# Patient Record
Sex: FEMALE | Race: ASIAN | NOT HISPANIC OR LATINO
[De-identification: names, ages, dates, MRNs, and addresses within clinical notes are randomized per-mention and may not be internally consistent; named-entity substitution may affect disease eponyms.]

---

## 2017-04-07 PROBLEM — Z00.00 ENCOUNTER FOR PREVENTIVE HEALTH EXAMINATION: Status: ACTIVE | Noted: 2017-04-07

## 2017-04-20 ENCOUNTER — APPOINTMENT (OUTPATIENT)
Dept: ORTHOPEDIC SURGERY | Facility: CLINIC | Age: 82
End: 2017-04-20

## 2017-11-01 ENCOUNTER — OUTPATIENT (OUTPATIENT)
Dept: OUTPATIENT SERVICES | Facility: HOSPITAL | Age: 82
LOS: 1 days | End: 2017-11-01
Payer: MEDICAID

## 2017-11-01 PROCEDURE — G9001: CPT

## 2017-11-28 ENCOUNTER — INPATIENT (INPATIENT)
Facility: HOSPITAL | Age: 82
LOS: 11 days | Discharge: HOME | End: 2017-12-10
Attending: INTERNAL MEDICINE | Admitting: INTERNAL MEDICINE
Payer: MEDICAID

## 2017-11-28 VITALS
HEART RATE: 95 BPM | DIASTOLIC BLOOD PRESSURE: 65 MMHG | OXYGEN SATURATION: 96 % | TEMPERATURE: 99 F | RESPIRATION RATE: 22 BRPM | SYSTOLIC BLOOD PRESSURE: 168 MMHG

## 2017-11-28 DIAGNOSIS — R06.02 SHORTNESS OF BREATH: ICD-10-CM

## 2017-11-28 LAB
ALBUMIN SERPL ELPH-MCNC: 3.5 G/DL — SIGNIFICANT CHANGE UP (ref 3.3–5)
ALP SERPL-CCNC: 48 U/L — SIGNIFICANT CHANGE UP (ref 40–120)
ALT FLD-CCNC: 15 U/L — SIGNIFICANT CHANGE UP (ref 4–33)
APTT BLD: 29.8 SEC — SIGNIFICANT CHANGE UP (ref 27.5–37.4)
AST SERPL-CCNC: 36 U/L — HIGH (ref 4–32)
BASE EXCESS BLDV CALC-SCNC: -7.7 MMOL/L — SIGNIFICANT CHANGE UP
BASE EXCESS BLDV CALC-SCNC: -8.3 MMOL/L — SIGNIFICANT CHANGE UP
BASOPHILS # BLD AUTO: 0.07 K/UL — SIGNIFICANT CHANGE UP (ref 0–0.2)
BASOPHILS NFR BLD AUTO: 0.5 % — SIGNIFICANT CHANGE UP (ref 0–2)
BILIRUB SERPL-MCNC: < 0.2 MG/DL — LOW (ref 0.2–1.2)
BLD GP AB SCN SERPL QL: NEGATIVE — SIGNIFICANT CHANGE UP
BLOOD GAS VENOUS - CREATININE: 2.51 MG/DL — HIGH (ref 0.5–1.3)
BLOOD GAS VENOUS - CREATININE: 2.6 MG/DL — HIGH (ref 0.5–1.3)
BUN SERPL-MCNC: 48 MG/DL — HIGH (ref 7–23)
CALCIUM SERPL-MCNC: 8.5 MG/DL — SIGNIFICANT CHANGE UP (ref 8.4–10.5)
CHLORIDE BLDV-SCNC: 98 MMOL/L — SIGNIFICANT CHANGE UP (ref 96–108)
CHLORIDE BLDV-SCNC: 99 MMOL/L — SIGNIFICANT CHANGE UP (ref 96–108)
CHLORIDE SERPL-SCNC: 95 MMOL/L — LOW (ref 98–107)
CK MB BLD-MCNC: 14.57 NG/ML — HIGH (ref 1–4.7)
CK SERPL-CCNC: 452 U/L — HIGH (ref 25–170)
CO2 SERPL-SCNC: 17 MMOL/L — LOW (ref 22–31)
CREAT SERPL-MCNC: 2.52 MG/DL — HIGH (ref 0.5–1.3)
D DIMER BLD IA.RAPID-MCNC: 388 NG/ML — SIGNIFICANT CHANGE UP
EOSINOPHIL # BLD AUTO: 0.18 K/UL — SIGNIFICANT CHANGE UP (ref 0–0.5)
EOSINOPHIL NFR BLD AUTO: 1.3 % — SIGNIFICANT CHANGE UP (ref 0–6)
GAS PNL BLDV: 124 MMOL/L — LOW (ref 136–146)
GAS PNL BLDV: 125 MMOL/L — LOW (ref 136–146)
GLUCOSE BLDV-MCNC: 102 — HIGH (ref 70–99)
GLUCOSE BLDV-MCNC: 118 — HIGH (ref 70–99)
GLUCOSE SERPL-MCNC: 125 MG/DL — HIGH (ref 70–99)
HCO3 BLDV-SCNC: 17 MMOL/L — LOW (ref 20–27)
HCO3 BLDV-SCNC: 17 MMOL/L — LOW (ref 20–27)
HCT VFR BLD CALC: 21.3 % — LOW (ref 34.5–45)
HCT VFR BLDV CALC: 19.8 % — CRITICAL LOW (ref 34.5–45)
HCT VFR BLDV CALC: 21.8 % — LOW (ref 34.5–45)
HGB BLD-MCNC: 6.3 G/DL — CRITICAL LOW (ref 11.5–15.5)
HGB BLDV-MCNC: 6.3 G/DL — CRITICAL LOW (ref 11.5–15.5)
HGB BLDV-MCNC: 7.1 G/DL — LOW (ref 11.5–15.5)
IMM GRANULOCYTES # BLD AUTO: 0.12 # — SIGNIFICANT CHANGE UP
IMM GRANULOCYTES NFR BLD AUTO: 0.9 % — SIGNIFICANT CHANGE UP (ref 0–1.5)
LACTATE BLDV-MCNC: 1.6 MMOL/L — SIGNIFICANT CHANGE UP (ref 0.5–2)
LACTATE BLDV-MCNC: 2 MMOL/L — SIGNIFICANT CHANGE UP (ref 0.5–2)
LIDOCAIN IGE QN: 39.5 U/L — SIGNIFICANT CHANGE UP (ref 7–60)
LYMPHOCYTES # BLD AUTO: 1.34 K/UL — SIGNIFICANT CHANGE UP (ref 1–3.3)
LYMPHOCYTES # BLD AUTO: 10 % — LOW (ref 13–44)
MCHC RBC-ENTMCNC: 22 PG — LOW (ref 27–34)
MCHC RBC-ENTMCNC: 29.6 % — LOW (ref 32–36)
MCV RBC AUTO: 74.5 FL — LOW (ref 80–100)
MONOCYTES # BLD AUTO: 1.46 K/UL — HIGH (ref 0–0.9)
MONOCYTES NFR BLD AUTO: 10.9 % — SIGNIFICANT CHANGE UP (ref 2–14)
NEUTROPHILS # BLD AUTO: 10.26 K/UL — HIGH (ref 1.8–7.4)
NEUTROPHILS NFR BLD AUTO: 76.4 % — SIGNIFICANT CHANGE UP (ref 43–77)
NRBC # FLD: 0.02 — SIGNIFICANT CHANGE UP
NT-PROBNP SERPL-SCNC: 8170 PG/ML — SIGNIFICANT CHANGE UP
PCO2 BLDV: 40 MMHG — LOW (ref 41–51)
PCO2 BLDV: 46 MMHG — SIGNIFICANT CHANGE UP (ref 41–51)
PH BLDV: 7.23 PH — LOW (ref 7.32–7.43)
PH BLDV: 7.26 PH — LOW (ref 7.32–7.43)
PLATELET # BLD AUTO: 597 K/UL — HIGH (ref 150–400)
PMV BLD: 9.5 FL — SIGNIFICANT CHANGE UP (ref 7–13)
PO2 BLDV: 28 MMHG — LOW (ref 35–40)
PO2 BLDV: 29 MMHG — LOW (ref 35–40)
POTASSIUM BLDV-SCNC: 5.1 MMOL/L — HIGH (ref 3.4–4.5)
POTASSIUM BLDV-SCNC: 6 MMOL/L — HIGH (ref 3.4–4.5)
POTASSIUM SERPL-MCNC: 5.6 MMOL/L — HIGH (ref 3.5–5.3)
POTASSIUM SERPL-SCNC: 5.6 MMOL/L — HIGH (ref 3.5–5.3)
PROT SERPL-MCNC: 6.3 G/DL — SIGNIFICANT CHANGE UP (ref 6–8.3)
RBC # BLD: 2.86 M/UL — LOW (ref 3.8–5.2)
RBC # FLD: 15.9 % — HIGH (ref 10.3–14.5)
RH IG SCN BLD-IMP: POSITIVE — SIGNIFICANT CHANGE UP
RH IG SCN BLD-IMP: POSITIVE — SIGNIFICANT CHANGE UP
SAO2 % BLDV: 38.9 % — LOW (ref 60–85)
SAO2 % BLDV: 39.4 % — LOW (ref 60–85)
SODIUM SERPL-SCNC: 128 MMOL/L — LOW (ref 135–145)
TROPONIN T SERPL-MCNC: < 0.06 NG/ML — SIGNIFICANT CHANGE UP (ref 0–0.06)
TROPONIN T SERPL-MCNC: < 0.06 NG/ML — SIGNIFICANT CHANGE UP (ref 0–0.06)
WBC # BLD: 13.43 K/UL — HIGH (ref 3.8–10.5)
WBC # FLD AUTO: 13.43 K/UL — HIGH (ref 3.8–10.5)

## 2017-11-28 PROCEDURE — 71010: CPT | Mod: 26

## 2017-11-28 RX ORDER — NITROGLYCERIN 6.5 MG
0.4 CAPSULE, EXTENDED RELEASE ORAL ONCE
Qty: 0 | Refills: 0 | Status: COMPLETED | OUTPATIENT
Start: 2017-11-28 | End: 2017-11-28

## 2017-11-28 RX ORDER — SODIUM CHLORIDE 9 MG/ML
3 INJECTION INTRAMUSCULAR; INTRAVENOUS; SUBCUTANEOUS ONCE
Qty: 0 | Refills: 0 | Status: COMPLETED | OUTPATIENT
Start: 2017-11-28 | End: 2017-11-28

## 2017-11-28 RX ORDER — ASPIRIN/CALCIUM CARB/MAGNESIUM 324 MG
162 TABLET ORAL ONCE
Qty: 0 | Refills: 0 | Status: COMPLETED | OUTPATIENT
Start: 2017-11-28 | End: 2017-11-28

## 2017-11-28 RX ORDER — FUROSEMIDE 40 MG
20 TABLET ORAL ONCE
Qty: 0 | Refills: 0 | Status: COMPLETED | OUTPATIENT
Start: 2017-11-28 | End: 2017-11-28

## 2017-11-28 RX ADMIN — SODIUM CHLORIDE 3 MILLILITER(S): 9 INJECTION INTRAMUSCULAR; INTRAVENOUS; SUBCUTANEOUS at 13:52

## 2017-11-28 RX ADMIN — Medication 20 MILLIGRAM(S): at 18:42

## 2017-11-28 RX ADMIN — Medication 162 MILLIGRAM(S): at 13:49

## 2017-11-28 RX ADMIN — Medication 20 MILLIGRAM(S): at 13:56

## 2017-11-28 RX ADMIN — Medication 0.4 MILLIGRAM(S): at 13:50

## 2017-11-28 NOTE — ED PROVIDER NOTE - ATTENDING CONTRIBUTION TO CARE
I, Jennifer Cabot, MD, have performed a history and physical exam of the patient and discussed their management with the resident. I reviewed the resident's note and agree with the documented findings and plan of care. My medical decision making and observations are found above.    Cabot: 87F with PMH of HTN who comes in with 1 month of progressive SOB, dry cough, BLE pitting edema, and today, CP.  No F/C/sputum.  No recent travel, prior PE/DVT.  No urinary sx.  On exam, in mild resp distress, retracting, wheezes throughout, heart sounds normal, abd benign, LEs with 1+ BLE edema.  DDx includes CHF exacerbation, COPD exacerbation, ACS.  Will check basic labs, trops, BNP, EKG, CXR, admit.  Will apply BIPAP and give lasix now.

## 2017-11-28 NOTE — ED ADULT NURSE NOTE - OBJECTIVE STATEMENT
Pt received to main ED room 29, pt primarily gujarati speaking, daughter in law at bedside to translate, pt denies need for  services. Per family at bedside, pt has been weak for 1 month and 15 days ago had started having nausea, vomiting and diarrhea. Pt presents to day with primarily complaints of worsening SOB and swelling and also generalzied body pain, pt stating 'I have pain all over' Pt also reporting dry cough. Pt received awake, alert, oriented x4, denies lightheadedness/dizziness at this time. Pt with respirations  tachypneic and labored, pt arrived with 2L NC, pulse oxygenation 100%. Pt reporting SOB and chest pain in the center of the chest. Pt reporting abdominal pain, denies N/V/D at this time. Pt ambulatory with walker/cane at baseline, denies recent falls, reporting decreased ability to ambulate r/t weakness. Pt received to main ED room 29, pt primarily gujarati speaking, daughter in law at bedside to translate, pt denies need for  services. Per family at bedside, pt has been weak for 1 month and 15 days ago had started having nausea, vomiting and diarrhea. Pt presents today with primarily complaints of worsening SOB and swelling and also generalized body pain, pt stating 'I have pain all over'. Pt received awake, alert, oriented x4, denies lightheadedness/dizziness at this time. Pt with respirations tachypneic and labored, pt arrived with 2L NC in place, pulse oxygenation 100%. Pt reporting SOB and chest pain in the center of the chest. Pt reporting abdominal pain, denies N/V/D at this time. Pt ambulatory with walker/cane at baseline, denies recent falls, reporting decreased ability to ambulate r/t weakness. Pt arrived with 20g PIV in R AC placed by EMS, labs drawn and sent per orders. Pt placed on BiPap by respirations therapist at bedside per Dr Cabot. Cardiac monitor in place. Family remains at bedside. Safety maintained, will continue to monitor.

## 2017-11-28 NOTE — CONSULT NOTE ADULT - SUBJECTIVE AND OBJECTIVE BOX
CHIEF COMPLAINT:    HPI:    PAST MEDICAL & SURGICAL HISTORY:      FAMILY HISTORY:      SOCIAL HISTORY:  Smoking: [x ] Never Smoked   Substance Use: [x ] Never Used [ ] Used ____  EtOH Use:  Marital Status: [ ] Single [ ]  [ ]  [ ]   Sexual History:   Occupation:  Recent Travel: Went to [facility] in 6/2017  Country of Birth: [facility]  Advance Directives:    Allergies    No Known Allergies    Intolerances        HOME MEDICATIONS:    REVIEW OF SYSTEMS:  Constitutional: [ ] negative [ ] fevers [x ] chills [ ] weight loss [ ] weight gain  HEENT: [x ] negative [ ] dry eyes [ ] eye irritation [ ] postnasal drip [ ] nasal congestion  CV: [ ] negative  [ ] chest pain [x ] orthopnea [ ] palpitations [ ] murmur  Resp: [ ] negative [x ] cough [ ] shortness of breath [ ] dyspnea [ ] wheezing [ ] sputum [ ] hemoptysis  GI: [x] negative [ ] nausea [ ] vomiting [ ] diarrhea [ ] constipation [ ] abd pain [ ] dysphagia   : [x ] negative [ ] dysuria [ ] nocturia [ ] hematuria [ ] increased urinary frequency  Musculoskeletal: [x ] negative [ ] back pain [ ] myalgias [ ] arthralgias [ ] fracture  Skin: [x ] negative [ ] rash [ ] itch  Neurological: [x ] negative [ ] headache [ ] dizziness [ ] syncope [ ] weakness [ ] numbness  Psychiatric: [x ] negative [ ] anxiety [ ] depression  Endocrine: [x ] negative [ ] diabetes [ ] thyroid problem  Hematologic/Lymphatic: [ ] negative [ ] anemia [ ] bleeding problem  Allergic/Immunologic: [ ] negative [ ] itchy eyes [ ] nasal discharge [ ] hives [ ] angioedema  [ ] All other systems negative  [ ] Unable to assess ROS because ________    OBJECTIVE:  T(C): 36.4 (28 Nov 2017 17:29), Max: 37.2 (28 Nov 2017 12:46)  T(F): 97.6 (28 Nov 2017 17:29), Max: 98.9 (28 Nov 2017 12:46)  HR: 81 (28 Nov 2017 17:29) (72 - 95)  BP: 179/79 (28 Nov 2017 17:29) (141/52 - 179/79)  RR: 20 (28 Nov 2017 17:29) (19 - 22)  SpO2: 95% (28 Nov 2017 17:29) (95% - 100%)        CAPILLARY BLOOD GLUCOSE          PHYSICAL EXAM:  General: dyspneic with exertion but no nasal flaring or excessive use of intercostal muscles. Orthopneic.  HEENT: ONEIL  Lymph Nodes:  Neck: mild JVD.  Respiratory: decreased breath sounds at bases bilaterally. No wheezes.  Cardiovascular: RRR no m/r/g  Abdomen: S/NT/ND  Extremities: 2+ pitting edema bilaterally. Left > right. Legs are symmetrical.  Skin: No rash  Neurological: NOn-focal  Psychiatry: oriented x 3    HOSPITAL MEDICATIONS:      furosemide   Injectable 20 milliGRAM(s) IV Push once                          LABS:                        6.3    13.43 )-----------( 597      ( 28 Nov 2017 15:29 )             21.3     Hgb Trend: 6.3<--  11-28    128<L>  |  95<L>  |  48<H>  ----------------------------<  125<H>  5.6<H>   |  17<L>  |  2.52<H>    Ca    8.5      28 Nov 2017 15:29    TPro  6.3  /  Alb  3.5  /  TBili  < 0.2<L>  /  DBili  x   /  AST  36<H>  /  ALT  15  /  AlkPhos  48  11-28    Creatinine Trend: 2.52<--  PTT - ( 28 Nov 2017 15:29 )  PTT:29.8 SEC      Venous Blood Gas:  11-28 @ 17:30  7.23/46/29/17/38.9  VBG Lactate: 2.0  Venous Blood Gas:  11-28 @ 15:29  7.26/40/28/17/39.4  VBG Lactate: 1.6      MICROBIOLOGY:     RADIOLOGY:  [x ] Reviewed and interpreted by me: CXR showing b/p pleural effusions with some increased vascular markings.    PULMONARY FUNCTION TESTS:    EKG: CHIEF COMPLAINT: dyspnea    HPI: 87F with HTN, HLD and h/o anemia 2/2 uterine bleeding s/p hysterectomy presents with acute dyspnea of 1 day with chest pain. Patient has had LE swelling for a year. Her medications include Lasix 40mg to use with swelling. She only has swelling to her ankles usually. She has had worsening LE swelling since after thanksgiving for the past 4 days. She denies orthopnea but had some progressive dyspnea and dry cough. No sick contacts, runny nose, fevers. She has not required a blood transfusion since 1983 after the uterine surgery. Denies h/o MI or smoking.    PAST MEDICAL & SURGICAL HISTORY:      FAMILY HISTORY:      SOCIAL HISTORY:  Smoking: [x ] Never Smoked   Substance Use: [x ] Never Used [ ] Used ____  EtOH Use:  Marital Status: [ ] Single [ ]  [ ]  [ ]   Sexual History:   Occupation:  Recent Travel: Went to [facility] in 6/2017  Country of Birth: [facility]  Advance Directives:    Allergies    No Known Allergies    Intolerances        HOME MEDICATIONS:    REVIEW OF SYSTEMS:  Constitutional: [ ] negative [ ] fevers [x ] chills [ ] weight loss [ ] weight gain  HEENT: [x ] negative [ ] dry eyes [ ] eye irritation [ ] postnasal drip [ ] nasal congestion  CV: [ ] negative  [ ] chest pain [x ] orthopnea [ ] palpitations [ ] murmur  Resp: [ ] negative [x ] cough [ ] shortness of breath [ ] dyspnea [ ] wheezing [ ] sputum [ ] hemoptysis  GI: [x] negative [ ] nausea [ ] vomiting [ ] diarrhea [ ] constipation [ ] abd pain [ ] dysphagia   : [x ] negative [ ] dysuria [ ] nocturia [ ] hematuria [ ] increased urinary frequency  Musculoskeletal: [x ] negative [ ] back pain [ ] myalgias [ ] arthralgias [ ] fracture  Skin: [x ] negative [ ] rash [ ] itch  Neurological: [x ] negative [ ] headache [ ] dizziness [ ] syncope [ ] weakness [ ] numbness  Psychiatric: [x ] negative [ ] anxiety [ ] depression  Endocrine: [x ] negative [ ] diabetes [ ] thyroid problem  Hematologic/Lymphatic: [ ] negative [ ] anemia [ ] bleeding problem  Allergic/Immunologic: [ ] negative [ ] itchy eyes [ ] nasal discharge [ ] hives [ ] angioedema  [ ] All other systems negative  [ ] Unable to assess ROS because ________    OBJECTIVE:  T(C): 36.4 (28 Nov 2017 17:29), Max: 37.2 (28 Nov 2017 12:46)  T(F): 97.6 (28 Nov 2017 17:29), Max: 98.9 (28 Nov 2017 12:46)  HR: 81 (28 Nov 2017 17:29) (72 - 95)  BP: 179/79 (28 Nov 2017 17:29) (141/52 - 179/79)  RR: 20 (28 Nov 2017 17:29) (19 - 22)  SpO2: 95% (28 Nov 2017 17:29) (95% - 100%)        CAPILLARY BLOOD GLUCOSE          PHYSICAL EXAM:  General: dyspneic with exertion but no nasal flaring or excessive use of intercostal muscles. Orthopneic.  HEENT: ONEIL  Lymph Nodes:  Neck: mild JVD.  Respiratory: decreased breath sounds at bases bilaterally. No wheezes.  Cardiovascular: RRR no m/r/g  Abdomen: S/NT/ND  Extremities: 2+ pitting edema bilaterally. Left > right. Legs are symmetrical.  Skin: No rash  Neurological: NOn-focal  Psychiatry: oriented x 3    HOSPITAL MEDICATIONS:      furosemide   Injectable 20 milliGRAM(s) IV Push once      LABS:                        6.3    13.43 )-----------( 597      ( 28 Nov 2017 15:29 )             21.3     Hgb Trend: 6.3<--  11-28    128<L>  |  95<L>  |  48<H>  ----------------------------<  125<H>  5.6<H>   |  17<L>  |  2.52<H>    Ca    8.5      28 Nov 2017 15:29    TPro  6.3  /  Alb  3.5  /  TBili  < 0.2<L>  /  DBili  x   /  AST  36<H>  /  ALT  15  /  AlkPhos  48  11-28    Creatinine Trend: 2.52<--  PTT - ( 28 Nov 2017 15:29 )  PTT:29.8 SEC      Venous Blood Gas:  11-28 @ 17:30  7.23/46/29/17/38.9  VBG Lactate: 2.0  Venous Blood Gas:  11-28 @ 15:29  7.26/40/28/17/39.4  VBG Lactate: 1.6      MICROBIOLOGY:     RADIOLOGY:  [x ] Reviewed and interpreted by me: CXR showing b/p pleural effusions with some increased vascular markings.    PULMONARY FUNCTION TESTS:    EKG:

## 2017-11-28 NOTE — ED PROVIDER NOTE - PROGRESS NOTE DETAILS
spoke with pt's primary medical doctor Dr. Villagomez who states patient has had diarrhea recently as well. Cardiac enzymes negative.  BNP ~8000.  Bedside cardiac ultrasound showed good squeeze, no LVH.  +BL simple pleural effusions at bases.  Pulm consulted, no plans for pigtail placement given pt's stable respiratory status and likely well compensated status.  Will admit to tele for further diuresis.

## 2017-11-28 NOTE — ED PROVIDER NOTE - OBJECTIVE STATEMENT
87F with past medical history Hypertension presents with 1 mo h/o progressive shortness of breath and BL LE edema and 1d h/o acute worsening shortness of breath with constant substernal chest pressure with no radiation.  C/o dry cough as well.  Pt's daughter states she thinks patient is swollen all over and complaining of diffuse pain.  Denies fever, chills, abdominal pain, calf pain, h/o vte or recent travel, h/o Coronary Artery Disease, CHF or COPD.  No smoking history. No

## 2017-11-28 NOTE — ED ADULT TRIAGE NOTE - CHIEF COMPLAINT QUOTE
Pt co sob and chest pain  x 2 days pt with facial edema and leg edema. Pt was hypoxic per EMS . Pt placed on 2LNC. 02 sat improved. Pt was given 2 nitro sprays and 2 baby asa.

## 2017-11-28 NOTE — CONSULT NOTE ADULT - ASSESSMENT
87F with HTN, HLD and presenting with Dyspnea and chest pain found to have b/l LE swelling and b/l pleural effusions, anemia concerning for heart failure. Patient now s/p 20mg IV lasix and nitroglycerine. She is now off BIPAP to room air with saturation of 94%.    - Bedside echo showing A-lines anteriorly with B lines posteriorly and b/l moderate simple pleural effusions.  - Slight pericardial effusion with normal RV/LV size and grossly normal LV function.  - Continue Lasix 20mg IV daily  - Patient improved significantly after Lasix diuresis and blood pressure control. Please keep sbp < 160.  - No thoracentesis indicated at this time.  - Follow up  BMP and proBNP and troponins.  - Check TSH, A1c, reticulocyte count and Iron studies.  - She will need a TTE to look for possible diastolic or valvular heart failure. 87F with HTN, HLD and presenting with Dyspnea and chest pain found to have b/l LE swelling and b/l pleural effusions, anemia concerning for heart failure. Patient now s/p 20mg IV lasix and nitroglycerine. She is now off BIPAP to room air with saturation of 94%.    - Bedside echo showing A-lines anteriorly with B lines posteriorly and b/l moderate simple pleural effusions.  - Slight pericardial effusion with normal RV/LV size and grossly normal LV function.  - Continue Lasix 20mg IV daily  - Patient improved significantly after Lasix diuresis and blood pressure control. Please keep sbp < 160.  - No thoracentesis indicated at this time.  - Follow up  BMP and proBNP and troponins to r/o underlying CAD or ACS.  - Check TSH, A1c, reticulocyte count and Iron studies.  - She will need a TTE to look for possible diastolic or valvular heart failure. Telemetry monitoring.  - May use supplemental oxygen to keep sats > 90% 87F with HTN, HLD and presenting with Dyspnea and chest pain found to have b/l LE swelling and b/l pleural effusions, anemia concerning for heart failure. Patient now s/p 20mg IV lasix and nitroglycerine. She is now off BIPAP to room air with saturation of 94%.    - Bedside echo showing A-lines anteriorly with B lines posteriorly and b/l moderate simple pleural effusions.  - Slight pericardial effusion with normal RV/LV size and grossly normal LV function.  - Continue Lasix 20mg IV daily  - Check RVP.  - Patient improved significantly after Lasix diuresis and blood pressure control. Please keep sbp < 160.  - No thoracentesis indicated at this time.  - Follow up  BMP and proBNP and troponins to r/o underlying CAD or ACS.  - Check TSH, A1c, reticulocyte count and Iron studies.  - She will need a TTE to look for possible diastolic or valvular heart failure. Telemetry monitoring.  - May use supplemental oxygen to keep sats > 90% 87F with HTN, HLD and presenting with Dyspnea and chest pain found to have b/l LE swelling and b/l pleural effusions, anemia concerning for heart failure. Patient now s/p 20mg IV lasix and nitroglycerine. She is now off BIPAP to room air with saturation of 94%.    - Bedside echo showing A-lines anteriorly with B lines posteriorly and b/l moderate simple pleural effusions.  - Slight pericardial effusion with normal RV/LV size and grossly normal LV function.  - Continue Lasix 20mg IV daily  - Check RVP.  - Patient improved significantly after Lasix diuresis and blood pressure control. Please keep sbp < 160.  - No thoracentesis indicated at this time.  - Follow up  BMP and proBNP and troponins to r/o underlying CAD or ACS.  - Check TSH, A1c, reticulocyte count and Iron studies. ER to give 1 unit PRBC. please give Lasix 20mg IV extra with transfusion.  - She will need a TTE to look for possible diastolic or valvular heart failure. Telemetry monitoring.  - May use supplemental oxygen to keep sats > 90%

## 2017-11-28 NOTE — ED PROVIDER NOTE - MEDICAL DECISION MAKING DETAILS
Cabot: 87F with PMH of HTN who comes in with 1 month of progressive SOB, dry cough, BLE pitting edema, and today, CP.  No F/C/sputum.  No recent travel, prior PE/DVT.  No urinary sx.  On exam, in mild resp distress, retracting, wheezes throughout, heart sounds normal, abd benign, LEs with 1+ BLE edema.  DDx includes CHF exacerbation, COPD exacerbation, ACS.  Will check basic labs, trops, BNP, EKG, CXR, admit.  Will apply BIPAP and give lasix now.

## 2017-11-29 DIAGNOSIS — N17.9 ACUTE KIDNEY FAILURE, UNSPECIFIED: ICD-10-CM

## 2017-11-29 DIAGNOSIS — J90 PLEURAL EFFUSION, NOT ELSEWHERE CLASSIFIED: ICD-10-CM

## 2017-11-29 DIAGNOSIS — I10 ESSENTIAL (PRIMARY) HYPERTENSION: ICD-10-CM

## 2017-11-29 DIAGNOSIS — D64.9 ANEMIA, UNSPECIFIED: ICD-10-CM

## 2017-11-29 DIAGNOSIS — Z29.9 ENCOUNTER FOR PROPHYLACTIC MEASURES, UNSPECIFIED: ICD-10-CM

## 2017-11-29 DIAGNOSIS — Z90.710 ACQUIRED ABSENCE OF BOTH CERVIX AND UTERUS: Chronic | ICD-10-CM

## 2017-11-29 LAB
ANISOCYTOSIS BLD QL: SLIGHT — SIGNIFICANT CHANGE UP
APPEARANCE UR: SIGNIFICANT CHANGE UP
B PERT DNA SPEC QL NAA+PROBE: SIGNIFICANT CHANGE UP
BACTERIA # UR AUTO: HIGH
BASE EXCESS BLDV CALC-SCNC: -7.2 MMOL/L — SIGNIFICANT CHANGE UP
BASOPHILS # BLD AUTO: 0.06 K/UL — SIGNIFICANT CHANGE UP (ref 0–0.2)
BASOPHILS NFR BLD AUTO: 0.6 % — SIGNIFICANT CHANGE UP (ref 0–2)
BILIRUB UR-MCNC: NEGATIVE — SIGNIFICANT CHANGE UP
BLOOD GAS VENOUS - CREATININE: 2.66 MG/DL — HIGH (ref 0.5–1.3)
BLOOD UR QL VISUAL: NEGATIVE — SIGNIFICANT CHANGE UP
BUN SERPL-MCNC: 48 MG/DL — HIGH (ref 7–23)
C PNEUM DNA SPEC QL NAA+PROBE: NOT DETECTED — SIGNIFICANT CHANGE UP
CALCIUM SERPL-MCNC: 9.1 MG/DL — SIGNIFICANT CHANGE UP (ref 8.4–10.5)
CHLORIDE BLDV-SCNC: 98 MMOL/L — SIGNIFICANT CHANGE UP (ref 96–108)
CHLORIDE SERPL-SCNC: 95 MMOL/L — LOW (ref 98–107)
CHLORIDE UR-SCNC: 99 MMOL/L — SIGNIFICANT CHANGE UP
CHOLEST SERPL-MCNC: 148 MG/DL — SIGNIFICANT CHANGE UP (ref 120–199)
CK MB BLD-MCNC: 3.4 — HIGH (ref 0–2.5)
CK MB BLD-MCNC: 9.74 NG/ML — HIGH (ref 1–4.7)
CK SERPL-CCNC: 290 U/L — HIGH (ref 25–170)
CO2 SERPL-SCNC: 18 MMOL/L — LOW (ref 22–31)
COLOR SPEC: SIGNIFICANT CHANGE UP
CREAT SERPL-MCNC: 2.55 MG/DL — HIGH (ref 0.5–1.3)
ELLIPTOCYTES BLD QL SMEAR: SLIGHT — SIGNIFICANT CHANGE UP
EOSINOPHIL # BLD AUTO: 0.37 K/UL — SIGNIFICANT CHANGE UP (ref 0–0.5)
EOSINOPHIL NFR BLD AUTO: 3.4 % — SIGNIFICANT CHANGE UP (ref 0–6)
FERRITIN SERPL-MCNC: 13.62 NG/ML — LOW (ref 15–150)
FLUAV H1 2009 PAND RNA SPEC QL NAA+PROBE: NOT DETECTED — SIGNIFICANT CHANGE UP
FLUAV H1 RNA SPEC QL NAA+PROBE: NOT DETECTED — SIGNIFICANT CHANGE UP
FLUAV H3 RNA SPEC QL NAA+PROBE: NOT DETECTED — SIGNIFICANT CHANGE UP
FLUAV SUBTYP SPEC NAA+PROBE: SIGNIFICANT CHANGE UP
FLUBV RNA SPEC QL NAA+PROBE: NOT DETECTED — SIGNIFICANT CHANGE UP
FOLATE SERPL-MCNC: 9.6 NG/ML — SIGNIFICANT CHANGE UP (ref 4.7–20)
GAS PNL BLDV: 127 MMOL/L — LOW (ref 136–146)
GLUCOSE BLDV-MCNC: 75 — SIGNIFICANT CHANGE UP (ref 70–99)
GLUCOSE SERPL-MCNC: 78 MG/DL — SIGNIFICANT CHANGE UP (ref 70–99)
GLUCOSE UR-MCNC: NEGATIVE — SIGNIFICANT CHANGE UP
HADV DNA SPEC QL NAA+PROBE: NOT DETECTED — SIGNIFICANT CHANGE UP
HBA1C BLD-MCNC: 7.3 % — HIGH (ref 4–5.6)
HCO3 BLDV-SCNC: 18 MMOL/L — LOW (ref 20–27)
HCOV 229E RNA SPEC QL NAA+PROBE: NOT DETECTED — SIGNIFICANT CHANGE UP
HCOV HKU1 RNA SPEC QL NAA+PROBE: NOT DETECTED — SIGNIFICANT CHANGE UP
HCOV NL63 RNA SPEC QL NAA+PROBE: NOT DETECTED — SIGNIFICANT CHANGE UP
HCOV OC43 RNA SPEC QL NAA+PROBE: NOT DETECTED — SIGNIFICANT CHANGE UP
HCT VFR BLD CALC: 25.5 % — LOW (ref 34.5–45)
HCT VFR BLDV CALC: 25 % — LOW (ref 34.5–45)
HDLC SERPL-MCNC: 42 MG/DL — LOW (ref 45–65)
HGB BLD-MCNC: 8.1 G/DL — LOW (ref 11.5–15.5)
HGB BLDV-MCNC: 8 G/DL — LOW (ref 11.5–15.5)
HMPV RNA SPEC QL NAA+PROBE: NOT DETECTED — SIGNIFICANT CHANGE UP
HPIV1 RNA SPEC QL NAA+PROBE: NOT DETECTED — SIGNIFICANT CHANGE UP
HPIV2 RNA SPEC QL NAA+PROBE: NOT DETECTED — SIGNIFICANT CHANGE UP
HPIV3 RNA SPEC QL NAA+PROBE: NOT DETECTED — SIGNIFICANT CHANGE UP
HPIV4 RNA SPEC QL NAA+PROBE: NOT DETECTED — SIGNIFICANT CHANGE UP
HYALINE CASTS # UR AUTO: SIGNIFICANT CHANGE UP (ref 0–?)
HYPOCHROMIA BLD QL: SLIGHT — SIGNIFICANT CHANGE UP
IMM GRANULOCYTES NFR BLD AUTO: 0.6 % — SIGNIFICANT CHANGE UP (ref 0–1.5)
IRON SATN MFR SERPL: 105 UG/DL — SIGNIFICANT CHANGE UP (ref 30–160)
IRON SATN MFR SERPL: 350 UG/DL — SIGNIFICANT CHANGE UP (ref 140–530)
KETONES UR-MCNC: NEGATIVE — SIGNIFICANT CHANGE UP
LACTATE BLDV-MCNC: 1 MMOL/L — SIGNIFICANT CHANGE UP (ref 0.5–2)
LEUKOCYTE ESTERASE UR-ACNC: HIGH
LIPID PNL WITH DIRECT LDL SERPL: 82 MG/DL — SIGNIFICANT CHANGE UP
LYMPHOCYTES # BLD AUTO: 1.75 K/UL — SIGNIFICANT CHANGE UP (ref 1–3.3)
LYMPHOCYTES # BLD AUTO: 16.3 % — SIGNIFICANT CHANGE UP (ref 13–44)
M PNEUMO DNA SPEC QL NAA+PROBE: NOT DETECTED — SIGNIFICANT CHANGE UP
MAGNESIUM SERPL-MCNC: 1.8 MG/DL — SIGNIFICANT CHANGE UP (ref 1.6–2.6)
MCHC RBC-ENTMCNC: 23.6 PG — LOW (ref 27–34)
MCHC RBC-ENTMCNC: 31.8 % — LOW (ref 32–36)
MCV RBC AUTO: 74.3 FL — LOW (ref 80–100)
MICROCYTES BLD QL: SLIGHT — SIGNIFICANT CHANGE UP
MONOCYTES # BLD AUTO: 1.39 K/UL — HIGH (ref 0–0.9)
MONOCYTES NFR BLD AUTO: 12.9 % — SIGNIFICANT CHANGE UP (ref 2–14)
MORPHOLOGY BLD-IMP: SIGNIFICANT CHANGE UP
MUCOUS THREADS # UR AUTO: SIGNIFICANT CHANGE UP
NEUTROPHILS # BLD AUTO: 7.11 K/UL — SIGNIFICANT CHANGE UP (ref 1.8–7.4)
NEUTROPHILS NFR BLD AUTO: 66.2 % — SIGNIFICANT CHANGE UP (ref 43–77)
NITRITE UR-MCNC: NEGATIVE — SIGNIFICANT CHANGE UP
NON-SQ EPI CELLS # UR AUTO: <1 — SIGNIFICANT CHANGE UP
OSMOLALITY UR: 278 MOSMO/KG — SIGNIFICANT CHANGE UP (ref 50–1200)
PCO2 BLDV: 40 MMHG — LOW (ref 41–51)
PH BLDV: 7.29 PH — LOW (ref 7.32–7.43)
PH UR: 6 — SIGNIFICANT CHANGE UP (ref 4.6–8)
PHOSPHATE SERPL-MCNC: 5.3 MG/DL — HIGH (ref 2.5–4.5)
PLATELET # BLD AUTO: 552 K/UL — HIGH (ref 150–400)
PO2 BLDV: 39 MMHG — SIGNIFICANT CHANGE UP (ref 35–40)
POIKILOCYTOSIS BLD QL AUTO: SLIGHT — SIGNIFICANT CHANGE UP
POTASSIUM BLDV-SCNC: 4.6 MMOL/L — HIGH (ref 3.4–4.5)
POTASSIUM SERPL-MCNC: 4.9 MMOL/L — SIGNIFICANT CHANGE UP (ref 3.5–5.3)
POTASSIUM SERPL-SCNC: 4.9 MMOL/L — SIGNIFICANT CHANGE UP (ref 3.5–5.3)
POTASSIUM UR-SCNC: 17 MEQ/L — SIGNIFICANT CHANGE UP
PROT UR-MCNC: 30 — HIGH
PROT UR-MCNC: 47.1 MG/DL — SIGNIFICANT CHANGE UP
RBC # BLD: 3.43 M/UL — LOW (ref 3.8–5.2)
RBC # FLD: 16.3 % — HIGH (ref 10.3–14.5)
RBC CASTS # UR COMP ASSIST: SIGNIFICANT CHANGE UP (ref 0–?)
RSV RNA SPEC QL NAA+PROBE: NOT DETECTED — SIGNIFICANT CHANGE UP
RV+EV RNA SPEC QL NAA+PROBE: NOT DETECTED — SIGNIFICANT CHANGE UP
SAO2 % BLDV: 67.6 % — SIGNIFICANT CHANGE UP (ref 60–85)
SCHISTOCYTES BLD QL AUTO: SLIGHT — SIGNIFICANT CHANGE UP
SODIUM SERPL-SCNC: 129 MMOL/L — LOW (ref 135–145)
SODIUM UR-SCNC: 97 MEQ/L — SIGNIFICANT CHANGE UP
SP GR SPEC: 1.01 — SIGNIFICANT CHANGE UP (ref 1–1.03)
SQUAMOUS # UR AUTO: SIGNIFICANT CHANGE UP
TRANSFERRIN SERPL-MCNC: 260 MG/DL — SIGNIFICANT CHANGE UP (ref 200–360)
TRIGL SERPL-MCNC: 199 MG/DL — HIGH (ref 10–149)
TROPONIN T SERPL-MCNC: < 0.06 NG/ML — SIGNIFICANT CHANGE UP (ref 0–0.06)
TSH SERPL-MCNC: 1.92 UIU/ML — SIGNIFICANT CHANGE UP (ref 0.27–4.2)
UIBC SERPL-MCNC: 245 UG/DL — SIGNIFICANT CHANGE UP (ref 110–370)
UROBILINOGEN FLD QL: NORMAL E.U. — SIGNIFICANT CHANGE UP (ref 0.1–0.2)
UUN UR-MCNC: 144.9 MG/DL — SIGNIFICANT CHANGE UP
VIT B12 SERPL-MCNC: 1740 PG/ML — HIGH (ref 200–900)
WBC # BLD: 10.74 K/UL — HIGH (ref 3.8–10.5)
WBC # FLD AUTO: 10.74 K/UL — HIGH (ref 3.8–10.5)
WBC CLUMPS #/AREA URNS HPF: PRESENT — HIGH (ref 0–?)
WBC UR QL: SIGNIFICANT CHANGE UP (ref 0–?)

## 2017-11-29 PROCEDURE — 99223 1ST HOSP IP/OBS HIGH 75: CPT | Mod: GC

## 2017-11-29 PROCEDURE — 76775 US EXAM ABDO BACK WALL LIM: CPT | Mod: 26

## 2017-11-29 RX ORDER — AMLODIPINE BESYLATE 2.5 MG/1
5 TABLET ORAL
Qty: 0 | Refills: 0 | Status: DISCONTINUED | OUTPATIENT
Start: 2017-11-29 | End: 2017-11-29

## 2017-11-29 RX ORDER — SIMVASTATIN 20 MG/1
40 TABLET, FILM COATED ORAL AT BEDTIME
Qty: 0 | Refills: 0 | Status: DISCONTINUED | OUTPATIENT
Start: 2017-11-29 | End: 2017-12-10

## 2017-11-29 RX ORDER — METOPROLOL TARTRATE 50 MG
100 TABLET ORAL DAILY
Qty: 0 | Refills: 0 | Status: DISCONTINUED | OUTPATIENT
Start: 2017-11-29 | End: 2017-11-30

## 2017-11-29 RX ORDER — FUROSEMIDE 40 MG
20 TABLET ORAL DAILY
Qty: 0 | Refills: 0 | Status: DISCONTINUED | OUTPATIENT
Start: 2017-11-29 | End: 2017-11-30

## 2017-11-29 RX ORDER — SODIUM CHLORIDE 9 MG/ML
3 INJECTION INTRAMUSCULAR; INTRAVENOUS; SUBCUTANEOUS EVERY 8 HOURS
Qty: 0 | Refills: 0 | Status: DISCONTINUED | OUTPATIENT
Start: 2017-11-29 | End: 2017-12-10

## 2017-11-29 RX ORDER — AMLODIPINE BESYLATE 2.5 MG/1
5 TABLET ORAL DAILY
Qty: 0 | Refills: 0 | Status: DISCONTINUED | OUTPATIENT
Start: 2017-11-30 | End: 2017-12-02

## 2017-11-29 RX ORDER — AMLODIPINE BESYLATE 2.5 MG/1
5 TABLET ORAL ONCE
Qty: 0 | Refills: 0 | Status: COMPLETED | OUTPATIENT
Start: 2017-11-29 | End: 2017-11-29

## 2017-11-29 RX ORDER — PANTOPRAZOLE SODIUM 20 MG/1
40 TABLET, DELAYED RELEASE ORAL EVERY 12 HOURS
Qty: 0 | Refills: 0 | Status: DISCONTINUED | OUTPATIENT
Start: 2017-11-29 | End: 2017-12-01

## 2017-11-29 RX ADMIN — AMLODIPINE BESYLATE 5 MILLIGRAM(S): 2.5 TABLET ORAL at 17:11

## 2017-11-29 RX ADMIN — Medication 20 MILLIGRAM(S): at 04:07

## 2017-11-29 RX ADMIN — Medication 100 MILLIGRAM(S): at 10:18

## 2017-11-29 RX ADMIN — PANTOPRAZOLE SODIUM 40 MILLIGRAM(S): 20 TABLET, DELAYED RELEASE ORAL at 17:11

## 2017-11-29 RX ADMIN — PANTOPRAZOLE SODIUM 40 MILLIGRAM(S): 20 TABLET, DELAYED RELEASE ORAL at 06:59

## 2017-11-29 RX ADMIN — SIMVASTATIN 40 MILLIGRAM(S): 20 TABLET, FILM COATED ORAL at 21:49

## 2017-11-29 RX ADMIN — SODIUM CHLORIDE 3 MILLILITER(S): 9 INJECTION INTRAMUSCULAR; INTRAVENOUS; SUBCUTANEOUS at 13:58

## 2017-11-29 RX ADMIN — SODIUM CHLORIDE 3 MILLILITER(S): 9 INJECTION INTRAMUSCULAR; INTRAVENOUS; SUBCUTANEOUS at 21:50

## 2017-11-29 RX ADMIN — SODIUM CHLORIDE 3 MILLILITER(S): 9 INJECTION INTRAMUSCULAR; INTRAVENOUS; SUBCUTANEOUS at 06:59

## 2017-11-29 NOTE — H&P ADULT - HISTORY OF PRESENT ILLNESS
88 y/o F with hx of HTN presents with SOB and worsening b/l LE edema x 1 day. As per daughter, patient has been having gradual onset of SOB x 1 month. SOB became more severe and constant. SOB is present when she is resting as well. Daughter states it was difficult for patient to ambulate secondary to SOB. Patient also has b/l LE edema. SOB was also associated with chest discomfort. Denies fever, chills, cough, falls, LOC, abdominal pain, nausea, vomiting, melena, hematochezia, diarrhea, constipation, melena or calf tenderness.

## 2017-11-29 NOTE — PROGRESS NOTE ADULT - SUBJECTIVE AND OBJECTIVE BOX
CHIEF COMPLAINT:    Interval Events:    REVIEW OF SYSTEMS:  Constitutional: [ ] negative [ ] fevers [ ] chills [ ] weight loss [ ] weight gain  HEENT: [ ] negative [ ] dry eyes [ ] eye irritation [ ] postnasal drip [ ] nasal congestion  CV: [ ] negative  [ ] chest pain [ ] orthopnea [ ] palpitations [ ] murmur  Resp: [ ] negative [ ] cough [ ] shortness of breath [ ] dyspnea [ ] wheezing [ ] sputum [ ] hemoptysis  GI: [ ] negative [ ] nausea [ ] vomiting [ ] diarrhea [ ] constipation [ ] abd pain [ ] dysphagia   : [ ] negative [ ] dysuria [ ] nocturia [ ] hematuria [ ] increased urinary frequency  Musculoskeletal: [ ] negative [ ] back pain [ ] myalgias [ ] arthralgias [ ] fracture  Skin: [ ] negative [ ] rash [ ] itch  Neurological: [ ] negative [ ] headache [ ] dizziness [ ] syncope [ ] weakness [ ] numbness  Psychiatric: [ ] negative [ ] anxiety [ ] depression  Endocrine: [ ] negative [ ] diabetes [ ] thyroid problem  Hematologic/Lymphatic: [ ] negative [ ] anemia [ ] bleeding problem  Allergic/Immunologic: [ ] negative [ ] itchy eyes [ ] nasal discharge [ ] hives [ ] angioedema  [ ] All other systems negative  [ ] Unable to assess ROS because ________    OBJECTIVE:  ICU Vital Signs Last 24 Hrs  T(C): 36.8 (2017 07:03), Max: 37.2 (2017 12:46)  T(F): 98.3 (2017 07:03), Max: 98.9 (2017 12:46)  HR: 85 (2017 07:03) (72 - 95)  BP: 159/65 (2017 07:03) (141/52 - 187/66)  BP(mean): --  ABP: --  ABP(mean): --  RR: 18 (2017 07:03) (17 - 24)  SpO2: 95% (2017 07:03) (95% - 100%)         @ 07:01  -   @ 07:00  --------------------------------------------------------  IN: 0 mL / OUT: 400 mL / NET: -400 mL      CAPILLARY BLOOD GLUCOSE          PHYSICAL EXAM:  General:   HEENT:   Lymph Nodes:  Neck:   Respiratory:   Cardiovascular:   Abdomen:   Extremities:   Skin:   Neurological:  Psychiatry:    HOSPITAL MEDICATIONS:      amLODIPine   Tablet 5 milliGRAM(s) Oral <User Schedule>  furosemide   Injectable 20 milliGRAM(s) IV Push daily  metoprolol succinate  milliGRAM(s) Oral daily    simvastatin 40 milliGRAM(s) Oral at bedtime        pantoprazole  Injectable 40 milliGRAM(s) IV Push every 12 hours              sodium chloride 0.9% lock flush 3 milliLiter(s) IV Push every 8 hours      LABS:                        6.3    13.43 )-----------( 597      ( 2017 15:29 )             21.3     Hgb Trend: 6.3<--      129<L>  |  95<L>  |  48<H>  ----------------------------<  78  4.9   |  18<L>  |  2.55<H>    Ca    9.1      2017 04:20  Phos  5.3       Mg     1.8         TPro  6.3  /  Alb  3.5  /  TBili  < 0.2<L>  /  DBili  x   /  AST  36<H>  /  ALT  15  /  AlkPhos  48      Creatinine Trend: 2.55<--, 2.52<--  PTT - ( 2017 15:29 )  PTT:29.8 SEC  Urinalysis Basic - ( 2017 07:45 )    Color: PLYEL / Appearance: HAZY / S.007 / pH: 6.0  Gluc: NEGATIVE / Ketone: NEGATIVE  / Bili: NEGATIVE / Urobili: NORMAL E.U.   Blood: NEGATIVE / Protein: 30 / Nitrite: NEGATIVE   Leuk Esterase: LARGE / RBC: 2-5 / WBC 25-50   Sq Epi: OCC / Non Sq Epi: x / Bacteria: MANY        Venous Blood Gas:   @ 04:20  7.29/40/39/18/67.6  VBG Lactate: 1.0  Venous Blood Gas:   @ 17:30  7.23/46/29/17/38.9  VBG Lactate: 2.0  Venous Blood Gas:   @ 15:29  7.26/40/28/17/39.4  VBG Lactate: 1.6      MICROBIOLOGY:     RADIOLOGY:  [ ] Reviewed and interpreted by me    PULMONARY FUNCTION TESTS:    EKG: CHIEF COMPLAINT: chest pain and dyspnea.    Interval Events: improved dyspnea    REVIEW OF SYSTEMS:  Constitutional: [x ] negative [ ] fevers [ ] chills [ ] weight loss [ ] weight gain  HEENT: [x ] negative [ ] dry eyes [ ] eye irritation [ ] postnasal drip [ ] nasal congestion  CV: [ ] negative  [ ] chest pain [x ] orthopnea [ ] palpitations [ ] murmur  Resp: [ ] negative [ ] cough [x ] shortness of breath [ ] dyspnea [ ] wheezing [ ] sputum [ ] hemoptysis  GI: [ x] negative [ ] nausea [ ] vomiting [ ] diarrhea [ ] constipation [ ] abd pain [ ] dysphagia   : [ ] negative [ ] dysuria [ ] nocturia [ ] hematuria [x ] increased urinary frequency  Musculoskeletal: [x ] negative [ ] back pain [ ] myalgias [ ] arthralgias [ ] fracture  Skin: [x ] negative [ ] rash [ ] itch  Neurological: [ x] negative [ ] headache [ ] dizziness [ ] syncope [ ] weakness [ ] numbness  Psychiatric: [x ] negative [ ] anxiety [ ] depression  Endocrine: [ ] negative [ ] diabetes [ ] thyroid problem  Hematologic/Lymphatic: [ ] negative [ ] anemia [ ] bleeding problem  Allergic/Immunologic: [ ] negative [ ] itchy eyes [ ] nasal discharge [ ] hives [ ] angioedema  [ ] All other systems negative  [ ] Unable to assess ROS because ________    OBJECTIVE:  T(C): 36.8 (2017 07:03), Max: 37.2 (2017 12:46)  T(F): 98.3 (2017 07:03), Max: 98.9 (2017 12:46)  HR: 85 (2017 07:03) (72 - 95)  BP: 159/65 (2017 07:03) (141/52 - 187/66)  RR: 18 (2017 07:03) (17 - 24)  SpO2: 95% (2017 07:03) (95% - 100%)         @ 07:01  -   @ 07:00  --------------------------------------------------------  IN: 0 mL / OUT: 400 mL / NET: -400 mL      CAPILLARY BLOOD GLUCOSE          PHYSICAL EXAM:  General: NAD  HEENT: ONEIL  Lymph Nodes:  Neck: No JVD  Respiratory: Diminished breath sounds at bases with few rales.  Cardiovascular: RRR no m/r/g  Abdomen: S/NT/ND  Extremities: 2+ pitting edema  Skin: No rash  Neurological: non focality  Psychiatry: oriented x 3    HOSPITAL MEDICATIONS:      amLODIPine   Tablet 5 milliGRAM(s) Oral <User Schedule>  furosemide   Injectable 20 milliGRAM(s) IV Push daily  metoprolol succinate  milliGRAM(s) Oral daily    simvastatin 40 milliGRAM(s) Oral at bedtime        pantoprazole  Injectable 40 milliGRAM(s) IV Push every 12 hours              sodium chloride 0.9% lock flush 3 milliLiter(s) IV Push every 8 hours      LABS:                        6.3    13.43 )-----------( 597      ( 2017 15:29 )             21.3     Hgb Trend: 6.3<--      129<L>  |  95<L>  |  48<H>  ----------------------------<  78  4.9   |  18<L>  |  2.55<H>    Ca    9.1      2017 04:20  Phos  5.3       Mg     1.8         TPro  6.3  /  Alb  3.5  /  TBili  < 0.2<L>  /  DBili  x   /  AST  36<H>  /  ALT  15  /  AlkPhos  48      Creatinine Trend: 2.55<--, 2.52<--  PTT - ( 2017 15:29 )  PTT:29.8 SEC  Urinalysis Basic - ( 2017 07:45 )    Color: PLYEL / Appearance: HAZY / S.007 / pH: 6.0  Gluc: NEGATIVE / Ketone: NEGATIVE  / Bili: NEGATIVE / Urobili: NORMAL E.U.   Blood: NEGATIVE / Protein: 30 / Nitrite: NEGATIVE   Leuk Esterase: LARGE / RBC: 2-5 / WBC 25-50   Sq Epi: OCC / Non Sq Epi: x / Bacteria: MANY        Venous Blood Gas:   @ 04:20  7.29/40/39/18/67.6  VBG Lactate: 1.0  Venous Blood Gas:   @ 17:30  7.23/46/29/17/38.9  VBG Lactate: 2.0  Venous Blood Gas:   @ 15:29  7.26/40/28/17/39.4  VBG Lactate: 1.6      MICROBIOLOGY:     RADIOLOGY:  [ ] Reviewed and interpreted by me    PULMONARY FUNCTION TESTS:    EKG:

## 2017-11-29 NOTE — PROGRESS NOTE ADULT - ASSESSMENT
87F with HTN, HLD and presenting with Dyspnea and chest pain found to have b/l LE swelling and b/l pleural effusions, anemia concerning for heart failure. Patient now s/p 20mg IV lasix and nitroglycerine. She is now off BIPAP to room air with saturation of 94% but now with + cardiac enzymes (down trending), SHY, possible UTI and elevated pro BNP concerning for ischemic CAD causing heart failure.    - Patient is responding very well to diuresis. She does not need a thoracentesis at this time.  - Please continue cardiac care per primary team.  - We will sign off. Call with questions. 87F with HTN, HLD and presenting with Dyspnea and chest pain found to have b/l LE swelling and b/l pleural effusions, anemia concerning for heart failure. Patient now s/p 20mg IV lasix and nitroglycerine. She is now off BIPAP to room air with saturation of 97% on RA but now with + cardiac enzymes (down trending), SHY, possible UTI and elevated pro BNP concerning for ischemic CAD causing heart failure.    - Patient is responding very well to diuresis. She does not need a thoracentesis at this time.  - Please continue cardiac care per primary team.  - We will sign off. Call with questions.

## 2017-11-29 NOTE — H&P ADULT - PROBLEM SELECTOR PLAN 3
cont amlodipine, metoprolol and lasix S/P 1 unit pRBC  No signs of acute bleeding  protonix IV BID  Iron studies  Consider GI eval  Clear diet for now  stool occult ordered   Monitor H/H

## 2017-11-29 NOTE — CONSULT NOTE ADULT - SUBJECTIVE AND OBJECTIVE BOX
Ryan Perez MD  Interventional Cardiology  [place] Office : [address] [place] [address]. [id number]  Tel:   [place] Office : [address] [facility] [place] [address]  Tel: [phone]  Cell : [phone]    HISTORY OF PRESENT ILLNESS:  88 y/o F with hx of HTN presents with SOB and worsening b/l LE edema x 1 day. As per daughter, patient has been having gradual onset of SOB x 1 month. SOB became more severe and constant. SOB is present when she is resting as well. Daughter states it was difficult for patient to ambulate secondary to SOB. Patient also has b/l LE edema. SOB was also associated with chest discomfort. Denies fever, chills, cough, falls, LOC, abdominal pain, nausea, vomiting, melena, hematochezia, diarrhea, constipation, melena or calf tenderness, no history of heart disease in the past, currently SOB is better      PAST MEDICAL & SURGICAL HISTORY:  Hypertension  History of hysterectomy    	    MEDICATIONS:  furosemide   Injectable 20 milliGRAM(s) IV Push daily  metoprolol succinate  milliGRAM(s) Oral daily          pantoprazole  Injectable 40 milliGRAM(s) IV Push every 12 hours    simvastatin 40 milliGRAM(s) Oral at bedtime        FAMILY HISTORY:        Allergies    No Known Allergies    Intolerances    	      PHYSICAL EXAM:  T(C): 36.7 (11-29-17 @ 17:36), Max: 37 (11-29-17 @ 15:29)  HR: 84 (11-29-17 @ 17:36) (74 - 90)  BP: 176/80 (11-29-17 @ 17:36) (159/65 - 210/76)  RR: 19 (11-29-17 @ 17:36) (17 - 20)  SpO2: 99% (11-29-17 @ 17:36) (95% - 100%)  Wt(kg): --  I&O's Summary    28 Nov 2017 07:01  -  29 Nov 2017 07:00  --------------------------------------------------------  IN: 0 mL / OUT: 400 mL / NET: -400 mL        Appearance: Normal	  HEENT:   Normal oral mucosa, PERRL, EOMI	  Cardiovascular: Normal S1 S2, No JVD, No murmurs, No edema  Respiratory: decreased breath sounds lung bases  Gastrointestinal:  Soft, Non-tender, + BS	  Extremities: 1+ edema b/l    LABS:	 	    CARDIAC MARKERS:      CKMB: 9.74 ng/mL (11-29 @ 04:20)    CKMB Relative Index: 3.4 (11-29 @ 04:20)                            8.1    10.74 )-----------( 552      ( 29 Nov 2017 04:20 )             25.5     11-29    129<L>  |  95<L>  |  48<H>  ----------------------------<  78  4.9   |  18<L>  |  2.55<H>    Ca    9.1      29 Nov 2017 04:20  Phos  5.3     11-29  Mg     1.8     11-29    TPro  6.3  /  Alb  3.5  /  TBili  < 0.2<L>  /  DBili  x   /  AST  36<H>  /  ALT  15  /  AlkPhos  48  11-28    proBNP:   Lipid Profile:   HgA1c: Hemoglobin A1C, Whole Blood: 7.3 % (11-29 @ 04:20)    TSH: Thyroid Stimulating Hormone, Serum: 1.92 uIU/mL (11-29 @ 04:20)      ASSESSMENT/PLAN:

## 2017-11-29 NOTE — H&P ADULT - PROBLEM SELECTOR PLAN 1
Admit to tele  check cbc, bmp, a1c, flp, tsh, trend CE  echo ordered  lasix 20 IV daily  pulm consult appreciated  consider cardio consult  strict I&Os  fluid restriction  f/u MD note Admit to tele  check cbc, bmp, a1c, flp, tsh, trend CE  echo ordered  lasix 20 IV daily  pulm consult appreciated  consider cardio consult  strict I&Os  fluid restriction  Dr. Moreno to call cardiology consult  Discussed with Dr. Moreno  f/u MD note

## 2017-11-29 NOTE — CHART NOTE - NSCHARTNOTEFT_GEN_A_CORE
Notified by RN pt had an episode of 2.2 sec pause on cardiac monitor while pt was asleep. Upon awakening the pt she denied chest pain, sob, palpitations, nausea, vomiting or abdominal pain.  STAT EKG obtained and Showed: NSR @ 82 bpm.  Will cont to monitor the pt.

## 2017-11-29 NOTE — PHYSICAL THERAPY INITIAL EVALUATION ADULT - ADDITIONAL COMMENTS
Follow progress with PT for ROM/MMT and previous level of function, which were unablet o be completed as patient had to use the bathroom.

## 2017-11-29 NOTE — H&P ADULT - NSHPLABSRESULTS_GEN_ALL_CORE
EKG: NSR 93, Flat T in V1  11-28    128<L>  |  95<L>  |  48<H>  ----------------------------<  125<H>  5.6<H>   |  17<L>  |  2.52<H>    Ca    8.5      28 Nov 2017 15:29    TPro  6.3  /  Alb  3.5  /  TBili  < 0.2<L>  /  DBili  x   /  AST  36<H>  /  ALT  15  /  AlkPhos  48  11-28                        6.3    13.43 )-----------( 597      ( 28 Nov 2017 15:29 )             21.3   Serum Pro-Brain Natriuretic Peptide: 8170: ACUTE CONGESTIVE HEART FAILURE is UNLIKELY if NT-proBNP is < 300 pg/mL.    CONSIDER ACUTE CONGESTIVE HEART FAILURE if:    AGE                NT-proBNP RESULT  ---                -------------  < 50 YEARS      >  450 pg/mL  50 - 75 YEARS      >  900 pg/mL  > 75 YEARS      > 1800 pg/mL    All results require clinical correlation.  Consider obtaining a  baseline or "dry" NT-proBNP level when the patient is stabilized,  so that subsequent levels can be compared to that value.  Patients  with recurrent CHF may have elevated NT-proBNP levels.  Acute  failure episodes generally produce levels at least 25% greater  than baseline levels.  The above values are derived from a large  multi-center international study, "KIERSTEN Parks, et al, European  Heart Journal, 2006; 27:330-337."   pg/mL (11.28.17 @ 15:29)  CARDIAC MARKERS ( 28 Nov 2017 20:00 )  x     / < 0.06 ng/mL / x     / x     / x      CARDIAC MARKERS ( 28 Nov 2017 15:29 )  x     / < 0.06 ng/mL / 452 u/L / 14.57 ng/mL / x        < from: Xray Chest 1 View AP/PA (11.28.17 @ 14:16) >    Bilateral pleural effusions and vascular lung bases concomitant   underlying airspace consolidation including pneumonia in the proper   clinical context cannot be excluded.    Clear remaining visualized upper lungs. No pneumothorax.    Cardiac silhouette partially obscured by the bilateral effusions.    Trachea midline.    Generalized osteopenia and spinal degenerative changes.    < end of copied text >

## 2017-11-29 NOTE — H&P ADULT - PROBLEM SELECTOR PLAN 2
S/P 1 unit pRBC  No signs of acute bleeding  protonix IV BID  Iron studies  Consider GI eval  Clear diet for now  stool occult ordered   Monitor H/H No previous Cr to compare  Most likely CKD or SHY on CKD  Will hold losartan/hctz now  Monitor Cr  avoid nephrotoxic meds  Patient was taking naprosyn at home --> AVOID nsaids No previous Cr to compare  Most likely CKD or SHY on CKD  Will hold losartan/hctz now   Renal us ordered UA urine lytes ordered   Monitor Cr  avoid nephrotoxic meds  Patient was taking naprosyn at home --> AVOID nsaids  Dr. Moreno to call Renal consult

## 2017-11-29 NOTE — CONSULT NOTE ADULT - ASSESSMENT
EKG - NSR no STT changes    a/p     1) Shortness of breath - likely secondary to worsening renal function, started on lasix 20mg daily, also pt has h/o uncontrolled HTN, will get 2d echo to access LV function     2) HTN - on toprol 100mg daily, norvasc 5 mg daily added    3) Anemia - s/p transfusion , check guaic MCV 74    4) Acute on chronic renal failure - nephrology on case

## 2017-11-29 NOTE — H&P ADULT - ASSESSMENT
86 y/o F with hx of HTN presents with SOB and worsening b/l LE edema x 1 day. admitted for pleural effusion and anemia

## 2017-11-30 DIAGNOSIS — E87.1 HYPO-OSMOLALITY AND HYPONATREMIA: ICD-10-CM

## 2017-11-30 DIAGNOSIS — R69 ILLNESS, UNSPECIFIED: ICD-10-CM

## 2017-11-30 DIAGNOSIS — D63.1 ANEMIA IN CHRONIC KIDNEY DISEASE: ICD-10-CM

## 2017-11-30 DIAGNOSIS — N39.0 URINARY TRACT INFECTION, SITE NOT SPECIFIED: ICD-10-CM

## 2017-11-30 DIAGNOSIS — R60.0 LOCALIZED EDEMA: ICD-10-CM

## 2017-11-30 DIAGNOSIS — N18.4 CHRONIC KIDNEY DISEASE, STAGE 4 (SEVERE): ICD-10-CM

## 2017-11-30 DIAGNOSIS — I12.9 HYPERTENSIVE CHRONIC KIDNEY DISEASE WITH STAGE 1 THROUGH STAGE 4 CHRONIC KIDNEY DISEASE, OR UNSPECIFIED CHRONIC KIDNEY DISEASE: ICD-10-CM

## 2017-11-30 LAB
BUN SERPL-MCNC: 51 MG/DL — HIGH (ref 7–23)
CALCIUM SERPL-MCNC: 8.8 MG/DL — SIGNIFICANT CHANGE UP (ref 8.4–10.5)
CHLORIDE SERPL-SCNC: 101 MMOL/L — SIGNIFICANT CHANGE UP (ref 98–107)
CO2 SERPL-SCNC: 22 MMOL/L — SIGNIFICANT CHANGE UP (ref 22–31)
CREAT ?TM UR-MCNC: 67.93 MG/DL — SIGNIFICANT CHANGE UP
CREAT SERPL-MCNC: 2.57 MG/DL — HIGH (ref 0.5–1.3)
GLUCOSE BLDC GLUCOMTR-MCNC: 113 MG/DL — HIGH (ref 70–99)
GLUCOSE BLDC GLUCOMTR-MCNC: 193 MG/DL — HIGH (ref 70–99)
GLUCOSE SERPL-MCNC: 97 MG/DL — SIGNIFICANT CHANGE UP (ref 70–99)
HCT VFR BLD CALC: 23.9 % — LOW (ref 34.5–45)
HGB BLD-MCNC: 7.6 G/DL — LOW (ref 11.5–15.5)
MCHC RBC-ENTMCNC: 23.8 PG — LOW (ref 27–34)
MCHC RBC-ENTMCNC: 31.8 % — LOW (ref 32–36)
MCV RBC AUTO: 74.7 FL — LOW (ref 80–100)
NRBC # FLD: 0.02 — SIGNIFICANT CHANGE UP
OSMOLALITY SERPL: 291 MOSMO/KG — SIGNIFICANT CHANGE UP (ref 275–295)
PLATELET # BLD AUTO: 525 K/UL — HIGH (ref 150–400)
PMV BLD: 9.1 FL — SIGNIFICANT CHANGE UP (ref 7–13)
POTASSIUM SERPL-MCNC: 5.2 MMOL/L — SIGNIFICANT CHANGE UP (ref 3.5–5.3)
POTASSIUM SERPL-SCNC: 5.2 MMOL/L — SIGNIFICANT CHANGE UP (ref 3.5–5.3)
PROT UR-MCNC: 109.4 MG/DL — SIGNIFICANT CHANGE UP
RBC # BLD: 3.2 M/UL — LOW (ref 3.8–5.2)
RBC # FLD: 16.4 % — HIGH (ref 10.3–14.5)
SODIUM SERPL-SCNC: 135 MMOL/L — SIGNIFICANT CHANGE UP (ref 135–145)
SPECIMEN SOURCE: SIGNIFICANT CHANGE UP
WBC # BLD: 11.75 K/UL — HIGH (ref 3.8–10.5)
WBC # FLD AUTO: 11.75 K/UL — HIGH (ref 3.8–10.5)

## 2017-11-30 PROCEDURE — 99254 IP/OBS CNSLTJ NEW/EST MOD 60: CPT | Mod: GC

## 2017-11-30 RX ORDER — METOPROLOL TARTRATE 50 MG
50 TABLET ORAL DAILY
Qty: 0 | Refills: 0 | Status: DISCONTINUED | OUTPATIENT
Start: 2017-11-30 | End: 2017-12-02

## 2017-11-30 RX ORDER — FUROSEMIDE 40 MG
40 TABLET ORAL DAILY
Qty: 0 | Refills: 0 | Status: DISCONTINUED | OUTPATIENT
Start: 2017-12-01 | End: 2017-12-01

## 2017-11-30 RX ADMIN — SODIUM CHLORIDE 3 MILLILITER(S): 9 INJECTION INTRAMUSCULAR; INTRAVENOUS; SUBCUTANEOUS at 21:02

## 2017-11-30 RX ADMIN — Medication 100 MILLIGRAM(S): at 06:12

## 2017-11-30 RX ADMIN — PANTOPRAZOLE SODIUM 40 MILLIGRAM(S): 20 TABLET, DELAYED RELEASE ORAL at 06:12

## 2017-11-30 RX ADMIN — PANTOPRAZOLE SODIUM 40 MILLIGRAM(S): 20 TABLET, DELAYED RELEASE ORAL at 17:31

## 2017-11-30 RX ADMIN — SODIUM CHLORIDE 3 MILLILITER(S): 9 INJECTION INTRAMUSCULAR; INTRAVENOUS; SUBCUTANEOUS at 06:11

## 2017-11-30 RX ADMIN — Medication 20 MILLIGRAM(S): at 06:11

## 2017-11-30 RX ADMIN — SIMVASTATIN 40 MILLIGRAM(S): 20 TABLET, FILM COATED ORAL at 19:29

## 2017-11-30 RX ADMIN — AMLODIPINE BESYLATE 5 MILLIGRAM(S): 2.5 TABLET ORAL at 06:11

## 2017-11-30 RX ADMIN — SODIUM CHLORIDE 3 MILLILITER(S): 9 INJECTION INTRAMUSCULAR; INTRAVENOUS; SUBCUTANEOUS at 14:03

## 2017-11-30 NOTE — PROGRESS NOTE ADULT - PROBLEM SELECTOR PLAN 3
S/P 1 unit pRBC  No signs of acute bleeding  protonix IV BID  gi eval  Iron studies  Consider GI eval  Clear diet for now  stool occult ordered   Monitor H/H

## 2017-11-30 NOTE — PROGRESS NOTE ADULT - SUBJECTIVE AND OBJECTIVE BOX
[facility] NEPHROLOGY- CONSULTATION NOTE    87 year old female with history of below presents with SOB and LE edema. Nephrology consulted for elevated Scr.    Patient with prior h/o CKD-4 (Scr 2.71 in 17) thought to be medication induced as per PMD. Patient admits to long standing h/o HTN. Patient denies any h/o DM, nephrolithiasis, hepatitis B, C, HIV, IVDA, tattoos, prior PRBC transfusions, herbal medication use but admits to chronic NSAID use which was discontinued 3 months ago.    Patient during current admission noted to have stable CKD-4 and started on IV lasix for volume overload.    REVIEW OF SYSTEMS:  Gen: no changes in weight  HEENT: no rhinorrhea  Neck: no sore throat  Cards: no chest pain  Resp: + dyspnea improving  GI: no nausea or vomiting or diarrhea  : no dysuria or hematuria  Vascular: + LE edema improving  Derm: no rashes  Neuro: no numbness/tingling    No Known Allergies      Home Medications Reviewed  Hospital Medications:   MEDICATIONS  (STANDING):  amLODIPine   Tablet 5 milliGRAM(s) Oral daily  furosemide   Injectable 20 milliGRAM(s) IV Push daily  metoprolol succinate ER 50 milliGRAM(s) Oral daily  pantoprazole  Injectable 40 milliGRAM(s) IV Push every 12 hours  simvastatin 40 milliGRAM(s) Oral at bedtime  sodium chloride 0.9% lock flush 3 milliLiter(s) IV Push every 8 hours      PAST MEDICAL & SURGICAL HISTORY:  Hypertension  History of hysterectomy      FAMILY HISTORY:  N/C    SOCIAL HISTORY:  Denies toxic substance use     VITALS:  T(F): 97.3 (17 @ 11:26), Max: 98 (17 @ 17:36)  HR: 66 (17 @ 11:26)  BP: 120/47 (17 @ 11:26)  RR: 18 (17 @ 11:26)  SpO2: 100% (17 @ 11:26)  Wt(kg): --     @ 07:01  -   @ 07:00  --------------------------------------------------------  IN: 120 mL / OUT: 700 mL / NET: -580 mL      Height (cm): 157.48 ( @ 20:00)  Weight (kg): 74 ( @ 20:00)  BMI (kg/m2): 29.8 ( @ 20:00)  BSA (m2): 1.75 ( @ 20:00)    PHYSICAL EXAM:  Gen: NAD, calm  HEENT: MMM  Neck: no JVD  Cards: RRR, +S1/S2, no M/G/R  Resp: bibasilar crackles  GI: soft, NT/ND, NABS  : no CVA tenderness  Vascular: no LE edema B/L  Derm: no rashes  Neuro: non-focal    LABS:      135  |  101  |  51<H>  ----------------------------<  97  5.2   |  22  |  2.57<H>    Ca    8.8      2017 07:30  Phos  5.3       Mg     1.8           Creatinine Trend: 2.57 <--, 2.55 <--, 2.52 <--                        7.6    11.75 )-----------( 525      ( 2017 07:30 )             23.9     Urine Studies:  Urinalysis Basic - ( 2017 07:45 )    Color: PLYEL / Appearance: HAZY / S.007 / pH: 6.0  Gluc: NEGATIVE / Ketone: NEGATIVE  / Bili: NEGATIVE / Urobili: NORMAL E.U.   Blood: NEGATIVE / Protein: 30 / Nitrite: NEGATIVE   Leuk Esterase: LARGE / RBC: 2-5 / WBC 25-50   Sq Epi: OCC / Non Sq Epi:  / Bacteria: MANY      Sodium, Random Urine: 97 meq/L ( @ 07:45)  Potassium, Random Urine: 17.0 meq/L ( @ 07:45)  Chloride, Random Urine: 99 mmol/L ( @ 07:45)  Protein, Random Urine: 47.1 mg/dL ( @ 07:45)  Osmolality, Random Urine: 278 mosmo/kg ( @ 07:45)      RADIOLOGY & ADDITIONAL STUDIES:  < from: US Renal (17 @ 12:50) >  IMPRESSION:     Normal renal ultrasound.    < end of copied text >

## 2017-11-30 NOTE — CONSULT NOTE ADULT - ASSESSMENT
86 yo woman with PMH of HTN p/w 3 weeks of myalgias, 10 days of non-productive cough, and 3-4 of SOB. Found to have leukocytosis, b/l pleural effusions, positive UA, anemia, and SHY.     Leukocytosis 2/2 pneumonia vs. UTI vs. pyelonephritis   • WBC trend:  11.74 <-- 10.74 <-- 13.43 ; Afebrile since admission   • UA: collected on 11/29, (+) L.E.,  (–) Nitrites   • Urine Cx:  collected on 11/29, pending    • Blood Cxs:  drawn on 11/29, pending   • RVP negative    • CVA tenderness -- renal U/S without hydronephrosis    Recommendations:   • Would treat for CAP, given leukocytosis and respiratory sxs, which will also cover UTI, even though pt is asx      - 5-7 days of Ceftriaxone 1g Q24h + Azithromycin 500mg Q24h  -OR-  Levaquin 750mg Q24h   • F/U B/UCxs    Will discuss with fellow and attending.      Almas Dodd MD  PGY-1 | Internal Medicine  [phone] / [phone] 88 yo woman with PMH of HTN p/w 3 weeks of myalgias, 10 days of non-productive cough, and 3-4 of SOB. Found to have leukocytosis, b/l pleural effusions, positive UA, anemia, and SHY.    • WBC trend:  11.74 <-- 10.74 <-- 13.43 ; Afebrile since admission   • UA: collected on 11/29, (+) L.E.,  (–) Nitrites   • Urine Cx:  collected on 11/29, pending    • Blood Cxs:  drawn on 11/29, pending   • RVP negative    • Renal U/S without hydronephrosis    Assessment and Plan:   • Leukocytosis improving off ABX.    • Unlikely to be pneumonia given clinical picture and more likely possibility of acute exacerbation of previously undiagnosed CHF.    • Would not treat positive UA, as pt is asymptomatic.    • Possible C diff given recent exposure to Levaquin and diarrhea, but now resolved. Would not test unless diarrhea recurs.   • Will continue to follow pending B/UCx results -- if negative and pt continuing to improve, ID will sign off.    Discussed with fellow and attending.      Almas Dodd MD  PGY-1 | Internal Medicine  [phone] / [phone] 86 yo woman with PMH of HTN p/w 3 weeks of myalgias, 10 days of non-productive cough, and 3-4 of SOB. Found to have leukocytosis, b/l pleural effusions, positive UA, anemia, and SHY.    • WBC trend:  11.74 <-- 10.74 <-- 13.43 ; Afebrile since admission   • UA: collected on 11/29, (+) L.E.,  (–) Nitrites   • Urine Cx:  collected on 11/29, pending    • Blood Cxs:  drawn on 11/29, pending   • RVP negative    • Renal U/S without hydronephrosis    Assessment and Plan:   • Leukocytosis improving off ABX.    • Unlikely to be pneumonia given clinical picture and more likely possibility of acute exacerbation of previously undiagnosed CHF.    • Would not treat positive UA, as pt is asymptomatic.    • Possible C diff given recent exposure to Levaquin and diarrhea, but now resolved. Would not test unless diarrhea recurs.   • Will continue to follow pending B/UCx results -- if negative and pt continuing to improve.    Discussed with fellow and attending.      Almas Dodd MD  PGY-1 | Internal Medicine  [phone] / [phone]

## 2017-11-30 NOTE — PROGRESS NOTE ADULT - SUBJECTIVE AND OBJECTIVE BOX
Ryan Perez MD  Interventional Cardiology  [place] Office : [address] [place] [address]  Tel:   [place] Office : [address] [facility] [place] [address]  Tel: [phone]  Cell : [phone]    Subjective : Pt lying in bed comfortable, not in distress, denies any chest pain, SOB slightly improving  	  MEDICATIONS:  amLODIPine   Tablet 5 milliGRAM(s) Oral daily  furosemide   Injectable 20 milliGRAM(s) IV Push daily  metoprolol succinate ER 50 milliGRAM(s) Oral daily          pantoprazole  Injectable 40 milliGRAM(s) IV Push every 12 hours    simvastatin 40 milliGRAM(s) Oral at bedtime        PHYSICAL EXAM:  T(C): 36.3 (11-30-17 @ 11:26), Max: 36.7 (11-29-17 @ 17:36)  HR: 66 (11-30-17 @ 11:26) (66 - 84)  BP: 120/47 (11-30-17 @ 11:26) (120/47 - 176/80)  RR: 18 (11-30-17 @ 11:26) (12 - 19)  SpO2: 100% (11-30-17 @ 11:26) (99% - 100%)  Wt(kg): --  I&O's Summary    29 Nov 2017 07:01  -  30 Nov 2017 07:00  --------------------------------------------------------  IN: 120 mL / OUT: 700 mL / NET: -580 mL      Height (cm): 157.48 (11-29 @ 20:00)  Weight (kg): 74 (11-29 @ 20:00)  BMI (kg/m2): 29.8 (11-29 @ 20:00)  BSA (m2): 1.75 (11-29 @ 20:00)    Appearance: Normal	  HEENT:   Normal oral mucosa, PERRL, EOMI	  Cardiovascular: Normal S1 S2, No JVD, No murmurs, No edema  Respiratory: Lungs clear to auscultation	  Gastrointestinal:  Soft, Non-tender, + BS	  Extremities: +1 edema b/l                                    7.6    11.75 )-----------( 525      ( 30 Nov 2017 07:30 )             23.9     11-30    135  |  101  |  51<H>  ----------------------------<  97  5.2   |  22  |  2.57<H>    Ca    8.8      30 Nov 2017 07:30  Phos  5.3     11-29  Mg     1.8     11-29      proBNP:   Lipid Profile:   HgA1c:   TSH:

## 2017-11-30 NOTE — PROGRESS NOTE ADULT - PROBLEM SELECTOR PLAN 4
Secondary to volume overload now resolved with diuretic therapy. Monitor serum sodium. Would check guaiac given low ferritin r/o GIB. Patient s/p PRBC earlier today. WIll consider IV iron/RAUL as needed. F/U paraproteinemia work up. Monitor Hb. Would check guaiac given low ferritin r/o GIB. Patient s/p PRBC earlier today. WIll consider IV iron/RAUL as needed. F/U paraproteinemia work up. Repeat ferritin in AM. Monitor Hb.

## 2017-11-30 NOTE — PROGRESS NOTE ADULT - PROBLEM SELECTOR PLAN 3
Improving but patient with bibasilar rales for which would continue with IV lasix for now. Would change lasix to bumex 1 mg daily for better oral absorption on discharge. Monitor UO. Improving but patient with bibasilar rales for which would increase IV lasix to 40 mg IV daily given advanced CKD. Would change lasix to bumex 1 mg PO daily for better oral absorption on discharge. Monitor UO.

## 2017-11-30 NOTE — PROGRESS NOTE ADULT - ASSESSMENT
87 year old female with history of hypertension presents with SOB and LE edema. Nephrology consulted for elevated Scr.

## 2017-11-30 NOTE — PROGRESS NOTE ADULT - ASSESSMENT
EKG - NSR no STT changes    a/p     1) Shortness of breath - likely secondary to worsening renal function, started on lasix 20mg daily, also pt has h/o uncontrolled HTN, will get 2d echo to access LV function     2) HTN - bradycardic sinus in 40's, decrease toprol to 50mg daily, cont norvasc 5 mg     3) Anemia - s/p transfusion , check guaic MCV 74    4) Acute on chronic renal failure - nephrology on case

## 2017-11-30 NOTE — PROGRESS NOTE ADULT - SUBJECTIVE AND OBJECTIVE BOX
chief complaint : shortness of breath         SUBJECTIVE / OVERNIGHT EVENTS: pt denies chest pain, shortness of breath, nausea, vomiting       MEDICATIONS  (STANDING):  amLODIPine   Tablet 5 milliGRAM(s) Oral daily  metoprolol succinate ER 50 milliGRAM(s) Oral daily  pantoprazole  Injectable 40 milliGRAM(s) IV Push every 12 hours  simvastatin 40 milliGRAM(s) Oral at bedtime  sodium chloride 0.9% lock flush 3 milliLiter(s) IV Push every 8 hours    MEDICATIONS  (PRN):        CAPILLARY BLOOD GLUCOSE      POCT Blood Glucose.: 113 mg/dL (2017 17:49)    I&O's Summary    2017 07:  -  2017 07:00  --------------------------------------------------------  IN: 120 mL / OUT: 700 mL / NET: -580 mL    2017 07:  -  2017 22:10  --------------------------------------------------------  IN: 100 mL / OUT: 300 mL / NET: -200 mL        Constitutional: No fever, fatigue  Skin: No rash.  Eyes: No recent vision problems or eye pain.  ENT: No congestion, ear pain, or sore throat.  Cardiovascular: No chest pain or palpation.  Respiratory: No cough, shortness of breath, congestion, or wheezing.  Gastrointestinal: No abdominal pain, nausea, vomiting, or diarrhea.  Genitourinary: No dysuria.  Musculoskeletal: No joint swelling.  Neurologic: No headache.    PHYSICAL EXAM:  GENERAL: NAD  EYES: EOMI, PERRLA  NECK: Supple, No JVD  CHEST/LUNG: dec breath sounds at bases   HEART:  S1 , S2 +  ABDOMEN: soft, bs+  EXTREMITIES:  trace edema  NEUROLOGY: alert awake oriented       LABS:                        7.6    11.75 )-----------( 525      ( 2017 07:30 )             23.9     11-30    135  |  101  |  51<H>  ----------------------------<  97  5.2   |  22  |  2.57<H>    Ca    8.8      2017 07:30  Phos  5.3     11-29  Mg     1.8     11-29        CARDIAC MARKERS ( 2017 04:20 )  x     / < 0.06 ng/mL / 290 u/L / 9.74 ng/mL / x          Urinalysis Basic - ( 2017 07:45 )    Color: PLYEL / Appearance: HAZY / S.007 / pH: 6.0  Gluc: NEGATIVE / Ketone: NEGATIVE  / Bili: NEGATIVE / Urobili: NORMAL E.U.   Blood: NEGATIVE / Protein: 30 / Nitrite: NEGATIVE   Leuk Esterase: LARGE / RBC: 2-5 / WBC 25-50   Sq Epi: OCC / Non Sq Epi: x / Bacteria: MANY        RADIOLOGY & ADDITIONAL TESTS:    Imaging Personally Reviewed:    Consultant(s) Notes Reviewed:      Care Discussed with Consultants/Other Providers:

## 2017-11-30 NOTE — PROGRESS NOTE ADULT - PROBLEM SELECTOR PLAN 5
As per infectious disease. Secondary to volume overload now resolved with diuretic therapy. Monitor serum sodium.

## 2017-11-30 NOTE — CONSULT NOTE ADULT - SUBJECTIVE AND OBJECTIVE BOX
HPI:  86 y/o F with hx of HTN presents with SOB and worsening b/l LE edema x 1 day. As per son (pt is non-English speaking), patient has been having myalgias for a few weeks, cough for 10 days, and SOB for the past 3-4 days with secondary B/L costochondral pain a/w coughing. She does endorse a few days of difficulty urinating last week that was not a/w dysuria, increased urinary frequency, incomplete voiding, foul-smelling urine, cloudy urine, or hematuria. She also reports 10 episodes of non-hematochezic/melenic diarrhea on  that resolved with Imodium and no episodes since. She denies F/C, N/V, CP, abd pain, HA, vision changes. She is A&Ox3. Per her outpatient PCP, Dr. Villagomez, she had a UTI treated outpatient with Levaquin 2 months ago. She denies hospitalization in the past 90 days. She denies sick contacts or recent travel.         PAST MEDICAL & SURGICAL HISTORY:  Hypertension  History of hysterectomy      Allergies  No Known Allergies        ANTIMICROBIALS:      OTHER MEDS: MEDICATIONS  (STANDING):  amLODIPine   Tablet 5 daily  furosemide   Injectable 20 daily  metoprolol succinate ER 50 daily  pantoprazole  Injectable 40 every 12 hours  simvastatin 40 at bedtime      SOCIAL HISTORY:  [ ] etoh [ ] tobacco [ ] former smoker [ ] IVDU    FAMILY HISTORY:      REVIEW OF SYSTEMS  [  ] ROS unobtainable because:    [ X ] All other systems negative except as noted below:	    Constitutional:  [ ] fever [ ] weight loss  Skin:  [ ] rash [ ] phlebitis	  Eyes: [ ] icterus [ ] inflammation	  ENMT: [ ] discharge [ ] thrush [ ] ulcers [ ] exudates  Respiratory: [ X ] dyspnea [ ] hemoptysis [ X ] cough [ ] sputum	  Cardiovascular:  [ ] chest pain [ ] palpitations [ ] edema	  Gastrointestinal:  [ ] nausea [ ] vomiting [ X ] diarrhea [ ] constipation [ ] pain	  Genitourinary:  [ ] dysuria [ ] frequency [ ] hematuria [ ] discharge [ ] flank pain  Musculoskeletal:  [ ] myalgias [ ] arthralgias [ ] arthritis	  Neurological:  [ ] headache [ ] seizures	  Psychiatric:  [ ] anxiety [ ] depression	  Hematology/Lymphatics:  [ ] lymphadenopathy  Endocrine:  [ ] adrenal [ ] thyroid  Allergic/Immunologic:	 [ ] transplant [ ] seasonal    Vital Signs Last 24 Hrs  T(F): 97.9 (11-30-17 @ 06:01), Max: 98.9 (17 @ 12:46)    Vital Signs Last 24 Hrs  HR: 73 (17 @ 06:01) (73 - 90)  BP: 135/68 (17 @ 06:01) (135/68 - 210/76)  RR: 12 (17 @ 06:01)  SpO2: 100% (17 @ 06:01) (96% - 100%)  Wt(kg): --    PHYSICAL EXAM:  General: NAD  HEAD/EYES: anicteric, PERRL, EOMI, hearing grossly intact  ENT:  supple, non-tender  Cardiovascular:  Regular rhythm, normal rate; normal S1, S2, no murmurs  Respiratory:  Bibasilar crackles, otherwise CTA   GI:  soft, non-tender, normal bowel sounds  :  mild B/L CVA tenderness L>R, no suprapubic tenderness  Musculoskeletal:  no synovitis  Neurologic:  grossly non-focal  Skin:  no rash  Lymph: no lymphadenopathy                                  7.6    11.75 )-----------( 525      ( 2017 07:30 )             23.9       11-    135  |  101  |  51<H>  ----------------------------<  97  5.2   |  22  |  2.57<H>    Ca    8.8      2017 07:30  Phos  5.3     -  Mg     1.8         TPro  6.3  /  Alb  3.5  /  TBili  < 0.2<L>  /  DBili  x   /  AST  36<H>  /  ALT  15  /  AlkPhos  48        Urinalysis Basic - ( 2017 07:45 )    Color: PLYEL / Appearance: HAZY / S.007 / pH: 6.0  Gluc: NEGATIVE / Ketone: NEGATIVE  / Bili: NEGATIVE / Urobili: NORMAL E.U.   Blood: NEGATIVE / Protein: 30 / Nitrite: NEGATIVE   Leuk Esterase: LARGE / RBC: 2-5 / WBC 25-50   Sq Epi: OCC / Non Sq Epi: x / Bacteria: MANY        MICROBIOLOGY:        RADIOLOGY:    < from: Xray Chest 1 View AP/PA (17 @ 14:16) >    EXAM:  RAD CHEST AP PA 1 VIEW        PROCEDURE DATE:  2017         INTERPRETATION:  CLINICAL INDICATION: chest pain    EXAM:  Portable upright AP chest from 2017 at 1416. No prior chest x-ray   available at this institution for comparison.    Slightly rotated left.    IMPRESSION:  Bilateral pleural effusions and vascular lung bases concomitant   underlying airspace consolidation including pneumonia in the proper   clinical context cannot be excluded.    Clear remaining visualized upper lungs. No pneumothorax.    Cardiac silhouette partially obscured by the bilateral effusions.    Trachea midline.    Generalized osteopenia and spinal degenerative changes.        YOLANDA BRO M.D., ATTENDING RADIOLOGIST  This document has been electronically signed. 2017  4:43PM                  EXAM:  US KIDNEY(S)        PROCEDURE DATE:  2017         INTERPRETATION:  CLINICAL INFORMATION: Acute renal insufficiency.    COMPARISON: None available.    TECHNIQUE: Sonography of the kidneys and bladder.     FINDINGS:    Right kidney:  8.5 cm. No renal mass, hydronephrosis or calculi.    Left kidney:  8.8 cm. No renal mass, hydronephrosis or calculi.    Urinary bladder: Within normal limits.    Bilateral pleural effusions are incidentally noted.    IMPRESSION:     Normal renal ultrasound.      IBRAHIMA KATZ M.D., ATTENDING RADIOLOGIST  This document has been electronically signed. 2017  1:19PM HPI:  88 y/o F with hx of HTN presents with SOB and worsening b/l LE edema x 1 day. As per son (pt is non-English speaking), patient has been having myalgias for a few weeks, cough for 10 days, and SOB for the past 3-4 days with secondary B/L costochondral pain a/w coughing. She does endorse a few days of difficulty urinating last week that was not a/w dysuria, increased urinary frequency, incomplete voiding, foul-smelling urine, cloudy urine, or hematuria. She also reports 10 episodes of non-hematochezic/melenic diarrhea on  that resolved with Imodium and no episodes since. She denies F/C, N/V, CP, abd pain, HA, vision changes. She is A&Ox3. Per her outpatient PCP, Dr. Villagomez, she had a UTI treated outpatient with Levaquin 2 months ago. She denies hospitalization in the past 90 days. She denies sick contacts or recent travel.         PAST MEDICAL & SURGICAL HISTORY:  Hypertension  History of hysterectomy      Allergies  No Known Allergies        ANTIMICROBIALS:      OTHER MEDS: MEDICATIONS  (STANDING):  amLODIPine   Tablet 5 daily  furosemide   Injectable 20 daily  metoprolol succinate ER 50 daily  pantoprazole  Injectable 40 every 12 hours  simvastatin 40 at bedtime      SOCIAL HISTORY:  [ ] etoh [ ] tobacco [ ] former smoker [ ] IVDU    FAMILY HISTORY:      REVIEW OF SYSTEMS  [  ] ROS unobtainable because:    [ X ] All other systems negative except as noted below:	    Constitutional:  [ ] fever [ ] weight loss  Skin:  [ ] rash [ ] phlebitis	  Eyes: [ ] icterus [ ] inflammation	  ENMT: [ ] discharge [ ] thrush [ ] ulcers [ ] exudates  Respiratory: [ X ] dyspnea [ ] hemoptysis [ X ] cough [ ] sputum	  Cardiovascular:  [ ] chest pain [ ] palpitations [ ] edema	  Gastrointestinal:  [ ] nausea [ ] vomiting [ X ] diarrhea [ ] constipation [ ] pain	  Genitourinary:  [ ] dysuria [ ] frequency [ ] hematuria [ ] discharge [ ] flank pain  Musculoskeletal:  [ ] myalgias [ ] arthralgias [ ] arthritis	  Neurological:  [ ] headache [ ] seizures	  Psychiatric:  [ ] anxiety [ ] depression	  Hematology/Lymphatics:  [ ] lymphadenopathy  Endocrine:  [ ] adrenal [ ] thyroid  Allergic/Immunologic:	 [ ] transplant [ ] seasonal    Vital Signs Last 24 Hrs  T(F): 97.9 (11-30-17 @ 06:01), Max: 98.9 (17 @ 12:46)    Vital Signs Last 24 Hrs  HR: 73 (17 @ 06:01) (73 - 90)  BP: 135/68 (17 @ 06:01) (135/68 - 210/76)  RR: 12 (17 @ 06:01)  SpO2: 100% (17 @ 06:01) (96% - 100%)  Wt(kg): --    PHYSICAL EXAM:  General: NAD  HEAD/EYES: anicteric, PERRL, EOMI, hearing grossly intact  ENT:  supple, non-tender  Cardiovascular:  Regular rhythm, normal rate; normal S1, S2, no murmurs  Respiratory:  Bibasilar crackles, otherwise CTA   GI:  soft, non-tender, normal bowel sounds  :  no CVA tenderness  Musculoskeletal:  no synovitis  Neurologic:  grossly non-focal  Skin:  no rash  Lymph: no lymphadenopathy                                  7.6    11.75 )-----------( 525      ( 2017 07:30 )             23.9           135  |  101  |  51<H>  ----------------------------<  97  5.2   |  22  |  2.57<H>    Ca    8.8      2017 07:30  Phos  5.3       Mg     1.8         TPro  6.3  /  Alb  3.5  /  TBili  < 0.2<L>  /  DBili  x   /  AST  36<H>  /  ALT  15  /  AlkPhos  48        Urinalysis Basic - ( 2017 07:45 )    Color: PLYEL / Appearance: HAZY / S.007 / pH: 6.0  Gluc: NEGATIVE / Ketone: NEGATIVE  / Bili: NEGATIVE / Urobili: NORMAL E.U.   Blood: NEGATIVE / Protein: 30 / Nitrite: NEGATIVE   Leuk Esterase: LARGE / RBC: 2-5 / WBC 25-50   Sq Epi: OCC / Non Sq Epi: x / Bacteria: MANY        MICROBIOLOGY:        RADIOLOGY:    < from: Xray Chest 1 View AP/PA (17 @ 14:16) >    EXAM:  RAD CHEST AP PA 1 VIEW        PROCEDURE DATE:  2017         INTERPRETATION:  CLINICAL INDICATION: chest pain    EXAM:  Portable upright AP chest from 2017 at 1416. No prior chest x-ray   available at this institution for comparison.    Slightly rotated left.    IMPRESSION:  Bilateral pleural effusions and vascular lung bases concomitant   underlying airspace consolidation including pneumonia in the proper   clinical context cannot be excluded.    Clear remaining visualized upper lungs. No pneumothorax.    Cardiac silhouette partially obscured by the bilateral effusions.    Trachea midline.    Generalized osteopenia and spinal degenerative changes.        YOLANDA BRO M.D., ATTENDING RADIOLOGIST  This document has been electronically signed. 2017  4:43PM                  EXAM:  US KIDNEY(S)        PROCEDURE DATE:  2017         INTERPRETATION:  CLINICAL INFORMATION: Acute renal insufficiency.    COMPARISON: None available.    TECHNIQUE: Sonography of the kidneys and bladder.     FINDINGS:    Right kidney:  8.5 cm. No renal mass, hydronephrosis or calculi.    Left kidney:  8.8 cm. No renal mass, hydronephrosis or calculi.    Urinary bladder: Within normal limits.    Bilateral pleural effusions are incidentally noted.    IMPRESSION:     Normal renal ultrasound.      IBRAHIMA KATZ M.D., ATTENDING RADIOLOGIST  This document has been electronically signed. 2017  1:19PM HPI:  86 y/o F with hx of HTN presents with SOB and worsening b/l LE edema x 1 day. As per son (pt is non-English speaking), patient has been having myalgias for a few weeks, cough for 10 days, and SOB for the past 3-4 days with secondary B/L costochondral pain a/w coughing. She does endorse a few days of difficulty urinating last week that was not a/w dysuria, increased urinary frequency, incomplete voiding, foul-smelling urine, cloudy urine, or hematuria. She also reports 10 episodes of non-hematochezic/melenic diarrhea on  that resolved with Imodium and no episodes since. She denies F/C, N/V, CP, abd pain, HA, vision changes. She is A&Ox3. Per her outpatient PCP, Dr. Villagomez, she had a UTI treated outpatient with Levaquin 2 months ago. She denies hospitalization in the past 90 days. She denies sick contacts or recent travel.         PAST MEDICAL & SURGICAL HISTORY:  Hypertension  History of hysterectomy      Allergies  No Known Allergies        ANTIMICROBIALS:      OTHER MEDS: MEDICATIONS  (STANDING):  amLODIPine   Tablet 5 daily  furosemide   Injectable 20 daily  metoprolol succinate ER 50 daily  pantoprazole  Injectable 40 every 12 hours  simvastatin 40 at bedtime      SOCIAL HISTORY:  [x] Negative unless noted below:  [ ] etoh [ ] tobacco [ ] former smoker [ ] IVDU    FAMILY HISTORY:      REVIEW OF SYSTEMS  [  ] ROS unobtainable because:    [ X ] All other systems negative except as noted below:	    Constitutional:  [ ] fever [ ] weight loss  Skin:  [ ] rash [ ] phlebitis	  Eyes: [ ] icterus [ ] inflammation	  ENMT: [ ] discharge [ ] thrush [ ] ulcers [ ] exudates  Respiratory: [ X ] dyspnea [ ] hemoptysis [ X ] cough [ ] sputum	  Cardiovascular:  [ ] chest pain [ ] palpitations [ ] edema	  Gastrointestinal:  [ ] nausea [ ] vomiting [ X ] diarrhea [ ] constipation [ ] pain	  Genitourinary:  [ ] dysuria [ ] frequency [ ] hematuria [ ] discharge [ ] flank pain  Musculoskeletal:  [ ] myalgias [ ] arthralgias [ ] arthritis	  Neurological:  [ ] headache [ ] seizures	  Psychiatric:  [ ] anxiety [ ] depression	  Hematology/Lymphatics:  [ ] lymphadenopathy  Endocrine:  [ ] adrenal [ ] thyroid  Allergic/Immunologic:	 [ ] transplant [ ] seasonal    Vital Signs Last 24 Hrs  T(F): 97.9 (17 @ 06:01), Max: 98.9 (17 @ 12:46)    Vital Signs Last 24 Hrs  HR: 73 (17 @ 06:01) (73 - 90)  BP: 135/68 (17 @ 06:01) (135/68 - 210/76)  RR: 12 (17 @ 06:01)  SpO2: 100% (17 @ 06:01) (96% - 100%)  Wt(kg): --    PHYSICAL EXAM:  General: NAD  HEAD/EYES: anicteric, PERRL, EOMI, hearing grossly intact  ENT:  supple, non-tender  Cardiovascular:  Regular rhythm, normal rate; normal S1, S2, no murmurs  Respiratory:  Bibasilar crackles, otherwise CTA   GI:  soft, non-tender, normal bowel sounds  :  no CVA tenderness  Musculoskeletal:  no synovitis  Neurologic:  grossly non-focal  Skin:  no rash  Lymph: no lymphadenopathy                                  7.6    11.75 )-----------( 525      ( 2017 07:30 )             23.9           135  |  101  |  51<H>  ----------------------------<  97  5.2   |  22  |  2.57<H>    Ca    8.8      2017 07:30  Phos  5.3       Mg     1.8         TPro  6.3  /  Alb  3.5  /  TBili  < 0.2<L>  /  DBili  x   /  AST  36<H>  /  ALT  15  /  AlkPhos  48        Urinalysis Basic - ( 2017 07:45 )    Color: PLYEL / Appearance: HAZY / S.007 / pH: 6.0  Gluc: NEGATIVE / Ketone: NEGATIVE  / Bili: NEGATIVE / Urobili: NORMAL E.U.   Blood: NEGATIVE / Protein: 30 / Nitrite: NEGATIVE   Leuk Esterase: LARGE / RBC: 2-5 / WBC 25-50   Sq Epi: OCC / Non Sq Epi: x / Bacteria: MANY        MICROBIOLOGY:        RADIOLOGY:    < from: Xray Chest 1 View AP/PA (17 @ 14:16) >    EXAM:  RAD CHEST AP PA 1 VIEW        PROCEDURE DATE:  2017         INTERPRETATION:  CLINICAL INDICATION: chest pain    EXAM:  Portable upright AP chest from 2017 at 1416. No prior chest x-ray   available at this institution for comparison.    Slightly rotated left.    IMPRESSION:  Bilateral pleural effusions and vascular lung bases concomitant   underlying airspace consolidation including pneumonia in the proper   clinical context cannot be excluded.    Clear remaining visualized upper lungs. No pneumothorax.    Cardiac silhouette partially obscured by the bilateral effusions.    Trachea midline.    Generalized osteopenia and spinal degenerative changes.        YOLANDA BRO M.D., ATTENDING RADIOLOGIST  This document has been electronically signed. 2017  4:43PM          EXAM:  US KIDNEY(S)        PROCEDURE DATE:  2017         INTERPRETATION:  CLINICAL INFORMATION: Acute renal insufficiency.    COMPARISON: None available.    TECHNIQUE: Sonography of the kidneys and bladder.     FINDINGS:    Right kidney:  8.5 cm. No renal mass, hydronephrosis or calculi.    Left kidney:  8.8 cm. No renal mass, hydronephrosis or calculi.    Urinary bladder: Within normal limits.    Bilateral pleural effusions are incidentally noted.    IMPRESSION:     Normal renal ultrasound.      IBRAHIMA KATZ M.D., ATTENDING RADIOLOGIST  This document has been electronically signed. 2017  1:19PM

## 2017-12-01 LAB
-  AMIKACIN: SIGNIFICANT CHANGE UP
-  AMPICILLIN/SULBACTAM: SIGNIFICANT CHANGE UP
-  AMPICILLIN: SIGNIFICANT CHANGE UP
-  AZTREONAM: SIGNIFICANT CHANGE UP
-  CEFAZOLIN: SIGNIFICANT CHANGE UP
-  CEFEPIME: SIGNIFICANT CHANGE UP
-  CEFOXITIN: SIGNIFICANT CHANGE UP
-  CEFTAZIDIME: SIGNIFICANT CHANGE UP
-  CEFTRIAXONE: SIGNIFICANT CHANGE UP
-  CIPROFLOXACIN: SIGNIFICANT CHANGE UP
-  ERTAPENEM: SIGNIFICANT CHANGE UP
-  GENTAMICIN: SIGNIFICANT CHANGE UP
-  IMIPENEM: SIGNIFICANT CHANGE UP
-  LEVOFLOXACIN: SIGNIFICANT CHANGE UP
-  MEROPENEM: SIGNIFICANT CHANGE UP
-  NITROFURANTOIN: SIGNIFICANT CHANGE UP
-  PIPERACILLIN/TAZOBACTAM: SIGNIFICANT CHANGE UP
-  TIGECYCLINE: SIGNIFICANT CHANGE UP
-  TOBRAMYCIN: SIGNIFICANT CHANGE UP
-  TRIMETHOPRIM/SULFAMETHOXAZOLE: SIGNIFICANT CHANGE UP
BACTERIA UR CULT: SIGNIFICANT CHANGE UP
BUN SERPL-MCNC: 47 MG/DL — HIGH (ref 7–23)
CALCIUM SERPL-MCNC: 8.7 MG/DL — SIGNIFICANT CHANGE UP (ref 8.4–10.5)
CHLORIDE SERPL-SCNC: 100 MMOL/L — SIGNIFICANT CHANGE UP (ref 98–107)
CO2 SERPL-SCNC: 20 MMOL/L — LOW (ref 22–31)
CREAT SERPL-MCNC: 2.35 MG/DL — HIGH (ref 0.5–1.3)
FERRITIN SERPL-MCNC: 16.32 NG/ML — SIGNIFICANT CHANGE UP (ref 15–150)
GLUCOSE BLDC GLUCOMTR-MCNC: 117 MG/DL — HIGH (ref 70–99)
GLUCOSE BLDC GLUCOMTR-MCNC: 118 MG/DL — HIGH (ref 70–99)
GLUCOSE BLDC GLUCOMTR-MCNC: 149 MG/DL — HIGH (ref 70–99)
GLUCOSE BLDC GLUCOMTR-MCNC: 159 MG/DL — HIGH (ref 70–99)
GLUCOSE SERPL-MCNC: 103 MG/DL — HIGH (ref 70–99)
HBV SURFACE AG SER-ACNC: NEGATIVE — SIGNIFICANT CHANGE UP
HCT VFR BLD CALC: 23.9 % — LOW (ref 34.5–45)
HCV AB S/CO SERPL IA: 0.12 S/CO — SIGNIFICANT CHANGE UP
HCV AB SERPL-IMP: SIGNIFICANT CHANGE UP
HGB BLD-MCNC: 7.5 G/DL — LOW (ref 11.5–15.5)
KAPPA FREE LIGHT CHAINS, SERUM: 6.27 MG/DL — HIGH (ref 0.33–1.94)
LAMBDA FREE LIGHT CHAINS, SERUM: 6.01 MG/DL — HIGH (ref 0.57–2.63)
MCHC RBC-ENTMCNC: 23.7 PG — LOW (ref 27–34)
MCHC RBC-ENTMCNC: 31.4 % — LOW (ref 32–36)
MCV RBC AUTO: 75.4 FL — LOW (ref 80–100)
METHOD TYPE: SIGNIFICANT CHANGE UP
NRBC # FLD: 0 — SIGNIFICANT CHANGE UP
ORGANISM # SPEC MICROSCOPIC CNT: SIGNIFICANT CHANGE UP
ORGANISM # SPEC MICROSCOPIC CNT: SIGNIFICANT CHANGE UP
PLATELET # BLD AUTO: 492 K/UL — HIGH (ref 150–400)
PMV BLD: 8.9 FL — SIGNIFICANT CHANGE UP (ref 7–13)
POTASSIUM SERPL-MCNC: 4.6 MMOL/L — SIGNIFICANT CHANGE UP (ref 3.5–5.3)
POTASSIUM SERPL-SCNC: 4.6 MMOL/L — SIGNIFICANT CHANGE UP (ref 3.5–5.3)
PROT SERPL-MCNC: 5.3 G/DL — LOW (ref 6–8.3)
RBC # BLD: 3.17 M/UL — LOW (ref 3.8–5.2)
RBC # FLD: 16.5 % — HIGH (ref 10.3–14.5)
SODIUM SERPL-SCNC: 133 MMOL/L — LOW (ref 135–145)
WBC # BLD: 12.18 K/UL — HIGH (ref 3.8–10.5)
WBC # FLD AUTO: 12.18 K/UL — HIGH (ref 3.8–10.5)

## 2017-12-01 PROCEDURE — 86334 IMMUNOFIX E-PHORESIS SERUM: CPT | Mod: 26

## 2017-12-01 PROCEDURE — 99222 1ST HOSP IP/OBS MODERATE 55: CPT | Mod: GC

## 2017-12-01 PROCEDURE — 84165 PROTEIN E-PHORESIS SERUM: CPT | Mod: 26

## 2017-12-01 PROCEDURE — 93306 TTE W/DOPPLER COMPLETE: CPT | Mod: 26

## 2017-12-01 PROCEDURE — 99232 SBSQ HOSP IP/OBS MODERATE 35: CPT | Mod: GC

## 2017-12-01 RX ORDER — BUMETANIDE 0.25 MG/ML
1 INJECTION INTRAMUSCULAR; INTRAVENOUS DAILY
Qty: 0 | Refills: 0 | Status: DISCONTINUED | OUTPATIENT
Start: 2017-12-02 | End: 2017-12-10

## 2017-12-01 RX ORDER — IRON SUCROSE 20 MG/ML
100 INJECTION, SOLUTION INTRAVENOUS DAILY
Qty: 0 | Refills: 0 | Status: COMPLETED | OUTPATIENT
Start: 2017-12-01 | End: 2017-12-04

## 2017-12-01 RX ORDER — PANTOPRAZOLE SODIUM 20 MG/1
8 TABLET, DELAYED RELEASE ORAL
Qty: 80 | Refills: 0 | Status: DISCONTINUED | OUTPATIENT
Start: 2017-12-01 | End: 2017-12-04

## 2017-12-01 RX ADMIN — PANTOPRAZOLE SODIUM 40 MILLIGRAM(S): 20 TABLET, DELAYED RELEASE ORAL at 05:06

## 2017-12-01 RX ADMIN — IRON SUCROSE 210 MILLIGRAM(S): 20 INJECTION, SOLUTION INTRAVENOUS at 17:55

## 2017-12-01 RX ADMIN — Medication 50 MILLIGRAM(S): at 05:07

## 2017-12-01 RX ADMIN — PANTOPRAZOLE SODIUM 10 MG/HR: 20 TABLET, DELAYED RELEASE ORAL at 14:56

## 2017-12-01 RX ADMIN — Medication 40 MILLIGRAM(S): at 05:06

## 2017-12-01 RX ADMIN — SODIUM CHLORIDE 3 MILLILITER(S): 9 INJECTION INTRAMUSCULAR; INTRAVENOUS; SUBCUTANEOUS at 12:47

## 2017-12-01 RX ADMIN — SIMVASTATIN 40 MILLIGRAM(S): 20 TABLET, FILM COATED ORAL at 21:19

## 2017-12-01 RX ADMIN — AMLODIPINE BESYLATE 5 MILLIGRAM(S): 2.5 TABLET ORAL at 05:07

## 2017-12-01 RX ADMIN — SODIUM CHLORIDE 3 MILLILITER(S): 9 INJECTION INTRAMUSCULAR; INTRAVENOUS; SUBCUTANEOUS at 05:07

## 2017-12-01 RX ADMIN — SODIUM CHLORIDE 3 MILLILITER(S): 9 INJECTION INTRAMUSCULAR; INTRAVENOUS; SUBCUTANEOUS at 21:19

## 2017-12-01 NOTE — PROGRESS NOTE ADULT - ASSESSMENT
EKG - NSR no STT changes    a/p     1) Shortness of breath - likely secondary to worsening renal function, started on lasix 20mg daily, also pt has h/o uncontrolled HTN, will get 2d echo to access LV function     2) HTN - bradycardic sinus in 40's,Toprol decreased to  50mg daily, cont norvasc 5 mg     3) Anemia - s/p transfusion     4) Acute on chronic renal failure - nephrology on case

## 2017-12-01 NOTE — PROGRESS NOTE ADULT - PROBLEM SELECTOR PLAN 3
Improving. Will change lasix 40 mg IV daily to bumex 1 mg PO daily (patient had been on lasix 40 mg daily at home and presented with volume overload). Monitor UO.

## 2017-12-01 NOTE — PROGRESS NOTE ADULT - SUBJECTIVE AND OBJECTIVE BOX
PATIENT: FRANCIA MARTEL   AGE: 86yo   GENDER: Female  WEIGHT:   ALLERGIES: No Known Allergies    CHIEF COMPLAINT:   SOB (29 Nov 2017 03:41)    INTERVAL HPI / OVERNIGHT EVENTS:   No acute events overnight, VS WNL.     MEDICATIONS  amLODIPine   Tablet 5 milliGRAM(s) Oral daily  furosemide   Injectable 40 milliGRAM(s) IV Push daily  metoprolol succinate ER 50 milliGRAM(s) Oral daily  pantoprazole  Injectable 40 milliGRAM(s) IV Push every 12 hours  simvastatin 40 milliGRAM(s) Oral at bedtime  sodium chloride 0.9% lock flush 3 milliLiter(s) IV Push every 8 hours      VITAL SIGNS (last 24 hrs):  T(C): 36.4 (12-01-17 @ 05:07), Max: 36.4 (11-30-17 @ 21:03)  T(F): 97.5 (12-01-17 @ 05:07), Max: 97.5 (11-30-17 @ 21:03)  HR: 93 (12-01-17 @ 05:07) (73 - 93)  BP: 144/53 (12-01-17 @ 05:07) (128/54 - 144/53)  BP(mean): --  RR: 16 (12-01-17 @ 05:07) (16 - 18)  SpO2: 100% (12-01-17 @ 05:07) (100% - 100%)    PHYSICAL EXAM:  GENERAL: NAD  HEENT:  AT/NC; EOMI, PERRLA, conjunctiva and sclera clear; hearing grossly intact  CHEST/LUNG: CTAB, no wheezes, ronchi, rales  HEART: Normal rate, regular rhythm; No murmurs  ABDOMEN: Soft, Nontender, Nondistended, No masses; BS present   MSK/EXTREMITIES:  Grossly normal strength, movement, tone, and ROM  PSYCH: AAOx3  NEUROLOGY: Non focal   SKIN: No rashes or lesions      LABS:                         7.5    12.18 >-----< 492           ( 12-01-17 @ 07:05 )             23.9       133  |  100  |   47  ----------------------< 103    (12-01-17 @ 07:05)     4.6  |  20  |  2.35    Anion Gap: --    Ca   8.7   (12-01-17 @ 07:05)  Mg   x    (12-01-17 @ 07:05)  Phos x    (12-01-17 @ 07:05)       TP 8.7     |  AST x     -------------------------     Alb x     |  ALT x               (12-01-17 @ 07:05)  -------------------------     T-bili x   |  AlkPh x     D-bili x            CAPILLARY BLOOD GLUCOSE  POCT Blood Glucose.: 118 mg/dL (12-01-17 @ 09:06)  POCT Blood Glucose.: 193 mg/dL (11-30-17 @ 21:56)  POCT Blood Glucose.: 113 mg/dL (11-30-17 @ 17:49)      I/O's SUMMARY:    11-30-17 @ 07:01  -  12-01-17 @ 07:00  --------------------------------------------------------  IN: 200 mL / OUT: 1500 mL / NET: -1300 mL    12-01-17 @ 07:01  -  12-01-17 @ 12:45  --------------------------------------------------------  IN: 360 mL / OUT: 200 mL / NET: 160 mL        MICROBIOLOGY:    Culture - Urine (collected 11-29-17 @ 13:25)  Source: URINE MIDSTREAM  Final Report (12-01-17 @ 12:16):    COLONY COUNT: > = 100,000 CFU/ML  Organism: Klebsiella pneumoniae (12-01-17 @ 12:16)  Organism: Klebsiella pneumoniae (12-01-17 @ 12:16)        RADIOLOGY & ADDITIONAL TESTS:

## 2017-12-01 NOTE — DIETITIAN INITIAL EVALUATION ADULT. - PROBLEM SELECTOR PLAN 3
S/P 1 unit pRBC  No signs of acute bleeding  protonix IV BID  Iron studies  Consider GI eval  Clear diet for now  stool occult ordered   Monitor H/H

## 2017-12-01 NOTE — CONSULT NOTE ADULT - SUBJECTIVE AND OBJECTIVE BOX
Chief Complaint:  Patient is a 87y old  Female who presents with a chief complaint of SOB (2017 03:41)      HPI:    Allergies:  No Known Allergies      Home Medications:  · 	furosemide 40 mg oral tablet: 1 tab(s) orally once a day, As Needed, Last Dose Taken:    · 	simvastatin 40 mg oral tablet: 1 tab(s) orally once a day (at bedtime), Last Dose Taken:    · 	losartan-hydrochlorothiazide 100mg-12.5mg oral tablet: 1 tab(s) orally once a day, Last Dose Taken:    · 	metoprolol succinate 100 mg oral tablet, extended release: 1 tab(s) orally once a day, Last Dose Taken:    · 	naproxen 500 mg oral tablet: 1 tab(s) orally 2 times a day, As Needed, Last Dose Taken:    · 	amLODIPine 5 mg oral tablet: 1 tab(s) orally once a day, Last Dose Taken:      Hospital Medications:  amLODIPine   Tablet 5 milliGRAM(s) Oral daily  furosemide   Injectable 40 milliGRAM(s) IV Push daily  metoprolol succinate ER 50 milliGRAM(s) Oral daily  pantoprazole  Injectable 40 milliGRAM(s) IV Push every 12 hours  simvastatin 40 milliGRAM(s) Oral at bedtime  sodium chloride 0.9% lock flush 3 milliLiter(s) IV Push every 8 hours      PMHX/PSHX:  Hypertension  History of hysterectomy      Family history:      Social History:  Lives with daughter  Denies alcohol, drug or smoking use    ROS:     General:  No wt loss, fevers, chills, night sweats, fatigue,   Eyes:  Good vision, no reported pain  ENT:  No sore throat, pain, runny nose, dysphagia  CV:  No pain, palpitations, hypo/hypertension  Resp:  No dyspnea, cough, tachypnea, wheezing  GI:  See HPI  :  No pain, bleeding, incontinence, nocturia  Muscle:  No pain, weakness  Neuro:  No weakness, tingling, memory problems  Psych:  No fatigue, insomnia, mood problems, depression  Endocrine:  No polyuria, polydipsia, cold/heat intolerance  Heme:  No petechiae, ecchymosis, easy bruisability  Skin:  No rash, edema      PHYSICAL EXAM:     GENERAL:  Appears stated age, well-groomed, well-nourished, no distress  HEENT:  NC/AT,  conjunctivae clear and pink,  no JVD  CHEST:  Full & symmetric excursion, no increased effort, breath sounds clear  HEART:  Regular rhythm, S1, S2, no murmur/rub/S3/S4, no abdominal bruit, no edema  ABDOMEN:  Soft, non-tender, non-distended, normoactive bowel sounds,  no masses ,  EXTREMITIES:  no cyanosis,clubbing or edema  SKIN:  No rash/erythema/ecchymoses/petechiae/wounds/abscess/warm/dry  NEURO:  Alert, oriented    Vital Signs:  Vital Signs Last 24 Hrs  T(C): 36.4 (01 Dec 2017 05:07), Max: 36.4 (2017 21:03)  T(F): 97.5 (01 Dec 2017 05:07), Max: 97.5 (2017 21:03)  HR: 93 (01 Dec 2017 05:07) (66 - 93)  BP: 144/53 (01 Dec 2017 05:07) (120/47 - 144/53)  BP(mean): --  RR: 16 (01 Dec 2017 05:07) (16 - 18)  SpO2: 100% (01 Dec 2017 05:07) (100% - 100%)  Daily     Daily Weight in k.4 (01 Dec 2017 05:07)    LABS:                        7.5    12.18 )-----------( 492      ( 01 Dec 2017 07:05 )             23.9     12    133<L>  |  100  |  47<H>  ----------------------------<  103<H>  4.6   |  20<L>  |  2.35<H>    Ca    8.7      01 Dec 2017 07:05    TPro  5.3<L>  /  Alb  x   /  TBili  x   /  DBili  x   /  AST  x   /  ALT  x   /  AlkPhos  x   12-    LIVER FUNCTIONS - ( 01 Dec 2017 07:05 )  Alb: x     / Pro: 5.3 g/dL / ALK PHOS: x     / ALT: x     / AST: x     / GGT: x                   Imaging:  < from: US Renal (17 @ 12:50) >  EXAM:  US KIDNEY(S)        PROCEDURE DATE:  2017         INTERPRETATION:  CLINICAL INFORMATION: Acute renal insufficiency.    COMPARISON: None available.    TECHNIQUE: Sonography of the kidneys and bladder.     FINDINGS:    Right kidney:  8.5 cm. No renal mass, hydronephrosis or calculi.    Left kidney:  8.8 cm. No renal mass, hydronephrosis or calculi.    Urinary bladder: Within normal limits.    Bilateral pleural effusions are incidentally noted.    IMPRESSION:     Normal renal ultrasound.                  IBRAHIMA KATZ M.D., ATTENDING RADIOLOGIST  This document has been electronically signed. 2017  1:19PM        < end of copied text >    < from: Xray Chest 1 View AP/PA (17 @ 14:16) >  EXAM:  RAD CHEST AP PA 1 VIEW        PROCEDURE DATE:  2017         INTERPRETATION:  CLINICAL INDICATION: chest pain    EXAM:  Portable upright AP chest from 2017 at 1416. No prior chest x-ray   available at this institution for comparison.    Slightly rotated left.    IMPRESSION:  Bilateral pleural effusions and vascular lung bases concomitant   underlying airspace consolidation including pneumonia in the proper   clinical context cannot be excluded.    Clear remaining visualized upper lungs. No pneumothorax.    Cardiac silhouette partially obscured by the bilateral effusions.    Trachea midline.    Generalized osteopenia and spinal degenerative changes.                  YOLANDA BRO M.D., ATTENDING RADIOLOGIST  This document has been electronically signed. 2017  4:43PM    < end of copied text > Chief Complaint:  Patient is a 87y old  Female who presents with a chief complaint of SOB (2017 03:41)      HPI:  Patient is an 88yo F with history of HTN who presented to the ED with shortness of breath and b/l LE edema for the last 1 month.  History obtained via [employer]  #[id number].  She reports 2 weeks of abdominal bloating and distention as well.  She was found to have anemia of 6.3 on admission and was given 1 unit of blood.  The patient reports brown bowel movements daily, denies history of blood or black tarry stool.  She has no nausea or vomiting.  She reports some subjective chills at home, no fevers, no weight loss.  She reports having had a colonoscopy 2 years ago but does not know where or the results.  She states her daughter-in-law and son know more information and prefer I talk to them.  Attempted to call the son's number but had to leave a message for call back.      She is currently being followed by cardiology and nephrology, being treated for volume overload.       Allergies:  No Known Allergies      Home Medications:  · 	furosemide 40 mg oral tablet: 1 tab(s) orally once a day, As Needed, Last Dose Taken:    · 	simvastatin 40 mg oral tablet: 1 tab(s) orally once a day (at bedtime), Last Dose Taken:    · 	losartan-hydrochlorothiazide 100mg-12.5mg oral tablet: 1 tab(s) orally once a day, Last Dose Taken:    · 	metoprolol succinate 100 mg oral tablet, extended release: 1 tab(s) orally once a day, Last Dose Taken:    · 	naproxen 500 mg oral tablet: 1 tab(s) orally 2 times a day, As Needed, Last Dose Taken:    · 	amLODIPine 5 mg oral tablet: 1 tab(s) orally once a day, Last Dose Taken:      Hospital Medications:  amLODIPine   Tablet 5 milliGRAM(s) Oral daily  furosemide   Injectable 40 milliGRAM(s) IV Push daily  metoprolol succinate ER 50 milliGRAM(s) Oral daily  pantoprazole  Injectable 40 milliGRAM(s) IV Push every 12 hours  simvastatin 40 milliGRAM(s) Oral at bedtime  sodium chloride 0.9% lock flush 3 milliLiter(s) IV Push every 8 hours      PMHX/PSHX:  Hypertension  History of hysterectomy      Family history:  Patient denies family history of colon cancer, gastric cancer, stomach ulcers     Social History:  Lives with daughter  Denies alcohol, drug or smoking use    ROS:     General:  No wt loss, fevers, chills, night sweats, fatigue,   Eyes:  Good vision, no reported pain  ENT:  No sore throat, pain, runny nose, dysphagia  CV:  No pain, palpitations, hypo/hypertension  Resp:  No dyspnea, cough, tachypnea, wheezing  GI:  See HPI  :  No pain, bleeding, incontinence, nocturia  Muscle:  No pain, weakness  Neuro:  No weakness, tingling, memory problems  Psych:  No fatigue, insomnia, mood problems, depression  Endocrine:  No polyuria, polydipsia, cold/heat intolerance  Heme:  No petechiae, ecchymosis, easy bruisability  Skin:  No rash, edema      PHYSICAL EXAM:     GENERAL:  Appears stated age, well-groomed, well-nourished, no distress  HEENT:  NC/AT,  conjunctivae clear and pink,  no JVD  CHEST:  Full & symmetric excursion, no increased effort, rales in bilateral bases   HEART:  Regular rhythm, S1, S2, no murmur/rub/S3/S4, no abdominal bruit, no edema  ABDOMEN:  Soft, mildly TTP in RUQ and LLQ, non-distended, normoactive bowel sounds,  no masses ,  EXTREMITIES:  no cyanosis,clubbing or edema  SKIN:  No rash  NEURO:  Alert, oriented    Vital Signs:  Vital Signs Last 24 Hrs  T(C): 36.4 (01 Dec 2017 05:07), Max: 36.4 (2017 21:03)  T(F): 97.5 (01 Dec 2017 05:07), Max: 97.5 (2017 21:03)  HR: 93 (01 Dec 2017 05:07) (66 - 93)  BP: 144/53 (01 Dec 2017 05:07) (120/47 - 144/53)  BP(mean): --  RR: 16 (01 Dec 2017 05:07) (16 - 18)  SpO2: 100% (01 Dec 2017 05:07) (100% - 100%)  Daily     Daily Weight in k.4 (01 Dec 2017 05:07)    LABS:                        7.5    12.18 )-----------( 492      ( 01 Dec 2017 07:05 )             23.9     12    133<L>  |  100  |  47<H>  ----------------------------<  103<H>  4.6   |  20<L>  |  2.35<H>    Ca    8.7      01 Dec 2017 07:05    TPro  5.3<L>  /  Alb  x   /  TBili  x   /  DBili  x   /  AST  x   /  ALT  x   /  AlkPhos  x   12-01    LIVER FUNCTIONS - ( 01 Dec 2017 07:05 )  Alb: x     / Pro: 5.3 g/dL / ALK PHOS: x     / ALT: x     / AST: x     / GGT: x                   Imaging:  < from: US Renal (17 @ 12:50) >  EXAM:  US KIDNEY(S)        PROCEDURE DATE:  2017         INTERPRETATION:  CLINICAL INFORMATION: Acute renal insufficiency.    COMPARISON: None available.    TECHNIQUE: Sonography of the kidneys and bladder.     FINDINGS:    Right kidney:  8.5 cm. No renal mass, hydronephrosis or calculi.    Left kidney:  8.8 cm. No renal mass, hydronephrosis or calculi.    Urinary bladder: Within normal limits.    Bilateral pleural effusions are incidentally noted.    IMPRESSION:     Normal renal ultrasound.                  IBRAHIMA KATZ M.D., ATTENDING RADIOLOGIST  This document has been electronically signed. 2017  1:19PM        < end of copied text >    < from: Xray Chest 1 View AP/PA (17 @ 14:16) >  EXAM:  RAD CHEST AP PA 1 VIEW        PROCEDURE DATE:  2017         INTERPRETATION:  CLINICAL INDICATION: chest pain    EXAM:  Portable upright AP chest from 2017 at 1416. No prior chest x-ray   available at this institution for comparison.    Slightly rotated left.    IMPRESSION:  Bilateral pleural effusions and vascular lung bases concomitant   underlying airspace consolidation including pneumonia in the proper   clinical context cannot be excluded.    Clear remaining visualized upper lungs. No pneumothorax.    Cardiac silhouette partially obscured by the bilateral effusions.    Trachea midline.    Generalized osteopenia and spinal degenerative changes.                  YOLANDA BRO M.D., ATTENDING RADIOLOGIST  This document has been electronically signed. 2017  4:43PM    < end of copied text >

## 2017-12-01 NOTE — CONSULT NOTE ADULT - ASSESSMENT
Impression:  88yo F with history of HTN who presents with microcytic anemia.    Problem List:  1) Microcytic anemia - iron studies done after blood transfusion so not reliable.  Concern for 2/2 occult GI Bleed from colon cancer in 88yo patient. Also could be occult bleeding 2/2 angiodysplasias, NSAID gastropathy or ulcer disease, gastric mass.  Could also have primary blood dyscrasia (thalassemia) given microcytosis.   2) Volume overload 2/2 CKD  - getting diuresed  3) HTN  4) Leukocytosis - no clear infection but UA with WBCs/LE    Plan:  - please obtain colonoscopy report from outside colonoscopy 2 years ago  - will discuss with patient's family if they are interested in endoscopic evaluation for anemia - EGD and colonoscopy - would be offered next week if they are interested  - please obtain cardiology clearance for endoscopy  - trend CBC, transfuse to Hgb >7  - would initiate PPI BID Impression:  86yo F with history of HTN who presents with microcytic anemia.    Problem List:  1) Microcytic anemia - iron studies done after blood transfusion so not reliable.  Concern for 2/2 occult GI Bleed from colon cancer in 86yo patient however if had recent negative colonoscopy, this is less likely. Also could be occult bleeding 2/2 NSAID gastropathy given naproxen use, PUD, angiodysplasias, less likely gastric mass.  Could also have primary blood dyscrasia (thalassemia) given microcytosis.   2) Volume overload 2/2 CKD  - getting diuresed  3) HTN  4) Leukocytosis - no clear infection but UA with WBCs/LE    Plan:  - please obtain colonoscopy report from outside colonoscopy 2 years ago  - will tentatively plan for EGD  on Monday for anemia evaluation  - please obtain cardiology clearance for endoscopy  - trend CBC, transfuse to Hgb >7  - would initiate PPI ggt Impression:  88yo F with history of HTN who presents with microcytic anemia.    Problem List:  1) Microcytic anemia - iron studies done after blood transfusion so not reliable.  Concern for 2/2 occult GI Bleeding from colon cancer in 88yo patient however if had recent negative colonoscopy, thus NSAID gastropathy with gastric ulcer is likely given daily use of naproxen, PUD, angiodysplasias, less likely gastric mass.  She may also have primary blood dyscrasia (thalassemia) given microcytosis.   2) Volume overload 2/2 CKD  - getting diuresed  3) HTN  4) Leukocytosis - no clear infection but UA with WBCs/LE    Plan:  - please obtain colonoscopy report from outside colonoscopy 2 years ago  - will tentatively plan for EGD  on Monday for anemia evaluation  - please obtain cardiology clearance for endoscopy  - trend CBC, transfuse to Hgb >7  - would initiate PPI infusion

## 2017-12-01 NOTE — PROGRESS NOTE ADULT - PROBLEM SELECTOR PLAN 4
S/P PRBC and plan for endoscopic evaluation as per GI. F/U paraproteinemia work up. Will give venofer given iron deficiency. Monitor Hb.

## 2017-12-01 NOTE — DIETITIAN INITIAL EVALUATION ADULT. - PROBLEM SELECTOR PLAN 1
Admit to tele  check cbc, bmp, a1c, flp, tsh, trend CE  echo ordered  lasix 20 IV daily  pulm consult appreciated  consider cardio consult  strict I&Os  fluid restriction  Dr. Moreno to call cardiology consult  Discussed with Dr. Moreno  f/u MD note

## 2017-12-01 NOTE — PROGRESS NOTE ADULT - SUBJECTIVE AND OBJECTIVE BOX
chief complaint : shortness of breath         SUBJECTIVE / OVERNIGHT EVENTS: pt denies chest pain, shortness of breath, nausea, vomiting     MEDICATIONS  (STANDING):  amLODIPine   Tablet 5 milliGRAM(s) Oral daily  iron sucrose IVPB 100 milliGRAM(s) IV Intermittent daily  metoprolol succinate ER 50 milliGRAM(s) Oral daily  pantoprazole Infusion 8 mG/Hr (10 mL/Hr) IV Continuous <Continuous>  simvastatin 40 milliGRAM(s) Oral at bedtime  sodium chloride 0.9% lock flush 3 milliLiter(s) IV Push every 8 hours    MEDICATIONS  (PRN):    Vital Signs Last 24 Hrs  T(C): 36.7 (01 Dec 2017 21:00), Max: 36.7 (01 Dec 2017 21:00)  T(F): 98 (01 Dec 2017 21:00), Max: 98 (01 Dec 2017 21:00)  HR: 80 (01 Dec 2017 21:00) (80 - 93)  BP: 137/60 (01 Dec 2017 21:00) (137/60 - 153/59)  BP(mean): --  RR: 17 (01 Dec 2017 21:00) (16 - 17)  SpO2: 98% (01 Dec 2017 21:00) (98% - 100%)    Constitutional: No fever, fatigue  Skin: No rash.  Eyes: No recent vision problems or eye pain.  ENT: No congestion, ear pain, or sore throat.  Cardiovascular: No chest pain or palpation.  Respiratory: No cough, shortness of breath, congestion, or wheezing.  Gastrointestinal: No abdominal pain, nausea, vomiting, or diarrhea.  Genitourinary: No dysuria.  Musculoskeletal: No joint swelling.  Neurologic: No headache.    PHYSICAL EXAM:  GENERAL: NAD  EYES: EOMI, PERRLA  NECK: Supple, No JVD  CHEST/LUNG: dec breath sounds at bases   HEART:  S1 , S2 +  ABDOMEN: soft, bs+  EXTREMITIES:  trace edema  NEUROLOGY: alert awake oriented     LABS:      133<L>  |  100  |  47<H>  ----------------------------<  103<H>  4.6   |  20<L>  |  2.35<H>    Ca    8.7      01 Dec 2017 07:05    TPro  5.3<L>  /  Alb      /  TBili      /  DBili      /  AST      /  ALT      /  AlkPhos          Creatinine Trend: 2.35 <--, 2.57 <--, 2.55 <--, 2.52 <--                        7.5    12.18 )-----------( 492      ( 01 Dec 2017 07:05 )             23.9     Urine Studies:  Urinalysis Basic - ( 2017 07:45 )    Color: PLYEL / Appearance: HAZY / S.007 / pH: 6.0  Gluc: NEGATIVE / Ketone: NEGATIVE  / Bili: NEGATIVE / Urobili: NORMAL E.U.   Blood: NEGATIVE / Protein: 30 / Nitrite: NEGATIVE   Leuk Esterase: LARGE / RBC: 2-5 / WBC 25-50   Sq Epi: OCC / Non Sq Epi:  / Bacteria: MANY      Protein, Random Urine: 109.4 mg/dL ( @ 19:06)  Creatinine, Random Urine: 67.93 mg/dL ( @ 19:06)  Sodium, Random Urine: 97 meq/L ( @ 07:45)  Potassium, Random Urine: 17.0 meq/L ( @ 07:45)  Chloride, Random Urine: 99 mmol/L ( @ 07:45)  Protein, Random Urine: 47.1 mg/dL ( @ 07:45)  Osmolality, Random Urine: 278 mosmo/kg ( @ 07:45)          LIVER FUNCTIONS - ( 01 Dec 2017 07:05 )  Alb: x     / Pro: 5.3 g/dL / ALK PHOS: x     / ALT: x     / AST: x     / GGT: x

## 2017-12-01 NOTE — DIETITIAN INITIAL EVALUATION ADULT. - OTHER INFO
Nutrition Consult X Registered Dietitian. Pt 86 yo female appears alert, oriented. Pt speaks Gujarati/Rommel, Pt's daughter @ bed side helped with translation/answered questions during interview. Per daughter Pt appetite not that good for past ~6 months. Food preferences discussed with Pt & her daughter. Pt/daughter not sure about Pt’s current body weight or any weight changes. No report of chewing/swallowing difficulties; no report of nausea/vomiting/diarrhea @ present either. At home Pt eats food prepared with salt (in small amount) reported. Of note Pt’s HbA1c level 7.3% (11/29). RDN offered to provide diet education, but daughter declined. No other food related concerns voiced @ this time. RDN remains available, Pt made aware.

## 2017-12-01 NOTE — DIETITIAN INITIAL EVALUATION ADULT. - PERTINENT LABORATORY DATA
(12/1) WBC 12.18 H, H/H 7.5/23.9 L,  H, Na 133 L, BUN 47 H, Creat 2.35 H, Glu 103 H;      (11/29) Triglycerides 199 H, HDL 42 L, HbA1c 7.3% H, phosphorus 5.3 H

## 2017-12-01 NOTE — PROGRESS NOTE ADULT - ASSESSMENT
88 yo woman with PMH of HTN p/w 3 weeks of myalgias, 10 days of non-productive cough, and 3-4 of SOB. Found to have leukocytosis, b/l pleural effusions, positive UA, anemia, and SHY.    • WBC trend:  12.18<--11.74 <-- 10.74 <-- 13.43 ; Afebrile since admission   • UA: collected on 11/29, (+) L.E.,  (–) Nitrites   • Urine Cx NGTD   • RVP negative    • Renal U/S without hydronephrosis    Assessment and Plan:   • Leukocytosis stable off ABX.    • Unlikely to be pneumonia given clinical picture and more likely possibility of acute exacerbation of previously undiagnosed CHF.    • Would not treat positive UA, as pt is asymptomatic.    • Possible C diff given recent exposure to Levaquin and diarrhea, but now resolved. Would not test unless diarrhea recurs.    Will discuss with fellow and attending.      Almas Dodd MD  PGY-1 | Internal Medicine  [phone] / [phone]

## 2017-12-01 NOTE — PROGRESS NOTE ADULT - PROBLEM SELECTOR PLAN 5
Mild and thought to be secondary to volume overload improving with diuretic therapy. Monitor serum sodium.

## 2017-12-01 NOTE — DIETITIAN INITIAL EVALUATION ADULT. - NS AS NUTRI INTERV MEALS SNACK
Diets modified for specific foods and ingredients/Other (specify)/1. Suggest: PO diet rx: Regular, Consistent Carbohydrate (no snacks), DASH/TLC (cholesterol and sodium restricted), No Concentrated Potassium; Lacto Veg (accepts Milk & Milk Products);           2. Monitor PO diet tolerance;          3. Monitor labs, weights, hydration status;

## 2017-12-01 NOTE — PROGRESS NOTE ADULT - SUBJECTIVE AND OBJECTIVE BOX
[facility] NEPHROLOGY- PROGRESS NOTE    87 year old female with history of hypertension presents with SOB and LE edema. Nephrology consulted for elevated Scr.    REVIEW OF SYSTEMS:  Gen: no changes in weight  Cards: no chest pain  Resp: no dyspnea  GI: no nausea or vomiting or diarrhea  Vascular: no LE edema    No Known Allergies      Hospital Medications: Medications reviewed    VITALS:  T(F): 97.9 (12-01-17 @ 14:36), Max: 97.9 (12-01-17 @ 14:36)  HR: 81 (12-01-17 @ 14:36)  BP: 153/59 (12-01-17 @ 14:36)  RR: 17 (12-01-17 @ 14:36)  SpO2: 100% (12-01-17 @ 14:36)  Wt(kg): --  Height (cm): 157.48 (11-29 @ 20:00)  Weight (kg): 74 (11-29 @ 20:00)  BMI (kg/m2): 29.8 (11-29 @ 20:00)  BSA (m2): 1.75 (11-29 @ 20:00)    11-30 @ 07:01  -  12-01 @ 07:00  --------------------------------------------------------  IN: 200 mL / OUT: 1500 mL / NET: -1300 mL    12-01 @ 07:01  -  12-01 @ 15:38  --------------------------------------------------------  IN: 360 mL / OUT: 200 mL / NET: 160 mL        PHYSICAL EXAM:    Gen: NAD, calm  Cards: RRR, +S1/S2, no M/G/R  Resp: CTA B/L  GI: soft, NT/ND, NABS  Vascular: no LE edema B/L    LABS:  12-01    133<L>  |  100  |  47<H>  ----------------------------<  103<H>  4.6   |  20<L>  |  2.35<H>    Ca    8.7      01 Dec 2017 07:05    TPro  5.3<L>  /  Alb      /  TBili      /  DBili      /  AST      /  ALT      /  AlkPhos      12-01    Creatinine Trend: 2.35 <--, 2.57 <--, 2.55 <--, 2.52 <--                        7.5    12.18 )-----------( 492      ( 01 Dec 2017 07:05 )             23.9

## 2017-12-01 NOTE — DIETITIAN INITIAL EVALUATION ADULT. - PROBLEM SELECTOR PLAN 2
No previous Cr to compare  Most likely CKD or SHY on CKD  Will hold losartan/hctz now   Renal us ordered UA urine lytes ordered   Monitor Cr  avoid nephrotoxic meds  Patient was taking naprosyn at home --> AVOID nsaids  Dr. Moreno to call Renal consult

## 2017-12-01 NOTE — PROGRESS NOTE ADULT - ASSESSMENT
88 y/o F with hx of HTN presents with SOB and worsening b/l LE edema x 1 day. admitted for pleural effusion and anemia

## 2017-12-01 NOTE — PROGRESS NOTE ADULT - SUBJECTIVE AND OBJECTIVE BOX
Ryan Perez MD  Interventional Cardiology  [place] Office : [phone] [place] [address] [place]. [address]  Tel:   [place] Office : [phone] [facility] [place] [address]  Tel: [phone]  Cell : [phone]    Subjective : Pt lying in bed comfortable, not in distress, denies any chest pain, SOB improving  	  MEDICATIONS:  amLODIPine   Tablet 5 milliGRAM(s) Oral daily  furosemide   Injectable 40 milliGRAM(s) IV Push daily  metoprolol succinate ER 50 milliGRAM(s) Oral daily  pantoprazole  Injectable 40 milliGRAM(s) IV Push every 12 hours  simvastatin 40 milliGRAM(s) Oral at bedtime        PHYSICAL EXAM:  T(C): 36.4 (12-01-17 @ 05:07), Max: 36.4 (11-30-17 @ 21:03)  HR: 93 (12-01-17 @ 05:07) (73 - 93)  BP: 144/53 (12-01-17 @ 05:07) (128/54 - 144/53)  RR: 16 (12-01-17 @ 05:07) (16 - 18)  SpO2: 100% (12-01-17 @ 05:07) (100% - 100%)  Wt(kg): --  I&O's Summary    30 Nov 2017 07:01  -  01 Dec 2017 07:00  --------------------------------------------------------  IN: 200 mL / OUT: 1500 mL / NET: -1300 mL    01 Dec 2017 07:01  -  01 Dec 2017 13:49  --------------------------------------------------------  IN: 360 mL / OUT: 200 mL / NET: 160 mL          Appearance: Normal	  HEENT:   Normal oral mucosa, PERRL, EOMI	  Cardiovascular: Normal S1 S2, No JVD, No murmurs, No edema  Respiratory: Lungs clear to auscultation	  Gastrointestinal:  Soft, Non-tender, + BS	  Extremities: +1 edema                                    7.5    12.18 )-----------( 492      ( 01 Dec 2017 07:05 )             23.9     12-01    133<L>  |  100  |  47<H>  ----------------------------<  103<H>  4.6   |  20<L>  |  2.35<H>    Ca    8.7      01 Dec 2017 07:05    TPro  5.3<L>  /  Alb  x   /  TBili  x   /  DBili  x   /  AST  x   /  ALT  x   /  AlkPhos  x   12-01    proBNP:   Lipid Profile:   HgA1c:   TSH:

## 2017-12-02 DIAGNOSIS — N17.9 ACUTE KIDNEY FAILURE, UNSPECIFIED: ICD-10-CM

## 2017-12-02 LAB
BUN SERPL-MCNC: 38 MG/DL — HIGH (ref 7–23)
CALCIUM SERPL-MCNC: 9 MG/DL — SIGNIFICANT CHANGE UP (ref 8.4–10.5)
CHLORIDE SERPL-SCNC: 104 MMOL/L — SIGNIFICANT CHANGE UP (ref 98–107)
CO2 SERPL-SCNC: 19 MMOL/L — LOW (ref 22–31)
CREAT SERPL-MCNC: 2.05 MG/DL — HIGH (ref 0.5–1.3)
GLUCOSE BLDC GLUCOMTR-MCNC: 113 MG/DL — HIGH (ref 70–99)
GLUCOSE BLDC GLUCOMTR-MCNC: 115 MG/DL — HIGH (ref 70–99)
GLUCOSE BLDC GLUCOMTR-MCNC: 122 MG/DL — HIGH (ref 70–99)
GLUCOSE BLDC GLUCOMTR-MCNC: 168 MG/DL — HIGH (ref 70–99)
GLUCOSE SERPL-MCNC: 108 MG/DL — HIGH (ref 70–99)
HCT VFR BLD CALC: 24.4 % — LOW (ref 34.5–45)
HGB BLD-MCNC: 7.6 G/DL — LOW (ref 11.5–15.5)
MCHC RBC-ENTMCNC: 23.5 PG — LOW (ref 27–34)
MCHC RBC-ENTMCNC: 31.1 % — LOW (ref 32–36)
MCV RBC AUTO: 75.5 FL — LOW (ref 80–100)
NRBC # FLD: 0 — SIGNIFICANT CHANGE UP
OB PNL STL: NEGATIVE — SIGNIFICANT CHANGE UP
PLATELET # BLD AUTO: 498 K/UL — HIGH (ref 150–400)
PMV BLD: 9.3 FL — SIGNIFICANT CHANGE UP (ref 7–13)
POTASSIUM SERPL-MCNC: 4.6 MMOL/L — SIGNIFICANT CHANGE UP (ref 3.5–5.3)
POTASSIUM SERPL-SCNC: 4.6 MMOL/L — SIGNIFICANT CHANGE UP (ref 3.5–5.3)
RBC # BLD: 3.23 M/UL — LOW (ref 3.8–5.2)
RBC # FLD: 16.9 % — HIGH (ref 10.3–14.5)
SODIUM SERPL-SCNC: 137 MMOL/L — SIGNIFICANT CHANGE UP (ref 135–145)
WBC # BLD: 10.84 K/UL — HIGH (ref 3.8–10.5)
WBC # FLD AUTO: 10.84 K/UL — HIGH (ref 3.8–10.5)

## 2017-12-02 RX ORDER — METOPROLOL TARTRATE 50 MG
25 TABLET ORAL DAILY
Qty: 0 | Refills: 0 | Status: DISCONTINUED | OUTPATIENT
Start: 2017-12-02 | End: 2017-12-05

## 2017-12-02 RX ORDER — AMLODIPINE BESYLATE 2.5 MG/1
10 TABLET ORAL DAILY
Qty: 0 | Refills: 0 | Status: DISCONTINUED | OUTPATIENT
Start: 2017-12-03 | End: 2017-12-10

## 2017-12-02 RX ORDER — AMLODIPINE BESYLATE 2.5 MG/1
5 TABLET ORAL ONCE
Qty: 0 | Refills: 0 | Status: COMPLETED | OUTPATIENT
Start: 2017-12-02 | End: 2017-12-02

## 2017-12-02 RX ADMIN — SODIUM CHLORIDE 3 MILLILITER(S): 9 INJECTION INTRAMUSCULAR; INTRAVENOUS; SUBCUTANEOUS at 22:11

## 2017-12-02 RX ADMIN — SIMVASTATIN 40 MILLIGRAM(S): 20 TABLET, FILM COATED ORAL at 22:12

## 2017-12-02 RX ADMIN — SODIUM CHLORIDE 3 MILLILITER(S): 9 INJECTION INTRAMUSCULAR; INTRAVENOUS; SUBCUTANEOUS at 14:24

## 2017-12-02 RX ADMIN — PANTOPRAZOLE SODIUM 10 MG/HR: 20 TABLET, DELAYED RELEASE ORAL at 21:06

## 2017-12-02 RX ADMIN — Medication 50 MILLIGRAM(S): at 05:16

## 2017-12-02 RX ADMIN — IRON SUCROSE 210 MILLIGRAM(S): 20 INJECTION, SOLUTION INTRAVENOUS at 12:14

## 2017-12-02 RX ADMIN — AMLODIPINE BESYLATE 5 MILLIGRAM(S): 2.5 TABLET ORAL at 15:54

## 2017-12-02 RX ADMIN — PANTOPRAZOLE SODIUM 10 MG/HR: 20 TABLET, DELAYED RELEASE ORAL at 12:15

## 2017-12-02 RX ADMIN — PANTOPRAZOLE SODIUM 10 MG/HR: 20 TABLET, DELAYED RELEASE ORAL at 05:15

## 2017-12-02 RX ADMIN — SODIUM CHLORIDE 3 MILLILITER(S): 9 INJECTION INTRAMUSCULAR; INTRAVENOUS; SUBCUTANEOUS at 05:16

## 2017-12-02 RX ADMIN — AMLODIPINE BESYLATE 5 MILLIGRAM(S): 2.5 TABLET ORAL at 05:16

## 2017-12-02 RX ADMIN — BUMETANIDE 1 MILLIGRAM(S): 0.25 INJECTION INTRAMUSCULAR; INTRAVENOUS at 05:16

## 2017-12-02 NOTE — PROGRESS NOTE ADULT - SUBJECTIVE AND OBJECTIVE BOX
Subjective : Pt lying in bed comfortable, not in distress, denies any chest pain or SOB  	  MEDICATIONS:  amLODIPine   Tablet 5 milliGRAM(s) Oral daily  buMETAnide 1 milliGRAM(s) Oral daily  metoprolol succinate ER 25 milliGRAM(s) Oral daily          pantoprazole Infusion 8 mG/Hr IV Continuous <Continuous>    simvastatin 40 milliGRAM(s) Oral at bedtime    iron sucrose IVPB 100 milliGRAM(s) IV Intermittent daily      PHYSICAL EXAM:  T(C): 36.5 (12-02-17 @ 05:00), Max: 36.7 (12-01-17 @ 21:00)  HR: 78 (12-02-17 @ 05:00) (78 - 81)  BP: 147/63 (12-02-17 @ 05:00) (137/60 - 153/59)  RR: 16 (12-02-17 @ 05:00) (16 - 17)  SpO2: 98% (12-02-17 @ 05:00) (98% - 100%)  Wt(kg): --  I&O's Summary    01 Dec 2017 07:01  -  02 Dec 2017 07:00  --------------------------------------------------------  IN: 700 mL / OUT: 1150 mL / NET: -450 mL    02 Dec 2017 07:01  -  02 Dec 2017 14:23  --------------------------------------------------------  IN: 120 mL / OUT: 0 mL / NET: 120 mL          Appearance: Normal	  HEENT:   Normal oral mucosa, PERRL, EOMI	  Cardiovascular: Normal S1 S2, No JVD, No murmurs, No edema  Respiratory: Lungs clear to auscultation	  Gastrointestinal:  Soft, Non-tender, + BS	  Extremities: Normal range of motion, No clubbing, cyanosis or edema                                    7.6    10.84 )-----------( 498      ( 02 Dec 2017 06:47 )             24.4     12-02    137  |  104  |  38<H>  ----------------------------<  108<H>  4.6   |  19<L>  |  2.05<H>    Ca    9.0      02 Dec 2017 06:47    TPro  5.3<L>  /  Alb  x   /  TBili  x   /  DBili  x   /  AST  x   /  ALT  x   /  AlkPhos  x   12-01    proBNP:   Lipid Profile:   HgA1c:   TSH:

## 2017-12-02 NOTE — PROGRESS NOTE ADULT - ASSESSMENT
EKG - NSR no STT changes    a/p     1) Shortness of breath -  likely secondary to worsening renal function, and uncontrolled HTN, 2d echo shows normal LV function, wouldnt work up ischemia at this time as pt has stage 4 CKD, age 87 with no objective signs of ischemia. cont bumex    2) HTN - bradycardic sinus in 40's, with 2.5 sec pause, Toprol decreased to 25 mg daily, cont norvasc 5 mg     3) Anemia - s/p transfusion     4) Acute on chronic renal failure - nephrology on case, improving

## 2017-12-02 NOTE — PROGRESS NOTE ADULT - SUBJECTIVE AND OBJECTIVE BOX
Ryan Perez MD  Interventional Cardiology  [place] Office : [address] [place] [address] [place]. [address]  Tel:   [place] Office : [address] [facility] [place] [address]  Tel: [phone]  Cell : [phone]    Subjective : Pt lying in bed comfortable, not in distress, denies any chest pain or SOB  	  MEDICATIONS:  amLODIPine   Tablet 5 milliGRAM(s) Oral daily  buMETAnide 1 milliGRAM(s) Oral daily  metoprolol succinate ER 25 milliGRAM(s) Oral daily          pantoprazole Infusion 8 mG/Hr IV Continuous <Continuous>    simvastatin 40 milliGRAM(s) Oral at bedtime    iron sucrose IVPB 100 milliGRAM(s) IV Intermittent daily      PHYSICAL EXAM:  T(C): 36.5 (12-02-17 @ 05:00), Max: 36.7 (12-01-17 @ 21:00)  HR: 78 (12-02-17 @ 05:00) (78 - 81)  BP: 147/63 (12-02-17 @ 05:00) (137/60 - 153/59)  RR: 16 (12-02-17 @ 05:00) (16 - 17)  SpO2: 98% (12-02-17 @ 05:00) (98% - 100%)  Wt(kg): --  I&O's Summary    01 Dec 2017 07:01  -  02 Dec 2017 07:00  --------------------------------------------------------  IN: 700 mL / OUT: 1150 mL / NET: -450 mL    02 Dec 2017 07:01  -  02 Dec 2017 14:23  --------------------------------------------------------  IN: 120 mL / OUT: 0 mL / NET: 120 mL          Appearance: Normal	  HEENT:   Normal oral mucosa, PERRL, EOMI	  Cardiovascular: Normal S1 S2, No JVD, No murmurs, No edema  Respiratory: Lungs clear to auscultation	  Gastrointestinal:  Soft, Non-tender, + BS	  Extremities: Normal range of motion, No clubbing, cyanosis or edema                                    7.6    10.84 )-----------( 498      ( 02 Dec 2017 06:47 )             24.4     12-02    137  |  104  |  38<H>  ----------------------------<  108<H>  4.6   |  19<L>  |  2.05<H>    Ca    9.0      02 Dec 2017 06:47    TPro  5.3<L>  /  Alb  x   /  TBili  x   /  DBili  x   /  AST  x   /  ALT  x   /  AlkPhos  x   12-01    proBNP:   Lipid Profile:   HgA1c:   TSH:

## 2017-12-02 NOTE — PROGRESS NOTE ADULT - PROBLEM SELECTOR PLAN 5
S/P PRBC and plan for endoscopic evaluation as per GI. F/U paraproteinemia work up. Continue with venofer given iron deficiency. Monitor Hb.

## 2017-12-02 NOTE — PROGRESS NOTE ADULT - PROBLEM SELECTOR PLAN 4
Improving. Continue with bumex 1 mg PO daily (patient had been on lasix 40 mg daily at home and presented with volume overload). Monitor UO.

## 2017-12-02 NOTE — PROGRESS NOTE ADULT - SUBJECTIVE AND OBJECTIVE BOX
[facility] NEPHROLOGY- PROGRESS NOTE    87 year old female with history of hypertension presents with SOB and LE edema. Nephrology consulted for elevated Scr.    REVIEW OF SYSTEMS:  Gen: no changes in weight  Cards: no chest pain  Resp: no dyspnea  GI: no nausea or vomiting or diarrhea  Vascular: no LE edema    No Known Allergies      Hospital Medications: Medications reviewed    VITALS:  T(F): 97.7 (12-02-17 @ 05:00), Max: 98 (12-01-17 @ 21:00)  HR: 78 (12-02-17 @ 05:00)  BP: 147/63 (12-02-17 @ 05:00)  RR: 16 (12-02-17 @ 05:00)  SpO2: 98% (12-02-17 @ 05:00)  Wt(kg): --    12-01 @ 07:01  -  12-02 @ 07:00  --------------------------------------------------------  IN: 700 mL / OUT: 1150 mL / NET: -450 mL    12-02 @ 07:01  -  12-02 @ 11:21  --------------------------------------------------------  IN: 120 mL / OUT: 0 mL / NET: 120 mL      PHYSICAL EXAM:    Gen: NAD, calm  Cards: RRR, +S1/S2, no M/G/R  Resp: CTA B/L  GI: soft, NT/ND, NABS  Vascular: no LE edema B/L    LABS:  12-02    137  |  104  |  38<H>  ----------------------------<  108<H>  4.6   |  19<L>  |  2.05<H>    Ca    9.0      02 Dec 2017 06:47    TPro  5.3<L>  /  Alb      /  TBili      /  DBili      /  AST      /  ALT      /  AlkPhos      12-01    Creatinine Trend: 2.05 <--, 2.35 <--, 2.57 <--, 2.55 <--, 2.52 <--                        7.6    10.84 )-----------( 498      ( 02 Dec 2017 06:47 )             24.4

## 2017-12-03 LAB
BUN SERPL-MCNC: 31 MG/DL — HIGH (ref 7–23)
CALCIUM SERPL-MCNC: 9 MG/DL — SIGNIFICANT CHANGE UP (ref 8.4–10.5)
CHLORIDE SERPL-SCNC: 99 MMOL/L — SIGNIFICANT CHANGE UP (ref 98–107)
CO2 SERPL-SCNC: 20 MMOL/L — LOW (ref 22–31)
CREAT SERPL-MCNC: 1.97 MG/DL — HIGH (ref 0.5–1.3)
GLUCOSE BLDC GLUCOMTR-MCNC: 123 MG/DL — HIGH (ref 70–99)
GLUCOSE BLDC GLUCOMTR-MCNC: 140 MG/DL — HIGH (ref 70–99)
GLUCOSE BLDC GLUCOMTR-MCNC: 193 MG/DL — HIGH (ref 70–99)
GLUCOSE BLDC GLUCOMTR-MCNC: 231 MG/DL — HIGH (ref 70–99)
GLUCOSE SERPL-MCNC: 101 MG/DL — HIGH (ref 70–99)
HCT VFR BLD CALC: 25.4 % — LOW (ref 34.5–45)
HGB BLD-MCNC: 8.2 G/DL — LOW (ref 11.5–15.5)
MCHC RBC-ENTMCNC: 24.7 PG — LOW (ref 27–34)
MCHC RBC-ENTMCNC: 32.3 % — SIGNIFICANT CHANGE UP (ref 32–36)
MCV RBC AUTO: 76.5 FL — LOW (ref 80–100)
NRBC # FLD: 0 — SIGNIFICANT CHANGE UP
PLATELET # BLD AUTO: 489 K/UL — HIGH (ref 150–400)
PMV BLD: 9.4 FL — SIGNIFICANT CHANGE UP (ref 7–13)
POTASSIUM SERPL-MCNC: 4.4 MMOL/L — SIGNIFICANT CHANGE UP (ref 3.5–5.3)
POTASSIUM SERPL-SCNC: 4.4 MMOL/L — SIGNIFICANT CHANGE UP (ref 3.5–5.3)
RBC # BLD: 3.32 M/UL — LOW (ref 3.8–5.2)
RBC # FLD: 16.9 % — HIGH (ref 10.3–14.5)
SODIUM SERPL-SCNC: 134 MMOL/L — LOW (ref 135–145)
WBC # BLD: 12.61 K/UL — HIGH (ref 3.8–10.5)
WBC # FLD AUTO: 12.61 K/UL — HIGH (ref 3.8–10.5)

## 2017-12-03 RX ORDER — ISOSORBIDE MONONITRATE 60 MG/1
30 TABLET, EXTENDED RELEASE ORAL ONCE
Qty: 0 | Refills: 0 | Status: COMPLETED | OUTPATIENT
Start: 2017-12-03 | End: 2017-12-03

## 2017-12-03 RX ORDER — ISOSORBIDE MONONITRATE 60 MG/1
30 TABLET, EXTENDED RELEASE ORAL DAILY
Qty: 0 | Refills: 0 | Status: DISCONTINUED | OUTPATIENT
Start: 2017-12-03 | End: 2017-12-03

## 2017-12-03 RX ORDER — ISOSORBIDE MONONITRATE 60 MG/1
30 TABLET, EXTENDED RELEASE ORAL DAILY
Qty: 0 | Refills: 0 | Status: DISCONTINUED | OUTPATIENT
Start: 2017-12-03 | End: 2017-12-07

## 2017-12-03 RX ADMIN — ISOSORBIDE MONONITRATE 30 MILLIGRAM(S): 60 TABLET, EXTENDED RELEASE ORAL at 11:45

## 2017-12-03 RX ADMIN — AMLODIPINE BESYLATE 10 MILLIGRAM(S): 2.5 TABLET ORAL at 05:30

## 2017-12-03 RX ADMIN — PANTOPRAZOLE SODIUM 10 MG/HR: 20 TABLET, DELAYED RELEASE ORAL at 06:49

## 2017-12-03 RX ADMIN — SODIUM CHLORIDE 3 MILLILITER(S): 9 INJECTION INTRAMUSCULAR; INTRAVENOUS; SUBCUTANEOUS at 05:23

## 2017-12-03 RX ADMIN — BUMETANIDE 1 MILLIGRAM(S): 0.25 INJECTION INTRAMUSCULAR; INTRAVENOUS at 05:30

## 2017-12-03 RX ADMIN — SODIUM CHLORIDE 3 MILLILITER(S): 9 INJECTION INTRAMUSCULAR; INTRAVENOUS; SUBCUTANEOUS at 13:12

## 2017-12-03 RX ADMIN — SIMVASTATIN 40 MILLIGRAM(S): 20 TABLET, FILM COATED ORAL at 22:47

## 2017-12-03 RX ADMIN — SODIUM CHLORIDE 3 MILLILITER(S): 9 INJECTION INTRAMUSCULAR; INTRAVENOUS; SUBCUTANEOUS at 22:47

## 2017-12-03 RX ADMIN — IRON SUCROSE 210 MILLIGRAM(S): 20 INJECTION, SOLUTION INTRAVENOUS at 11:46

## 2017-12-03 RX ADMIN — Medication 25 MILLIGRAM(S): at 05:30

## 2017-12-03 RX ADMIN — PANTOPRAZOLE SODIUM 10 MG/HR: 20 TABLET, DELAYED RELEASE ORAL at 07:12

## 2017-12-03 NOTE — CHART NOTE - NSCHARTNOTEFT_GEN_A_CORE
I spoke to ID, at Pager [phone].  Pt had Klebsiella in her urine with >100K but is till symptomatic  Plan:  No treatment recommended at this time

## 2017-12-03 NOTE — PROGRESS NOTE ADULT - SUBJECTIVE AND OBJECTIVE BOX
Subjective : Pt lying in bed comfortable, not in distress, denies any chest pain or SOB, some epistaxix from time to time  	  MEDICATIONS:  amLODIPine   Tablet 10 milliGRAM(s) Oral daily  buMETAnide 1 milliGRAM(s) Oral daily  metoprolol succinate ER 25 milliGRAM(s) Oral daily          pantoprazole Infusion 8 mG/Hr IV Continuous <Continuous>    simvastatin 40 milliGRAM(s) Oral at bedtime    iron sucrose IVPB 100 milliGRAM(s) IV Intermittent daily      PHYSICAL EXAM:  T(C): 36.9 (12-03-17 @ 05:28), Max: 36.9 (12-03-17 @ 05:28)  HR: 98 (12-03-17 @ 05:28) (90 - 98)  BP: 168/76 (12-03-17 @ 05:28) (159/75 - 168/76)  RR: 18 (12-03-17 @ 05:28) (17 - 18)  SpO2: 100% (12-03-17 @ 05:28) (100% - 100%)  Wt(kg): --  I&O's Summary    02 Dec 2017 07:01  -  03 Dec 2017 07:00  --------------------------------------------------------  IN: 600 mL / OUT: 1300 mL / NET: -700 mL    03 Dec 2017 07:01  -  03 Dec 2017 11:02  --------------------------------------------------------  IN: 220 mL / OUT: 0 mL / NET: 220 mL          Appearance: Normal	  HEENT:   Normal oral mucosa, PERRL, EOMI	  Cardiovascular: Normal S1 S2, No JVD, No murmurs, No edema  Respiratory: Lungs clear to auscultation	  Gastrointestinal:  Soft, Non-tender, + BS	  Extremities: Normal range of motion, No clubbing, cyanosis or edema                                    8.2    12.61 )-----------( 489      ( 03 Dec 2017 06:50 )             25.4     12-03    134<L>  |  99  |  31<H>  ----------------------------<  101<H>  4.4   |  20<L>  |  1.97<H>    Ca    9.0      03 Dec 2017 06:50      proBNP:   Lipid Profile:   HgA1c:   TSH:

## 2017-12-03 NOTE — PROGRESS NOTE ADULT - ASSESSMENT
EKG - NSR no STT changes    a/p     1) Shortness of breath -  likely secondary to worsening renal function, and uncontrolled HTN, 2d echo shows normal LV function, wouldnt work up ischemia at this time as pt has stage 4 CKD, age 87 with no objective signs of ischemia. cont bumex    2) HTN - bradycardic sinus in 40's, with 2.5 sec pause, Toprol decreased to 25 mg daily, cont norvasc 5 mg , no pause today, bp elevated add imdur 30mg daily    3) Anemia - s/p transfusion     4) Acute on chronic renal failure - nephrology on case, improving EKG - NSR no STT changes    a/p     1) Shortness of breath -  likely secondary to worsening renal function, and uncontrolled HTN, 2d echo shows normal LV function, wouldnt work up ischemia at this time as pt has stage 4 CKD, age 87 with no objective signs of ischemia. cont bumex    2) HTN - bradycardic sinus in 40's, with 2.5 sec pause, Toprol decreased to 25 mg daily, cont norvasc 5 mg , no pause today, bp elevated add imdur 30mg daily    3) Anemia - s/p transfusion     4) Acute on chronic renal failure - nephrology on case, improving    5) UTI - growing klebsiella, consider starting abx

## 2017-12-03 NOTE — PROGRESS NOTE ADULT - PROBLEM SELECTOR PLAN 3
BP uncontrolled. Amlodipine increased to 10 mg daily today and Imdur added by cardiology. Agree with holding HCTZ and losartan for now. Monitor BP.

## 2017-12-03 NOTE — PROGRESS NOTE ADULT - SUBJECTIVE AND OBJECTIVE BOX
Ryan Perez MD  Interventional Cardiology  [place] Office : [address] [place] [address] [place]. [address]  Tel:   [place] Office : [address] [facility] [place] [address]  Tel: [phone]  Cell : [phone]    Subjective : Pt lying in bed comfortable, not in distress, denies any chest pain or SOB, some epistaxix from time to time  	  MEDICATIONS:  amLODIPine   Tablet 10 milliGRAM(s) Oral daily  buMETAnide 1 milliGRAM(s) Oral daily  metoprolol succinate ER 25 milliGRAM(s) Oral daily          pantoprazole Infusion 8 mG/Hr IV Continuous <Continuous>    simvastatin 40 milliGRAM(s) Oral at bedtime    iron sucrose IVPB 100 milliGRAM(s) IV Intermittent daily      PHYSICAL EXAM:  T(C): 36.9 (12-03-17 @ 05:28), Max: 36.9 (12-03-17 @ 05:28)  HR: 98 (12-03-17 @ 05:28) (90 - 98)  BP: 168/76 (12-03-17 @ 05:28) (159/75 - 168/76)  RR: 18 (12-03-17 @ 05:28) (17 - 18)  SpO2: 100% (12-03-17 @ 05:28) (100% - 100%)  Wt(kg): --  I&O's Summary    02 Dec 2017 07:01  -  03 Dec 2017 07:00  --------------------------------------------------------  IN: 600 mL / OUT: 1300 mL / NET: -700 mL    03 Dec 2017 07:01  -  03 Dec 2017 11:02  --------------------------------------------------------  IN: 220 mL / OUT: 0 mL / NET: 220 mL          Appearance: Normal	  HEENT:   Normal oral mucosa, PERRL, EOMI	  Cardiovascular: Normal S1 S2, No JVD, No murmurs, No edema  Respiratory: Lungs clear to auscultation	  Gastrointestinal:  Soft, Non-tender, + BS	  Extremities: Normal range of motion, No clubbing, cyanosis or edema                                    8.2    12.61 )-----------( 489      ( 03 Dec 2017 06:50 )             25.4     12-03    134<L>  |  99  |  31<H>  ----------------------------<  101<H>  4.4   |  20<L>  |  1.97<H>    Ca    9.0      03 Dec 2017 06:50      proBNP:   Lipid Profile:   HgA1c:   TSH:

## 2017-12-03 NOTE — PROGRESS NOTE ADULT - SUBJECTIVE AND OBJECTIVE BOX
[facility] NEPHROLOGY- PROGRESS NOTE    87 year old female with history of hypertension presents with SOB and LE edema. Nephrology consulted for elevated Scr.    REVIEW OF SYSTEMS:  Gen: no changes in weight  Cards: no chest pain  Resp: no dyspnea  GI: no nausea or vomiting or diarrhea  Vascular: no LE edema    No Known Allergies      Hospital Medications: Medications reviewed    VITALS:  T(F): 98.4 (12-03-17 @ 05:28), Max: 98.4 (12-03-17 @ 05:28)  HR: 98 (12-03-17 @ 05:28)  BP: 168/76 (12-03-17 @ 05:28)  RR: 18 (12-03-17 @ 05:28)  SpO2: 100% (12-03-17 @ 05:28)  Wt(kg): --    12-02 @ 07:01  -  12-03 @ 07:00  --------------------------------------------------------  IN: 600 mL / OUT: 1300 mL / NET: -700 mL    12-03 @ 07:01  -  12-03 @ 11:20  --------------------------------------------------------  IN: 220 mL / OUT: 0 mL / NET: 220 mL      PHYSICAL EXAM:    Gen: NAD, calm  Cards: RRR, +S1/S2, no M/G/R  Resp: CTA B/L  GI: soft, NT/ND, NABS  Vascular: no LE edema B/L    LABS:  12-03    134<L>  |  99  |  31<H>  ----------------------------<  101<H>  4.4   |  20<L>  |  1.97<H>    Ca    9.0      03 Dec 2017 06:50      Creatinine Trend: 1.97 <--, 2.05 <--, 2.35 <--, 2.57 <--, 2.55 <--, 2.52 <--                        8.2    12.61 )-----------( 489      ( 03 Dec 2017 06:50 )             25.4

## 2017-12-03 NOTE — PROGRESS NOTE ADULT - PROBLEM SELECTOR PLAN 4
Improving with good UO. Continue with bumex 1 mg PO daily (patient had been on lasix 40 mg daily at home and presented with volume overload). Monitor UO.

## 2017-12-04 ENCOUNTER — RESULT REVIEW (OUTPATIENT)
Age: 82
End: 2017-12-04

## 2017-12-04 LAB
BUN SERPL-MCNC: 31 MG/DL — HIGH (ref 7–23)
CALCIUM SERPL-MCNC: 8.7 MG/DL — SIGNIFICANT CHANGE UP (ref 8.4–10.5)
CHLORIDE SERPL-SCNC: 102 MMOL/L — SIGNIFICANT CHANGE UP (ref 98–107)
CO2 SERPL-SCNC: 23 MMOL/L — SIGNIFICANT CHANGE UP (ref 22–31)
CREAT SERPL-MCNC: 2.13 MG/DL — HIGH (ref 0.5–1.3)
FERRITIN SERPL-MCNC: 244.4 NG/ML — HIGH (ref 15–150)
GAS PNL BLDMV: SIGNIFICANT CHANGE UP
GLUCOSE BLDC GLUCOMTR-MCNC: 122 MG/DL — HIGH (ref 70–99)
GLUCOSE BLDC GLUCOMTR-MCNC: 122 MG/DL — HIGH (ref 70–99)
GLUCOSE BLDC GLUCOMTR-MCNC: 126 MG/DL — HIGH (ref 70–99)
GLUCOSE BLDC GLUCOMTR-MCNC: 198 MG/DL — HIGH (ref 70–99)
GLUCOSE SERPL-MCNC: 109 MG/DL — HIGH (ref 70–99)
HAPTOGLOB SERPL-MCNC: 287 MG/DL — HIGH (ref 34–200)
HCT VFR BLD CALC: 24.4 % — LOW (ref 34.5–45)
HGB BLD-MCNC: 7.4 G/DL — LOW (ref 11.5–15.5)
KAPPA/LAMBDA FREE LIGHT CHAIN RATIO, SERUM: 1.04 — SIGNIFICANT CHANGE UP
LDH SERPL L TO P-CCNC: 321 U/L — HIGH (ref 135–225)
MAGNESIUM SERPL-MCNC: 1.5 MG/DL — LOW (ref 1.6–2.6)
MCHC RBC-ENTMCNC: 23.6 PG — LOW (ref 27–34)
MCHC RBC-ENTMCNC: 30.3 % — LOW (ref 32–36)
MCV RBC AUTO: 78 FL — LOW (ref 80–100)
NRBC # FLD: 0 — SIGNIFICANT CHANGE UP
PHOSPHATE SERPL-MCNC: 3.2 MG/DL — SIGNIFICANT CHANGE UP (ref 2.5–4.5)
PLATELET # BLD AUTO: 447 K/UL — HIGH (ref 150–400)
PMV BLD: 9.3 FL — SIGNIFICANT CHANGE UP (ref 7–13)
POTASSIUM SERPL-MCNC: 4.2 MMOL/L — SIGNIFICANT CHANGE UP (ref 3.5–5.3)
POTASSIUM SERPL-SCNC: 4.2 MMOL/L — SIGNIFICANT CHANGE UP (ref 3.5–5.3)
RBC # BLD: 3.13 M/UL — LOW (ref 3.8–5.2)
RBC # FLD: 17.3 % — HIGH (ref 10.3–14.5)
RETICS #: 0.1 10X6/UL — HIGH (ref 0.02–0.07)
RETICS/RBC NFR: 2.7 % — HIGH (ref 0.5–2.5)
SODIUM SERPL-SCNC: 139 MMOL/L — SIGNIFICANT CHANGE UP (ref 135–145)
WBC # BLD: 12.7 K/UL — HIGH (ref 3.8–10.5)
WBC # FLD AUTO: 12.7 K/UL — HIGH (ref 3.8–10.5)

## 2017-12-04 PROCEDURE — 88305 TISSUE EXAM BY PATHOLOGIST: CPT | Mod: 26

## 2017-12-04 PROCEDURE — 88312 SPECIAL STAINS GROUP 1: CPT | Mod: 26

## 2017-12-04 PROCEDURE — 99233 SBSQ HOSP IP/OBS HIGH 50: CPT | Mod: GC

## 2017-12-04 PROCEDURE — 43239 EGD BIOPSY SINGLE/MULTIPLE: CPT | Mod: GC

## 2017-12-04 PROCEDURE — 99232 SBSQ HOSP IP/OBS MODERATE 35: CPT

## 2017-12-04 RX ORDER — IRON SUCROSE 20 MG/ML
200 INJECTION, SOLUTION INTRAVENOUS ONCE
Qty: 0 | Refills: 0 | Status: DISCONTINUED | OUTPATIENT
Start: 2017-12-04 | End: 2017-12-04

## 2017-12-04 RX ORDER — ERYTHROPOIETIN 10000 [IU]/ML
6000 INJECTION, SOLUTION INTRAVENOUS; SUBCUTANEOUS ONCE
Qty: 0 | Refills: 0 | Status: COMPLETED | OUTPATIENT
Start: 2017-12-04 | End: 2017-12-04

## 2017-12-04 RX ORDER — IRON SUCROSE 20 MG/ML
200 INJECTION, SOLUTION INTRAVENOUS ONCE
Qty: 0 | Refills: 0 | Status: COMPLETED | OUTPATIENT
Start: 2017-12-06 | End: 2017-12-06

## 2017-12-04 RX ORDER — MAGNESIUM SULFATE 500 MG/ML
1 VIAL (ML) INJECTION ONCE
Qty: 0 | Refills: 0 | Status: COMPLETED | OUTPATIENT
Start: 2017-12-04 | End: 2017-12-04

## 2017-12-04 RX ADMIN — ERYTHROPOIETIN 6000 UNIT(S): 10000 INJECTION, SOLUTION INTRAVENOUS; SUBCUTANEOUS at 18:09

## 2017-12-04 RX ADMIN — SODIUM CHLORIDE 3 MILLILITER(S): 9 INJECTION INTRAMUSCULAR; INTRAVENOUS; SUBCUTANEOUS at 05:15

## 2017-12-04 RX ADMIN — BUMETANIDE 1 MILLIGRAM(S): 0.25 INJECTION INTRAMUSCULAR; INTRAVENOUS at 05:16

## 2017-12-04 RX ADMIN — PANTOPRAZOLE SODIUM 10 MG/HR: 20 TABLET, DELAYED RELEASE ORAL at 07:24

## 2017-12-04 RX ADMIN — ISOSORBIDE MONONITRATE 30 MILLIGRAM(S): 60 TABLET, EXTENDED RELEASE ORAL at 12:35

## 2017-12-04 RX ADMIN — IRON SUCROSE 210 MILLIGRAM(S): 20 INJECTION, SOLUTION INTRAVENOUS at 12:35

## 2017-12-04 RX ADMIN — PANTOPRAZOLE SODIUM 10 MG/HR: 20 TABLET, DELAYED RELEASE ORAL at 04:13

## 2017-12-04 RX ADMIN — Medication 100 GRAM(S): at 15:56

## 2017-12-04 RX ADMIN — Medication 25 MILLIGRAM(S): at 05:16

## 2017-12-04 RX ADMIN — SODIUM CHLORIDE 3 MILLILITER(S): 9 INJECTION INTRAMUSCULAR; INTRAVENOUS; SUBCUTANEOUS at 21:48

## 2017-12-04 RX ADMIN — SIMVASTATIN 40 MILLIGRAM(S): 20 TABLET, FILM COATED ORAL at 21:48

## 2017-12-04 RX ADMIN — AMLODIPINE BESYLATE 10 MILLIGRAM(S): 2.5 TABLET ORAL at 05:16

## 2017-12-04 RX ADMIN — SODIUM CHLORIDE 3 MILLILITER(S): 9 INJECTION INTRAMUSCULAR; INTRAVENOUS; SUBCUTANEOUS at 13:13

## 2017-12-04 NOTE — PROGRESS NOTE ADULT - SUBJECTIVE AND OBJECTIVE BOX
CC: F/U Leukocytosis ([place]  [id number])    [place] History/ROS: Patient with no complaints. Feels well. Denies chest pain, SOB, abd pain, dysuria, N/V/D/C, cough.    Allergies  No Known Allergies    ANTIMICROBIALS:      OTHER MEDS:  amLODIPine   Tablet 10 milliGRAM(s) Oral daily  buMETAnide 1 milliGRAM(s) Oral daily  isosorbide   mononitrate ER Tablet (IMDUR) 30 milliGRAM(s) Oral daily  metoprolol succinate ER 25 milliGRAM(s) Oral daily  simvastatin 40 milliGRAM(s) Oral at bedtime  sodium chloride 0.9% lock flush 3 milliLiter(s) IV Push every 8 hours      PE:    Vital Signs Last 24 Hrs  T(C): 36.4 (04 Dec 2017 12:32), Max: 36.8 (04 Dec 2017 05:13)  T(F): 97.6 (04 Dec 2017 12:32), Max: 98.2 (04 Dec 2017 05:13)  HR: 95 (04 Dec 2017 12:32) (95 - 100)  BP: 148/75 (04 Dec 2017 12:32) (148/68 - 157/83)  BP(mean): --  RR: 18 (04 Dec 2017 12:32) (17 - 18)  SpO2: 98% (04 Dec 2017 12:32) (98% - 100%)    Gen: AOx3, NAD, non-toxic, pleasant  CV: S1+S2 normal, no murmurs  Resp: Clear bilat, no resp distress  Abd: Soft, nontender, +BS  Ext: No LE edema, no wounds  : No Palafox  IV/Skin: No thrombophlebitis  Neuro: no focal deficits    LABS:                          7.4    12.70 )-----------( 447      ( 04 Dec 2017 04:21 )             24.4       12-04    139  |  102  |  31<H>  ----------------------------<  109<H>  4.2   |  23  |  2.13<H>    Ca    8.7      04 Dec 2017 04:21  Phos  3.2     12-04  Mg     1.5     12-04            MICROBIOLOGY:  v  URINE MIDSTREAM  11-29-17 --  --  Klebsiella pneumoniae                RADIOLOGY:    No new images.

## 2017-12-04 NOTE — CONSULT NOTE ADULT - ATTENDING COMMENTS
I have seen and evaluated the patient with the GI Fellow and GI Team.  I agree with the findings, formulation and plan of care as documented in the resident / fellows note, except as noted.
Ms. Villagomez was seen during hematology consultation today. Her peripheral blood smear was reviewed. Elements the history were reviewed. She's had 3 ferritin determinations, 2 of which clearly indicate a deficiency anemia. Peripheral blood smear does reveal some normal blood cells which are likely those that were transfused. Hypochromic and microcytic cells are noted consistent with iron deficiency. She has had iron deficiency in the past. An EGD was performed today.    She will start on iron sucrose intravenously as supplementation. She will likely require some additional treatment as an outpatient. I agree with Dr. Valentin's note as stated above.
Agree with above. See note 11/29/17
87 year old female with HTN presented with SOB and worsening LE edema x 1 day.     For the past week has endorsed diarrhea and resolved with imodium. A few weeks ago, was on Levaquin for ?UTI.     Patient with positive urine cx with GNR >100K. WBC in urine 25-50. Patient with no complaints of dysuria. Patient also states SOB improving. CXR with bilateral pleural effusions and vascular lung bases concomitant underlying airspace consolidation.      Assessment:  -Asymptomatic bacteruria  -Bilateral pleural effusions  -Leukocytosis improving  -SHY    Recommend:  -Continue to monitor off antibiotics as patient has no complaints of dysuria or any urinary complaints.  -Doubt PNA at this time given no sputum production and cough. Also, has remained afebrile.  -Leukocytosis improving off antibiotics.  -Continue to trend WBC and monitor for fevers.      Bubba Marshall MD  Pager [phone]  After 5pm/weekends call [phone]

## 2017-12-04 NOTE — PROGRESS NOTE ADULT - SUBJECTIVE AND OBJECTIVE BOX
Ryan Perez MD  Interventional Cardiology  [place] Office : [address] [place] [address] [place]. [address]  Tel:   [place] Office : [address] [facility] [place] [address]  Tel: [phone]  Cell : [phone]    Subjective : Pt lying in bed comfortable, not in distress, denies any chest pain or SOB, s/p EGD  	  MEDICATIONS:  amLODIPine   Tablet 10 milliGRAM(s) Oral daily  buMETAnide 1 milliGRAM(s) Oral daily  isosorbide   mononitrate ER Tablet (IMDUR) 30 milliGRAM(s) Oral daily  metoprolol succinate ER 25 milliGRAM(s) Oral daily            simvastatin 40 milliGRAM(s) Oral at bedtime        PHYSICAL EXAM:  T(C): 36.4 (12-04-17 @ 12:32), Max: 37 (12-03-17 @ 13:21)  HR: 95 (12-04-17 @ 12:32) (89 - 100)  BP: 148/75 (12-04-17 @ 12:32) (139/68 - 157/83)  RR: 18 (12-04-17 @ 12:32) (17 - 18)  SpO2: 98% (12-04-17 @ 12:32) (98% - 100%)  Wt(kg): --  I&O's Summary    03 Dec 2017 07:01  -  04 Dec 2017 07:00  --------------------------------------------------------  IN: 670 mL / OUT: 600 mL / NET: 70 mL    04 Dec 2017 07:01  -  04 Dec 2017 13:13  --------------------------------------------------------  IN: 0 mL / OUT: 300 mL / NET: -300 mL      Height (cm): 152.4 (12-04 @ 09:01)  Weight (kg): 61.8 (12-04 @ 09:01)  BMI (kg/m2): 26.6 (12-04 @ 09:01)  BSA (m2): 1.59 (12-04 @ 09:01)    Appearance: Normal	  HEENT:   Normal oral mucosa, PERRL, EOMI	  Cardiovascular: Normal S1 S2, No JVD, No murmurs, No edema  Respiratory: Lungs clear to auscultation	  Gastrointestinal:  Soft, Non-tender, + BS	  Extremities: Normal range of motion, No clubbing, cyanosis or edema                                    7.4    12.70 )-----------( 447      ( 04 Dec 2017 04:21 )             24.4     12-04    139  |  102  |  31<H>  ----------------------------<  109<H>  4.2   |  23  |  2.13<H>    Ca    8.7      04 Dec 2017 04:21  Phos  3.2     12-04  Mg     1.5     12-04      proBNP:   Lipid Profile:   HgA1c:   TSH:

## 2017-12-04 NOTE — CONSULT NOTE ADULT - PROBLEM SELECTOR RECOMMENDATION 9
Patient with iron deficient anemia in setting of kidney disease, no active GI source of blood loss located and patient denies any other site of bleeding  -plan for Iron Sucrose 200mg today and Wednesday  -check hemoglobin electrophoresis  -check SPEP, Immunofixation, Immunoglobulins    Carloz Valentin  PGY-5, Hematology-Oncology Fellow  [phone] ([place]) [phone] ([facility])

## 2017-12-04 NOTE — PROGRESS NOTE ADULT - SUBJECTIVE AND OBJECTIVE BOX
Subjective : Pt lying in bed comfortable, not in distress, denies any chest pain or SOB, s/p EGD  	  MEDICATIONS:  amLODIPine   Tablet 10 milliGRAM(s) Oral daily  buMETAnide 1 milliGRAM(s) Oral daily  isosorbide   mononitrate ER Tablet (IMDUR) 30 milliGRAM(s) Oral daily  metoprolol succinate ER 25 milliGRAM(s) Oral daily            simvastatin 40 milliGRAM(s) Oral at bedtime        PHYSICAL EXAM:  T(C): 36.4 (12-04-17 @ 12:32), Max: 37 (12-03-17 @ 13:21)  HR: 95 (12-04-17 @ 12:32) (89 - 100)  BP: 148/75 (12-04-17 @ 12:32) (139/68 - 157/83)  RR: 18 (12-04-17 @ 12:32) (17 - 18)  SpO2: 98% (12-04-17 @ 12:32) (98% - 100%)  Wt(kg): --  I&O's Summary    03 Dec 2017 07:01  -  04 Dec 2017 07:00  --------------------------------------------------------  IN: 670 mL / OUT: 600 mL / NET: 70 mL    04 Dec 2017 07:01  -  04 Dec 2017 13:13  --------------------------------------------------------  IN: 0 mL / OUT: 300 mL / NET: -300 mL      Height (cm): 152.4 (12-04 @ 09:01)  Weight (kg): 61.8 (12-04 @ 09:01)  BMI (kg/m2): 26.6 (12-04 @ 09:01)  BSA (m2): 1.59 (12-04 @ 09:01)    Appearance: Normal	  HEENT:   Normal oral mucosa, PERRL, EOMI	  Cardiovascular: Normal S1 S2, No JVD, No murmurs, No edema  Respiratory: Lungs clear to auscultation	  Gastrointestinal:  Soft, Non-tender, + BS	  Extremities: Normal range of motion, No clubbing, cyanosis or edema                                    7.4    12.70 )-----------( 447      ( 04 Dec 2017 04:21 )             24.4     12-04    139  |  102  |  31<H>  ----------------------------<  109<H>  4.2   |  23  |  2.13<H>    Ca    8.7      04 Dec 2017 04:21  Phos  3.2     12-04  Mg     1.5     12-04      proBNP:   Lipid Profile:   HgA1c:   TSH:

## 2017-12-04 NOTE — PROGRESS NOTE ADULT - PROBLEM SELECTOR PLAN 3
BP improving. Amlodipine increased to 10 mg daily and Imdur added by cardiology on 12/3. Agree with holding HCTZ and losartan for now. Monitor BP.

## 2017-12-04 NOTE — PROGRESS NOTE ADULT - ASSESSMENT
EKG - NSR no STT changes    a/p     1) Shortness of breath -  likely secondary to worsening renal function, and uncontrolled HTN, 2d echo shows normal LV function, wouldnt work up ischemia at this time as pt has stage 4 CKD, age 87 with no objective signs of ischemia. cont bumex    2) HTN - bradycardic sinus in 40's, with 2.5 sec pause, Toprol decreased to 25 mg daily, cont norvasc 5 mg , no pause today, bp elevated increase  imdur to 60mg daily    3) Anemia - s/p transfusion , EGD shows no bleeding source    4) Acute on chronic renal failure - nephrology on case    5) UTI - growing klebsiella, f/u ID consult

## 2017-12-04 NOTE — CONSULT NOTE ADULT - SUBJECTIVE AND OBJECTIVE BOX
HPI:  87F HTN p/w SOB and worsening b/l LE edema x 1 day found to have chronic kidney disease stage IV, hematology consulted for anemia workup.    Patient has had complicated hospital course with cardiology evaluation (normal LV so no plan for further ischemic workup), ID evaluation (asymptomatic bacteriuria, low suspicion pneumonia and observing off abx) and nephrology evaluation (management of CKD IV and diuretic). Patient states she has improved and denies any acute symptoms. On admission patient with severe anemia requiring 1 unit pRBC and GI consulted rule out GI bleed with endoscopy that was negative, hematology now consulted for further workup of the anemia.     Allergies  No Known Allergies    Intolerances    MEDICATIONS  (STANDING):  amLODIPine   Tablet 10 milliGRAM(s) Oral daily  buMETAnide 1 milliGRAM(s) Oral daily  epoetin cathy Injectable 6000 Unit(s) SubCutaneous once  iron sucrose IVPB 200 milliGRAM(s) IV Intermittent once  isosorbide   mononitrate ER Tablet (IMDUR) 30 milliGRAM(s) Oral daily  metoprolol succinate ER 25 milliGRAM(s) Oral daily  simvastatin 40 milliGRAM(s) Oral at bedtime  sodium chloride 0.9% lock flush 3 milliLiter(s) IV Push every 8 hours    PAST MEDICAL & SURGICAL HISTORY:  Hypertension  History of hysterectomy    FAMILY HISTORY: Noncontributory    SOCIAL HISTORY: No EtOH, no tobacco    REVIEW OF SYSTEMS:  10 point ROS otherwise negative    Height (cm): 152.4 (12-04 @ 09:01)  Weight (kg): 61.8 (12-04 @ 09:01)  BMI (kg/m2): 26.6 (12-04 @ 09:01)  BSA (m2): 1.59 (12-04 @ 09:01)    T(F): 98.2 (12-04-17 @ 17:28), Max: 98.2 (12-04-17 @ 05:13)  HR: 99 (12-04-17 @ 17:28)  BP: 152/82 (12-04-17 @ 17:28)  RR: 18 (12-04-17 @ 17:28)  SpO2: 96% (12-04-17 @ 17:28)  Wt(kg): --    GENERAL: NAD, well-developed  HEAD:  Atraumatic, Normocephalic  EYES: EOMI, PERRLA, conjunctiva and sclera clear  NECK: Supple, No JVD  CHEST/LUNG: Clear to auscultation bilaterally; No wheeze  HEART: Regular rate and rhythm; No murmurs, rubs, or gallops  ABDOMEN: Soft, Nontender, Nondistended; Bowel sounds present  EXTREMITIES:  2+ Peripheral Pulses, No clubbing, cyanosis. +2 peripheral pedal pitting edema  NEUROLOGY: non-focal  SKIN: No rashes or lesions                          7.4    12.70 )-----------( 447      ( 04 Dec 2017 04:21 )             24.4       12-04    139  |  102  |  31<H>  ----------------------------<  109<H>  4.2   |  23  |  2.13<H>    Ca    8.7      04 Dec 2017 04:21  Phos  3.2     12-04  Mg     1.5     12-04    Phosphorus Level, Serum: 3.2 mg/dL (12-04 @ 04:21)  Magnesium, Serum: 1.5 mg/dL (12-04 @ 04:21)  Lactate Dehydrogenase, Serum: 321 U/L (12-04 @ 04:21)    EXAM:  RAD CHEST AP PA 1 VIEW      PROCEDURE DATE:  Nov 28 2017     INTERPRETATION:  CLINICAL INDICATION: chest pain    EXAM:  Portable upright AP chest from 11/28/2017 at 1416. No prior chest x-ray   available at this institution for comparison.    Slightly rotated left.    IMPRESSION:  Bilateral pleural effusions and vascular lung bases concomitant   underlying airspace consolidation including pneumonia in the proper   clinical context cannot be excluded.    Clear remaining visualized upper lungs. No pneumothorax.    Cardiac silhouette partially obscured by the bilateral effusions.    Trachea midline.    Generalized osteopenia and spinal degenerative changes.    YOLANDA BRO M.D., ATTENDING RADIOLOGIST  This document has been electronically signed. Nov 28 2017  4:43PM

## 2017-12-04 NOTE — CONSULT NOTE ADULT - ASSESSMENT
87F HTN p/w SOB and worsening b/l LE edema x 1 day found to have chronic kidney disease stage IV, hematology consulted for anemia workup

## 2017-12-04 NOTE — PROGRESS NOTE ADULT - PROBLEM SELECTOR PLAN 4
Patient appears euvolemic. Continue with bumex 1 mg PO daily (patient had been on lasix 40 mg daily at home and presented with volume overload). Monitor UO.

## 2017-12-05 LAB
BUN SERPL-MCNC: 27 MG/DL — HIGH (ref 7–23)
CALCIUM SERPL-MCNC: 8.7 MG/DL — SIGNIFICANT CHANGE UP (ref 8.4–10.5)
CHLORIDE SERPL-SCNC: 97 MMOL/L — LOW (ref 98–107)
CO2 SERPL-SCNC: 22 MMOL/L — SIGNIFICANT CHANGE UP (ref 22–31)
CREAT SERPL-MCNC: 2.09 MG/DL — HIGH (ref 0.5–1.3)
GAS PNL BLDMV: SIGNIFICANT CHANGE UP
GLUCOSE BLDC GLUCOMTR-MCNC: 118 MG/DL — HIGH (ref 70–99)
GLUCOSE BLDC GLUCOMTR-MCNC: 159 MG/DL — HIGH (ref 70–99)
GLUCOSE BLDC GLUCOMTR-MCNC: 161 MG/DL — HIGH (ref 70–99)
GLUCOSE BLDC GLUCOMTR-MCNC: 181 MG/DL — HIGH (ref 70–99)
GLUCOSE SERPL-MCNC: 109 MG/DL — HIGH (ref 70–99)
HCT VFR BLD CALC: 24.9 % — LOW (ref 34.5–45)
HGB A MFR BLD: 97.7 % — SIGNIFICANT CHANGE UP
HGB A2 MFR BLD: 2.1 % — LOW (ref 2.4–3.5)
HGB BLD-MCNC: 7.5 G/DL — LOW (ref 11.5–15.5)
HGB ELECT COMMENT: SIGNIFICANT CHANGE UP
HGB F MFR BLD: < 1 % — SIGNIFICANT CHANGE UP (ref 0–1.5)
IGA FLD-MCNC: 190 MG/DL — SIGNIFICANT CHANGE UP (ref 70–400)
IGG FLD-MCNC: 552 MG/DL — LOW (ref 700–1600)
IGM SERPL-MCNC: 65 MG/DL — SIGNIFICANT CHANGE UP (ref 40–230)
MCHC RBC-ENTMCNC: 24 PG — LOW (ref 27–34)
MCHC RBC-ENTMCNC: 30.1 % — LOW (ref 32–36)
MCV RBC AUTO: 79.8 FL — LOW (ref 80–100)
NRBC # FLD: 0 — SIGNIFICANT CHANGE UP
PLATELET # BLD AUTO: 449 K/UL — HIGH (ref 150–400)
PMV BLD: 9.8 FL — SIGNIFICANT CHANGE UP (ref 7–13)
POTASSIUM SERPL-MCNC: 4 MMOL/L — SIGNIFICANT CHANGE UP (ref 3.5–5.3)
POTASSIUM SERPL-SCNC: 4 MMOL/L — SIGNIFICANT CHANGE UP (ref 3.5–5.3)
PROT SERPL-MCNC: 5.9 G/DL — LOW (ref 6–8.3)
RBC # BLD: 3.12 M/UL — LOW (ref 3.8–5.2)
RBC # FLD: 17.9 % — HIGH (ref 10.3–14.5)
SODIUM SERPL-SCNC: 135 MMOL/L — SIGNIFICANT CHANGE UP (ref 135–145)
WBC # BLD: 13.49 K/UL — HIGH (ref 3.8–10.5)
WBC # FLD AUTO: 13.49 K/UL — HIGH (ref 3.8–10.5)

## 2017-12-05 PROCEDURE — 99232 SBSQ HOSP IP/OBS MODERATE 35: CPT | Mod: GC

## 2017-12-05 RX ORDER — METOPROLOL TARTRATE 50 MG
25 TABLET ORAL ONCE
Qty: 0 | Refills: 0 | Status: COMPLETED | OUTPATIENT
Start: 2017-12-05 | End: 2017-12-05

## 2017-12-05 RX ORDER — METOPROLOL TARTRATE 50 MG
50 TABLET ORAL DAILY
Qty: 0 | Refills: 0 | Status: DISCONTINUED | OUTPATIENT
Start: 2017-12-06 | End: 2017-12-10

## 2017-12-05 RX ADMIN — SIMVASTATIN 40 MILLIGRAM(S): 20 TABLET, FILM COATED ORAL at 22:25

## 2017-12-05 RX ADMIN — SODIUM CHLORIDE 3 MILLILITER(S): 9 INJECTION INTRAMUSCULAR; INTRAVENOUS; SUBCUTANEOUS at 22:25

## 2017-12-05 RX ADMIN — ISOSORBIDE MONONITRATE 30 MILLIGRAM(S): 60 TABLET, EXTENDED RELEASE ORAL at 11:16

## 2017-12-05 RX ADMIN — AMLODIPINE BESYLATE 10 MILLIGRAM(S): 2.5 TABLET ORAL at 05:41

## 2017-12-05 RX ADMIN — Medication 25 MILLIGRAM(S): at 11:16

## 2017-12-05 RX ADMIN — BUMETANIDE 1 MILLIGRAM(S): 0.25 INJECTION INTRAMUSCULAR; INTRAVENOUS at 05:41

## 2017-12-05 RX ADMIN — SODIUM CHLORIDE 3 MILLILITER(S): 9 INJECTION INTRAMUSCULAR; INTRAVENOUS; SUBCUTANEOUS at 13:27

## 2017-12-05 RX ADMIN — Medication 25 MILLIGRAM(S): at 05:41

## 2017-12-05 RX ADMIN — SODIUM CHLORIDE 3 MILLILITER(S): 9 INJECTION INTRAMUSCULAR; INTRAVENOUS; SUBCUTANEOUS at 05:41

## 2017-12-05 NOTE — PROGRESS NOTE ADULT - PROBLEM SELECTOR PLAN 3
BP uncontrolled. Increase metoprolol to 50 mg daily. Agree with holding HCTZ and losartan for now. Monitor BP.

## 2017-12-05 NOTE — PROGRESS NOTE ADULT - SUBJECTIVE AND OBJECTIVE BOX
Ryan Perez MD  Interventional Cardiology  [place] Office : [phone] [place] [address] [place]. [address]  Tel:   [place] Office : [address] [facility] [place] [address]  Tel: [phone]  Cell : [phone]    Subjective : Pt lying in bed comfortable, not in distress, denies any chest pain or SOB  	  MEDICATIONS:  amLODIPine   Tablet 10 milliGRAM(s) Oral daily  buMETAnide 1 milliGRAM(s) Oral daily  isosorbide   mononitrate ER Tablet (IMDUR) 30 milliGRAM(s) Oral daily            simvastatin 40 milliGRAM(s) Oral at bedtime        PHYSICAL EXAM:  T(C): 36.9 (12-05-17 @ 05:33), Max: 37.1 (12-04-17 @ 21:47)  HR: 100 (12-05-17 @ 11:15) (98 - 100)  BP: 140/64 (12-05-17 @ 11:15) (140/64 - 152/82)  RR: 18 (12-05-17 @ 11:15) (17 - 18)  SpO2: 99% (12-05-17 @ 11:15) (96% - 100%)  Wt(kg): --  I&O's Summary    04 Dec 2017 07:01  -  05 Dec 2017 07:00  --------------------------------------------------------  IN: 654 mL / OUT: 300 mL / NET: 354 mL    05 Dec 2017 07:01  -  05 Dec 2017 13:21  --------------------------------------------------------  IN: 240 mL / OUT: 0 mL / NET: 240 mL          Appearance: Normal	  HEENT:   Normal oral mucosa, PERRL, EOMI	  Cardiovascular: Normal S1 S2, No JVD, No murmurs, No edema  Respiratory: Lungs clear to auscultation	  Gastrointestinal:  Soft, Non-tender, + BS	  Extremities: Normal range of motion, No clubbing, cyanosis or edema                                    7.5    13.49 )-----------( 449      ( 05 Dec 2017 13:05 )             24.9     12-05    135  |  97<L>  |  27<H>  ----------------------------<  109<H>  4.0   |  22  |  2.09<H>    Ca    8.7      05 Dec 2017 06:50  Phos  3.2     12-04  Mg     1.5     12-04    TPro  5.9<L>  /  Alb  x   /  TBili  x   /  DBili  x   /  AST  x   /  ALT  x   /  AlkPhos  x   12-05    proBNP:   Lipid Profile:   HgA1c:   TSH:

## 2017-12-05 NOTE — PROGRESS NOTE ADULT - SUBJECTIVE AND OBJECTIVE BOX
Chief Complaint:  Patient is a 87y old  Female who presents with a chief complaint of SOB (2017 03:41)      Interval Events:  Pt had EGD yesterday, negative study.  No Hgb today but was downtrending, retics seem appropriate.  Will plan for capsule tomorrow. No GI bleeding per patient.     Allergies:  No Known Allergies      Hospital Medications:  amLODIPine   Tablet 10 milliGRAM(s) Oral daily  buMETAnide 1 milliGRAM(s) Oral daily  isosorbide   mononitrate ER Tablet (IMDUR) 30 milliGRAM(s) Oral daily  metoprolol succinate ER 25 milliGRAM(s) Oral once  simvastatin 40 milliGRAM(s) Oral at bedtime  sodium chloride 0.9% lock flush 3 milliLiter(s) IV Push every 8 hours      PMHX/PSHX:  Hypertension  History of hysterectomy    ROS:     General:  No wt loss, fevers, chills, night sweats, fatigue,   Eyes:  Good vision, no reported pain  ENT:  No sore throat, pain, runny nose, dysphagia  CV:  No pain, palpitations, hypo/hypertension  Resp:  No dyspnea, cough, tachypnea, wheezing  GI:  See HPI  :  No pain, bleeding, incontinence, nocturia  Muscle:  No pain, weakness  Neuro:  No weakness, tingling, memory problems  Psych:  No fatigue, insomnia, mood problems, depression  Endocrine:  No polyuria, polydipsia, cold/heat intolerance  Heme:  No petechiae, ecchymosis, easy bruisability  Skin:  No rash, edema      PHYSICAL EXAM:     GENERAL:  Appears stated age, well-groomed, well-nourished, no distress  HEENT:  NC/AT,  conjunctivae clear, sclera -anicteric  CHEST:  Full & symmetric excursion, no increased effort  ABDOMEN:  Soft, non-tender, non-distended  EXTREMITIES:  no cyanosis,clubbing or edema  SKIN:  No rash/erythema/ecchymoses/petechiae/wounds/abscess/warm/dry  NEURO:  Alert, oriented    Vital Signs:  Vital Signs Last 24 Hrs  T(C): 36.9 (05 Dec 2017 05:33), Max: 37.1 (04 Dec 2017 21:47)  T(F): 98.4 (05 Dec 2017 05:33), Max: 98.8 (04 Dec 2017 21:47)  HR: 98 (05 Dec 2017 05:33) (95 - 100)  BP: 150/69 (05 Dec 2017 05:33) (148/75 - 152/82)  BP(mean): --  RR: 18 (05 Dec 2017 05:33) (17 - 18)  SpO2: 100% (05 Dec 2017 05:33) (96% - 100%)  Daily     Daily Weight in k (05 Dec 2017 05:33)    LABS:                        7.4    12.70 )-----------( 447      ( 04 Dec 2017 04:21 )             24.4     12-05    135  |  97<L>  |  27<H>  ----------------------------<  109<H>  4.0   |  22  |  2.09<H>    Ca    8.7      05 Dec 2017 06:50  Phos  3.2     12-04  Mg     1.5     12-04    TPro  5.9<L>  /  Alb  x   /  TBili  x   /  DBili  x   /  AST  x   /  ALT  x   /  AlkPhos  x   12-05    LIVER FUNCTIONS - ( 05 Dec 2017 06:50 )  Alb: x     / Pro: 5.9 g/dL / ALK PHOS: x     / ALT: x     / AST: x     / GGT: x                   Imaging:  < from: Upper Endoscopy (17 @ 10:27) >    [facility]  _______________________________________________________________________________  Patient Name: Elizabeth Villagomez          Procedure Date: 2017 10:27 AM  MRN: [id number]                     Account Number: [id number]  YOB: 1930              Admit Type: Inpatient  Room: [place] [address]                         Gender: Female  Attending MD: KIRAN BROWN MD       _______________________________________________________________________________     Procedure:           Upper GI endoscopy  Indications:         Unexplained anemia, normal iron, B12, folate  Providers:           KIRAN BROWN MD, HOMA HOLBROOK MD (Fellow)  Referring MD:        FABIO ESPINOZA  Medicines:           Monitored Anesthesia Care  Complications:       No immediate complications.  Procedure:           Pre-Anesthesia Assessment:                       - Prior to the procedure, a History and Physical was                        performed, and patient medications and allergies were                        reviewed. The patient is competent. The risks and                        benefits of the procedure and the sedation options and                        risks were discussed with the patient. All questions               were answered and informed consent was obtained. Patient                        identification and proposed procedure were verified by                        the physician, the nurse and the anesthesiologist in the                        endoscopy suite. Mental Status Examination: alert and                        oriented. Airway Examination: normal oropharyngeal                        airway and neck mobility. Respiratory Examination: clear                        to auscultation. CV Examination: normal. Prophylactic                        Antibiotics: The patient does not require prophylactic                        antibiotics. Prior Anticoagulants: The patient has taken                        no previous anticoagulant or antiplatelet agents. ASA                        Grade Assessment: II - A patient with mild systemic                        disease. After reviewing the risks and benefits, the                        patient was deemed in satisfactory condition to undergo                      the procedure. The anesthesia plan was to use monitored                        anesthesia care (MAC). Immediately prior to                        administration of medications, the patient was                        re-assessed foradequacy to receive sedatives. The heart                        rate, respiratory rate, oxygen saturations, blood                        pressure, adequacy of pulmonary ventilation, and                        response to care were monitored throughout the                        procedure. The physical status of the patient was                        re-assessed after the procedure.                       After obtaining informed consent, the endoscope was                        passed under directvision. Throughout the procedure,                        the patient's blood pressure, pulse, and oxygen                        saturations were monitored continuously. The Endoscope                        was introduced through the mouth, and advanced to the                        second part of duodenum. The upper GI endoscopy was                        accomplished without difficulty. The patient tolerated                        the procedure well.                                      Findings:       A small hiatus hernia was present in the distal esophagus, best seen on        retroflexed views in the stomach.       A mild Schatzki ring (acquired) was found at the gastroesophageal        junction.       Patchy mildly erythematous mucosa without bleeding was found in the        prepyloric region of the stomach. Biopsies were taken with a cold        forceps for histology.       The duodenal bulb and 2nd part of the duodenum were normal. Biopsies for        histology were taken with a cold forceps for evaluation of celiac        disease.                                                                                   Impression:          - No evidence of bleeding in upper GI tract.                  - Small hiatus hernia.                       - Mild Schatzki ring.                       - Erythematous mucosa in the prepyloric region of the                        stomach. Biopsied.                       - Normal duodenal bulb and 2nd part of the duodenum.                        Biopsied.  Recommendation:      - [facility] patient to [facility] for [facility] care.                       - Resume previous diet.                       - Monitor CBC daily.                       - Awaitpathology results.                       - Check reticulocyte count. Consider hematology workup                        if no overt or occult bleeding.                       - Will review records of the colonoscopy performed 2                        years ago per family.                                                                                   Attending Participation:       I was present and participated during the entire procedure, including        non-key portions.                                                               __________________  KIRAN BROWN MD  2017 11:21:10 AM  This report has been signed electronically.  Number of Addenda: 0    Note Initiated On: 2017 10:27 AM    < end of copied text > Chief Complaint:  Patient is a 87y old  Female who presents with a chief complaint of SOB (2017 03:41)      Interval Events:  Pt had EGD yesterday, negative study.  No Hgb today but was downtrending, retics seem appropriate.  Will plan for capsule tomorrow. No GI bleeding per patient.     Allergies:  No Known Allergies      Hospital Medications:  amLODIPine   Tablet 10 milliGRAM(s) Oral daily  buMETAnide 1 milliGRAM(s) Oral daily  isosorbide   mononitrate ER Tablet (IMDUR) 30 milliGRAM(s) Oral daily  metoprolol succinate ER 25 milliGRAM(s) Oral once  simvastatin 40 milliGRAM(s) Oral at bedtime  sodium chloride 0.9% lock flush 3 milliLiter(s) IV Push every 8 hours      PMHX/PSHX:  Hypertension  History of hysterectomy    ROS:     General:  No wt loss, fevers, chills, night sweats, fatigue,   Eyes:  Good vision, no reported pain  ENT:  No sore throat, pain, runny nose, dysphagia  CV:  No pain, palpitations, hypo/hypertension  Resp:  No dyspnea, cough, tachypnea, wheezing  GI:  See HPI  :  No pain, bleeding, incontinence, nocturia  Muscle:  No pain, weakness  Neuro:  No weakness, tingling, memory problems  Psych:  No fatigue, insomnia, mood problems, depression  Endocrine:  No polyuria, polydipsia, cold/heat intolerance  Heme:  No petechiae, ecchymosis, easy bruisability  Skin:  No rash, edema      PHYSICAL EXAM:     GENERAL:  Appears stated age, well-groomed, well-nourished, no distress  HEENT:  NC/AT,  conjunctivae clear, sclera -anicteric  CHEST:  Full & symmetric excursion, no increased effort  ABDOMEN:  Soft, non-tender, non-distended  EXTREMITIES:  no cyanosis,clubbing or edema  SKIN:  No rash/erythema/ecchymoses/petechiae/wounds/abscess/warm/dry  NEURO:  Alert, oriented    Vital Signs:  Vital Signs Last 24 Hrs  T(C): 36.9 (05 Dec 2017 05:33), Max: 37.1 (04 Dec 2017 21:47)  T(F): 98.4 (05 Dec 2017 05:33), Max: 98.8 (04 Dec 2017 21:47)  HR: 98 (05 Dec 2017 05:33) (95 - 100)  BP: 150/69 (05 Dec 2017 05:33) (148/75 - 152/82)  BP(mean): --  RR: 18 (05 Dec 2017 05:33) (17 - 18)  SpO2: 100% (05 Dec 2017 05:33) (96% - 100%)  Daily     Daily Weight in k (05 Dec 2017 05:33)    LABS:                        7.4    12.70 )-----------( 447      ( 04 Dec 2017 04:21 )             24.4     12-05    135  |  97<L>  |  27<H>  ----------------------------<  109<H>  4.0   |  22  |  2.09<H>    Ca    8.7      05 Dec 2017 06:50  Phos  3.2     12-04  Mg     1.5     12-04    TPro  5.9<L>  /  Alb  x   /  TBili  x   /  DBili  x   /  AST  x   /  ALT  x   /  AlkPhos  x   12-05    LIVER FUNCTIONS - ( 05 Dec 2017 06:50 )  Alb: x     / Pro: 5.9 g/dL / ALK PHOS: x     / ALT: x     / AST: x     / GGT: x                   Imaging:  < from: Upper Endoscopy (17 @ 10:27) >    [facility]  _______________________________________________________________________________  Patient Name: Elizabeth Villagomez          Procedure Date: 2017 10:27 AM  MRN: [id number]                     Account Number: [id number]  YOB: 1930              Admit Type: Inpatient  Room: [place] [address]                         Gender: Female  Attending MD: KIRAN BROWN MD       _______________________________________________________________________________     Procedure:           Upper GI endoscopy  Indications:         Unexplained anemia, normal iron, B12, folate  Providers:           KIRAN BROWN MD, HOMA HOLBROOK MD (Fellow)  Referring MD:        FABIO ESPINOZA  Medicines:           Monitored Anesthesia Care  Complications:       No immediate complications.  Procedure:           Pre-Anesthesia Assessment:                       - Prior to the procedure, a History and Physical was                        performed, and patient medications and allergies were                        reviewed. The patient is competent. The risks and                        benefits of the procedure and the sedation options and                        risks were discussed with the patient. All questions               were answered and informed consent was obtained. Patient                        identification and proposed procedure were verified by                        the physician, the nurse and the anesthesiologist in the                        endoscopy suite. Mental Status Examination: alert and                        oriented. Airway Examination: normal oropharyngeal                        airway and neck mobility. Respiratory Examination: clear                        to auscultation. CV Examination: normal. Prophylactic                        Antibiotics: The patient does not require prophylactic                        antibiotics. Prior Anticoagulants: The patient has taken                        no previous anticoagulant or antiplatelet agents. ASA                        Grade Assessment: II - A patient with mild systemic                        disease. After reviewing the risks and benefits, the                        patient was deemed in satisfactory condition to undergo                      the procedure. The anesthesia plan was to use monitored                        anesthesia care (MAC). Immediately prior to                        administration of medications, the patient was                        re-assessed foradequacy to receive sedatives. The heart                        rate, respiratory rate, oxygen saturations, blood                        pressure, adequacy of pulmonary ventilation, and                        response to care were monitored throughout the                        procedure. The physical status of the patient was                        re-assessed after the procedure.                       After obtaining informed consent, the endoscope was                        passed under directvision. Throughout the procedure,                        the patient's blood pressure, pulse, and oxygen                        saturations were monitored continuously. The Endoscope                        was introduced through the mouth, and advanced to the                        second part of duodenum. The upper GI endoscopy was                        accomplished without difficulty. The patient tolerated                        the procedure well.                                      Findings:       A small hiatus hernia was present in the distal esophagus, best seen on        retroflexed views in the stomach.       A mild Schatzki ring (acquired) was found at the gastroesophageal        junction.       Patchy mildly erythematous mucosa without bleeding was found in the        prepyloric region of the stomach. Biopsies were taken with a cold        forceps for histology.       The duodenal bulb and 2nd part of the duodenum were normal. Biopsies for        histology were taken with a cold forceps for evaluation of celiac        disease.                                                                                   Impression:          - No evidence of bleeding in upper GI tract.                  - Small hiatus hernia.                       - Mild Schatzki ring.                       - Erythematous mucosa in the prepyloric region of the                        stomach. Biopsied.                       - Normal duodenal bulb and 2nd part of the duodenum.                        Biopsied.  Recommendation:      - Return patient to [facility] for ongoing care.                       - Resume previous diet.                       - Monitor CBC daily.                       - Awaitpathology results.                       - Check reticulocyte count. Consider hematology workup                        if no overt or occult bleeding.                       - Will review records of the colonoscopy performed 2                        years ago per family.                                                                                   Attending Participation:       I was present and participated during the entire procedure, including        non-key portions.                                                               __________________  KIRAN BROWN MD  2017 11:21:10 AM  This report has been signed electronically.  Number of Addenda: 0    Note Initiated On: 2017 10:27 AM    < end of copied text >

## 2017-12-05 NOTE — PROGRESS NOTE ADULT - ASSESSMENT
EKG - NSR no STT changes    a/p     1) Shortness of breath -  likely secondary to worsening renal function, and uncontrolled HTN, 2d echo shows normal LV function, wouldnt work up ischemia at this time as pt has stage 4 CKD, age 87 with no objective signs of ischemia. cont bumex    2) HTN - bradycardic sinus in 40's, with 2.5 sec pause, Toprol decreased to 25 mg daily, cont norvasc 5 mg , no pause today, cont imdur    3) Anemia - s/p transfusion , EGD shows no bleeding source    4) Acute on chronic renal failure - nephrology on case    5) UTI - growing klebsiella, asympomatic no anti biotics

## 2017-12-05 NOTE — PROGRESS NOTE ADULT - ASSESSMENT
EKG - NSR no STT changes    a/p     1) Shortness of breath -  likely secondary to worsening renal function, and uncontrolled HTN, 2d echo shows normal LV function, wouldnt work up ischemia at this time as pt has stage 4 CKD, age 87 with no objective signs of ischemia. cont bumex    2) HTN - bradycardic sinus in 40's, with 2.5 sec pause, Toprol decreased to 25 mg daily, cont norvasc 5 mg , no pause today, bp elevated increase  imdur to 60mg daily    3) Anemia - s/p transfusion , EGD shows no bleeding source, POSS capsule study tomorrow as per GI    4) Acute on chronic renal failure - stable AT PRESENT, K IS NL     5) UTI - growing klebsiella, f/u ID consult evaluated pt - no abx at present

## 2017-12-05 NOTE — PROGRESS NOTE ADULT - SUBJECTIVE AND OBJECTIVE BOX
[facility] NEPHROLOGY- PROGRESS NOTE    87 year old female with history of hypertension presents with SOB and LE edema. Nephrology consulted for elevated Scr.    REVIEW OF SYSTEMS:  Gen: no changes in weight, + bloody nose earlier today  Cards: no chest pain  Resp: no dyspnea  GI: no nausea or vomiting or diarrhea  Vascular: no LE edema    No Known Allergies      Hospital Medications: Medications reviewed    VITALS:  T(F): 98.4 (12-05-17 @ 05:33), Max: 98.8 (12-04-17 @ 21:47)  HR: 98 (12-05-17 @ 05:33)  BP: 150/69 (12-05-17 @ 05:33)  RR: 18 (12-05-17 @ 05:33)  SpO2: 100% (12-05-17 @ 05:33)  Wt(kg): --    12-04 @ 07:01  -  12-05 @ 07:00  --------------------------------------------------------  IN: 654 mL / OUT: 300 mL / NET: 354 mL      PHYSICAL EXAM:    Gen: NAD, calm  Cards: RRR, +S1/S2, no M/G/R  Resp: CTA B/L  GI: soft, NT/ND, NABS  Vascular: no LE edema B/L    LABS:  12-05    135  |  97<L>  |  27<H>  ----------------------------<  109<H>  4.0   |  22  |  2.09<H>    Ca    8.7      05 Dec 2017 06:50  Phos  3.2     12-04  Mg     1.5     12-04    TPro  5.9<L>  /  Alb      /  TBili      /  DBili      /  AST      /  ALT      /  AlkPhos      12-05    Creatinine Trend: 2.09 <--, 2.13 <--, 1.97 <--, 2.05 <--, 2.35 <--, 2.57 <--, 2.55 <--, 2.52 <--                        7.4    12.70 )-----------( 447      ( 04 Dec 2017 04:21 )             24.4

## 2017-12-05 NOTE — PROGRESS NOTE ADULT - PROBLEM SELECTOR PLAN 5
S/P PRBC s/p venofer for iron deficiency s/p Epo. F/U serum NHUNG (P). F/U Heme recommendations. Monitor Hb.

## 2017-12-05 NOTE — PROGRESS NOTE ADULT - ASSESSMENT
Impression:  88yo F with history of HTN who presents with microcytic anemia.    Problem List:  1) Microcytic anemia - negative upper endoscopy.  Pt reports recent normal colonoscopy but need to obtain records.  Retics appropriate.   2) Volume overload 2/2 CKD  - getting diuresed  3) HTN    Plan:  - please obtain colonoscopy report from outside colonoscopy 2 years ago and let GI team know when results are obtained  - will plan for capsule endoscopy tomorrow  - please make NPO after midnight  - followup path from EGD   - trend CBC, transfuse to Hgb >7 Impression:  86yo F with history of HTN who presents with microcytic anemia.    Problem List:  1) Microcytic anemia - negative upper endoscopy.  Pt reports recent normal colonoscopy but need to obtain records.  Retics appropriate.   2) Volume overload 2/2 CKD  - getting diuresed  3) HTN    Plan:  - please obtain colonoscopy report from outside colonoscopy 2 years ago  - will plan for capsule endoscopy tomorrow  - please make NPO after midnight  - followup path from EGD   - trend CBC, transfuse to Hgb >7

## 2017-12-05 NOTE — PROGRESS NOTE ADULT - SUBJECTIVE AND OBJECTIVE BOX
Subjective : Pt lying in bed comfortable, not in distress, denies any chest pain or SOB, s/p EGD  	  MEDICATIONS  (STANDING):  amLODIPine   Tablet 10 milliGRAM(s) Oral daily  buMETAnide 1 milliGRAM(s) Oral daily  isosorbide   mononitrate ER Tablet (IMDUR) 30 milliGRAM(s) Oral daily  simvastatin 40 milliGRAM(s) Oral at bedtime  sodium chloride 0.9% lock flush 3 milliLiter(s) IV Push every 8 hours    MEDICATIONS  (PRN):      Vital Signs Last 24 Hrs  T(C): 36.9 (05 Dec 2017 22:24), Max: 36.9 (05 Dec 2017 05:33)  T(F): 98.5 (05 Dec 2017 22:24), Max: 98.5 (05 Dec 2017 22:24)  HR: 97 (05 Dec 2017 22:24) (97 - 105)  BP: 150/70 (05 Dec 2017 22:24) (140/64 - 150/70)  BP(mean): --  RR: 17 (05 Dec 2017 22:24) (17 - 18)  SpO2: 100% (05 Dec 2017 22:24) (99% - 100%)    Constitutional: No fever, fatigue  Skin: No rash.  Eyes: No recent vision problems or eye pain.  ENT: No congestion, ear pain, or sore throat.  Cardiovascular: No chest pain or palpation.  Respiratory: No cough, shortness of breath, congestion, or wheezing.  Gastrointestinal: No abdominal pain, nausea, vomiting, or diarrhea.  Genitourinary: No dysuria.  Musculoskeletal: No joint swelling.  Neurologic: No headache.      Appearance: Normal	  HEENT:   Normal oral mucosa, PERRL, EOMI	  Cardiovascular: Normal S1 S2, No JVD, No murmurs, No edema  Respiratory: Lungs clear to auscultation	  Gastrointestinal:  Soft, Non-tender, + BS	  Extremities: Normal range of motion, No clubbing, cyanosis or edema    LABS:      135  |  97<L>  |  27<H>  ----------------------------<  109<H>  4.0   |  22  |  2.09<H>    Ca    8.7      05 Dec 2017 06:50  Phos  3.2     12  Mg     1.5         TPro  5.9<L>  /  Alb      /  TBili      /  DBili      /  AST      /  ALT      /  AlkPhos          Creatinine Trend: 2.09 <--, 2.13 <--, 1.97 <--, 2.05 <--, 2.35 <--, 2.57 <--, 2.55 <--                        7.5    13.49 )-----------( 449      ( 05 Dec 2017 13:05 )             24.9     Urine Studies:  Urinalysis Basic - ( 2017 07:45 )    Color: PLYEL / Appearance: HAZY / S.007 / pH: 6.0  Gluc: NEGATIVE / Ketone: NEGATIVE  / Bili: NEGATIVE / Urobili: NORMAL E.U.   Blood: NEGATIVE / Protein: 30 / Nitrite: NEGATIVE   Leuk Esterase: LARGE / RBC: 2-5 / WBC 25-50   Sq Epi: OCC / Non Sq Epi:  / Bacteria: MANY      Protein, Random Urine: 109.4 mg/dL ( @ 19:06)  Creatinine, Random Urine: 67.93 mg/dL ( @ 19:06)  Sodium, Random Urine: 97 meq/L ( @ 07:45)  Potassium, Random Urine: 17.0 meq/L ( @ 07:45)  Chloride, Random Urine: 99 mmol/L ( @ 07:45)  Protein, Random Urine: 47.1 mg/dL ( @ 07:45)  Osmolality, Random Urine: 278 mosmo/kg ( @ 07:45)          LIVER FUNCTIONS - ( 05 Dec 2017 06:50 )  Alb: x     / Pro: 5.9 g/dL / ALK PHOS: x     / ALT: x     / AST: x     / GGT: x

## 2017-12-06 LAB
BLD GP AB SCN SERPL QL: NEGATIVE — SIGNIFICANT CHANGE UP
BUN SERPL-MCNC: 27 MG/DL — HIGH (ref 7–23)
CALCIUM SERPL-MCNC: 9 MG/DL — SIGNIFICANT CHANGE UP (ref 8.4–10.5)
CHLORIDE SERPL-SCNC: 97 MMOL/L — LOW (ref 98–107)
CO2 SERPL-SCNC: 23 MMOL/L — SIGNIFICANT CHANGE UP (ref 22–31)
CREAT SERPL-MCNC: 2.09 MG/DL — HIGH (ref 0.5–1.3)
GLUCOSE BLDC GLUCOMTR-MCNC: 120 MG/DL — HIGH (ref 70–99)
GLUCOSE BLDC GLUCOMTR-MCNC: 123 MG/DL — HIGH (ref 70–99)
GLUCOSE BLDC GLUCOMTR-MCNC: 183 MG/DL — HIGH (ref 70–99)
GLUCOSE BLDC GLUCOMTR-MCNC: 282 MG/DL — HIGH (ref 70–99)
GLUCOSE SERPL-MCNC: 105 MG/DL — HIGH (ref 70–99)
HCT VFR BLD CALC: 25.5 % — LOW (ref 34.5–45)
HGB BLD-MCNC: 8 G/DL — LOW (ref 11.5–15.5)
MAGNESIUM SERPL-MCNC: 1.9 MG/DL — SIGNIFICANT CHANGE UP (ref 1.6–2.6)
MCHC RBC-ENTMCNC: 24.4 PG — LOW (ref 27–34)
MCHC RBC-ENTMCNC: 31.4 % — LOW (ref 32–36)
MCV RBC AUTO: 77.7 FL — LOW (ref 80–100)
NRBC # FLD: 0 — SIGNIFICANT CHANGE UP
PHOSPHATE SERPL-MCNC: 3.3 MG/DL — SIGNIFICANT CHANGE UP (ref 2.5–4.5)
PLATELET # BLD AUTO: 431 K/UL — HIGH (ref 150–400)
PMV BLD: 9.9 FL — SIGNIFICANT CHANGE UP (ref 7–13)
POTASSIUM SERPL-MCNC: 4.3 MMOL/L — SIGNIFICANT CHANGE UP (ref 3.5–5.3)
POTASSIUM SERPL-SCNC: 4.3 MMOL/L — SIGNIFICANT CHANGE UP (ref 3.5–5.3)
RBC # BLD: 3.28 M/UL — LOW (ref 3.8–5.2)
RBC # FLD: 17.7 % — HIGH (ref 10.3–14.5)
RH IG SCN BLD-IMP: POSITIVE — SIGNIFICANT CHANGE UP
SODIUM SERPL-SCNC: 135 MMOL/L — SIGNIFICANT CHANGE UP (ref 135–145)
WBC # BLD: 13.11 K/UL — HIGH (ref 3.8–10.5)
WBC # FLD AUTO: 13.11 K/UL — HIGH (ref 3.8–10.5)

## 2017-12-06 RX ADMIN — AMLODIPINE BESYLATE 10 MILLIGRAM(S): 2.5 TABLET ORAL at 05:31

## 2017-12-06 RX ADMIN — SIMVASTATIN 40 MILLIGRAM(S): 20 TABLET, FILM COATED ORAL at 21:47

## 2017-12-06 RX ADMIN — SODIUM CHLORIDE 3 MILLILITER(S): 9 INJECTION INTRAMUSCULAR; INTRAVENOUS; SUBCUTANEOUS at 21:15

## 2017-12-06 RX ADMIN — ISOSORBIDE MONONITRATE 30 MILLIGRAM(S): 60 TABLET, EXTENDED RELEASE ORAL at 11:05

## 2017-12-06 RX ADMIN — BUMETANIDE 1 MILLIGRAM(S): 0.25 INJECTION INTRAMUSCULAR; INTRAVENOUS at 05:31

## 2017-12-06 RX ADMIN — SODIUM CHLORIDE 3 MILLILITER(S): 9 INJECTION INTRAMUSCULAR; INTRAVENOUS; SUBCUTANEOUS at 14:42

## 2017-12-06 RX ADMIN — SODIUM CHLORIDE 3 MILLILITER(S): 9 INJECTION INTRAMUSCULAR; INTRAVENOUS; SUBCUTANEOUS at 05:31

## 2017-12-06 RX ADMIN — IRON SUCROSE 110 MILLIGRAM(S): 20 INJECTION, SOLUTION INTRAVENOUS at 02:12

## 2017-12-06 RX ADMIN — Medication 50 MILLIGRAM(S): at 05:31

## 2017-12-06 NOTE — PROGRESS NOTE ADULT - SUBJECTIVE AND OBJECTIVE BOX
[facility] NEPHROLOGY- PROGRESS NOTE    87 year old female with history of hypertension presents with SOB and LE edema. Nephrology consulted for elevated Scr.    REVIEW OF SYSTEMS:  Gen: no changes in weight  Cards: no chest pain  Resp: no dyspnea  GI: no nausea or vomiting or diarrhea  Vascular: no LE edema    No Known Allergies      Hospital Medications: Medications reviewed    VITALS:  T(F): 98.1 (12-06-17 @ 11:04), Max: 98.5 (12-05-17 @ 22:24)  HR: 90 (12-06-17 @ 11:04)  BP: 149/80 (12-06-17 @ 11:04)  RR: 18 (12-06-17 @ 11:04)  SpO2: 100% (12-06-17 @ 11:04)  Wt(kg): --    12-05 @ 07:01  -  12-06 @ 07:00  --------------------------------------------------------  IN: 440 mL / OUT: 0 mL / NET: 440 mL      PHYSICAL EXAM:    Gen: NAD, calm  Cards: RRR, +S1/S2, no M/G/R  Resp: CTA B/L  GI: soft, NT/ND, NABS  Vascular: no LE edema B/L    LABS:  12-06    135  |  97<L>  |  27<H>  ----------------------------<  105<H>  4.3   |  23  |  2.09<H>    Ca    9.0      06 Dec 2017 06:00  Phos  3.3     12-06  Mg     1.9     12-06    TPro  5.9<L>  /  Alb      /  TBili      /  DBili      /  AST      /  ALT      /  AlkPhos      12-05    Creatinine Trend: 2.09 <--, 2.09 <--, 2.13 <--, 1.97 <--, 2.05 <--, 2.35 <--, 2.57 <--                        8.0    13.11 )-----------( 431      ( 06 Dec 2017 06:00 )             25.5

## 2017-12-06 NOTE — PROGRESS NOTE ADULT - SUBJECTIVE AND OBJECTIVE BOX
Subjective : Pt lying in bed comfortable, not in distress, denies any chest pain or SOB, s/p EGD  	  MEDICATIONS  (STANDING):  amLODIPine   Tablet 10 milliGRAM(s) Oral daily  buMETAnide 1 milliGRAM(s) Oral daily  isosorbide   mononitrate ER Tablet (IMDUR) 30 milliGRAM(s) Oral daily  metoprolol succinate ER 50 milliGRAM(s) Oral daily  simvastatin 40 milliGRAM(s) Oral at bedtime  sodium chloride 0.9% lock flush 3 milliLiter(s) IV Push every 8 hours    MEDICATIONS  (PRN):    Vital Signs Last 24 Hrs  T(C): 36.7 (06 Dec 2017 11:04), Max: 36.9 (05 Dec 2017 22:24)  T(F): 98.1 (06 Dec 2017 11:04), Max: 98.5 (05 Dec 2017 22:24)  HR: 90 (06 Dec 2017 11:04) (90 - 100)  BP: 149/80 (06 Dec 2017 11:04) (144/72 - 150/70)  BP(mean): --  RR: 18 (06 Dec 2017 11:04) (17 - 18)  SpO2: 100% (06 Dec 2017 11:04) (100% - 100%)  Constitutional: No fever, fatigue  Skin: No rash.  Eyes: No recent vision problems or eye pain.  ENT: No congestion, ear pain, or sore throat.  Cardiovascular: No chest pain or palpation.  Respiratory: No cough, shortness of breath, congestion, or wheezing.  Gastrointestinal: No abdominal pain, nausea, vomiting, or diarrhea.  Genitourinary: No dysuria.  Musculoskeletal: No joint swelling.  Neurologic: No headache.      Appearance: Normal	  HEENT:   Normal oral mucosa, PERRL, EOMI	  Cardiovascular: Normal S1 S2, No JVD, No murmurs, No edema  Respiratory: Lungs clear to auscultation	  Gastrointestinal:  Soft, Non-tender, + BS	  Extremities: Normal range of motion, No clubbing, cyanosis or edema    LABS:  12-06    135  |  97<L>  |  27<H>  ----------------------------<  105<H>  4.3   |  23  |  2.09<H>    Ca    9.0      06 Dec 2017 06:00  Phos  3.3     12-06  Mg     1.9     12-06    TPro  5.9<L>  /  Alb      /  TBili      /  DBili      /  AST      /  ALT      /  AlkPhos      12-05    Creatinine Trend: 2.09 <--, 2.09 <--, 2.13 <--, 1.97 <--, 2.05 <--, 2.35 <--, 2.57 <--                        8.0    13.11 )-----------( 431      ( 06 Dec 2017 06:00 )             25.5     Urine Studies:    Protein, Random Urine: 109.4 mg/dL (11-30 @ 19:06)  Creatinine, Random Urine: 67.93 mg/dL (11-30 @ 19:06)          LIVER FUNCTIONS - ( 05 Dec 2017 06:50 )  Alb: x     / Pro: 5.9 g/dL / ALK PHOS: x     / ALT: x     / AST: x     / GGT: x

## 2017-12-06 NOTE — PROGRESS NOTE ADULT - ASSESSMENT
EKG - NSR no STT changes    a/p     1) Shortness of breath -  likely secondary to worsening renal function, and uncontrolled HTN, 2d echo shows normal LV function, wouldnt work up ischemia at this time as pt has stage 4 CKD, age 87 with no objective signs of ischemia. cont bumex    2) HTN - cont toprol, imdur and norvasc    3) Anemia - s/p transfusion , EGD shows no bleeding source, getting pill endoscopy    4) Acute on chronic renal failure - nephrology on case    5) UTI - growing klebsiella, asympomatic no anti biotics

## 2017-12-06 NOTE — PROGRESS NOTE ADULT - PROBLEM SELECTOR PLAN 5
S/P PRBC s/p venofer for iron deficiency s/p Epo. F/U Heme recommendations regarding paraproteinemia (IgA lambda band). Monitor Hb.

## 2017-12-06 NOTE — PROGRESS NOTE ADULT - PROBLEM SELECTOR PLAN 3
BP improving. Continue with current medications and low sodium diet. Agree with holding HCTZ and losartan for now. Monitor BP.

## 2017-12-06 NOTE — PROGRESS NOTE ADULT - SUBJECTIVE AND OBJECTIVE BOX
Ryan Perez MD  Interventional Cardiology  [place] Office : [address] [place] [address] [address] [address]  Tel:   [place] Office : [address] [facility] [place] [address]  Tel: [phone]  Cell : [phone]    Subjective : Pt lying in bed comfortable, not in distress, denies any chest pain or SOB, getting pill endoscopy  	  MEDICATIONS:  amLODIPine   Tablet 10 milliGRAM(s) Oral daily  buMETAnide 1 milliGRAM(s) Oral daily  isosorbide   mononitrate ER Tablet (IMDUR) 30 milliGRAM(s) Oral daily  metoprolol succinate ER 50 milliGRAM(s) Oral daily  simvastatin 40 milliGRAM(s) Oral at bedtime        PHYSICAL EXAM:  T(C): 36.7 (12-06-17 @ 11:04), Max: 36.9 (12-05-17 @ 22:24)  HR: 90 (12-06-17 @ 11:04) (90 - 105)  BP: 149/80 (12-06-17 @ 11:04) (143/77 - 150/70)  RR: 18 (12-06-17 @ 11:04) (17 - 18)  SpO2: 100% (12-06-17 @ 11:04) (100% - 100%)  Wt(kg): --  I&O's Summary    05 Dec 2017 07:01  -  06 Dec 2017 07:00  --------------------------------------------------------  IN: 440 mL / OUT: 0 mL / NET: 440 mL          Appearance: Normal	  HEENT:   Normal oral mucosa, PERRL, EOMI	  Cardiovascular: Normal S1 S2, No JVD, No murmurs, No edema  Respiratory: Lungs clear to auscultation	  Gastrointestinal:  Soft, Non-tender, + BS	  Extremities: Normal range of motion, No clubbing, cyanosis or edema                                    8.0    13.11 )-----------( 431      ( 06 Dec 2017 06:00 )             25.5     12-06    135  |  97<L>  |  27<H>  ----------------------------<  105<H>  4.3   |  23  |  2.09<H>    Ca    9.0      06 Dec 2017 06:00  Phos  3.3     12-06  Mg     1.9     12-06    TPro  5.9<L>  /  Alb  x   /  TBili  x   /  DBili  x   /  AST  x   /  ALT  x   /  AlkPhos  x   12-05    proBNP:   Lipid Profile:   HgA1c:   TSH:

## 2017-12-06 NOTE — CHART NOTE - NSCHARTNOTEFT_GEN_A_CORE
Risks of video capsule endoscopy explained, including risk of retained capsule, potentially requiring surgery for removal.  Patient understands and agrees to risks.    Capsule swallowed at: 8:30am   NPO now. Resume diet at: Clear liquid diet at 12:30pm and Regular diet at 2:30pm     Equipment can be removed at: 8:30pm   GI fellow will retrieve equipment in the morning.

## 2017-12-07 DIAGNOSIS — R06.02 SHORTNESS OF BREATH: ICD-10-CM

## 2017-12-07 LAB
BUN SERPL-MCNC: 25 MG/DL — HIGH (ref 7–23)
CALCIUM SERPL-MCNC: 9.1 MG/DL — SIGNIFICANT CHANGE UP (ref 8.4–10.5)
CHLORIDE SERPL-SCNC: 95 MMOL/L — LOW (ref 98–107)
CO2 SERPL-SCNC: 24 MMOL/L — SIGNIFICANT CHANGE UP (ref 22–31)
CREAT SERPL-MCNC: 2 MG/DL — HIGH (ref 0.5–1.3)
GLUCOSE BLDC GLUCOMTR-MCNC: 121 MG/DL — HIGH (ref 70–99)
GLUCOSE BLDC GLUCOMTR-MCNC: 123 MG/DL — HIGH (ref 70–99)
GLUCOSE BLDC GLUCOMTR-MCNC: 126 MG/DL — HIGH (ref 70–99)
GLUCOSE BLDC GLUCOMTR-MCNC: 154 MG/DL — HIGH (ref 70–99)
GLUCOSE SERPL-MCNC: 98 MG/DL — SIGNIFICANT CHANGE UP (ref 70–99)
HCT VFR BLD CALC: 27.3 % — LOW (ref 34.5–45)
HGB BLD-MCNC: 8.2 G/DL — LOW (ref 11.5–15.5)
MAGNESIUM SERPL-MCNC: 1.6 MG/DL — SIGNIFICANT CHANGE UP (ref 1.6–2.6)
MCHC RBC-ENTMCNC: 23.6 PG — LOW (ref 27–34)
MCHC RBC-ENTMCNC: 30 % — LOW (ref 32–36)
MCV RBC AUTO: 78.4 FL — LOW (ref 80–100)
NRBC # FLD: 0 — SIGNIFICANT CHANGE UP
PHOSPHATE SERPL-MCNC: 3.2 MG/DL — SIGNIFICANT CHANGE UP (ref 2.5–4.5)
PLATELET # BLD AUTO: 483 K/UL — HIGH (ref 150–400)
PMV BLD: 9.8 FL — SIGNIFICANT CHANGE UP (ref 7–13)
POTASSIUM SERPL-MCNC: 3.9 MMOL/L — SIGNIFICANT CHANGE UP (ref 3.5–5.3)
POTASSIUM SERPL-SCNC: 3.9 MMOL/L — SIGNIFICANT CHANGE UP (ref 3.5–5.3)
PROT SERPL-MCNC: 6.4 G/DL — SIGNIFICANT CHANGE UP (ref 6–8.3)
RBC # BLD: 3.48 M/UL — LOW (ref 3.8–5.2)
RBC # FLD: 17.9 % — HIGH (ref 10.3–14.5)
SODIUM SERPL-SCNC: 134 MMOL/L — LOW (ref 135–145)
WBC # BLD: 12.81 K/UL — HIGH (ref 3.8–10.5)
WBC # FLD AUTO: 12.81 K/UL — HIGH (ref 3.8–10.5)

## 2017-12-07 PROCEDURE — 91110 GI TRC IMG INTRAL ESOPH-ILE: CPT | Mod: 26,GC

## 2017-12-07 RX ORDER — ONDANSETRON 8 MG/1
4 TABLET, FILM COATED ORAL ONCE
Qty: 0 | Refills: 0 | Status: COMPLETED | OUTPATIENT
Start: 2017-12-07 | End: 2017-12-07

## 2017-12-07 RX ORDER — POLYETHYLENE GLYCOL 3350 17 G/17G
17 POWDER, FOR SOLUTION ORAL
Qty: 0 | Refills: 0 | Status: COMPLETED | OUTPATIENT
Start: 2017-12-07 | End: 2017-12-07

## 2017-12-07 RX ORDER — SOD SULF/SODIUM/NAHCO3/KCL/PEG
1000 SOLUTION, RECONSTITUTED, ORAL ORAL
Qty: 0 | Refills: 0 | Status: COMPLETED | OUTPATIENT
Start: 2017-12-07 | End: 2017-12-07

## 2017-12-07 RX ORDER — ISOSORBIDE MONONITRATE 60 MG/1
60 TABLET, EXTENDED RELEASE ORAL DAILY
Qty: 0 | Refills: 0 | Status: DISCONTINUED | OUTPATIENT
Start: 2017-12-07 | End: 2017-12-07

## 2017-12-07 RX ORDER — ISOSORBIDE MONONITRATE 60 MG/1
60 TABLET, EXTENDED RELEASE ORAL DAILY
Qty: 0 | Refills: 0 | Status: DISCONTINUED | OUTPATIENT
Start: 2017-12-07 | End: 2017-12-10

## 2017-12-07 RX ADMIN — Medication 1000 MILLILITER(S): at 17:28

## 2017-12-07 RX ADMIN — Medication 50 MILLIGRAM(S): at 06:51

## 2017-12-07 RX ADMIN — SODIUM CHLORIDE 3 MILLILITER(S): 9 INJECTION INTRAMUSCULAR; INTRAVENOUS; SUBCUTANEOUS at 21:40

## 2017-12-07 RX ADMIN — POLYETHYLENE GLYCOL 3350 17 GRAM(S): 17 POWDER, FOR SOLUTION ORAL at 11:43

## 2017-12-07 RX ADMIN — AMLODIPINE BESYLATE 10 MILLIGRAM(S): 2.5 TABLET ORAL at 06:51

## 2017-12-07 RX ADMIN — BUMETANIDE 1 MILLIGRAM(S): 0.25 INJECTION INTRAMUSCULAR; INTRAVENOUS at 06:51

## 2017-12-07 RX ADMIN — SODIUM CHLORIDE 3 MILLILITER(S): 9 INJECTION INTRAMUSCULAR; INTRAVENOUS; SUBCUTANEOUS at 06:51

## 2017-12-07 RX ADMIN — ONDANSETRON 4 MILLIGRAM(S): 8 TABLET, FILM COATED ORAL at 21:54

## 2017-12-07 RX ADMIN — SIMVASTATIN 40 MILLIGRAM(S): 20 TABLET, FILM COATED ORAL at 21:47

## 2017-12-07 RX ADMIN — SODIUM CHLORIDE 3 MILLILITER(S): 9 INJECTION INTRAMUSCULAR; INTRAVENOUS; SUBCUTANEOUS at 14:05

## 2017-12-07 RX ADMIN — Medication 1000 MILLILITER(S): at 21:48

## 2017-12-07 RX ADMIN — ISOSORBIDE MONONITRATE 60 MILLIGRAM(S): 60 TABLET, EXTENDED RELEASE ORAL at 21:54

## 2017-12-07 RX ADMIN — ISOSORBIDE MONONITRATE 30 MILLIGRAM(S): 60 TABLET, EXTENDED RELEASE ORAL at 11:43

## 2017-12-07 NOTE — PROGRESS NOTE ADULT - PROBLEM SELECTOR PLAN 3
BP uncontrolled. Increase metoprolol to 100 mg daily on 12/8 if BP remains elevated. Agree with holding HCTZ and losartan for now. Monitor BP.

## 2017-12-07 NOTE — PROGRESS NOTE ADULT - SUBJECTIVE AND OBJECTIVE BOX
Ryan Perez MD  Interventional Cardiology  [place] Office : [address] [place] [id number]  Tel:   [place] Office : [address] [facility] [place] [address]  Tel: [phone]  Cell : [phone]    Subjective : Pt lying in bed comfortable, not in distress, denies any chest pain or SOB  	  MEDICATIONS:  amLODIPine   Tablet 10 milliGRAM(s) Oral daily  buMETAnide 1 milliGRAM(s) Oral daily  isosorbide   mononitrate ER Tablet (IMDUR) 30 milliGRAM(s) Oral daily  metoprolol succinate ER 50 milliGRAM(s) Oral daily          polyethylene glycol 3350 17 Gram(s) Oral two times a day    simvastatin 40 milliGRAM(s) Oral at bedtime        PHYSICAL EXAM:  T(C): 36.7 (12-07-17 @ 14:35), Max: 37.1 (12-06-17 @ 20:45)  HR: 92 (12-07-17 @ 14:35) (85 - 97)  BP: 146/71 (12-07-17 @ 14:35) (144/56 - 171/89)  RR: 18 (12-07-17 @ 14:35) (18 - 18)  SpO2: 100% (12-07-17 @ 14:35) (99% - 100%)  Wt(kg): --  I&O's Summary    07 Dec 2017 07:01  -  07 Dec 2017 16:25  --------------------------------------------------------  IN: 450 mL / OUT: 0 mL / NET: 450 mL          Appearance: Normal	  HEENT:   Normal oral mucosa, PERRL, EOMI	  Cardiovascular: Normal S1 S2, No JVD, No murmurs, No edema  Respiratory: Lungs clear to auscultation	  Gastrointestinal:  Soft, Non-tender, + BS	  Extremities: Normal range of motion, No clubbing, cyanosis or edema                                    8.2    12.81 )-----------( 483      ( 07 Dec 2017 06:13 )             27.3     12-07    134<L>  |  95<L>  |  25<H>  ----------------------------<  98  3.9   |  24  |  2.00<H>    Ca    9.1      07 Dec 2017 06:13  Phos  3.2     12-07  Mg     1.6     12-07    TPro  6.4  /  Alb  x   /  TBili  x   /  DBili  x   /  AST  x   /  ALT  x   /  AlkPhos  x   12-07    proBNP:   Lipid Profile:   HgA1c:   TSH:

## 2017-12-07 NOTE — PROGRESS NOTE ADULT - SUBJECTIVE AND OBJECTIVE BOX
[facility] NEPHROLOGY- PROGRESS NOTE    87 year old female with history of hypertension presents with SOB and LE edema. Nephrology consulted for elevated Scr.    REVIEW OF SYSTEMS:  Gen: no changes in weight  Cards: no chest pain  Resp: no dyspnea  GI: no nausea or vomiting or diarrhea  Vascular: no LE edema    No Known Allergies      Hospital Medications: Medications reviewed    VITALS:  T(F): 98.5 (12-07-17 @ 06:50), Max: 98.7 (12-06-17 @ 20:45)  HR: 85 (12-07-17 @ 11:56)  BP: 144/56 (12-07-17 @ 11:56)  RR: 18 (12-07-17 @ 11:56)  SpO2: 99% (12-07-17 @ 11:56)  Wt(kg): --    12-07 @ 07:01  -  12-07 @ 14:29  --------------------------------------------------------  IN: 210 mL / OUT: 0 mL / NET: 210 mL      PHYSICAL EXAM:    Gen: NAD, calm  Cards: RRR, +S1/S2, no M/G/R  Resp: CTA B/L  GI: soft, NT/ND, NABS  Vascular: no LE edema B/L      LABS:  12-07    134<L>  |  95<L>  |  25<H>  ----------------------------<  98  3.9   |  24  |  2.00<H>    Ca    9.1      07 Dec 2017 06:13  Phos  3.2     12-07  Mg     1.6     12-07    TPro  6.4  /  Alb      /  TBili      /  DBili      /  AST      /  ALT      /  AlkPhos      12-07    Creatinine Trend: 2.00 <--, 2.09 <--, 2.09 <--, 2.13 <--, 1.97 <--, 2.05 <--, 2.35 <--                        8.2    12.81 )-----------( 483      ( 07 Dec 2017 06:13 )             27.3

## 2017-12-07 NOTE — PROGRESS NOTE ADULT - ASSESSMENT
EKG - NSR no STT changes    a/p     1) Shortness of breath -  likely secondary to worsening renal function, and uncontrolled HTN, 2d echo shows normal LV function, wouldnt work up ischemia at this time as pt has stage 4 CKD, age 87 with no objective signs of ischemia. cont bumex    2) HTN - cont toprol, imdur and norvasc, increase imdur to 60mg daily , dont increase toprol further as pt was having >2 sec pauses    3) Anemia - s/p transfusion , EGD shows no bleeding source, s/p pill endoscopy    4) Acute on chronic renal failure - nephrology on case    5) UTI - growing klebsiella, asympomatic no anti biotics

## 2017-12-07 NOTE — PROGRESS NOTE ADULT - SUBJECTIVE AND OBJECTIVE BOX
Subjective : Pt lying in bed comfortable, not in distress, denies any chest pain or SOB, s/p EGD  	  MEDICATIONS  (STANDING):  amLODIPine   Tablet 10 milliGRAM(s) Oral daily  buMETAnide 1 milliGRAM(s) Oral daily  isosorbide   mononitrate ER Tablet (IMDUR) 60 milliGRAM(s) Oral daily  metoprolol succinate ER 50 milliGRAM(s) Oral daily  polyethylene glycol/electrolyte Solution 1000 milliLiter(s) Oral every 2 hours  simvastatin 40 milliGRAM(s) Oral at bedtime  sodium chloride 0.9% lock flush 3 milliLiter(s) IV Push every 8 hours    MEDICATIONS  (PRN):      Vital Signs Last 24 Hrs  T(C): 36.7 (07 Dec 2017 14:35), Max: 36.9 (07 Dec 2017 06:50)  T(F): 98 (07 Dec 2017 14:35), Max: 98.5 (07 Dec 2017 06:50)  HR: 92 (07 Dec 2017 14:35) (85 - 97)  BP: 146/71 (07 Dec 2017 14:35) (144/56 - 171/89)  BP(mean): --  RR: 18 (07 Dec 2017 14:35) (18 - 18)  SpO2: 100% (07 Dec 2017 14:35) (99% - 100%)    Constitutional: No fever, fatigue  Skin: No rash.  Eyes: No recent vision problems or eye pain.  ENT: No congestion, ear pain, or sore throat.  Cardiovascular: No chest pain or palpation.  Respiratory: No cough, shortness of breath, congestion, or wheezing.  Gastrointestinal: No abdominal pain, nausea, vomiting, or diarrhea.  Genitourinary: No dysuria.  Musculoskeletal: No joint swelling.  Neurologic: No headache.      Appearance: Normal	  HEENT:   Normal oral mucosa, PERRL, EOMI	  Cardiovascular: Normal S1 S2, No JVD, No murmurs, No edema  Respiratory: Lungs clear to auscultation	  Gastrointestinal:  Soft, Non-tender, + BS	  Extremities: Normal range of motion, No clubbing, cyanosis or edema    LABS:  12-07    134<L>  |  95<L>  |  25<H>  ----------------------------<  98  3.9   |  24  |  2.00<H>    Ca    9.1      07 Dec 2017 06:13  Phos  3.2     12-07  Mg     1.6     12-07    TPro  6.4  /  Alb      /  TBili      /  DBili      /  AST      /  ALT      /  AlkPhos      12-07    Creatinine Trend: 2.00 <--, 2.09 <--, 2.09 <--, 2.13 <--, 1.97 <--, 2.05 <--, 2.35 <--                        8.2    12.81 )-----------( 483      ( 07 Dec 2017 06:13 )             27.3     Urine Studies:            LIVER FUNCTIONS - ( 07 Dec 2017 06:13 )  Alb: x     / Pro: 6.4 g/dL / ALK PHOS: x     / ALT: x     / AST: x     / GGT: x

## 2017-12-07 NOTE — CHART NOTE - NSCHARTNOTEFT_GEN_A_CORE
Plan for colonoscopy tomorrow. Moviprep ordered by GI fellow.   Please keep patient NPO after midnight.

## 2017-12-08 LAB
BASOPHILS # BLD AUTO: 0.09 K/UL — SIGNIFICANT CHANGE UP (ref 0–0.2)
BASOPHILS NFR BLD AUTO: 0.9 % — SIGNIFICANT CHANGE UP (ref 0–2)
BUN SERPL-MCNC: 22 MG/DL — SIGNIFICANT CHANGE UP (ref 7–23)
CALCIUM SERPL-MCNC: 9 MG/DL — SIGNIFICANT CHANGE UP (ref 8.4–10.5)
CHLORIDE SERPL-SCNC: 100 MMOL/L — SIGNIFICANT CHANGE UP (ref 98–107)
CO2 SERPL-SCNC: 23 MMOL/L — SIGNIFICANT CHANGE UP (ref 22–31)
CREAT SERPL-MCNC: 1.95 MG/DL — HIGH (ref 0.5–1.3)
EOSINOPHIL # BLD AUTO: 0.34 K/UL — SIGNIFICANT CHANGE UP (ref 0–0.5)
EOSINOPHIL NFR BLD AUTO: 3.4 % — SIGNIFICANT CHANGE UP (ref 0–6)
GLUCOSE BLDC GLUCOMTR-MCNC: 107 MG/DL — HIGH (ref 70–99)
GLUCOSE BLDC GLUCOMTR-MCNC: 113 MG/DL — HIGH (ref 70–99)
GLUCOSE BLDC GLUCOMTR-MCNC: 168 MG/DL — HIGH (ref 70–99)
GLUCOSE SERPL-MCNC: 79 MG/DL — SIGNIFICANT CHANGE UP (ref 70–99)
HCT VFR BLD CALC: 25.8 % — LOW (ref 34.5–45)
HGB BLD-MCNC: 7.7 G/DL — LOW (ref 11.5–15.5)
IMM GRANULOCYTES # BLD AUTO: 0.04 # — SIGNIFICANT CHANGE UP
IMM GRANULOCYTES NFR BLD AUTO: 0.4 % — SIGNIFICANT CHANGE UP (ref 0–1.5)
LYMPHOCYTES # BLD AUTO: 1.52 K/UL — SIGNIFICANT CHANGE UP (ref 1–3.3)
LYMPHOCYTES # BLD AUTO: 15.1 % — SIGNIFICANT CHANGE UP (ref 13–44)
MAGNESIUM SERPL-MCNC: 1.7 MG/DL — SIGNIFICANT CHANGE UP (ref 1.6–2.6)
MCHC RBC-ENTMCNC: 23.5 PG — LOW (ref 27–34)
MCHC RBC-ENTMCNC: 29.8 % — LOW (ref 32–36)
MCV RBC AUTO: 78.9 FL — LOW (ref 80–100)
MONOCYTES # BLD AUTO: 1.38 K/UL — HIGH (ref 0–0.9)
MONOCYTES NFR BLD AUTO: 13.7 % — SIGNIFICANT CHANGE UP (ref 2–14)
NEUTROPHILS # BLD AUTO: 6.71 K/UL — SIGNIFICANT CHANGE UP (ref 1.8–7.4)
NEUTROPHILS NFR BLD AUTO: 66.5 % — SIGNIFICANT CHANGE UP (ref 43–77)
NRBC # FLD: 0 — SIGNIFICANT CHANGE UP
PHOSPHATE SERPL-MCNC: 3.8 MG/DL — SIGNIFICANT CHANGE UP (ref 2.5–4.5)
PLATELET # BLD AUTO: 450 K/UL — HIGH (ref 150–400)
PMV BLD: 9.7 FL — SIGNIFICANT CHANGE UP (ref 7–13)
POTASSIUM SERPL-MCNC: 4.1 MMOL/L — SIGNIFICANT CHANGE UP (ref 3.5–5.3)
POTASSIUM SERPL-SCNC: 4.1 MMOL/L — SIGNIFICANT CHANGE UP (ref 3.5–5.3)
PROT SERPL-MCNC: 5.9 G/DL — LOW (ref 6–8.3)
RBC # BLD: 3.27 M/UL — LOW (ref 3.8–5.2)
RBC # FLD: 18.6 % — HIGH (ref 10.3–14.5)
SODIUM SERPL-SCNC: 140 MMOL/L — SIGNIFICANT CHANGE UP (ref 135–145)
WBC # BLD: 10.08 K/UL — SIGNIFICANT CHANGE UP (ref 3.8–10.5)
WBC # FLD AUTO: 10.08 K/UL — SIGNIFICANT CHANGE UP (ref 3.8–10.5)

## 2017-12-08 PROCEDURE — 77075 RADEX OSSEOUS SURVEY COMPL: CPT | Mod: 26

## 2017-12-08 PROCEDURE — 45378 DIAGNOSTIC COLONOSCOPY: CPT | Mod: GC

## 2017-12-08 RX ORDER — IRON SUCROSE 20 MG/ML
200 INJECTION, SOLUTION INTRAVENOUS ONCE
Qty: 0 | Refills: 0 | Status: COMPLETED | OUTPATIENT
Start: 2017-12-08 | End: 2017-12-08

## 2017-12-08 RX ADMIN — BUMETANIDE 1 MILLIGRAM(S): 0.25 INJECTION INTRAMUSCULAR; INTRAVENOUS at 06:16

## 2017-12-08 RX ADMIN — AMLODIPINE BESYLATE 10 MILLIGRAM(S): 2.5 TABLET ORAL at 06:16

## 2017-12-08 RX ADMIN — SIMVASTATIN 40 MILLIGRAM(S): 20 TABLET, FILM COATED ORAL at 23:09

## 2017-12-08 RX ADMIN — SODIUM CHLORIDE 3 MILLILITER(S): 9 INJECTION INTRAMUSCULAR; INTRAVENOUS; SUBCUTANEOUS at 21:30

## 2017-12-08 RX ADMIN — Medication 50 MILLIGRAM(S): at 06:16

## 2017-12-08 RX ADMIN — SODIUM CHLORIDE 3 MILLILITER(S): 9 INJECTION INTRAMUSCULAR; INTRAVENOUS; SUBCUTANEOUS at 06:15

## 2017-12-08 RX ADMIN — SODIUM CHLORIDE 3 MILLILITER(S): 9 INJECTION INTRAMUSCULAR; INTRAVENOUS; SUBCUTANEOUS at 13:30

## 2017-12-08 RX ADMIN — IRON SUCROSE 110 MILLIGRAM(S): 20 INJECTION, SOLUTION INTRAVENOUS at 18:21

## 2017-12-08 NOTE — PROGRESS NOTE ADULT - SUBJECTIVE AND OBJECTIVE BOX
Ryan Perez MD  Interventional Cardiology  [place] Office : [address] [place] [address] [address] [address]  Tel:   [place] Office : [address] [facility] [place] [address]  Tel: [phone]  Cell : [phone]    Subjective : Pt lying in bed comfortable, not in distress, denies any chest pain or SOB  	  MEDICATIONS:  amLODIPine   Tablet 10 milliGRAM(s) Oral daily  buMETAnide 1 milliGRAM(s) Oral daily  isosorbide   mononitrate ER Tablet (IMDUR) 60 milliGRAM(s) Oral daily  metoprolol succinate ER 50 milliGRAM(s) Oral daily            simvastatin 40 milliGRAM(s) Oral at bedtime    iron sucrose IVPB 200 milliGRAM(s) IV Intermittent once      PHYSICAL EXAM:  T(C): 36.7 (12-08-17 @ 06:15), Max: 36.7 (12-08-17 @ 04:57)  HR: 95 (12-08-17 @ 06:15) (90 - 95)  BP: 147/70 (12-08-17 @ 06:15) (140/78 - 147/77)  RR: 16 (12-08-17 @ 06:15) (16 - 17)  SpO2: 97% (12-08-17 @ 06:15) (97% - 98%)  Wt(kg): --  I&O's Summary    07 Dec 2017 07:01  -  08 Dec 2017 07:00  --------------------------------------------------------  IN: 620 mL / OUT: 0 mL / NET: 620 mL          Appearance: Normal	  HEENT:   Normal oral mucosa, PERRL, EOMI	  Cardiovascular: Normal S1 S2, No JVD, No murmurs, No edema  Respiratory: Lungs clear to auscultation	  Gastrointestinal:  Soft, Non-tender, + BS	  Extremities: Normal range of motion, No clubbing, cyanosis or edema                                    7.7    10.08 )-----------( 450      ( 08 Dec 2017 05:30 )             25.8     12-08    140  |  100  |  22  ----------------------------<  79  4.1   |  23  |  1.95<H>    Ca    9.0      08 Dec 2017 05:30  Phos  3.8     12-08  Mg     1.7     12-08    TPro  5.9<L>  /  Alb  x   /  TBili  x   /  DBili  x   /  AST  x   /  ALT  x   /  AlkPhos  x   12-08    proBNP:   Lipid Profile:   HgA1c:   TSH:

## 2017-12-08 NOTE — PROGRESS NOTE ADULT - ASSESSMENT
87F HTN p/w SOB and worsening b/l LE edema x 1 day found to have chronic kidney disease stage IV, hematology consulted for anemia workup and now paraproteinemia workup

## 2017-12-08 NOTE — PROGRESS NOTE ADULT - ASSESSMENT
EKG - NSR no STT changes    a/p     1) Shortness of breath -  likely secondary to worsening renal function, and uncontrolled HTN, 2d echo shows normal LV function, wouldnt work up ischemia at this time as pt has stage 4 CKD, age 87 with no objective signs of ischemia. cont bumex    2) HTN - cont toprol, imdur and norvasc, dont increase toprol further as pt was having >2 sec pauses    3) Anemia - s/p transfusion , EGD shows no bleeding source, s/p pill endoscopy s/p colonoscopy no source of bleeding found,    4) Acute on chronic renal failure - nephrology on case    5) UTI - growing klebsiella, asympomatic no anti biotics

## 2017-12-08 NOTE — PROGRESS NOTE ADULT - SUBJECTIVE AND OBJECTIVE BOX
Subjective : Pt lying in bed comfortable, not in distress, denies any chest pain or SOB  	  MEDICATIONS  (STANDING):  amLODIPine   Tablet 10 milliGRAM(s) Oral daily  buMETAnide 1 milliGRAM(s) Oral daily  isosorbide   mononitrate ER Tablet (IMDUR) 60 milliGRAM(s) Oral daily  metoprolol succinate ER 50 milliGRAM(s) Oral daily  simvastatin 40 milliGRAM(s) Oral at bedtime  sodium chloride 0.9% lock flush 3 milliLiter(s) IV Push every 8 hours    MEDICATIONS  (PRN):    Vital Signs Last 24 Hrs  T(C): 36.6 (08 Dec 2017 21:00), Max: 36.7 (08 Dec 2017 04:57)  T(F): 97.8 (08 Dec 2017 21:00), Max: 98.1 (08 Dec 2017 04:57)  HR: 90 (08 Dec 2017 21:00) (90 - 95)  BP: 133/75 (08 Dec 2017 21:00) (133/75 - 147/77)  BP(mean): --  RR: 16 (08 Dec 2017 21:00) (16 - 16)  SpO2: 98% (08 Dec 2017 21:00) (97% - 98%)    Constitutional: No fever, fatigue  Skin: No rash.  Eyes: No recent vision problems or eye pain.  ENT: No congestion, ear pain, or sore throat.  Cardiovascular: No chest pain or palpation.  Respiratory: No cough, shortness of breath, congestion, or wheezing.  Gastrointestinal: No abdominal pain, nausea, vomiting, or diarrhea.  Genitourinary: No dysuria.  Musculoskeletal: No joint swelling.  Neurologic: No headache.      Appearance: Normal	  HEENT:   Normal oral mucosa, PERRL, EOMI	  Cardiovascular: Normal S1 S2, No JVD, No murmurs, No edema  Respiratory: Lungs clear to auscultation	  Gastrointestinal:  Soft, Non-tender, + BS	  Extremities: Normal range of motion, No clubbing, cyanosis or edema    LABS:  12-08    140  |  100  |  22  ----------------------------<  79  4.1   |  23  |  1.95<H>    Ca    9.0      08 Dec 2017 05:30  Phos  3.8     12-08  Mg     1.7     12-08    TPro  5.9<L>  /  Alb      /  TBili      /  DBili      /  AST      /  ALT      /  AlkPhos      12-08    Creatinine Trend: 1.95 <--, 2.00 <--, 2.09 <--, 2.09 <--, 2.13 <--, 1.97 <--, 2.05 <--                        7.7    10.08 )-----------( 450      ( 08 Dec 2017 05:30 )             25.8     Urine Studies:            LIVER FUNCTIONS - ( 08 Dec 2017 05:30 )  Alb: x     / Pro: 5.9 g/dL / ALK PHOS: x     / ALT: x     / AST: x     / GGT: x

## 2017-12-08 NOTE — PROGRESS NOTE ADULT - SUBJECTIVE AND OBJECTIVE BOX
INTERVAL HPI/OVERNIGHT EVENTS  Patient seen and examined at bedside, [place] Interpretation provided by [employer] phone service with  ID #[id number]. No o/n events, patient resting comfortably. No complaints at this time. Patient denies fever, chills, dizziness, weakness, CP, palpitations, SOB, cough, N/V/D/C, dysuria, changes in bowel movements, LE edema.    VITAL SIGNS:  T(F): 98 (12-08-17 @ 06:15)  HR: 95 (12-08-17 @ 06:15)  BP: 147/70 (12-08-17 @ 06:15)  RR: 16 (12-08-17 @ 06:15)  SpO2: 97% (12-08-17 @ 06:15)  Wt(kg): --    PHYSICAL EXAM:  Constitutional: NAD  Eyes: EOMI, sclera non-icteric  Neck: supple, no masses, no JVD  Respiratory: CTA b/l, good air entry b/l  Cardiovascular: +S1S2 RRR, no M/R/G  Gastrointestinal: soft, NTND, + BS, no hepatosplenomegaly  Extremities: no c/c/e  Neurological: AAOx3    MEDICATIONS  (STANDING):  amLODIPine   Tablet 10 milliGRAM(s) Oral daily  buMETAnide 1 milliGRAM(s) Oral daily  isosorbide   mononitrate ER Tablet (IMDUR) 60 milliGRAM(s) Oral daily  metoprolol succinate ER 50 milliGRAM(s) Oral daily  simvastatin 40 milliGRAM(s) Oral at bedtime  sodium chloride 0.9% lock flush 3 milliLiter(s) IV Push every 8 hours    Allergies  No Known Allergies    LABS:                        7.7    10.08 )-----------( 450      ( 08 Dec 2017 05:30 )             25.8     12-08    140  |  100  |  22  ----------------------------<  79  4.1   |  23  |  1.95<H>    Ca    9.0      08 Dec 2017 05:30  Phos  3.8     12-08  Mg     1.7     12-08    TPro  5.9<L>  /  Alb  x   /  TBili  x   /  DBili  x   /  AST  x   /  ALT  x   /  AlkPhos  x   12-08    RADIOLOGY & ADDITIONAL TESTS:  Studies reviewed.    ASSESSMENT & PLAN:

## 2017-12-08 NOTE — PROGRESS NOTE ADULT - PROBLEM SELECTOR PLAN 5
S/P PRBC s/p venofer for iron deficiency s/p Epo. F/U Heme recommendations regarding paraproteinemia (IgA lambda band). RAUL/Venofer as per Heme. Monitor Hb.

## 2017-12-08 NOTE — PROGRESS NOTE ADULT - SUBJECTIVE AND OBJECTIVE BOX
[facility] NEPHROLOGY- PROGRESS NOTE    87 year old female with history of hypertension presents with SOB and LE edema. Nephrology consulted for elevated Scr.    REVIEW OF SYSTEMS:  Gen: no changes in weight  Cards: no chest pain  Resp: no dyspnea  GI: no nausea or vomiting or diarrhea  Vascular: no LE edema    No Known Allergies      Hospital Medications: Medications reviewed    VITALS:  T(F): 98 (12-08-17 @ 06:15), Max: 98 (12-07-17 @ 14:35)  HR: 95 (12-08-17 @ 06:15)  BP: 147/70 (12-08-17 @ 06:15)  RR: 16 (12-08-17 @ 06:15)  SpO2: 97% (12-08-17 @ 06:15)  Wt(kg): --    12-07 @ 07:01  -  12-08 @ 07:00  --------------------------------------------------------  IN: 620 mL / OUT: 0 mL / NET: 620 mL      PHYSICAL EXAM:    Gen: NAD, calm  Cards: RRR, +S1/S2, no M/G/R  Resp: CTA B/L  GI: soft, NT/ND, NABS  Vascular: no LE edema B/L      LABS:  12-08    140  |  100  |  22  ----------------------------<  79  4.1   |  23  |  1.95<H>    Ca    9.0      08 Dec 2017 05:30  Phos  3.8     12-08  Mg     1.7     12-08    TPro  5.9<L>  /  Alb      /  TBili      /  DBili      /  AST      /  ALT      /  AlkPhos      12-08    Creatinine Trend: 1.95 <--, 2.00 <--, 2.09 <--, 2.09 <--, 2.13 <--, 1.97 <--, 2.05 <--                        7.7    10.08 )-----------( 450      ( 08 Dec 2017 05:30 )             25.8

## 2017-12-09 LAB
BLD GP AB SCN SERPL QL: NEGATIVE — SIGNIFICANT CHANGE UP
BUN SERPL-MCNC: 22 MG/DL — SIGNIFICANT CHANGE UP (ref 7–23)
CALCIUM SERPL-MCNC: 9 MG/DL — SIGNIFICANT CHANGE UP (ref 8.4–10.5)
CHLORIDE SERPL-SCNC: 101 MMOL/L — SIGNIFICANT CHANGE UP (ref 98–107)
CO2 SERPL-SCNC: 24 MMOL/L — SIGNIFICANT CHANGE UP (ref 22–31)
CREAT SERPL-MCNC: 1.97 MG/DL — HIGH (ref 0.5–1.3)
GLUCOSE BLDC GLUCOMTR-MCNC: 111 MG/DL — HIGH (ref 70–99)
GLUCOSE BLDC GLUCOMTR-MCNC: 132 MG/DL — HIGH (ref 70–99)
GLUCOSE BLDC GLUCOMTR-MCNC: 138 MG/DL — HIGH (ref 70–99)
GLUCOSE BLDC GLUCOMTR-MCNC: 157 MG/DL — HIGH (ref 70–99)
GLUCOSE SERPL-MCNC: 97 MG/DL — SIGNIFICANT CHANGE UP (ref 70–99)
HCT VFR BLD CALC: 26.2 % — LOW (ref 34.5–45)
HGB BLD-MCNC: 7.9 G/DL — LOW (ref 11.5–15.5)
MAGNESIUM SERPL-MCNC: 1.7 MG/DL — SIGNIFICANT CHANGE UP (ref 1.6–2.6)
MCHC RBC-ENTMCNC: 24 PG — LOW (ref 27–34)
MCHC RBC-ENTMCNC: 30.2 % — LOW (ref 32–36)
MCV RBC AUTO: 79.6 FL — LOW (ref 80–100)
NRBC # FLD: 0 — SIGNIFICANT CHANGE UP
PHOSPHATE SERPL-MCNC: 3.5 MG/DL — SIGNIFICANT CHANGE UP (ref 2.5–4.5)
PLATELET # BLD AUTO: 474 K/UL — HIGH (ref 150–400)
PMV BLD: 9.5 FL — SIGNIFICANT CHANGE UP (ref 7–13)
POTASSIUM SERPL-MCNC: 3.9 MMOL/L — SIGNIFICANT CHANGE UP (ref 3.5–5.3)
POTASSIUM SERPL-SCNC: 3.9 MMOL/L — SIGNIFICANT CHANGE UP (ref 3.5–5.3)
RBC # BLD: 3.29 M/UL — LOW (ref 3.8–5.2)
RBC # FLD: 19.1 % — HIGH (ref 10.3–14.5)
RH IG SCN BLD-IMP: POSITIVE — SIGNIFICANT CHANGE UP
SODIUM SERPL-SCNC: 138 MMOL/L — SIGNIFICANT CHANGE UP (ref 135–145)
WBC # BLD: 9.63 K/UL — SIGNIFICANT CHANGE UP (ref 3.8–10.5)
WBC # FLD AUTO: 9.63 K/UL — SIGNIFICANT CHANGE UP (ref 3.8–10.5)

## 2017-12-09 RX ORDER — HEPARIN SODIUM 5000 [USP'U]/ML
5000 INJECTION INTRAVENOUS; SUBCUTANEOUS EVERY 8 HOURS
Qty: 0 | Refills: 0 | Status: DISCONTINUED | OUTPATIENT
Start: 2017-12-09 | End: 2017-12-10

## 2017-12-09 RX ADMIN — SODIUM CHLORIDE 3 MILLILITER(S): 9 INJECTION INTRAMUSCULAR; INTRAVENOUS; SUBCUTANEOUS at 13:04

## 2017-12-09 RX ADMIN — SIMVASTATIN 40 MILLIGRAM(S): 20 TABLET, FILM COATED ORAL at 21:21

## 2017-12-09 RX ADMIN — ISOSORBIDE MONONITRATE 60 MILLIGRAM(S): 60 TABLET, EXTENDED RELEASE ORAL at 12:52

## 2017-12-09 RX ADMIN — Medication 50 MILLIGRAM(S): at 06:28

## 2017-12-09 RX ADMIN — BUMETANIDE 1 MILLIGRAM(S): 0.25 INJECTION INTRAMUSCULAR; INTRAVENOUS at 06:27

## 2017-12-09 RX ADMIN — HEPARIN SODIUM 5000 UNIT(S): 5000 INJECTION INTRAVENOUS; SUBCUTANEOUS at 21:28

## 2017-12-09 RX ADMIN — SODIUM CHLORIDE 3 MILLILITER(S): 9 INJECTION INTRAMUSCULAR; INTRAVENOUS; SUBCUTANEOUS at 06:29

## 2017-12-09 RX ADMIN — SODIUM CHLORIDE 3 MILLILITER(S): 9 INJECTION INTRAMUSCULAR; INTRAVENOUS; SUBCUTANEOUS at 21:28

## 2017-12-09 RX ADMIN — AMLODIPINE BESYLATE 10 MILLIGRAM(S): 2.5 TABLET ORAL at 06:28

## 2017-12-09 NOTE — PROGRESS NOTE ADULT - SUBJECTIVE AND OBJECTIVE BOX
Ryan Perez MD  Interventional Cardiology  [place] Office : [address] [place] [address] [address] [address]  Tel:   [place] Office : [address] [facility] [place] [address]  Tel: [phone]  Cell : [phone]    Subjective : Pt lying in bed comfortable, not in distress, denies any chest pain or SOB  	  MEDICATIONS:  amLODIPine   Tablet 10 milliGRAM(s) Oral daily  buMETAnide 1 milliGRAM(s) Oral daily  isosorbide   mononitrate ER Tablet (IMDUR) 60 milliGRAM(s) Oral daily  metoprolol succinate ER 50 milliGRAM(s) Oral daily  simvastatin 40 milliGRAM(s) Oral at bedtime        PHYSICAL EXAM:  T(C): 36.9 (12-09-17 @ 12:07), Max: 36.9 (12-09-17 @ 12:07)  HR: 92 (12-09-17 @ 12:07) (90 - 92)  BP: 145/67 (12-09-17 @ 12:07) (133/75 - 145/67)  RR: 16 (12-09-17 @ 12:07) (16 - 17)  SpO2: 100% (12-09-17 @ 12:07) (98% - 100%)  Wt(kg): --  I&O's Summary    08 Dec 2017 07:01  -  09 Dec 2017 07:00  --------------------------------------------------------  IN: 220 mL / OUT: 0 mL / NET: 220 mL          Appearance: Normal	  HEENT:   Normal oral mucosa, PERRL, EOMI	  Cardiovascular: Normal S1 S2, No JVD, No murmurs, No edema  Respiratory: Lungs clear to auscultation	  Gastrointestinal:  Soft, Non-tender, + BS	  Extremities: Normal range of motion, No clubbing, cyanosis or edema                                    7.9    9.63  )-----------( 474      ( 09 Dec 2017 06:30 )             26.2     12-09    138  |  101  |  22  ----------------------------<  97  3.9   |  24  |  1.97<H>    Ca    9.0      09 Dec 2017 06:30  Phos  3.5     12-09  Mg     1.7     12-09    TPro  5.9<L>  /  Alb  x   /  TBili  x   /  DBili  x   /  AST  x   /  ALT  x   /  AlkPhos  x   12-08    proBNP:   Lipid Profile:   HgA1c:   TSH:

## 2017-12-09 NOTE — PROGRESS NOTE ADULT - ASSESSMENT
EKG - NSR no STT changes    a/p     1) Shortness of breath -  likely secondary to worsening renal function, and uncontrolled HTN, 2d echo shows normal LV function, wouldnt work up ischemia at this time as pt has stage 4 CKD, age 87 with no objective signs of ischemia. cont bumex    2) HTN - cont toprol, imdur increase to 120mg daily cont norvasc, dont increase toprol further as pt was having >2 sec pauses    3) Anemia - s/p transfusion , EGD shows no bleeding source, s/p pill endoscopy s/p colonoscopy no source of bleeding found,    4) Acute on chronic renal failure - nephrology on case    5) UTI - growing klebsiella, asympomatic no anti biotics

## 2017-12-09 NOTE — PROGRESS NOTE ADULT - MINUTES
How Severe Are Your Bumps?: mild
Have Your Bumps Been Treated?: not been treated
Is This A New Presentation, Or A Follow-Up?: Bumps
Additional History: Cerave Eczema cream
35

## 2017-12-09 NOTE — PROGRESS NOTE ADULT - PROBLEM SELECTOR PLAN 5
S/P PRBC s/p venofer for iron deficiency s/p Epo. Will repeat SPEP/ SIFE as per Heme reccs f/u Heme regarding paraproteinemia (IgA lambda band). RAUL/Venofer as per Heme. Monitor Hb.

## 2017-12-09 NOTE — PROGRESS NOTE ADULT - SUBJECTIVE AND OBJECTIVE BOX
Subjective : Pt lying in bed comfortable, not in distress, denies any chest pain or SOB  	  MEDICATIONS  (STANDING):  amLODIPine   Tablet 10 milliGRAM(s) Oral daily  buMETAnide 1 milliGRAM(s) Oral daily  isosorbide   mononitrate ER Tablet (IMDUR) 60 milliGRAM(s) Oral daily  metoprolol succinate ER 50 milliGRAM(s) Oral daily  simvastatin 40 milliGRAM(s) Oral at bedtime  sodium chloride 0.9% lock flush 3 milliLiter(s) IV Push every 8 hours    MEDICATIONS  (PRN):    Vital Signs Last 24 Hrs  T(C): 36.9 (09 Dec 2017 12:07), Max: 36.9 (09 Dec 2017 12:07)  T(F): 98.5 (09 Dec 2017 12:07), Max: 98.5 (09 Dec 2017 12:07)  HR: 92 (09 Dec 2017 12:07) (90 - 92)  BP: 145/67 (09 Dec 2017 12:07) (133/75 - 145/67)  BP(mean): --  RR: 16 (09 Dec 2017 12:07) (16 - 17)  SpO2: 100% (09 Dec 2017 12:07) (98% - 100%)    Constitutional: No fever, fatigue  Skin: No rash.  Eyes: No recent vision problems or eye pain.  ENT: No congestion, ear pain, or sore throat.  Cardiovascular: No chest pain or palpation.  Respiratory: No cough, shortness of breath, congestion, or wheezing.  Gastrointestinal: No abdominal pain, nausea, vomiting, or diarrhea.  Genitourinary: No dysuria.  Musculoskeletal: No joint swelling.  Neurologic: No headache.      Appearance: Normal	  HEENT:   Normal oral mucosa, PERRL, EOMI	  Cardiovascular: Normal S1 S2, No JVD, No murmurs, No edema  Respiratory: Lungs clear to auscultation	  Gastrointestinal:  Soft, Non-tender, + BS	  Extremities: Normal range of motion, No clubbing, cyanosis or edema    LABS:  12-09    138  |  101  |  22  ----------------------------<  97  3.9   |  24  |  1.97<H>    Ca    9.0      09 Dec 2017 06:30  Phos  3.5     12-09  Mg     1.7     12-09    TPro  5.9<L>  /  Alb      /  TBili      /  DBili      /  AST      /  ALT      /  AlkPhos      12-08    Creatinine Trend: 1.97 <--, 1.95 <--, 2.00 <--, 2.09 <--, 2.09 <--, 2.13 <--, 1.97 <--                        7.9    9.63  )-----------( 474      ( 09 Dec 2017 06:30 )             26.2     Urine Studies:            LIVER FUNCTIONS - ( 08 Dec 2017 05:30 )  Alb: x     / Pro: 5.9 g/dL / ALK PHOS: x     / ALT: x     / AST: x     / GGT: x

## 2017-12-09 NOTE — PROGRESS NOTE ADULT - SUBJECTIVE AND OBJECTIVE BOX
[facility] NEPHROLOGY- PROGRESS NOTE    87 year old female with history of hypertension presents with SOB and LE edema. Nephrology consulted for elevated Scr.    REVIEW OF SYSTEMS:  Gen: no changes in weight  Cards: no chest pain  Resp: no dyspnea  GI: no nausea or vomiting or diarrhea  Vascular: no LE edema    No Known Allergies      Hospital Medications: Medications reviewed    VITALS:  T(F): 98.5 (12-09-17 @ 12:07), Max: 98.5 (12-09-17 @ 12:07)  HR: 92 (12-09-17 @ 12:07)  BP: 145/67 (12-09-17 @ 12:07)  RR: 16 (12-09-17 @ 12:07)  SpO2: 100% (12-09-17 @ 12:07)  Wt(kg): --    12-08 @ 07:01  -  12-09 @ 07:00  --------------------------------------------------------  IN: 220 mL / OUT: 0 mL / NET: 220 mL      PHYSICAL EXAM:    Gen: NAD, calm  Cards: RRR, +S1/S2, no M/G/R  Resp: CTA B/L  GI: soft, NT/ND, NABS  Vascular: no LE edema B/L      LABS:  12-09    138  |  101  |  22  ----------------------------<  97  3.9   |  24  |  1.97<H>    Ca    9.0      09 Dec 2017 06:30  Phos  3.5     12-09  Mg     1.7     12-09    TPro  5.9<L>  /  Alb      /  TBili      /  DBili      /  AST      /  ALT      /  AlkPhos      12-08    Creatinine Trend: 1.97 <--, 1.95 <--, 2.00 <--, 2.09 <--, 2.09 <--, 2.13 <--, 1.97 <--                        7.9    9.63  )-----------( 474      ( 09 Dec 2017 06:30 )             26.2     Urine Studies:

## 2017-12-10 ENCOUNTER — TRANSCRIPTION ENCOUNTER (OUTPATIENT)
Age: 82
End: 2017-12-10

## 2017-12-10 VITALS
OXYGEN SATURATION: 100 % | SYSTOLIC BLOOD PRESSURE: 146 MMHG | RESPIRATION RATE: 16 BRPM | HEART RATE: 90 BPM | TEMPERATURE: 98 F | DIASTOLIC BLOOD PRESSURE: 78 MMHG

## 2017-12-10 LAB
BUN SERPL-MCNC: 21 MG/DL — SIGNIFICANT CHANGE UP (ref 7–23)
CALCIUM SERPL-MCNC: 9 MG/DL — SIGNIFICANT CHANGE UP (ref 8.4–10.5)
CHLORIDE SERPL-SCNC: 103 MMOL/L — SIGNIFICANT CHANGE UP (ref 98–107)
CO2 SERPL-SCNC: 26 MMOL/L — SIGNIFICANT CHANGE UP (ref 22–31)
CREAT SERPL-MCNC: 1.87 MG/DL — HIGH (ref 0.5–1.3)
GLUCOSE BLDC GLUCOMTR-MCNC: 110 MG/DL — HIGH (ref 70–99)
GLUCOSE BLDC GLUCOMTR-MCNC: 143 MG/DL — HIGH (ref 70–99)
GLUCOSE SERPL-MCNC: 101 MG/DL — HIGH (ref 70–99)
HCT VFR BLD CALC: 27 % — LOW (ref 34.5–45)
HGB BLD-MCNC: 8.2 G/DL — LOW (ref 11.5–15.5)
MAGNESIUM SERPL-MCNC: 1.6 MG/DL — SIGNIFICANT CHANGE UP (ref 1.6–2.6)
MCHC RBC-ENTMCNC: 24 PG — LOW (ref 27–34)
MCHC RBC-ENTMCNC: 30.4 % — LOW (ref 32–36)
MCV RBC AUTO: 78.9 FL — LOW (ref 80–100)
NRBC # FLD: 0 — SIGNIFICANT CHANGE UP
PHOSPHATE SERPL-MCNC: 3.3 MG/DL — SIGNIFICANT CHANGE UP (ref 2.5–4.5)
PLATELET # BLD AUTO: 461 K/UL — HIGH (ref 150–400)
PMV BLD: 9 FL — SIGNIFICANT CHANGE UP (ref 7–13)
POTASSIUM SERPL-MCNC: 3.6 MMOL/L — SIGNIFICANT CHANGE UP (ref 3.5–5.3)
POTASSIUM SERPL-SCNC: 3.6 MMOL/L — SIGNIFICANT CHANGE UP (ref 3.5–5.3)
RBC # BLD: 3.42 M/UL — LOW (ref 3.8–5.2)
RBC # FLD: 19.3 % — HIGH (ref 10.3–14.5)
SODIUM SERPL-SCNC: 141 MMOL/L — SIGNIFICANT CHANGE UP (ref 135–145)
WBC # BLD: 9.44 K/UL — SIGNIFICANT CHANGE UP (ref 3.8–10.5)
WBC # FLD AUTO: 9.44 K/UL — SIGNIFICANT CHANGE UP (ref 3.8–10.5)

## 2017-12-10 RX ORDER — METOPROLOL TARTRATE 50 MG
1 TABLET ORAL
Qty: 0 | Refills: 0 | COMMUNITY

## 2017-12-10 RX ORDER — AMLODIPINE BESYLATE 2.5 MG/1
1 TABLET ORAL
Qty: 0 | Refills: 0 | COMMUNITY

## 2017-12-10 RX ORDER — ISOSORBIDE MONONITRATE 60 MG/1
120 TABLET, EXTENDED RELEASE ORAL DAILY
Qty: 0 | Refills: 0 | Status: DISCONTINUED | OUTPATIENT
Start: 2017-12-10 | End: 2017-12-10

## 2017-12-10 RX ORDER — FUROSEMIDE 40 MG
1 TABLET ORAL
Qty: 0 | Refills: 0 | COMMUNITY

## 2017-12-10 RX ORDER — BUMETANIDE 0.25 MG/ML
1 INJECTION INTRAMUSCULAR; INTRAVENOUS
Qty: 30 | Refills: 0 | OUTPATIENT
Start: 2017-12-10 | End: 2018-01-08

## 2017-12-10 RX ORDER — AMLODIPINE BESYLATE 2.5 MG/1
1 TABLET ORAL
Qty: 30 | Refills: 0 | OUTPATIENT
Start: 2017-12-10 | End: 2018-01-08

## 2017-12-10 RX ORDER — METOPROLOL TARTRATE 50 MG
1 TABLET ORAL
Qty: 30 | Refills: 0 | OUTPATIENT
Start: 2017-12-10 | End: 2018-01-08

## 2017-12-10 RX ORDER — LOSARTAN/HYDROCHLOROTHIAZIDE 100MG-25MG
1 TABLET ORAL
Qty: 0 | Refills: 0 | COMMUNITY

## 2017-12-10 RX ORDER — ISOSORBIDE MONONITRATE 60 MG/1
1 TABLET, EXTENDED RELEASE ORAL
Qty: 30 | Refills: 0
Start: 2017-12-10 | End: 2018-01-08

## 2017-12-10 RX ORDER — BUMETANIDE 0.25 MG/ML
1 INJECTION INTRAMUSCULAR; INTRAVENOUS
Qty: 0 | Refills: 0 | COMMUNITY
Start: 2017-12-10 | End: 2018-01-08

## 2017-12-10 RX ADMIN — SODIUM CHLORIDE 3 MILLILITER(S): 9 INJECTION INTRAMUSCULAR; INTRAVENOUS; SUBCUTANEOUS at 13:21

## 2017-12-10 RX ADMIN — BUMETANIDE 1 MILLIGRAM(S): 0.25 INJECTION INTRAMUSCULAR; INTRAVENOUS at 05:53

## 2017-12-10 RX ADMIN — SODIUM CHLORIDE 3 MILLILITER(S): 9 INJECTION INTRAMUSCULAR; INTRAVENOUS; SUBCUTANEOUS at 05:53

## 2017-12-10 RX ADMIN — HEPARIN SODIUM 5000 UNIT(S): 5000 INJECTION INTRAVENOUS; SUBCUTANEOUS at 13:18

## 2017-12-10 RX ADMIN — ISOSORBIDE MONONITRATE 120 MILLIGRAM(S): 60 TABLET, EXTENDED RELEASE ORAL at 16:02

## 2017-12-10 RX ADMIN — HEPARIN SODIUM 5000 UNIT(S): 5000 INJECTION INTRAVENOUS; SUBCUTANEOUS at 05:53

## 2017-12-10 RX ADMIN — Medication 50 MILLIGRAM(S): at 05:53

## 2017-12-10 RX ADMIN — AMLODIPINE BESYLATE 10 MILLIGRAM(S): 2.5 TABLET ORAL at 05:53

## 2017-12-10 NOTE — PROGRESS NOTE ADULT - PROBLEM SELECTOR PLAN 6
Off abx as per infectious disease.
Mild and thought to be secondary to volume overload improving with diuretic therapy. Monitor serum sodium.
Off abx as per infectious disease.
As per infectious disease.
Off abx as per infectious disease.
Off abx as per infectious disease.
Resolved and thought to be secondary to volume overload improving with diuretic therapy. Monitor serum sodium.

## 2017-12-10 NOTE — DISCHARGE NOTE ADULT - CARE PROVIDER_API CALL
Ryan Villagomez), Internal Medicine  [address] [place], [place] [id number]  Phone: [phone]  Fax: [phone] Ryan Villagomez), Internal Medicine  [address] [place], [place] [id number]  Phone: [phone]  Fax: [phone]    Reagan Pierre (DO), Internal Medicine  [facility] [place] [address]  Phone: [phone]  Fax: [phone]

## 2017-12-10 NOTE — PROGRESS NOTE ADULT - SUBJECTIVE AND OBJECTIVE BOX
Ryan Perez MD  Interventional Cardiology  [place] Office : [address] [place] [address] [address] [address]  Tel:   [place] Office : [address] [facility] [place] [address]  Tel: [phone]  Cell : [phone]    Subjective : Pt lying in bed comfortable, not in distress, denies any chest pain or SOB  	  MEDICATIONS:  amLODIPine   Tablet 10 milliGRAM(s) Oral daily  buMETAnide 1 milliGRAM(s) Oral daily  heparin  Injectable 5000 Unit(s) SubCutaneous every 8 hours  isosorbide   mononitrate ER Tablet (IMDUR) 120 milliGRAM(s) Oral daily  metoprolol succinate ER 50 milliGRAM(s) Oral daily            simvastatin 40 milliGRAM(s) Oral at bedtime        PHYSICAL EXAM:  T(C): 36.5 (12-10-17 @ 05:53), Max: 36.9 (12-09-17 @ 12:07)  HR: 92 (12-10-17 @ 05:53) (90 - 92)  BP: 148/78 (12-10-17 @ 05:53) (133/70 - 148/78)  RR: 17 (12-10-17 @ 05:53) (16 - 17)  SpO2: 98% (12-10-17 @ 05:53) (98% - 100%)  Wt(kg): --  I&O's Summary    09 Dec 2017 07:01  -  10 Dec 2017 07:00  --------------------------------------------------------  IN: 370 mL / OUT: 0 mL / NET: 370 mL          Appearance: Normal	  HEENT:   Normal oral mucosa, PERRL, EOMI	  Cardiovascular: Normal S1 S2, No JVD, No murmurs, No edema  Respiratory: Lungs clear to auscultation	  Gastrointestinal:  Soft, Non-tender, + BS	  Extremities: Normal range of motion, No clubbing, cyanosis or edema                                    8.2    9.44  )-----------( 461      ( 10 Dec 2017 03:56 )             27.0     12-10    141  |  103  |  21  ----------------------------<  101<H>  3.6   |  26  |  1.87<H>    Ca    9.0      10 Dec 2017 03:56  Phos  3.3     12-10  Mg     1.6     12-10      proBNP:   Lipid Profile:   HgA1c:   TSH:

## 2017-12-10 NOTE — DISCHARGE NOTE ADULT - PLAN OF CARE
maintain stable hemoglobin levels Your GI workup did not show reason for your blood loss. Additional labwork was drawn and you will need to follow up with a hematologist if it results positive. Follow up with Dr. Pierre for the results, and he will refer you to a hematologist if the results are positive. Please follow up with Dr. Pierre-phone number below. Please take Bumex as ordered. Your metoprolol dose was decreased, and Imdur was added. Please take all as prescribed and follow up with your PCP. blood pressure control As outlined above Please follow up with Dr. Pierre-phone number below. Your lisinopril-hydrochlorathiazide is now on hold. Please follow up with Dr. Pierre in one week if lisinopril can be continued and your Imdur dose will likely be decreased when that is resumed.

## 2017-12-10 NOTE — PROGRESS NOTE ADULT - PROBLEM SELECTOR PLAN 3
BP improving. Continue with current medications and low sodium diet. Agree with holding HCTZ and losartan for now. Monitor BP. BP improving. Continue with current medications and low sodium diet. Recc to restart Lisinopril 5mg PO daily. Monitor BP.

## 2017-12-10 NOTE — PROGRESS NOTE ADULT - PROBLEM SELECTOR PLAN 2
Patient with h/o proteinuric CKD-4 (Scr 2.71 in 8/26/17 and 1.95 on 6/24/17 as per PMD). Renal function near baseline. Suspect CKD-4 secondary to long standing h/o HTN and chronic NSAID use. Given proteinuria (spot urine TP/CR 1.6), will consider restarting ARB at lower dose on discharge. F/U paraproteinemia work up. Renal US reviewed and unremarkable. Avoid nephrotoxins.
Patient with h/o proteinuric CKD-4 (Scr 2.71 in 8/26/17 and 1.95 on 6/24/17 as per PMD). Renal function at baseline. Suspect CKD-4 secondary to long standing h/o HTN and chronic NSAID use however patient may have possible component of paraproteinemia (IgA lambda band on serum NHUNG). F/U hematology recommendations. Given proteinuria (spot urine TP/CR 1.6), will consider restarting ARB at lower dose on discharge. Renal US reviewed and unremarkable. Avoid nephrotoxins.
stable at present , k Is nl
stable at present , k Is nl  RENAL BIOPSY AS PER RENAL
BP controlled. Agree with holding HCTZ and losartan for now. Monitor BP.
BP controlled. Agree with holding HCTZ and losartan for now. Monitor BP.
Patient with h/o proteinuric CKD-4 (Scr 2.71 in 8/26/17 and 1.95 on 6/24/17 as per PMD). Renal function at baseline. Suspect CKD-4 secondary to long standing h/o HTN and chronic NSAID use however patient may have possible component of paraproteinemia (IgA lambda band on serum NHUNG). F/U hematology recommendations. Given proteinuria (spot urine TP/CR 1.6), will consider restarting ARB at lower dose on discharge. Renal US reviewed and unremarkable. Avoid nephrotoxins.
Patient with h/o proteinuric CKD-4 (Scr 2.71 in 8/26/17 and 1.95 on 6/24/17 as per PMD). Renal function near baseline. Suspect CKD-4 secondary to long standing h/o HTN and chronic NSAID use however patient may have possible component of paraproteinemia (IgA lambda band on serum NHUNG). F/U hematology recommendations. Given proteinuria (spot urine TP/CR 1.6), will consider restarting ARB at lower dose on discharge. Renal US reviewed and unremarkable. Avoid nephrotoxins.
Patient with h/o proteinuric CKD-4 (Scr 2.71 in 8/26/17 and 1.95 on 6/24/17 as per PMD). Renal function near baseline. Suspect CKD-4 secondary to long standing h/o HTN and chronic NSAID use. Given proteinuria (spot urine TP/CR 1.6), will consider restarting ARB at lower dose on discharge. F/U paraproteinemia work up (P). Renal US reviewed and unremarkable. Avoid nephrotoxins.
Patient with h/o proteinuric CKD-4 (Scr 2.71 in 8/26/17 and 1.95 on 6/24/17 as per PMD). Renal function near baseline. Suspect CKD-4 secondary to long standing h/o HTN and chronic NSAID use. Given proteinuria (spot urine TP/CR 1.6), will consider restarting ARB at lower dose on discharge. F/U paraproteinemia work up. Renal US reviewed and unremarkable. Avoid nephrotoxins.
unclear baseline, reanl eval   renal sono with nl size kidneys  renal diet / avoid nephrotoxic agents
unclear baseline, renal eval   renal sono with nl size kidneys  renal diet / avoid nephrotoxic agents
Patient with h/o proteinuric CKD-4 (Scr 2.71 in 8/26/17 and 1.95 on 6/24/17 as per PMD). Renal function at baseline. Suspect CKD-4 secondary to long standing h/o HTN and chronic NSAID use however patient may have possible component of paraproteinemia (IgA lambda band on serum NHUNG). F/U hematology recommendations. Given proteinuria (spot urine TP/CR 1.6), will consider restarting ARB at lower dose on discharge. Renal US reviewed and unremarkable. Avoid nephrotoxins.
stable at present , k Is nl  RENAL BIOPSY AS PER RENAL - out pt
Patient with h/o proteinuric CKD-4 (Scr 2.71 in 8/26/17 and 1.95 on 6/24/17 as per PMD). Renal function near baseline. Suspect CKD-4 secondary to long standing h/o HTN and chronic NSAID use however patient may have possible component of paraproteinemia (IgA lamda band on serum NHUNG). F/U hematology recommendations. Given proteinuria (spot urine TP/CR 1.6), will consider restarting ARB at lower dose on discharge. Renal US reviewed and unremarkable. Avoid nephrotoxins.
Patient with h/o proteinuric CKD-4 (Scr 2.71 in 8/26/17 and 1.95 on 6/24/17 as per PMD). Renal function near baseline. Suspect CKD-4 secondary to long standing h/o HTN and chronic NSAID use. Given proteinuria (spot urine TP/CR 1.6), will consider restarting ARB at lower dose on discharge. F/U serum NHUNG (P). Renal US reviewed and unremarkable. Avoid nephrotoxins.

## 2017-12-10 NOTE — PROGRESS NOTE ADULT - ATTENDING COMMENTS
87 year old female with HTN presented with SOB and worsening LE edema x 1 day.     For the past week has endorsed diarrhea and resolved with imodium. A few weeks ago, was on Levaquin for ?UTI.     Patient with positive urine cx with GNR >100K. WBC in urine 25-50. Patient with no complaints of dysuria. Patient also states SOB improving. CXR with bilateral pleural effusions and vascular lung bases concomitant underlying airspace consolidation. No clinical signs of pna.    Assessment:  -Asymptomatic bacteruria  -Leukocytosis, remains afebrile. Clinically looks well. Unclear etiology of leukocytosis  -SHY    Recommend:  -Continue to monitor off antibiotics as patient looks clinically well.  -Doubt PNA at this time given no sputum production and cough. Also, has remained afebrile.  -Continue to trend WBC and monitor for fevers.    Please call with questions.    Bubba Marshall MD  Pager [phone]  After 5pm/weekends call [phone] .
Pt's respiratory status improving with diuresis. No thoracentesis indicated at this time. Bilateral pleural effusions likely secondary to CHF exacerbation. Pt getting workup up. TTE pending. cardiology f/u. SHY being worked up. Pt's O2 sat is 98% on RA.
87 year old female with HTN presented with SOB and worsening LE edema x 1 day.     For the past week has endorsed diarrhea and resolved with imodium. A few weeks ago, was on Levaquin for ?UTI.     Patient with positive urine cx with GNR >100K. WBC in urine 25-50. Patient with no complaints of dysuria. Patient also states SOB improving. CXR with bilateral pleural effusions and vascular lung bases concomitant underlying airspace consolidation.      Assessment:  -Asymptomatic bacteruria  -Bilateral pleural effusions  -Leukocytosis, remains afebrile. Clinically looks well. Unclear etiology of leukocytosis  -SHY    Recommend:  -Continue to monitor off antibiotics as patient looks clinically well.  -Doubt PNA at this time given no sputum production and cough. Also, has remained afebrile.  -Continue to trend WBC and monitor for fevers.    ID available over the weekend. Please call with questions.    Bubba Marshall MD  Pager [phone]  After 5pm/weekends call [phone] .
[facility] NEPHROLOGY  Bubba Mercado M.D.  Reagan Pierre D.O.  Kristin Martinez M.D.  Paola Monique, MSN, ANP-C    Telephone: [phone]  Facsimile: [phone]    [address] [place] [address]
I have seen and evaluated the patient with the GI Fellow and GI Team.  I agree with the findings, formulation and plan of care as documented in the resident / fellows note and edited where appropriate.
Patient not in the room when I tried to see her (down at colonscopy) but she has anemia, CKD and SPEP/NHUNG with IgA band (no M-protein) in a polyclonal background.  Normal quantitative immunoglobulins and SFLC.  Would recommend repeating SPEP/NHUNG; if still persistent can proceed with bone marrow biopsy.  Would check skeletal survey in the meantime.
[facility] NEPHROLOGY  Bubba Mercado M.D.  Reagan Pierre D.O.  Kristin Martinez M.D.  Paola Monique, MSN, ANP-C    Telephone: [phone]  Facsimile: [phone]    [address] [place] [address]
[facility] NEPHROLOGY  Bubba Mercado M.D.  Reagan Pierre D.O.  Kristin Martinez M.D.  Paola Monique, MSN, ANP-C    Telephone: [phone]  Facsimile: [phone]    [address] [place] [address]
[facility] NEPHROLOGY  Bubba Mercado M.D.  Reagan Pierre D.O.  Kristin Martinez M.D.  Paola Monique, MSN, ANP-C    Telephone: [phone]  Facsimile: [phone]    [address] [place] [address]
[facility] NEPHROLOGY  Bubba Mercado M.D.  Reagan Pierre D.O.  Kristin Martinez M.D.  Paola Monique, MSN, ANP-C    Telephone: [phone]  Facsimile: [phone]    [address] [place] [address]
[facility] NEPHROLOGY  Bubba Mercado M.D.  Reagan Pierre D.O.  Kristin Martinez M.D.  Paola Monique, MSN, ANP-C    Telephone: [phone]  Facsimile: [phone]    [address] [place] [address]
[facility] NEPHROLOGY  Bubba Mercado M.D.  Reagan Pierre D.O.  Kristin Martinez M.D.  Paola Monique, MSN, ANP-C    Telephone: [phone]  Facsimile: [phone]    [address] [place] [address]
[facility] NEPHROLOGY  Bubba Mercado M.D.  Reagan Pierre D.O.  Kristin Martinez M.D.  Paola Monique, MSN, ANP-C    Telephone: [phone]  Facsimile: [phone]    [address] [place] [address]
[facility] NEPHROLOGY  Bubba Mercado M.D.  Reagan Pierre D.O.  Kristin Martinez M.D.  Paola Monique, MSN, ANP-C    Telephone: [phone]  Facsimile: [phone]    [address] [place] [address]
[facility] NEPHROLOGY  Bubba Mercado M.D.  Reagan Pierre D.O.  Kristin Martinez M.D.  Paola Monique, MSN, ANP-C    Telephone: [phone]  Facsimile: [phone]    [address] [place] [address]
[facility] NEPHROLOGY  Bubba Mercado M.D.  Reagan Pierre D.O.  Kristin Martinez M.D.  Paola Monique, MSN, ANP-C    Telephone: [phone]  Facsimile: [phone]    [address] [place] [address]

## 2017-12-10 NOTE — DISCHARGE NOTE ADULT - MEDICATION SUMMARY - MEDICATIONS TO STOP TAKING
I will STOP taking the medications listed below when I get home from the hospital:    furosemide 40 mg oral tablet  -- 1 tab(s) by mouth once a day, As Needed    losartan-hydrochlorothiazide 100mg-12.5mg oral tablet  -- 1 tab(s) by mouth once a day    naproxen 500 mg oral tablet  -- 1 tab(s) by mouth 2 times a day, As Needed

## 2017-12-10 NOTE — DISCHARGE NOTE ADULT - MEDICATION SUMMARY - MEDICATIONS TO CHANGE
I will SWITCH the dose or number of times a day I take the medications listed below when I get home from the hospital:    metoprolol succinate 100 mg oral tablet, extended release  -- 1 tab(s) by mouth once a day    amLODIPine 5 mg oral tablet  -- 1 tab(s) by mouth once a day

## 2017-12-10 NOTE — PROGRESS NOTE ADULT - PROVIDER SPECIALTY LIST ADULT
Cardiology
Gastroenterology
Heme/Onc
Infectious Disease
Infectious Disease
Internal Medicine
Nephrology
Pulmonology
Cardiology
Nephrology
Internal Medicine
Nephrology

## 2017-12-10 NOTE — PROGRESS NOTE ADULT - PROBLEM SELECTOR PROBLEM 3
Hypertensive kidney disease with chronic kidney disease, stage 1 through stage 4 or unspecified 
Anemia
Anemia
Edema, lower extremity
Edema, lower extremity
Hypertensive kidney disease with chronic kidney disease, stage 1 through stage 4 or unspecified 

## 2017-12-10 NOTE — DISCHARGE NOTE ADULT - HOSPITAL COURSE
86 y/o F with hx of HTN presents with SOB and worsening b/l LE edema x 1 day. As per daughter, patient has been having gradual onset of SOB x 1 month. SOB became more severe and constant. SOB is present when she is resting as well. Daughter states it was difficult for patient to ambulate secondary to SOB.    SOB likely secondary to worsening renal function, started on lasix 20mg daily, Found to be anemic and  Patient with h/o proteinuric CKD-4 likely secondary to history of longstanding HTN and chronic NSAID use.     bradycardic sinus in 40's, with 2.5 sec pause, Toprol decreased to 25 mg daily. 88 y/o F with hx of HTN presents with SOB and worsening b/l LE edema x 1 day. As per daughter, patient has been having gradual onset of SOB x 1 month. SOB became more severe and constant. SOB is present when she is resting as well. Daughter states it was difficult for patient to ambulate secondary to SOB.    SOB likely secondary to worsening renal function, started on lasix 20mg daily, which was later switched to PO Bumex. Found to be anemic and  Patient with h/o proteinuric CKD-4 likely secondary to history of longstanding HTN and chronic NSAID use. As part of anemia workup (pt was transfused with PRBCs) an EGD revealed - Small hiatus hernia and mild Schatzki ring. A later colonoscopy revealed Diverticulosis in the sigmoid colon and non-bleeding internal hemorrhoids s/p capsule study:  No GI source of blood loss noted.  s/p venofer for iron deficiency s/p Epogen. Patient Immonfixation with monoclonal IgA band in background of polyclonal with no M-spike or quantitative elevation of IgA, patient unlikely to have CKD secondary IgA given there is not a large monoclonal component, discussed with patient that on discharge she will need follow up at [facility] where the lab work will be repeated, if IgA band persistent will likely need further workup with a bone marrow biopsy as an outpatient. Skeletal survey obtained: Generalized spinal degenerative changes. In addition, grade 1-2   anterolisthesis of L5 on S1 with associated L5 spondylolysis defects.      bradycardic sinus in 40's, with 2.5 sec pause, Toprol decreased to 25 mg daily.

## 2017-12-10 NOTE — PROGRESS NOTE ADULT - PROBLEM SELECTOR PROBLEM 6
Urinary tract infection with hematuria, site unspecified
Hyponatremia
Urinary tract infection with hematuria, site unspecified
Hyponatremia
Urinary tract infection with hematuria, site unspecified

## 2017-12-10 NOTE — DISCHARGE NOTE ADULT - CARE PLAN
Principal Discharge DX:	Anemia  Goal:	maintain stable hemoglobin levels  Instructions for follow-up, activity and diet:	Your GI workup did not show reason for your blood loss. Additional labwork was drawn and you will need to follow up with a hematologist if it results positive. Follow up with Dr. Pierre for the results, and he will refer you to a hematologist if the results are positive.  Secondary Diagnosis:	CKD (chronic kidney disease), stage IV  Instructions for follow-up, activity and diet:	Please follow up with Dr. Pierre-phone number below.  Secondary Diagnosis:	Pleural effusion  Instructions for follow-up, activity and diet:	Please take Bumex as ordered. Your metoprolol dose was decreased, and Imdur was added. Please take all as prescribed and follow up with your PCP.  Secondary Diagnosis:	Hypertension  Goal:	blood pressure control  Instructions for follow-up, activity and diet:	As outlined above Principal Discharge DX:	Anemia  Goal:	maintain stable hemoglobin levels  Instructions for follow-up, activity and diet:	Your GI workup did not show reason for your blood loss. Additional labwork was drawn and you will need to follow up with a hematologist if it results positive. Follow up with Dr. Pierre for the results, and he will refer you to a hematologist if the results are positive.  Secondary Diagnosis:	CKD (chronic kidney disease), stage IV  Instructions for follow-up, activity and diet:	Please follow up with Dr. Pierre-phone number below. Your lisinopril-hydrochlorathiazide is now on hold. Please follow up with Dr. Pierre in one week if lisinopril can be continued and your Imdur dose will likely be decreased when that is resumed.  Secondary Diagnosis:	Pleural effusion  Instructions for follow-up, activity and diet:	Please take Bumex as ordered. Your metoprolol dose was decreased, and Imdur was added. Please take all as prescribed and follow up with your PCP.  Secondary Diagnosis:	Hypertension  Goal:	blood pressure control  Instructions for follow-up, activity and diet:	As outlined above

## 2017-12-10 NOTE — DISCHARGE NOTE ADULT - PATIENT PORTAL LINK FT
“You can access the Follow[facility] Patient Portal, offered by [facility], by registering with the following website: [email or web address]”

## 2017-12-10 NOTE — PROGRESS NOTE ADULT - SUBJECTIVE AND OBJECTIVE BOX
[facility] NEPHROLOGY- PROGRESS NOTE    87 year old female with history of hypertension presents with SOB and LE edema. Nephrology consulted for elevated Scr.    REVIEW OF SYSTEMS:  Gen: no changes in weight  Cards: no chest pain  Resp: no dyspnea  GI: no nausea or vomiting or diarrhea  Vascular: no LE edema    No Known Allergies      Hospital Medications: Medications reviewed    VITALS:  T(F): 97.7 (12-10-17 @ 05:53), Max: 98 (12-09-17 @ 21:29)  HR: 92 (12-10-17 @ 05:53)  BP: 148/78 (12-10-17 @ 05:53)  RR: 17 (12-10-17 @ 05:53)  SpO2: 98% (12-10-17 @ 05:53)  Wt(kg): --    12-09 @ 07:01  -  12-10 @ 07:00  --------------------------------------------------------  IN: 370 mL / OUT: 0 mL / NET: 370 mL        PHYSICAL EXAM:    Gen: NAD, calm  Cards: RRR, +S1/S2, no M/G/R  Resp: CTA B/L  GI: soft, NT/ND, NABS  Vascular: no LE edema B/L      LABS:  12-10    141  |  103  |  21  ----------------------------<  101<H>  3.6   |  26  |  1.87<H>    Ca    9.0      10 Dec 2017 03:56  Phos  3.3     12-10  Mg     1.6     12-10      Creatinine Trend: 1.87 <--, 1.97 <--, 1.95 <--, 2.00 <--, 2.09 <--, 2.09 <--, 2.13 <--                        8.2    9.44  )-----------( 461      ( 10 Dec 2017 03:56 )             27.0     Urine Studies:

## 2017-12-10 NOTE — DISCHARGE NOTE ADULT - MEDICATION SUMMARY - MEDICATIONS TO TAKE
I will START or STAY ON the medications listed below when I get home from the hospital:    isosorbide mononitrate 120 mg oral tablet, extended release  -- 1 tab(s) by mouth once a day  -- Indication: For Hypertension    simvastatin 40 mg oral tablet  -- 1 tab(s) by mouth once a day (at bedtime)  -- Indication: For HLD    metoprolol succinate 50 mg oral tablet, extended release  -- 1 tab(s) by mouth once a day  -- Indication: For Hypertension    amLODIPine 10 mg oral tablet  -- 1 tab(s) by mouth once a day  -- Indication: For Hypertension    bumetanide 1 mg oral tablet  -- 1 tab(s) by mouth once a day  -- Indication: For Pleural effusion

## 2017-12-10 NOTE — PROGRESS NOTE ADULT - PROBLEM SELECTOR PLAN 1
Likely hemodynamically mediated secondary to decreased EABV, loop and thiazide diuretic and ARB use. SHY now resolved. Avoid nephrotoxins.
S/P CAPSULE - F/U RESULTS WITH GI   stable hb/ hct at present
S/P CAPSULE - F/U RESULTS WITH GI , colonoscopy today  stable hb/ hct at present  poss BM BIOPSY AS OUT PT AS PER HEME
Likely hemodynamically mediated secondary to decreased EABV, loop and thiazide diuretic and ARB use. SHY now resolved. Avoid nephrotoxins.
Likely hemodynamically mediated secondary to decreased EABV, loop and thiazide diuretic and ARB use. SHY now resolving. Avoid nephrotoxins.
Patient Immonfixation with monoclonal IgA band in background of polyclonal with no M-spike or quantitative elevation of IgA, patient unlikely to have CKD secondary IgA given there is not a large monoclonal component, discussed with patient that on discharge she will need follow up at [facility] where the lab work will be repeated, if IgA band persistent will likely need further workup with a bone marrow biopsy as an outpatient. Though unlikely would check skeletal survey to rule out lytic lesions  -last IV iron sucrose 200mg today, patient also received Epogen per nephrology    Carloz Valentin  PGY-5, Hematology-Oncology Fellow  [phone] ([place]) [id number] ([facility])
Patient with h/o CKD-4 (Scr 2.71 in 8/26/17 as per PMD). Renal function currently at baseline. Suspect CKD-4 secondary to long standing h/o HTN and chronic NSAID use. Check spot urine TP/CR to quantify proteinuria. Check CKD work up including hepatitis B and C serologies and paraproteinemia work up. Renal US reviewed and unremarkable. Avoid nephrotoxins.
Patient with h/o proteinuric CKD-4 (Scr 2.71 in 8/26/17 as per PMD). Renal function currently at baseline. Suspect CKD-4 secondary to long standing h/o HTN and chronic NSAID use. Given proteinuria (spot urine TP/CR 1.6), will consider restarting ARB at lower dose on discharge. F/U CKD work up including hepatitis C serologies and paraproteinemia work up. Renal US reviewed and unremarkable. Avoid nephrotoxins.
pulm/ cards  eval   echo to check lvfx
pulm/ cards  eval   echo to check lvfx
Likely hemodynamically mediated secondary to decreased EABV, loop and thiazide diuretic and ARB use. SHY now resolved. Avoid nephrotoxins.
S/P CAPSULE - F/U RESULTS WITH GI , colonoscopy < from: Colonoscopy (12.08.17 @ 14:58) >     No evidence of bleeding in the entire colon.                       - Diverticulosis in the sigmoid colon.                       - Non-bleeding internal hemorrhoids.    < end of copied text >      stable hb/ hct at present  poss BM BIOPSY AS OUT PT AS PER HEME
Likely hemodynamically mediated secondary to decreased EABV, loop and thiazide diuretic and ARB use. SHY now resolved. Avoid nephrotoxins.
Likely hemodynamically mediated secondary to decreased EABV, loop and thiazide diuretic and ARB use. SHY now resolved. Avoid nephrotoxins.
Likely hemodynamically mediated secondary to decreased EABV, loop and thiazide diuretic and ARB use. SHY now resolving. Avoid nephrotoxins.

## 2017-12-10 NOTE — PROGRESS NOTE ADULT - PROBLEM SELECTOR PROBLEM 1
Acute kidney injury
Anemia of renal disease
Anemia of renal disease
Acute kidney injury
CKD (chronic kidney disease), stage IV
CKD (chronic kidney disease), stage IV
Pleural effusion
Pleural effusion
R/O IgA monoclonal gammopathy
Acute kidney injury
Anemia of renal disease
Acute kidney injury

## 2017-12-10 NOTE — PROGRESS NOTE ADULT - PROBLEM SELECTOR PROBLEM 2
CKD (chronic kidney disease), stage IV
Hypertensive kidney disease with chronic kidney disease, stage 1 through stage 4 or unspecified 
Hypertensive kidney disease with chronic kidney disease, stage 1 through stage 4 or unspecified 
SHY (acute kidney injury)
SHY (acute kidney injury)
CKD (chronic kidney disease), stage IV

## 2017-12-19 ENCOUNTER — OUTPATIENT (OUTPATIENT)
Dept: OUTPATIENT SERVICES | Facility: HOSPITAL | Age: 82
LOS: 1 days | Discharge: HOME | End: 2017-12-19

## 2017-12-19 DIAGNOSIS — Z90.710 ACQUIRED ABSENCE OF BOTH CERVIX AND UTERUS: Chronic | ICD-10-CM

## 2017-12-19 DIAGNOSIS — C43.9 MALIGNANT MELANOMA OF SKIN, UNSPECIFIED: ICD-10-CM

## 2017-12-28 ENCOUNTER — APPOINTMENT (OUTPATIENT)
Dept: HEMATOLOGY ONCOLOGY | Facility: CLINIC | Age: 82
End: 2017-12-28

## 2018-03-01 ENCOUNTER — OUTPATIENT (OUTPATIENT)
Dept: OUTPATIENT SERVICES | Facility: HOSPITAL | Age: 83
LOS: 1 days | End: 2018-03-01
Payer: MEDICAID

## 2018-03-01 DIAGNOSIS — Z90.710 ACQUIRED ABSENCE OF BOTH CERVIX AND UTERUS: Chronic | ICD-10-CM

## 2018-03-01 PROCEDURE — G9001: CPT

## 2018-03-08 DIAGNOSIS — R69 ILLNESS, UNSPECIFIED: ICD-10-CM

## 2018-04-24 ENCOUNTER — OUTPATIENT (OUTPATIENT)
Dept: OUTPATIENT SERVICES | Facility: HOSPITAL | Age: 83
LOS: 1 days | Discharge: HOME | End: 2018-04-24
Payer: MEDICARE

## 2018-04-24 DIAGNOSIS — C43.9 MALIGNANT MELANOMA OF SKIN, UNSPECIFIED: ICD-10-CM

## 2018-04-24 DIAGNOSIS — Z90.710 ACQUIRED ABSENCE OF BOTH CERVIX AND UTERUS: Chronic | ICD-10-CM

## 2018-04-26 ENCOUNTER — OUTPATIENT (OUTPATIENT)
Dept: OUTPATIENT SERVICES | Facility: HOSPITAL | Age: 83
LOS: 1 days | End: 2018-04-26
Payer: MEDICARE

## 2018-04-26 ENCOUNTER — RESULT REVIEW (OUTPATIENT)
Age: 83
End: 2018-04-26

## 2018-04-26 ENCOUNTER — EMERGENCY (EMERGENCY)
Facility: HOSPITAL | Age: 83
LOS: 1 days | Discharge: HOME | End: 2018-04-26
Attending: EMERGENCY MEDICINE | Admitting: EMERGENCY MEDICINE
Payer: MEDICARE

## 2018-04-26 ENCOUNTER — APPOINTMENT (OUTPATIENT)
Dept: HEMATOLOGY ONCOLOGY | Facility: CLINIC | Age: 83
End: 2018-04-26

## 2018-04-26 VITALS
BODY MASS INDEX: 27.06 KG/M2 | SYSTOLIC BLOOD PRESSURE: 182 MMHG | HEIGHT: 59.45 IN | DIASTOLIC BLOOD PRESSURE: 79 MMHG | RESPIRATION RATE: 16 BRPM | WEIGHT: 136 LBS | HEART RATE: 92 BPM | OXYGEN SATURATION: 100 % | TEMPERATURE: 97.9 F

## 2018-04-26 VITALS
DIASTOLIC BLOOD PRESSURE: 81 MMHG | HEART RATE: 88 BPM | TEMPERATURE: 98 F | SYSTOLIC BLOOD PRESSURE: 178 MMHG | OXYGEN SATURATION: 100 % | RESPIRATION RATE: 18 BRPM

## 2018-04-26 DIAGNOSIS — Z90.710 ACQUIRED ABSENCE OF BOTH CERVIX AND UTERUS: Chronic | ICD-10-CM

## 2018-04-26 DIAGNOSIS — D63.1 CHRONIC KIDNEY DISEASE, STAGE 4 (SEVERE): ICD-10-CM

## 2018-04-26 DIAGNOSIS — N18.4 CHRONIC KIDNEY DISEASE, STAGE 4 (SEVERE): ICD-10-CM

## 2018-04-26 DIAGNOSIS — C43.9 MALIGNANT MELANOMA OF SKIN, UNSPECIFIED: ICD-10-CM

## 2018-04-26 DIAGNOSIS — N18.4 HYPERTENSIVE CHRONIC KIDNEY DISEASE WITH STAGE 1 THROUGH STAGE 4 CHRONIC KIDNEY DISEASE, OR UNSPECIFIED CHRONIC KIDNEY DISEASE: ICD-10-CM

## 2018-04-26 DIAGNOSIS — I12.9 HYPERTENSIVE CHRONIC KIDNEY DISEASE WITH STAGE 1 THROUGH STAGE 4 CHRONIC KIDNEY DISEASE, OR UNSPECIFIED CHRONIC KIDNEY DISEASE: ICD-10-CM

## 2018-04-26 PROCEDURE — 85097 BONE MARROW INTERPRETATION: CPT

## 2018-04-26 PROCEDURE — 88313 SPECIAL STAINS GROUP 2: CPT

## 2018-04-26 PROCEDURE — 88280 CHROMOSOME KARYOTYPE STUDY: CPT

## 2018-04-26 PROCEDURE — 86901 BLOOD TYPING SEROLOGIC RH(D): CPT

## 2018-04-26 PROCEDURE — 88305 TISSUE EXAM BY PATHOLOGIST: CPT | Mod: 26

## 2018-04-26 PROCEDURE — 88305 TISSUE EXAM BY PATHOLOGIST: CPT

## 2018-04-26 PROCEDURE — 99283 EMERGENCY DEPT VISIT LOW MDM: CPT | Mod: 25,GC

## 2018-04-26 PROCEDURE — 93010 ELECTROCARDIOGRAM REPORT: CPT

## 2018-04-26 PROCEDURE — 88185 FLOWCYTOMETRY/TC ADD-ON: CPT

## 2018-04-26 PROCEDURE — 88237 TISSUE CULTURE BONE MARROW: CPT

## 2018-04-26 PROCEDURE — 88342 IMHCHEM/IMCYTCHM 1ST ANTB: CPT | Mod: 26,59

## 2018-04-26 PROCEDURE — 88188 FLOWCYTOMETRY/READ 9-15: CPT

## 2018-04-26 PROCEDURE — 88341 IMHCHEM/IMCYTCHM EA ADD ANTB: CPT | Mod: 26,59

## 2018-04-26 PROCEDURE — 88360 TUMOR IMMUNOHISTOCHEM/MANUAL: CPT

## 2018-04-26 PROCEDURE — 88341 IMHCHEM/IMCYTCHM EA ADD ANTB: CPT

## 2018-04-26 PROCEDURE — 88360 TUMOR IMMUNOHISTOCHEM/MANUAL: CPT | Mod: 26

## 2018-04-26 PROCEDURE — 88275 CYTOGENETICS 100-300: CPT

## 2018-04-26 PROCEDURE — 88264 CHROMOSOME ANALYSIS 20-25: CPT

## 2018-04-26 PROCEDURE — 84166 PROTEIN E-PHORESIS/URINE/CSF: CPT | Mod: 26

## 2018-04-26 PROCEDURE — 88342 IMHCHEM/IMCYTCHM 1ST ANTB: CPT

## 2018-04-26 PROCEDURE — 88313 SPECIAL STAINS GROUP 2: CPT | Mod: 26

## 2018-04-26 PROCEDURE — 88184 FLOWCYTOMETRY/ TC 1 MARKER: CPT

## 2018-04-26 PROCEDURE — 88285 CHROMOSOME COUNT ADDITIONAL: CPT

## 2018-04-26 PROCEDURE — 86900 BLOOD TYPING SEROLOGIC ABO: CPT

## 2018-04-26 PROCEDURE — 88271 CYTOGENETICS DNA PROBE: CPT

## 2018-04-26 PROCEDURE — 86850 RBC ANTIBODY SCREEN: CPT

## 2018-04-26 NOTE — ED ADULT TRIAGE NOTE - CHIEF COMPLAINT QUOTE
Pt sent by PCP for K 6.4.  Prim language Vibharati, son preferred .  Pt A&Ox4 denies fever/chills, chest pain/palpitations, SOB,  abdominal pain, N/V/D,  headache, dizziness, weakness.  EKG completed.

## 2018-04-27 ENCOUNTER — RESULT REVIEW (OUTPATIENT)
Age: 83
End: 2018-04-27

## 2018-04-27 VITALS
SYSTOLIC BLOOD PRESSURE: 198 MMHG | RESPIRATION RATE: 14 BRPM | DIASTOLIC BLOOD PRESSURE: 85 MMHG | HEART RATE: 87 BPM | OXYGEN SATURATION: 100 %

## 2018-04-27 LAB
ALBUMIN SERPL ELPH-MCNC: 3.2 G/DL — LOW (ref 3.3–5)
ALP SERPL-CCNC: 53 U/L — SIGNIFICANT CHANGE UP (ref 40–120)
ALT FLD-CCNC: 11 U/L — SIGNIFICANT CHANGE UP (ref 4–33)
AST SERPL-CCNC: 16 U/L — SIGNIFICANT CHANGE UP (ref 4–32)
BASOPHILS # BLD AUTO: 0.07 K/UL — SIGNIFICANT CHANGE UP (ref 0–0.2)
BASOPHILS NFR BLD AUTO: 0.8 % — SIGNIFICANT CHANGE UP (ref 0–2)
BILIRUB SERPL-MCNC: 0.2 MG/DL — SIGNIFICANT CHANGE UP (ref 0.2–1.2)
BUN SERPL-MCNC: 44 MG/DL — HIGH (ref 7–23)
CALCIUM SERPL-MCNC: 9.3 MG/DL — SIGNIFICANT CHANGE UP (ref 8.4–10.5)
CHLORIDE SERPL-SCNC: 99 MMOL/L — SIGNIFICANT CHANGE UP (ref 98–107)
CO2 SERPL-SCNC: 22 MMOL/L — SIGNIFICANT CHANGE UP (ref 22–31)
CREAT SERPL-MCNC: 2.69 MG/DL — HIGH (ref 0.5–1.3)
EOSINOPHIL # BLD AUTO: 0.23 K/UL — SIGNIFICANT CHANGE UP (ref 0–0.5)
EOSINOPHIL NFR BLD AUTO: 2.6 % — SIGNIFICANT CHANGE UP (ref 0–6)
GLUCOSE SERPL-MCNC: 110 MG/DL — HIGH (ref 70–99)
HCT VFR BLD CALC: 32.4 % — LOW (ref 34.5–45)
HGB BLD-MCNC: 10.7 G/DL — LOW (ref 11.5–15.5)
IMM GRANULOCYTES # BLD AUTO: 0.03 # — SIGNIFICANT CHANGE UP
IMM GRANULOCYTES NFR BLD AUTO: 0.3 % — SIGNIFICANT CHANGE UP (ref 0–1.5)
LYMPHOCYTES # BLD AUTO: 1.9 K/UL — SIGNIFICANT CHANGE UP (ref 1–3.3)
LYMPHOCYTES # BLD AUTO: 21.8 % — SIGNIFICANT CHANGE UP (ref 13–44)
MCHC RBC-ENTMCNC: 27.6 PG — SIGNIFICANT CHANGE UP (ref 27–34)
MCHC RBC-ENTMCNC: 33 % — SIGNIFICANT CHANGE UP (ref 32–36)
MCV RBC AUTO: 83.5 FL — SIGNIFICANT CHANGE UP (ref 80–100)
MONOCYTES # BLD AUTO: 0.92 K/UL — HIGH (ref 0–0.9)
MONOCYTES NFR BLD AUTO: 10.5 % — SIGNIFICANT CHANGE UP (ref 2–14)
NEUTROPHILS # BLD AUTO: 5.58 K/UL — SIGNIFICANT CHANGE UP (ref 1.8–7.4)
NEUTROPHILS NFR BLD AUTO: 64 % — SIGNIFICANT CHANGE UP (ref 43–77)
NRBC # FLD: 0 — SIGNIFICANT CHANGE UP
PLATELET # BLD AUTO: 363 K/UL — SIGNIFICANT CHANGE UP (ref 150–400)
PMV BLD: 10.3 FL — SIGNIFICANT CHANGE UP (ref 7–13)
POTASSIUM SERPL-MCNC: 4.2 MMOL/L — SIGNIFICANT CHANGE UP (ref 3.5–5.3)
POTASSIUM SERPL-SCNC: 4.2 MMOL/L — SIGNIFICANT CHANGE UP (ref 3.5–5.3)
PROT SERPL-MCNC: 6.4 G/DL — SIGNIFICANT CHANGE UP (ref 6–8.3)
RBC # BLD: 3.88 M/UL — SIGNIFICANT CHANGE UP (ref 3.8–5.2)
RBC # FLD: 13.5 % — SIGNIFICANT CHANGE UP (ref 10.3–14.5)
SODIUM SERPL-SCNC: 136 MMOL/L — SIGNIFICANT CHANGE UP (ref 135–145)
WBC # BLD: 8.73 K/UL — SIGNIFICANT CHANGE UP (ref 3.8–10.5)
WBC # FLD AUTO: 8.73 K/UL — SIGNIFICANT CHANGE UP (ref 3.8–10.5)

## 2018-04-27 NOTE — ED PROVIDER NOTE - ATTENDING CONTRIBUTION TO CARE
86 y/o F with h/o HTN. hyperlipidemia, currently undergoing evaluation for multiple myeloma sent from heme-onc for hyperkalemia on outpatient labs.  Pt was seen by Dr Valentin in the office today for routine biopsy.  Pt had labs drawn, noted with K>6 and Cr 2.7, called in to ER for repeat labs.  Pt is asymptomatic.  Recently started on valtrex for shingles.  Only c/o pain to site of biopsy, no bleeding or discharge.  Well appearing, lying comfortably in stretcher, awake and alert, nontoxic.  VSS.  Lungs cta bl.  Cards nl S1/S2, RRR, no MRG.  (+)vesicles to left mid thoracic back.  Small puncture site to left post pelvis, no surrouning erythema/induration, no bleeding or discharge.  Abd soft ntnd.  EKG NSR no e/o hyperkalemia.  Will repeat labs, reassess.

## 2018-04-27 NOTE — ED ADULT NURSE NOTE - OBJECTIVE STATEMENT
pt is a&ox3, amb, arrived to ed room trauma a. pt sent to ED for elevated serum potassium level by hemologist. pt denies any chest pain, SOB, dizziness, or headache. pt does complain of mild L lower back pain at site of biopsy, pt being evaluated for multiple myeloma. area is clean, bandage applied. no redness or tenderness of surrounding area. Labs drawn and sent to lab. pt states was recently DX with shingles, currently taking antivirals. Scabbing noted to back. Pt was assessed by ed provider. pt placed on contact precautions. pt pending lab results.

## 2018-04-27 NOTE — CHART NOTE - NSCHARTNOTEFT_GEN_A_CORE
Patient seen as new visit at hematology clinic today to rule out multiple myeloma. Suspicion for multiple myeloma is low, but bone marrow biopsy was performed given IgA monoclonal band identified on immunofixation. Lab work was sent and is notable for potassium 6.4 that is marked non hemolyzed and worsened kidney function.    -please repeat stat BMP, if potassium is normal and creatinine is stable, can likely discharge     Carloz Valentin  PGY-5, Hematology-Oncology Fellow  [phone] ([place]) [phone] ([facility])

## 2018-04-27 NOTE — ED PROVIDER NOTE - OBJECTIVE STATEMENT
87F h/o HTN and HLD sent in from hematology clinic for hyperkalemia. Pt is undergoing outpt evaluation for multiple myeloma, has bone marrow biopsy a few days ago. 87F h/o HTN and HLD sent in from hematology clinic for hyperkalemia. Pt is undergoing outpt evaluation for multiple myeloma, had bone marrow biopsy a few days ago. Pt's potassium was 6.4 on routine outpt testing (not hemolyzed) so pt sent to the ED for further eval. Pt denies c/o other than pain at biopsy site. No fevers, CP, SOB, abd pain, or vomiting.

## 2018-04-27 NOTE — ED PROVIDER NOTE - PROGRESS NOTE DETAILS
Ashish QUESADA:  Labs reviewed, no hyperkalemia here.  Cr stable at 2.6.  Pt cont to be asymptomatic without any complaints.  Will dc home with close renal follow-up.

## 2018-04-29 PROBLEM — I12.9 BENIGN HYPERTENSION WITH CKD (CHRONIC KIDNEY DISEASE) STAGE IV: Status: RESOLVED | Noted: 2018-04-29 | Resolved: 2018-04-29

## 2018-04-29 PROBLEM — N18.4 ANEMIA IN STAGE 4 CHRONIC KIDNEY DISEASE: Status: RESOLVED | Noted: 2018-04-29 | Resolved: 2018-04-29

## 2018-04-29 RX ORDER — SIMVASTATIN 40 MG/1
40 TABLET, FILM COATED ORAL
Refills: 0 | Status: ACTIVE | COMMUNITY

## 2018-04-29 RX ORDER — METOPROLOL SUCCINATE 100 MG/1
100 TABLET, EXTENDED RELEASE ORAL
Refills: 0 | Status: ACTIVE | COMMUNITY

## 2018-05-02 LAB — HEMATOPATHOLOGY REPORT: SIGNIFICANT CHANGE UP

## 2018-05-03 ENCOUNTER — RESULT REVIEW (OUTPATIENT)
Age: 83
End: 2018-05-03

## 2018-05-07 LAB — CHROM ANALY INTERPHASE BLD FISH-IMP: SIGNIFICANT CHANGE UP

## 2018-05-09 LAB — CHROM ANALY OVERALL INTERP SPEC-IMP: SIGNIFICANT CHANGE UP

## 2018-05-17 ENCOUNTER — APPOINTMENT (OUTPATIENT)
Dept: HEMATOLOGY ONCOLOGY | Facility: CLINIC | Age: 83
End: 2018-05-17

## 2018-05-17 VITALS
BODY MASS INDEX: 29.21 KG/M2 | DIASTOLIC BLOOD PRESSURE: 84 MMHG | OXYGEN SATURATION: 97 % | WEIGHT: 146.83 LBS | HEART RATE: 90 BPM | TEMPERATURE: 97.5 F | SYSTOLIC BLOOD PRESSURE: 186 MMHG | RESPIRATION RATE: 16 BRPM

## 2018-05-17 DIAGNOSIS — D47.2 MONOCLONAL GAMMOPATHY: ICD-10-CM

## 2018-05-17 RX ORDER — FUROSEMIDE 40 MG/1
40 TABLET ORAL
Refills: 0 | Status: DISCONTINUED | COMMUNITY
End: 2018-05-17

## 2018-05-17 RX ORDER — NAPROXEN 500 MG/1
TABLET ORAL
Refills: 0 | Status: DISCONTINUED | COMMUNITY
End: 2018-05-17

## 2018-05-17 RX ORDER — LOSARTAN POTASSIUM AND HYDROCHLOROTHIAZIDE 100; 12.5 MG/1; MG/1
100-12.5 TABLET, FILM COATED ORAL
Refills: 0 | Status: DISCONTINUED | COMMUNITY
End: 2018-05-17

## 2018-05-23 PROBLEM — D47.2 MONOCLONAL GAMMOPATHY: Status: ACTIVE | Noted: 2018-04-26

## 2018-05-26 ENCOUNTER — INPATIENT (INPATIENT)
Facility: HOSPITAL | Age: 83
LOS: 5 days | Discharge: HOME HEALTH | End: 2018-06-01
Attending: INTERNAL MEDICINE | Admitting: INTERNAL MEDICINE
Payer: MEDICARE

## 2018-05-26 VITALS
SYSTOLIC BLOOD PRESSURE: 198 MMHG | DIASTOLIC BLOOD PRESSURE: 101 MMHG | TEMPERATURE: 100 F | RESPIRATION RATE: 18 BRPM | OXYGEN SATURATION: 100 % | HEART RATE: 96 BPM

## 2018-05-26 DIAGNOSIS — E87.70 FLUID OVERLOAD, UNSPECIFIED: ICD-10-CM

## 2018-05-26 DIAGNOSIS — B02.29 OTHER POSTHERPETIC NERVOUS SYSTEM INVOLVEMENT: ICD-10-CM

## 2018-05-26 DIAGNOSIS — I10 ESSENTIAL (PRIMARY) HYPERTENSION: ICD-10-CM

## 2018-05-26 DIAGNOSIS — R06.02 SHORTNESS OF BREATH: ICD-10-CM

## 2018-05-26 DIAGNOSIS — E78.00 PURE HYPERCHOLESTEROLEMIA, UNSPECIFIED: ICD-10-CM

## 2018-05-26 DIAGNOSIS — N18.4 CHRONIC KIDNEY DISEASE, STAGE 4 (SEVERE): ICD-10-CM

## 2018-05-26 DIAGNOSIS — Z29.9 ENCOUNTER FOR PROPHYLACTIC MEASURES, UNSPECIFIED: ICD-10-CM

## 2018-05-26 DIAGNOSIS — Z90.710 ACQUIRED ABSENCE OF BOTH CERVIX AND UTERUS: Chronic | ICD-10-CM

## 2018-05-26 LAB
ALBUMIN SERPL ELPH-MCNC: 3.4 G/DL — SIGNIFICANT CHANGE UP (ref 3.3–5)
ALP SERPL-CCNC: 56 U/L — SIGNIFICANT CHANGE UP (ref 40–120)
ALT FLD-CCNC: 10 U/L — SIGNIFICANT CHANGE UP (ref 4–33)
APTT BLD: 34.4 SEC — SIGNIFICANT CHANGE UP (ref 27.5–37.4)
AST SERPL-CCNC: 17 U/L — SIGNIFICANT CHANGE UP (ref 4–32)
BASOPHILS # BLD AUTO: 0.07 K/UL — SIGNIFICANT CHANGE UP (ref 0–0.2)
BASOPHILS NFR BLD AUTO: 0.8 % — SIGNIFICANT CHANGE UP (ref 0–2)
BILIRUB SERPL-MCNC: < 0.2 MG/DL — LOW (ref 0.2–1.2)
BUN SERPL-MCNC: 48 MG/DL — HIGH (ref 7–23)
CALCIUM SERPL-MCNC: 8.9 MG/DL — SIGNIFICANT CHANGE UP (ref 8.4–10.5)
CHLORIDE SERPL-SCNC: 103 MMOL/L — SIGNIFICANT CHANGE UP (ref 98–107)
CK MB BLD-MCNC: 2.4 — SIGNIFICANT CHANGE UP (ref 0–2.5)
CK MB BLD-MCNC: 5.91 NG/ML — HIGH (ref 1–4.7)
CK SERPL-CCNC: 242 U/L — HIGH (ref 25–170)
CO2 SERPL-SCNC: 21 MMOL/L — LOW (ref 22–31)
CREAT SERPL-MCNC: 2.66 MG/DL — HIGH (ref 0.5–1.3)
EOSINOPHIL # BLD AUTO: 0.34 K/UL — SIGNIFICANT CHANGE UP (ref 0–0.5)
EOSINOPHIL NFR BLD AUTO: 4.1 % — SIGNIFICANT CHANGE UP (ref 0–6)
GLUCOSE SERPL-MCNC: 112 MG/DL — HIGH (ref 70–99)
HCT VFR BLD CALC: 30.2 % — LOW (ref 34.5–45)
HGB BLD-MCNC: 9.7 G/DL — LOW (ref 11.5–15.5)
IMM GRANULOCYTES # BLD AUTO: 0.03 # — SIGNIFICANT CHANGE UP
IMM GRANULOCYTES NFR BLD AUTO: 0.4 % — SIGNIFICANT CHANGE UP (ref 0–1.5)
INR BLD: 0.98 — SIGNIFICANT CHANGE UP (ref 0.88–1.17)
LYMPHOCYTES # BLD AUTO: 1.6 K/UL — SIGNIFICANT CHANGE UP (ref 1–3.3)
LYMPHOCYTES # BLD AUTO: 19.2 % — SIGNIFICANT CHANGE UP (ref 13–44)
MCHC RBC-ENTMCNC: 28 PG — SIGNIFICANT CHANGE UP (ref 27–34)
MCHC RBC-ENTMCNC: 32.1 % — SIGNIFICANT CHANGE UP (ref 32–36)
MCV RBC AUTO: 87 FL — SIGNIFICANT CHANGE UP (ref 80–100)
MONOCYTES # BLD AUTO: 1.02 K/UL — HIGH (ref 0–0.9)
MONOCYTES NFR BLD AUTO: 12.3 % — SIGNIFICANT CHANGE UP (ref 2–14)
NEUTROPHILS # BLD AUTO: 5.26 K/UL — SIGNIFICANT CHANGE UP (ref 1.8–7.4)
NEUTROPHILS NFR BLD AUTO: 63.2 % — SIGNIFICANT CHANGE UP (ref 43–77)
NRBC # FLD: 0 — SIGNIFICANT CHANGE UP
NT-PROBNP SERPL-SCNC: 8440 PG/ML — SIGNIFICANT CHANGE UP
PLATELET # BLD AUTO: 307 K/UL — SIGNIFICANT CHANGE UP (ref 150–400)
PMV BLD: 10.6 FL — SIGNIFICANT CHANGE UP (ref 7–13)
POTASSIUM SERPL-MCNC: 4.7 MMOL/L — SIGNIFICANT CHANGE UP (ref 3.5–5.3)
POTASSIUM SERPL-SCNC: 4.7 MMOL/L — SIGNIFICANT CHANGE UP (ref 3.5–5.3)
PROT SERPL-MCNC: 6.5 G/DL — SIGNIFICANT CHANGE UP (ref 6–8.3)
PROTHROM AB SERPL-ACNC: 10.9 SEC — SIGNIFICANT CHANGE UP (ref 9.8–13.1)
RBC # BLD: 3.47 M/UL — LOW (ref 3.8–5.2)
RBC # FLD: 15.5 % — HIGH (ref 10.3–14.5)
SODIUM SERPL-SCNC: 139 MMOL/L — SIGNIFICANT CHANGE UP (ref 135–145)
TROPONIN T SERPL-MCNC: 0.1 NG/ML — HIGH (ref 0–0.06)
TROPONIN T SERPL-MCNC: 0.11 NG/ML — HIGH (ref 0–0.06)
WBC # BLD: 8.32 K/UL — SIGNIFICANT CHANGE UP (ref 3.8–10.5)
WBC # FLD AUTO: 8.32 K/UL — SIGNIFICANT CHANGE UP (ref 3.8–10.5)

## 2018-05-26 PROCEDURE — 71046 X-RAY EXAM CHEST 2 VIEWS: CPT | Mod: 26

## 2018-05-26 RX ORDER — AMLODIPINE BESYLATE 2.5 MG/1
5 TABLET ORAL ONCE
Qty: 0 | Refills: 0 | Status: COMPLETED | OUTPATIENT
Start: 2018-05-26 | End: 2018-05-26

## 2018-05-26 RX ORDER — AMLODIPINE BESYLATE 2.5 MG/1
5 TABLET ORAL DAILY
Qty: 0 | Refills: 0 | Status: DISCONTINUED | OUTPATIENT
Start: 2018-05-26 | End: 2018-05-26

## 2018-05-26 RX ORDER — METOPROLOL TARTRATE 50 MG
100 TABLET ORAL DAILY
Qty: 0 | Refills: 0 | Status: DISCONTINUED | OUTPATIENT
Start: 2018-05-26 | End: 2018-06-01

## 2018-05-26 RX ORDER — HEPARIN SODIUM 5000 [USP'U]/ML
5000 INJECTION INTRAVENOUS; SUBCUTANEOUS EVERY 8 HOURS
Qty: 0 | Refills: 0 | Status: DISCONTINUED | OUTPATIENT
Start: 2018-05-26 | End: 2018-06-01

## 2018-05-26 RX ORDER — BUMETANIDE 0.25 MG/ML
1 INJECTION INTRAMUSCULAR; INTRAVENOUS EVERY 12 HOURS
Qty: 0 | Refills: 0 | Status: DISCONTINUED | OUTPATIENT
Start: 2018-05-26 | End: 2018-05-31

## 2018-05-26 RX ORDER — BUMETANIDE 0.25 MG/ML
1 INJECTION INTRAMUSCULAR; INTRAVENOUS ONCE
Qty: 0 | Refills: 0 | Status: COMPLETED | OUTPATIENT
Start: 2018-05-26 | End: 2018-05-26

## 2018-05-26 RX ORDER — ISOSORBIDE MONONITRATE 60 MG/1
120 TABLET, EXTENDED RELEASE ORAL DAILY
Qty: 0 | Refills: 0 | Status: DISCONTINUED | OUTPATIENT
Start: 2018-05-26 | End: 2018-06-01

## 2018-05-26 RX ORDER — BUMETANIDE 0.25 MG/ML
1 INJECTION INTRAMUSCULAR; INTRAVENOUS ONCE
Qty: 0 | Refills: 0 | Status: DISCONTINUED | OUTPATIENT
Start: 2018-05-26 | End: 2018-05-26

## 2018-05-26 RX ORDER — SIMVASTATIN 20 MG/1
40 TABLET, FILM COATED ORAL AT BEDTIME
Qty: 0 | Refills: 0 | Status: DISCONTINUED | OUTPATIENT
Start: 2018-05-26 | End: 2018-06-01

## 2018-05-26 RX ORDER — PANTOPRAZOLE SODIUM 20 MG/1
40 TABLET, DELAYED RELEASE ORAL
Qty: 0 | Refills: 0 | Status: DISCONTINUED | OUTPATIENT
Start: 2018-05-26 | End: 2018-06-01

## 2018-05-26 RX ADMIN — BUMETANIDE 1 MILLIGRAM(S): 0.25 INJECTION INTRAMUSCULAR; INTRAVENOUS at 18:53

## 2018-05-26 RX ADMIN — HEPARIN SODIUM 5000 UNIT(S): 5000 INJECTION INTRAVENOUS; SUBCUTANEOUS at 14:40

## 2018-05-26 RX ADMIN — ISOSORBIDE MONONITRATE 120 MILLIGRAM(S): 60 TABLET, EXTENDED RELEASE ORAL at 15:12

## 2018-05-26 RX ADMIN — AMLODIPINE BESYLATE 5 MILLIGRAM(S): 2.5 TABLET ORAL at 22:46

## 2018-05-26 RX ADMIN — BUMETANIDE 1 MILLIGRAM(S): 0.25 INJECTION INTRAMUSCULAR; INTRAVENOUS at 12:00

## 2018-05-26 RX ADMIN — HEPARIN SODIUM 5000 UNIT(S): 5000 INJECTION INTRAVENOUS; SUBCUTANEOUS at 21:41

## 2018-05-26 RX ADMIN — Medication 75 MILLIGRAM(S): at 18:53

## 2018-05-26 RX ADMIN — SIMVASTATIN 40 MILLIGRAM(S): 20 TABLET, FILM COATED ORAL at 21:41

## 2018-05-26 NOTE — ED PROVIDER NOTE - MEDICAL DECISION MAKING DETAILS
86yo F with hx of CKD4, CHF presenting with increased swelling in legs. took bumex; leg swelling. will r/o DVT since L leg more swollen than R, R leg more painful; kidney failure vs heart failure exacerbation. CBC, CMP, BNP, CXR, reassess, admit to hospitalist for diuresis. bumex 1mg IV for diuresis

## 2018-05-26 NOTE — ED ADULT NURSE NOTE - CHPI ED SYMPTOMS NEG
no syncope/no back pain/no chest pain/no dizziness/no nausea/no chills/no fever/no vomiting/no diaphoresis

## 2018-05-26 NOTE — CHART NOTE - NSCHARTNOTEFT_GEN_A_CORE
Called by RN pt had uncontrolled HTN - pt was asymp, no headaches, blurry vision,  Patient on Toprol  mg PO qd, Bumex 1 mg IVP q 12 hrs, Imdur  mg PO qd, & received Norvasc 5 mg PO x 1 dose (pt's home dose)   - Additional Norvasc 5 mg PO x 1 given for blood pressure control - repeat BP was improved  Patient remains asymp will continue to monitor       Vital Signs Last 24 Hrs  T(C): 36.4 (26 May 2018 20:23), Max: 37.5 (26 May 2018 10:35)  T(F): 97.6 (26 May 2018 20:23), Max: 99.5 (26 May 2018 10:35)  HR: 72 (26 May 2018 20:23) (68 - 96)  BP: 168/70 (26 May 2018 21:27) (162/88 - 198/101)  RR: 18 (26 May 2018 20:23) (17 - 18)  SpO2: 100% (26 May 2018 20:23) (96% - 100%)    MEDICATIONS  (STANDING):  buMETAnide Injectable 1 milliGRAM(s) IV Push every 12 hours  heparin  Injectable 5000 Unit(s) SubCutaneous every 8 hours  isosorbide   mononitrate ER Tablet (IMDUR) 120 milliGRAM(s) Oral daily  metoprolol succinate  milliGRAM(s) Oral daily  pantoprazole    Tablet 40 milliGRAM(s) Oral before breakfast  pregabalin 75 milliGRAM(s) Oral two times a day  simvastatin 40 milliGRAM(s) Oral at bedtime    MEDICATIONS  (PRN):

## 2018-05-26 NOTE — H&P ADULT - NSHPLABSRESULTS_GEN_ALL_CORE
9.7    8.32  )-----------( 307      ( 26 May 2018 11:14 )             30.2     05-26    139  |  103  |  48<H>  ----------------------------<  112<H>  4.7   |  21<L>  |  2.66<H>    Ca    8.9      26 May 2018 11:14    TPro  6.5  /  Alb  3.4  /  TBili  < 0.2<L>  /  DBili  x   /  AST  17  /  ALT  10  /  AlkPhos  56  05-26    CARDIAC MARKERS ( 26 May 2018 11:14 )  x     / 0.11 ng/mL / x     / x     / x          EKG NSR @ 97 with LVH and repol  BNP 8440  CXR Bilateral small effusion with passive atelectasis

## 2018-05-26 NOTE — H&P ADULT - NSHPSOCIALHISTORY_GEN_ALL_CORE
, lives with son and daughter in law, uses a walker at home  Has "city-pap?" daughter helps her at home  never smoked, no etoh use

## 2018-05-26 NOTE — ED ADULT TRIAGE NOTE - CHIEF COMPLAINT QUOTE
Pt. c/o SOB and b/l LE edema with pain x 3 days. Difficulty today ambulating with walker. Respirations are even and unlabored. Takes Bumex. Denies cp or dizziness. PMHx HTN

## 2018-05-26 NOTE — ED ADULT NURSE NOTE - OBJECTIVE STATEMENT
Patient and family reports increasing SOB on exertion for a couple of days. Patient reports that SOB was exacerbated today, and therefore patient came to the ED. SOB worsened on exertion, relieved when at rest and sitting up. Patient has non-pitting edema B/L extremities. Patient is taking diuretics as prescribed, hx of HTN, no hx of CHF. Patient also has non-productive cough. Lungs on auscultation are clear bilaterally. Patient denies any CP, dizziness of syncope. Patient's EKG shows NSR, pulse ox >96%. Patient placed on continuous monitoring.

## 2018-05-26 NOTE — H&P ADULT - PROBLEM SELECTOR PLAN 1
Admit to tele  Serial EKG and CE to r/o ACS as cause of swelling  LE Duplex r/o DVT  TTE to eval LVEF to r/o CHF as cause  Nephrology c/s called to eval if fluid overload 2/2 progression of CKD  Will hold norvasc for now given increasing LE edema as may exacerbate  IV Bumex 1mg BID

## 2018-05-26 NOTE — ED PROVIDER NOTE - PROGRESS NOTE DETAILS
Naveen Lei MD PGY3: d/w Dr. Villagomez, no a/c for now in setting of positive trop- likely 2/2 ckd, would like patient admitted to Dr. Moreno.

## 2018-05-26 NOTE — H&P ADULT - RS GEN PE MLT RESP DETAILS PC
breath sounds equal/respirations non-labored/good air movement/clear to auscultation bilaterally/no rales/no wheezes/no rhonchi/airway patent

## 2018-05-26 NOTE — H&P ADULT - NSHPREVIEWOFSYSTEMS_GEN_ALL_CORE
Denies chest pain, abd pain, palpitations, fever, dysuria, hematuria, diarrhea, brbpr, melena, vertigo, dizziness, headache, tinnitus, hearting difficulty, syncope, LOC, seizure activity. no recent travel, no sick contact.

## 2018-05-26 NOTE — H&P ADULT - HISTORY OF PRESENT ILLNESS
87 year old female pmh of CKD stage 4, HTN, HLD, shingles 4/2018,  presents with lower extremity swelling and shortness of breath. One week ago saw Dr. Villagomez for leg swelling, intermittently better and worse over course of the week, he continued her water pill and elevate her legs. Since last night swelling worse and making hard to walk. states legs are heavy and notes VIRGEN with small amount of exertion. occasionally has chills.     Denies chest pain, abd pain, palpitations, fever, dysuria, hematuria, diarrhea, brbpr, melena, vertigo, dizziness, headache, tinnitus, hearting difficulty, syncope, LOC, seizure activity. no recent travel, no sick contact.

## 2018-05-26 NOTE — ED PROVIDER NOTE - ATTENDING CONTRIBUTION TO CARE
agree with resident note  86yo F with hx of CKD stage 4, HTN , HLD, CHF (EF 67%) presents today with LE edema for 1 week, today presenting with SOB. Has been taking bumex but states not working in terms of ridding her of LE edema    PE: well appearing; decreased BS at bases R>L; s1 s2 no m/r/g abd soft/NT/ND ext: 2+ pitting edema    Imp: spoke with pt PCP who would like her admitted for fluid overload

## 2018-05-26 NOTE — ED PROVIDER NOTE - OBJECTIVE STATEMENT
88yo F with hx of CKD stage 4, HTN , HLD, CHF (EF 67%) presents today with LE edema for 1 week, today presenting with SOB. increasing swelling for 1 week worsening, now limiting her ability to walk due to the heaviness of her legs. no fevers, chills. +cough intermittently.     PCP: Ryan Villagomez  Nephrologist: Ignacio

## 2018-05-27 DIAGNOSIS — N17.9 ACUTE KIDNEY FAILURE, UNSPECIFIED: ICD-10-CM

## 2018-05-27 DIAGNOSIS — R60.0 LOCALIZED EDEMA: ICD-10-CM

## 2018-05-27 DIAGNOSIS — R80.9 PROTEINURIA, UNSPECIFIED: ICD-10-CM

## 2018-05-27 DIAGNOSIS — D89.2 HYPERGAMMAGLOBULINEMIA, UNSPECIFIED: ICD-10-CM

## 2018-05-27 DIAGNOSIS — N18.4 CHRONIC KIDNEY DISEASE, STAGE 4 (SEVERE): ICD-10-CM

## 2018-05-27 LAB
BLD GP AB SCN SERPL QL: NEGATIVE — SIGNIFICANT CHANGE UP
BUN SERPL-MCNC: 48 MG/DL — HIGH (ref 7–23)
CALCIUM SERPL-MCNC: 8.9 MG/DL — SIGNIFICANT CHANGE UP (ref 8.4–10.5)
CHLORIDE SERPL-SCNC: 103 MMOL/L — SIGNIFICANT CHANGE UP (ref 98–107)
CHOLEST SERPL-MCNC: 176 MG/DL — SIGNIFICANT CHANGE UP (ref 120–199)
CO2 SERPL-SCNC: 20 MMOL/L — LOW (ref 22–31)
CREAT SERPL-MCNC: 2.62 MG/DL — HIGH (ref 0.5–1.3)
GLUCOSE SERPL-MCNC: 93 MG/DL — SIGNIFICANT CHANGE UP (ref 70–99)
HBA1C BLD-MCNC: 6.3 % — HIGH (ref 4–5.6)
HCT VFR BLD CALC: 27.6 % — LOW (ref 34.5–45)
HCT VFR BLD CALC: 28.8 % — LOW (ref 34.5–45)
HDLC SERPL-MCNC: 50 MG/DL — SIGNIFICANT CHANGE UP (ref 45–65)
HGB BLD-MCNC: 8.6 G/DL — LOW (ref 11.5–15.5)
HGB BLD-MCNC: 9.2 G/DL — LOW (ref 11.5–15.5)
LIPID PNL WITH DIRECT LDL SERPL: 99 MG/DL — SIGNIFICANT CHANGE UP
MAGNESIUM SERPL-MCNC: 1.9 MG/DL — SIGNIFICANT CHANGE UP (ref 1.6–2.6)
MCHC RBC-ENTMCNC: 27.2 PG — SIGNIFICANT CHANGE UP (ref 27–34)
MCHC RBC-ENTMCNC: 28.1 PG — SIGNIFICANT CHANGE UP (ref 27–34)
MCHC RBC-ENTMCNC: 31.2 % — LOW (ref 32–36)
MCHC RBC-ENTMCNC: 31.9 % — LOW (ref 32–36)
MCV RBC AUTO: 87.3 FL — SIGNIFICANT CHANGE UP (ref 80–100)
MCV RBC AUTO: 88.1 FL — SIGNIFICANT CHANGE UP (ref 80–100)
NRBC # FLD: 0 — SIGNIFICANT CHANGE UP
NRBC # FLD: 0 — SIGNIFICANT CHANGE UP
OB PNL STL: NEGATIVE — SIGNIFICANT CHANGE UP
PLATELET # BLD AUTO: 230 K/UL — SIGNIFICANT CHANGE UP (ref 150–400)
PLATELET # BLD AUTO: 279 K/UL — SIGNIFICANT CHANGE UP (ref 150–400)
PMV BLD: 10.7 FL — SIGNIFICANT CHANGE UP (ref 7–13)
PMV BLD: 11.3 FL — SIGNIFICANT CHANGE UP (ref 7–13)
POTASSIUM SERPL-MCNC: 4.3 MMOL/L — SIGNIFICANT CHANGE UP (ref 3.5–5.3)
POTASSIUM SERPL-SCNC: 4.3 MMOL/L — SIGNIFICANT CHANGE UP (ref 3.5–5.3)
RBC # BLD: 3.16 M/UL — LOW (ref 3.8–5.2)
RBC # BLD: 3.27 M/UL — LOW (ref 3.8–5.2)
RBC # FLD: 15.3 % — HIGH (ref 10.3–14.5)
RBC # FLD: 15.4 % — HIGH (ref 10.3–14.5)
RH IG SCN BLD-IMP: POSITIVE — SIGNIFICANT CHANGE UP
SODIUM SERPL-SCNC: 137 MMOL/L — SIGNIFICANT CHANGE UP (ref 135–145)
TRIGL SERPL-MCNC: 192 MG/DL — HIGH (ref 10–149)
WBC # BLD: 5.91 K/UL — SIGNIFICANT CHANGE UP (ref 3.8–10.5)
WBC # BLD: 6.25 K/UL — SIGNIFICANT CHANGE UP (ref 3.8–10.5)
WBC # FLD AUTO: 5.91 K/UL — SIGNIFICANT CHANGE UP (ref 3.8–10.5)
WBC # FLD AUTO: 6.25 K/UL — SIGNIFICANT CHANGE UP (ref 3.8–10.5)

## 2018-05-27 RX ADMIN — Medication 75 MILLIGRAM(S): at 18:01

## 2018-05-27 RX ADMIN — Medication 75 MILLIGRAM(S): at 05:41

## 2018-05-27 RX ADMIN — HEPARIN SODIUM 5000 UNIT(S): 5000 INJECTION INTRAVENOUS; SUBCUTANEOUS at 22:06

## 2018-05-27 RX ADMIN — SIMVASTATIN 40 MILLIGRAM(S): 20 TABLET, FILM COATED ORAL at 22:06

## 2018-05-27 RX ADMIN — PANTOPRAZOLE SODIUM 40 MILLIGRAM(S): 20 TABLET, DELAYED RELEASE ORAL at 05:42

## 2018-05-27 RX ADMIN — Medication 100 MILLIGRAM(S): at 05:41

## 2018-05-27 RX ADMIN — HEPARIN SODIUM 5000 UNIT(S): 5000 INJECTION INTRAVENOUS; SUBCUTANEOUS at 13:10

## 2018-05-27 RX ADMIN — ISOSORBIDE MONONITRATE 120 MILLIGRAM(S): 60 TABLET, EXTENDED RELEASE ORAL at 11:36

## 2018-05-27 RX ADMIN — BUMETANIDE 1 MILLIGRAM(S): 0.25 INJECTION INTRAMUSCULAR; INTRAVENOUS at 18:01

## 2018-05-27 RX ADMIN — HEPARIN SODIUM 5000 UNIT(S): 5000 INJECTION INTRAVENOUS; SUBCUTANEOUS at 05:41

## 2018-05-27 RX ADMIN — BUMETANIDE 1 MILLIGRAM(S): 0.25 INJECTION INTRAMUSCULAR; INTRAVENOUS at 05:41

## 2018-05-27 NOTE — CONSULT NOTE ADULT - SUBJECTIVE AND OBJECTIVE BOX
CHIEF COMPLAINT: Leg Swelling    HISTORY OF PRESENT ILLNESS:  This is an 87 year old woman with CKD stage 4, HTN, HLD, shingles 4/2018,  presents with lower extremity swelling and shortness of breath. The week prior to admission, Ms. Villagomez saw her PMD for leg swelling, intermittently better and worse over course of the week. He continued her water pill and told her to elevate her legs. Edema has gotten progressively worse, now affecting her ambulation.    Allergies  No Known Allergies    MEDICATIONS:  buMETAnide Injectable 1 milliGRAM(s) IV Push every 12 hours  heparin  Injectable 5000 Unit(s) SubCutaneous every 8 hours  isosorbide   mononitrate ER Tablet (IMDUR) 120 milliGRAM(s) Oral daily  metoprolol succinate  milliGRAM(s) Oral daily  pregabalin 75 milliGRAM(s) Oral two times a day  pantoprazole    Tablet 40 milliGRAM(s) Oral before breakfast  simvastatin 40 milliGRAM(s) Oral at bedtime        PAST MEDICAL & SURGICAL HISTORY:  CKD (chronic kidney disease)  High cholesterol  Hypertension  History of hysterectomy      FAMILY HISTORY:  HTN    SOCIAL HISTORY:    Non-smoker      REVIEW OF SYSTEMS:  See HPI, otherwise complete 10 point review of systems negative      PHYSICAL EXAM:  T(C): 36.8 (05-27-18 @ 04:59), Max: 37.5 (05-26-18 @ 10:35)  HR: 81 (05-27-18 @ 04:59) (68 - 96)  BP: 147/71 (05-27-18 @ 04:59) (147/71 - 198/101)  RR: 18 (05-27-18 @ 04:59) (17 - 18)  SpO2: 99% (05-27-18 @ 04:59) (96% - 100%)  Wt(kg): --  I&O's Summary    26 May 2018 07:01  -  27 May 2018 07:00  --------------------------------------------------------  IN: 600 mL / OUT: 400 mL / NET: 200 mL        Appearance: No Acute Distress	  HEENT:  Normal oral mucosa, PERRL, EOMI	  Cardiovascular: Normal S1 S2, No JVD, No murmurs/rubs/gallops  Respiratory: Lungs clear to auscultation bilaterally  Gastrointestinal:  Soft, Non-tender, + BS	  Skin: No rashes, No ecchymoses, No cyanosis	  Neurologic: Non-focal  Extremities: No clubbing, cyanosis or edema  Vascular: Peripheral pulses palpable 2+ bilaterally  Psychiatry: A & O x 3, Mood & affect appropriate    Laboratory Data:	 	    CBC Full  -  ( 27 May 2018 06:40 )  WBC Count : 5.91 K/uL  Hemoglobin : 8.6 g/dL  Hematocrit : 27.6 %  Platelet Count - Automated : 230 K/uL  Mean Cell Volume : 87.3 fL  Mean Cell Hemoglobin : 27.2 pg  Mean Cell Hemoglobin Concentration : 31.2 %  Auto Neutrophil # : x  Auto Lymphocyte # : x  Auto Monocyte # : x  Auto Eosinophil # : x  Auto Basophil # : x  Auto Neutrophil % : x  Auto Lymphocyte % : x  Auto Monocyte % : x  Auto Eosinophil % : x  Auto Basophil % : x    05-27    137  |  103  |  48<H>  ----------------------------<  93  4.3   |  20<L>  |  2.62<H>  05-26    139  |  103  |  48<H>  ----------------------------<  112<H>  4.7   |  21<L>  |  2.66<H>    Ca    8.9      27 May 2018 06:40  Ca    8.9      26 May 2018 11:14  Mg     1.9     05-27    TPro  6.5  /  Alb  3.4  /  TBili  < 0.2<L>  /  DBili  x   /  AST  17  /  ALT  10  /  AlkPhos  56  05-26      proBNP: Serum Pro-Brain Natriuretic Peptide: 8440 pg/mL (05-26 @ 11:14)    Lipid Profile:   HgA1c: Hemoglobin A1C, Whole Blood: 6.3 % (05-27 @ 06:40)    Interpretation of Telemetry: Sinus; occasional PVDs	    ECG:  	Normal Sinus at 97 bpm.    Assessment: 87 year old woman with HTN, CKD IV, and HLD presents with acute diastolic CHF.    Plan of Care:    #Acute on chronic diastolic CHF-  Likely secondary to progressive renal disease.   I agree with IV bumex at this time while trending creatinine.  If UOP is suboptimal, may give a dose of metolazone 5 mg.  TTE from 6 months ago reviewed- preserved LVEF with no significant valvulopathy.    #HTN urgency-  BP better controlled this morning.   If BP elevated today, start hydralazine 25 mg TID.     90 minutes spent on total encounter; more than 50% of the visit was spent counseling and/or coordinating care by the attending physician.   	  Justin Vick MD [facility]  Cardiovascular Diseases  [phone] CHIEF COMPLAINT: Leg Swelling    HISTORY OF PRESENT ILLNESS:  This is an 87 year old woman with CKD stage 4, HTN, HLD, shingles 4/2018,  presents with lower extremity swelling and shortness of breath. The week prior to admission, Ms. Villagomez saw her PMD for leg swelling, intermittently better and worse over course of the week. He continued her water pill and told her to elevate her legs. Edema has gotten progressively worse, now affecting her ambulation.    Allergies  No Known Allergies    MEDICATIONS:  buMETAnide Injectable 1 milliGRAM(s) IV Push every 12 hours  heparin  Injectable 5000 Unit(s) SubCutaneous every 8 hours  isosorbide   mononitrate ER Tablet (IMDUR) 120 milliGRAM(s) Oral daily  metoprolol succinate  milliGRAM(s) Oral daily  pregabalin 75 milliGRAM(s) Oral two times a day  pantoprazole    Tablet 40 milliGRAM(s) Oral before breakfast  simvastatin 40 milliGRAM(s) Oral at bedtime        PAST MEDICAL & SURGICAL HISTORY:  CKD (chronic kidney disease)  High cholesterol  Hypertension  History of hysterectomy      FAMILY HISTORY:  HTN    SOCIAL HISTORY:    Non-smoker      REVIEW OF SYSTEMS:  See HPI, otherwise complete 10 point review of systems negative      PHYSICAL EXAM:  T(C): 36.8 (05-27-18 @ 04:59), Max: 37.5 (05-26-18 @ 10:35)  HR: 81 (05-27-18 @ 04:59) (68 - 96)  BP: 147/71 (05-27-18 @ 04:59) (147/71 - 198/101)  RR: 18 (05-27-18 @ 04:59) (17 - 18)  SpO2: 99% (05-27-18 @ 04:59) (96% - 100%)  Wt(kg): --  I&O's Summary    26 May 2018 07:01  -  27 May 2018 07:00  --------------------------------------------------------  IN: 600 mL / OUT: 400 mL / NET: 200 mL        Appearance: No Acute Distress	  HEENT:  Normal oral mucosa, PERRL, EOMI	  Cardiovascular: Normal S1 S2, No JVD, No murmurs/rubs/gallops  Respiratory: Lungs clear to auscultation bilaterally  Gastrointestinal:  Soft, Non-tender, + BS	  Skin: No rashes, No ecchymoses, No cyanosis	  Neurologic: Non-focal  Extremities: No clubbing, cyanosis or edema  Vascular: Peripheral pulses palpable 2+ bilaterally  Psychiatry: A & O x 3, Mood & affect appropriate    Laboratory Data:	 	    CBC Full  -  ( 27 May 2018 06:40 )  WBC Count : 5.91 K/uL  Hemoglobin : 8.6 g/dL  Hematocrit : 27.6 %  Platelet Count - Automated : 230 K/uL  Mean Cell Volume : 87.3 fL  Mean Cell Hemoglobin : 27.2 pg  Mean Cell Hemoglobin Concentration : 31.2 %  Auto Neutrophil # : x  Auto Lymphocyte # : x  Auto Monocyte # : x  Auto Eosinophil # : x  Auto Basophil # : x  Auto Neutrophil % : x  Auto Lymphocyte % : x  Auto Monocyte % : x  Auto Eosinophil % : x  Auto Basophil % : x    05-27    137  |  103  |  48<H>  ----------------------------<  93  4.3   |  20<L>  |  2.62<H>  05-26    139  |  103  |  48<H>  ----------------------------<  112<H>  4.7   |  21<L>  |  2.66<H>    Ca    8.9      27 May 2018 06:40  Ca    8.9      26 May 2018 11:14  Mg     1.9     05-27    TPro  6.5  /  Alb  3.4  /  TBili  < 0.2<L>  /  DBili  x   /  AST  17  /  ALT  10  /  AlkPhos  56  05-26      proBNP: Serum Pro-Brain Natriuretic Peptide: 8440 pg/mL (05-26 @ 11:14)    Lipid Profile:   HgA1c: Hemoglobin A1C, Whole Blood: 6.3 % (05-27 @ 06:40)    Interpretation of Telemetry: Sinus; occasional PVDs	    ECG:  	Normal Sinus at 97 bpm.    Assessment: 87 year old woman with HTN, CKD IV, and HLD presents with acute diastolic CHF.    Plan of Care:    #Acute on chronic diastolic CHF-  Likely secondary to progressive renal disease.   I agree with IV bumex at this time while trending creatinine.  According to patient and nurse, urine output is increased.  TTE from 6 months ago reviewed- preserved LVEF with no significant valvulopathy.  No ischemic evaluation in the setting of advanced CKD.     #HTN urgency-  BP better controlled this morning.   If BP elevated today, start hydralazine 25 mg TID.     90 minutes spent on total encounter; more than 50% of the visit was spent counseling and/or coordinating care by the attending physician.   	  Justin Vick MD [facility]  Cardiovascular Diseases  [phone]

## 2018-05-27 NOTE — CONSULT NOTE ADULT - SUBJECTIVE AND OBJECTIVE BOX
[facility] NEPHROLOGY- CONSULTATION NOTE     # [id number]    87 year old male h/o CKD-4 (baseline  creatinine ~ 2.3) secondary to HTN and chronic NSAID use, paraproteinemia who presented to the hospital with SOB and worsening LE edema. She had been on bumex 1mg bid, but was noncompliant with fluid and fluid restriction. Pt is now on bumex 1mg iv q12h and feels better today.      Pt has a paraproteinemia with a negative bone marrow biopsy.  Pt has not yet had a kidney biopsy.  She continues to have nephrotic range proteinuria (TP/CR 12 on last outpatient labs last month).      No ACEi/ARBs/NSAIDs/IV Contrast.      PAST MEDICAL & SURGICAL HISTORY:  CKD (chronic kidney disease) stage 4  paraproteinemia  High cholesterol  Hypertension  History of hysterectomy    No Known Allergies    Home Medications Reviewed  Hospital Medications:   MEDICATIONS  (STANDING):  buMETAnide Injectable 1 milliGRAM(s) IV Push every 12 hours  heparin  Injectable 5000 Unit(s) SubCutaneous every 8 hours  isosorbide   mononitrate ER Tablet (IMDUR) 120 milliGRAM(s) Oral daily  metoprolol succinate  milliGRAM(s) Oral daily  pantoprazole    Tablet 40 milliGRAM(s) Oral before breakfast  pregabalin 75 milliGRAM(s) Oral two times a day  simvastatin 40 milliGRAM(s) Oral at bedtime    SOCIAL HISTORY:  Denies ETOh,Smoking,   FAMILY HISTORY:    REVIEW OF SYSTEMS:  CONSTITUTIONAL: No weakness, fevers or chills  EYES/ENT: No visual changes;  No vertigo or throat pain   NECK: No pain or stiffness  RESPIRATORY: No cough, wheezing, hemoptysis; Improving shortness of breath  CARDIOVASCULAR: No chest pain or palpitations.  GASTROINTESTINAL: No abdominal or epigastric pain. No nausea, vomiting, or hematemesis; No diarrhea or constipation. No melena or hematochezia.  GENITOURINARY: No dysuria, frequency, foamy urine, urinary urgency, incontinence or hematuria  NEUROLOGICAL: No numbness or weakness  SKIN: No itching, burning, rashes, or lesions   VASCULAR: + bilateral lower extremity edema.   All other review of systems is negative unless indicated above.    VITALS:  T(F): 97.1 (05-27-18 @ 11:35), Max: 98.3 (05-27-18 @ 04:59)  HR: 68 (05-27-18 @ 11:35)  BP: 154/71 (05-27-18 @ 11:35)  RR: 18 (05-27-18 @ 11:35)  SpO2: 98% (05-27-18 @ 11:35)  Wt(kg): --    05-26 @ 07:01  -  05-27 @ 07:00  --------------------------------------------------------  IN: 600 mL / OUT: 400 mL / NET: 200 mL    05-27 @ 07:01  -  05-27 @ 12:58  --------------------------------------------------------  IN: 120 mL / OUT: 600 mL / NET: -480 mL        PHYSICAL EXAM:  Constitutional: NAD  HEENT: anicteric sclera, oropharynx clear, MMM  Neck: No JVD  Respiratory: CTAB, no wheezes, rales or rhonchi  Cardiovascular: S1, S2, RRR  Gastrointestinal: BS+, soft, NT/ND  Extremities: No cyanosis or clubbing. No peripheral edema  Neurological: A/O x 3, no focal deficits  Psychiatric: Normal mood, normal affect  : No CVA tenderness. No lynn.   Skin: No rashes  Vascular Access:    LABS:  05-27    137  |  103  |  48<H>  ----------------------------<  93  4.3   |  20<L>  |  2.62<H>    Ca    8.9      27 May 2018 06:40  Mg     1.9     05-27    TPro  6.5  /  Alb  3.4  /  TBili  < 0.2<L>  /  DBili      /  AST  17  /  ALT  10  /  AlkPhos  56  05-26    Creatinine Trend: 2.62 <--, 2.66 <--                        8.6    5.91  )-----------( 230      ( 27 May 2018 06:40 )             27.6     Urine Studies:      RADIOLOGY & ADDITIONAL STUDIES:  < from: Xray Chest 2 Views PA/Lat (05.26.18 @ 12:51) >    EXAM:  XR CHEST PA LAT 2V        PROCEDURE DATE:  May 26 2018         INTERPRETATION:  CLINICAL INFORMATION: Shortness of breath. Chronic   kidney disease.    TECHNIQUE: PA and lateral views of the chest from 5/26/2018 12:51 PM.    COMPARISON: Chest radiograph 11/28/2017.    FINDINGS:     Small pleural effusions with adjacent passive atelectasis bilaterally.    The cardiomediastinal silhouette cannot be accurately assessed on the   current projection.    The visualized osseous structures are unremarkable for age.    IMPRESSION:    Small pleural effusions with adjacent passive atelectasis bilaterally.              CHRISTINA PIRES M.D., RADIOLOGY RESIDENT  This document has been electronically signed.  MILLI IRAHETA M.D.; ATTENDING RADIOLOGIST  This document has been electronically signed. May 27 2018  6:31AM                  < end of copied text >

## 2018-05-27 NOTE — PROGRESS NOTE ADULT - SUBJECTIVE AND OBJECTIVE BOX
Subjective : Pt lying in bed comfortable, not in distress, denies any chest pain or SOB  	  MEDICATIONS  (STANDING):  buMETAnide Injectable 1 milliGRAM(s) IV Push every 12 hours  heparin  Injectable 5000 Unit(s) SubCutaneous every 8 hours  isosorbide   mononitrate ER Tablet (IMDUR) 120 milliGRAM(s) Oral daily  metoprolol succinate  milliGRAM(s) Oral daily  pantoprazole    Tablet 40 milliGRAM(s) Oral before breakfast  pregabalin 75 milliGRAM(s) Oral two times a day  simvastatin 40 milliGRAM(s) Oral at bedtime    MEDICATIONS  (PRN):    Vital Signs Last 24 Hrs  T(C): 36.2 (27 May 2018 11:35), Max: 36.8 (27 May 2018 04:59)  T(F): 97.1 (27 May 2018 11:35), Max: 98.3 (27 May 2018 04:59)  HR: 79 (27 May 2018 17:53) (30 - 81)  BP: 164/88 (27 May 2018 17:53) (147/71 - 172/79)  BP(mean): --  RR: 18 (27 May 2018 11:35) (17 - 18)  SpO2: 98% (27 May 2018 11:35) (98% - 99%)    Constitutional: No fever, fatigue  Skin: No rash.  Eyes: No recent vision problems or eye pain.  ENT: No congestion, ear pain, or sore throat.  Cardiovascular: No chest pain or palpation.  Respiratory: No cough, shortness of breath, congestion, or wheezing.  Gastrointestinal: No abdominal pain, nausea, vomiting, or diarrhea.  Genitourinary: No dysuria.  Musculoskeletal: No joint swelling.  Neurologic: No headache.      Appearance: Normal	  HEENT:   Normal oral mucosa, PERRL, EOMI	  Cardiovascular: Normal S1 S2, No JVD, No murmurs, No edema  Respiratory: Lungs clear to auscultation	  Gastrointestinal:  Soft, Non-tender, + BS	  Extremities: Normal range of motion, No clubbing, cyanosis or edema    LABS:  05-27    137  |  103  |  48<H>  ----------------------------<  93  4.3   |  20<L>  |  2.62<H>    Ca    8.9      27 May 2018 06:40  Mg     1.9     05-27    TPro  6.5  /  Alb  3.4  /  TBili  < 0.2<L>  /  DBili      /  AST  17  /  ALT  10  /  AlkPhos  56  05-26    Creatinine Trend: 2.62 <--, 2.66 <--                        9.2    6.25  )-----------( 279      ( 27 May 2018 18:07 )             28.8     Urine Studies:      CARDIAC MARKERS ( 26 May 2018 19:34 )  x     / 0.10 ng/mL / 242 u/L / 5.91 ng/mL / x      CARDIAC MARKERS ( 26 May 2018 11:14 )  x     / 0.11 ng/mL / x     / x     / x            LIVER FUNCTIONS - ( 26 May 2018 11:14 )  Alb: 3.4 g/dL / Pro: 6.5 g/dL / ALK PHOS: 56 u/L / ALT: 10 u/L / AST: 17 u/L / GGT: x           PT/INR - ( 26 May 2018 13:35 )   PT: 10.9 SEC;   INR: 0.98          PTT - ( 26 May 2018 13:35 )  PTT:34.4 SEC

## 2018-05-27 NOTE — CONSULT NOTE ADULT - ASSESSMENT
87 year-old woman with SHY, hemodynamically-meidated in the setting of volume overload on diuretics.   CKD 4, secondary to HTN and chronic NSAID use, with volume overload/LE edema.  Paraproteinemia with severe proteinuria.  Only mild hypoalbuminemia.  This still likely represents a nephrotic process.    Plan for diuresis until stable.  At that point, would like to do a kidney biopsy. I discussed with patient re:  # [id number] and she agrees.

## 2018-05-27 NOTE — CONSULT NOTE ADULT - PROBLEM SELECTOR RECOMMENDATION 4
Severe proteinuria, certainly nephrotic range, and probably due to a nephrotic process.  Recommend kidney biopsy.

## 2018-05-27 NOTE — CONSULT NOTE ADULT - PROBLEM SELECTOR RECOMMENDATION 2
CKD Stage  4: secondary to HTN and chronic NSAID use  Renal function stable. Avoid nephrotoxins/ RCA/ NSAIDs. Monitor BMP.

## 2018-05-27 NOTE — CONSULT NOTE ADULT - ATTENDING COMMENTS
[facility] NEPHROLOGY  Bubba Mercado M.D.  Reagan Pierre D.O.  Kristin Martinez M.D.  Paola Monique, MSN, ANP-C    Telephone: [phone]  Facsimile: [phone]    [address] [place] [address]

## 2018-05-28 DIAGNOSIS — I15.1 HYPERTENSION SECONDARY TO OTHER RENAL DISORDERS: ICD-10-CM

## 2018-05-28 LAB
BUN SERPL-MCNC: 47 MG/DL — HIGH (ref 7–23)
CALCIUM SERPL-MCNC: 8.9 MG/DL — SIGNIFICANT CHANGE UP (ref 8.4–10.5)
CHLORIDE SERPL-SCNC: 103 MMOL/L — SIGNIFICANT CHANGE UP (ref 98–107)
CO2 SERPL-SCNC: 24 MMOL/L — SIGNIFICANT CHANGE UP (ref 22–31)
CREAT SERPL-MCNC: 2.67 MG/DL — HIGH (ref 0.5–1.3)
GLUCOSE SERPL-MCNC: 100 MG/DL — HIGH (ref 70–99)
HCT VFR BLD CALC: 29.3 % — LOW (ref 34.5–45)
HGB BLD-MCNC: 9.2 G/DL — LOW (ref 11.5–15.5)
MCHC RBC-ENTMCNC: 27.2 PG — SIGNIFICANT CHANGE UP (ref 27–34)
MCHC RBC-ENTMCNC: 31.4 % — LOW (ref 32–36)
MCV RBC AUTO: 86.7 FL — SIGNIFICANT CHANGE UP (ref 80–100)
NRBC # FLD: 0 — SIGNIFICANT CHANGE UP
PLATELET # BLD AUTO: 276 K/UL — SIGNIFICANT CHANGE UP (ref 150–400)
PMV BLD: 11.4 FL — SIGNIFICANT CHANGE UP (ref 7–13)
POTASSIUM SERPL-MCNC: 4.5 MMOL/L — SIGNIFICANT CHANGE UP (ref 3.5–5.3)
POTASSIUM SERPL-SCNC: 4.5 MMOL/L — SIGNIFICANT CHANGE UP (ref 3.5–5.3)
RBC # BLD: 3.38 M/UL — LOW (ref 3.8–5.2)
RBC # FLD: 15.2 % — HIGH (ref 10.3–14.5)
SODIUM SERPL-SCNC: 139 MMOL/L — SIGNIFICANT CHANGE UP (ref 135–145)
WBC # BLD: 7 K/UL — SIGNIFICANT CHANGE UP (ref 3.8–10.5)
WBC # FLD AUTO: 7 K/UL — SIGNIFICANT CHANGE UP (ref 3.8–10.5)

## 2018-05-28 RX ORDER — HYDRALAZINE HCL 50 MG
25 TABLET ORAL EVERY 8 HOURS
Qty: 0 | Refills: 0 | Status: DISCONTINUED | OUTPATIENT
Start: 2018-05-28 | End: 2018-05-29

## 2018-05-28 RX ADMIN — Medication 25 MILLIGRAM(S): at 14:48

## 2018-05-28 RX ADMIN — BUMETANIDE 1 MILLIGRAM(S): 0.25 INJECTION INTRAMUSCULAR; INTRAVENOUS at 05:42

## 2018-05-28 RX ADMIN — HEPARIN SODIUM 5000 UNIT(S): 5000 INJECTION INTRAVENOUS; SUBCUTANEOUS at 14:48

## 2018-05-28 RX ADMIN — PANTOPRAZOLE SODIUM 40 MILLIGRAM(S): 20 TABLET, DELAYED RELEASE ORAL at 05:42

## 2018-05-28 RX ADMIN — HEPARIN SODIUM 5000 UNIT(S): 5000 INJECTION INTRAVENOUS; SUBCUTANEOUS at 21:35

## 2018-05-28 RX ADMIN — SIMVASTATIN 40 MILLIGRAM(S): 20 TABLET, FILM COATED ORAL at 21:35

## 2018-05-28 RX ADMIN — Medication 25 MILLIGRAM(S): at 21:35

## 2018-05-28 RX ADMIN — BUMETANIDE 1 MILLIGRAM(S): 0.25 INJECTION INTRAMUSCULAR; INTRAVENOUS at 17:04

## 2018-05-28 RX ADMIN — Medication 75 MILLIGRAM(S): at 05:42

## 2018-05-28 RX ADMIN — Medication 100 MILLIGRAM(S): at 05:42

## 2018-05-28 RX ADMIN — ISOSORBIDE MONONITRATE 120 MILLIGRAM(S): 60 TABLET, EXTENDED RELEASE ORAL at 11:58

## 2018-05-28 RX ADMIN — HEPARIN SODIUM 5000 UNIT(S): 5000 INJECTION INTRAVENOUS; SUBCUTANEOUS at 05:42

## 2018-05-28 RX ADMIN — Medication 75 MILLIGRAM(S): at 17:05

## 2018-05-28 NOTE — PROGRESS NOTE ADULT - SUBJECTIVE AND OBJECTIVE BOX
[facility] NEPHROLOGY- CONSULTATION NOTE     # [id number]    87 year old male h/o CKD-4 (baseline  creatinine ~ 2.3) secondary to HTN and chronic NSAID use, paraproteinemia who presented to the hospital with SOB and worsening LE edema. She had been on bumex 1mg bid, but was noncompliant with fluid and fluid restriction. No ACEi/ARBs/NSAIDs/IV Contrast.    Pt has a paraproteinemia with a negative bone marrow biopsy.  Pt has not yet had a kidney biopsy.  She continues to have nephrotic range proteinuria (TP/CR 12 on last outpatient labs last month).      Pt feels better today.  Tolerating bumex gtt well.      PAST MEDICAL & SURGICAL HISTORY:  CKD (chronic kidney disease) stage 4  paraproteinemia  High cholesterol  Hypertension  History of hysterectomy    No Known Allergies    Home Medications Reviewed  Hospital Medications:   MEDICATIONS  (STANDING):  buMETAnide Injectable 1 milliGRAM(s) IV Push every 12 hours  heparin  Injectable 5000 Unit(s) SubCutaneous every 8 hours  isosorbide   mononitrate ER Tablet (IMDUR) 120 milliGRAM(s) Oral daily  metoprolol succinate  milliGRAM(s) Oral daily  pantoprazole    Tablet 40 milliGRAM(s) Oral before breakfast  pregabalin 75 milliGRAM(s) Oral two times a day  simvastatin 40 milliGRAM(s) Oral at bedtime    SOCIAL HISTORY:  Denies ETOh,Smoking,   FAMILY HISTORY:    REVIEW OF SYSTEMS:  CONSTITUTIONAL: No weakness, fevers or chills  EYES/ENT: No visual changes;  No vertigo or throat pain   NECK: No pain or stiffness  RESPIRATORY: No cough, wheezing, hemoptysis; Improving shortness of breath  CARDIOVASCULAR: No chest pain or palpitations.  GASTROINTESTINAL: No abdominal or epigastric pain. No nausea, vomiting, or hematemesis; No diarrhea or constipation. No melena or hematochezia.  GENITOURINARY: No dysuria, frequency, foamy urine, urinary urgency, incontinence or hematuria  NEUROLOGICAL: No numbness or weakness  SKIN: No itching, burning, rashes, or lesions   VASCULAR: + bilateral lower extremity edema.   All other review of systems is negative unless indicated above.    VITALS:  T(F): 98.4 (05-28-18 @ 05:37), Max: 98.4 (05-27-18 @ 22:01)  HR: 82 (05-28-18 @ 11:58)  BP: 169/82 (05-28-18 @ 11:58)  RR: 18 (05-28-18 @ 05:37)  SpO2: 100% (05-28-18 @ 05:37)  Wt(kg): --    05-27 @ 07:01  -  05-28 @ 07:00  --------------------------------------------------------  IN: 120 mL / OUT: 700 mL / NET: -580 mL      PHYSICAL EXAM:  Constitutional: NAD  HEENT: anicteric sclera, oropharynx clear, MMM  Neck: No JVD  Respiratory: CTAB, no wheezes, rales or rhonchi  Cardiovascular: S1, S2, RRR  Gastrointestinal: BS+, soft, NT/ND  Extremities: No cyanosis or clubbing. No peripheral edema  Neurological: A/O x 3, no focal deficits  Psychiatric: Normal mood, normal affect  : No CVA tenderness. No lynn.   Skin: No rashes    LABS:  05-28    139  |  103  |  47<H>  ----------------------------<  100<H>  4.5   |  24  |  2.67<H>    Ca    8.9      28 May 2018 06:18  Mg     1.9     05-27      Creatinine Trend: 2.67 <--, 2.62 <--, 2.66 <--                        9.2    7.00  )-----------( 276      ( 28 May 2018 06:18 )             29.3

## 2018-05-28 NOTE — PROGRESS NOTE ADULT - SUBJECTIVE AND OBJECTIVE BOX
Subjective : Pt lying in bed comfortable, not in distress, denies any chest pain or SOB  	  MEDICATIONS  (STANDING):  buMETAnide Injectable 1 milliGRAM(s) IV Push every 12 hours  heparin  Injectable 5000 Unit(s) SubCutaneous every 8 hours  hydrALAZINE 25 milliGRAM(s) Oral every 8 hours  isosorbide   mononitrate ER Tablet (IMDUR) 120 milliGRAM(s) Oral daily  metoprolol succinate  milliGRAM(s) Oral daily  pantoprazole    Tablet 40 milliGRAM(s) Oral before breakfast  pregabalin 75 milliGRAM(s) Oral two times a day  simvastatin 40 milliGRAM(s) Oral at bedtime    MEDICATIONS  (PRN):    Vital Signs Last 24 Hrs  T(C): 36.9 (28 May 2018 21:28), Max: 36.9 (27 May 2018 22:01)  T(F): 98.5 (28 May 2018 21:28), Max: 98.5 (28 May 2018 21:28)  HR: 80 (28 May 2018 21:28) (68 - 83)  BP: 172/72 (28 May 2018 21:28) (160/55 - 197/76)  BP(mean): --  RR: 18 (28 May 2018 21:28) (18 - 18)  SpO2: 100% (28 May 2018 21:28) (100% - 100%)    Constitutional: No fever, fatigue  Skin: No rash.  Eyes: No recent vision problems or eye pain.  ENT: No congestion, ear pain, or sore throat.  Cardiovascular: No chest pain or palpation.  Respiratory: No cough, shortness of breath, congestion, or wheezing.  Gastrointestinal: No abdominal pain, nausea, vomiting, or diarrhea.  Genitourinary: No dysuria.  Musculoskeletal: No joint swelling.  Neurologic: No headache.      Appearance: Normal	  HEENT:   Normal oral mucosa, PERRL, EOMI	  Cardiovascular: Normal S1 S2, No JVD, No murmurs, No edema  Respiratory: Lungs clear to auscultation	  Gastrointestinal:  Soft, Non-tender, + BS	  Extremities: Normal range of motion, No clubbing, cyanosis or edema    LABS:  05-28    139  |  103  |  47<H>  ----------------------------<  100<H>  4.5   |  24  |  2.67<H>    Ca    8.9      28 May 2018 06:18  Mg     1.9     05-27      Creatinine Trend: 2.67 <--, 2.62 <--, 2.66 <--                        9.2    7.00  )-----------( 276      ( 28 May 2018 06:18 )             29.3     Urine Studies:

## 2018-05-28 NOTE — PROGRESS NOTE ADULT - SUBJECTIVE AND OBJECTIVE BOX
Cardiovascular Disease Progress Note    Overnight events: No acute events overnight. Ms. Villagomez is resting comfortably. She denies chest pain or SOB.    Otherwise review of systems negative    Objective Findings:  T(C): 36.9 (18 @ 05:37), Max: 36.9 (18 @ 22:01)  HR: 83 (18 @ 05:37) (30 - 83)  BP: 190/66 (18 @ 05:37) (154/71 - 190/66)  RR: 18 (18 @ 05:37) (17 - 18)  SpO2: 100% (18 @ 05:37) (98% - 100%)  Wt(kg): --  Daily     Daily Weight in k.1 (28 May 2018 08:01)      Physical Exam:  Gen: NAD  HEENT: EOMI  CV: RRR, normal S1 + S2, no m/r/g  Lungs: CTAB  Abd: soft, non-tender  Ext: No edema    Telemetry: Sinus    Laboratory Data:                        9.2    7.00  )-----------( 276      ( 28 May 2018 06:18 )             29.3     05    139  |  103  |  47<H>  ----------------------------<  100<H>  4.5   |  24  |  2.67<H>    Ca    8.9      28 May 2018 06:18  Mg     1.9         TPro  6.5  /  Alb  3.4  /  TBili  < 0.2<L>  /  DBili  x   /  AST  17  /  ALT  10  /  AlkPhos  56  05-26    PT/INR - ( 26 May 2018 13:35 )   PT: 10.9 SEC;   INR: 0.98          PTT - ( 26 May 2018 13:35 )  PTT:34.4 SEC  CARDIAC MARKERS ( 26 May 2018 19:34 )  x     / 0.10 ng/mL / 242 u/L / 5.91 ng/mL / x      CARDIAC MARKERS ( 26 May 2018 11:14 )  x     / 0.11 ng/mL / x     / x     / x              Inpatient Medications:  MEDICATIONS  (STANDING):  buMETAnide Injectable 1 milliGRAM(s) IV Push every 12 hours  heparin  Injectable 5000 Unit(s) SubCutaneous every 8 hours  isosorbide   mononitrate ER Tablet (IMDUR) 120 milliGRAM(s) Oral daily  metoprolol succinate  milliGRAM(s) Oral daily  pantoprazole    Tablet 40 milliGRAM(s) Oral before breakfast  pregabalin 75 milliGRAM(s) Oral two times a day  simvastatin 40 milliGRAM(s) Oral at bedtime      Assessment: 87 year old woman with HTN, CKD IV, and HLD presents with acute diastolic CHF.    Plan of Care:    #Acute on chronic diastolic CHF-  Likely secondary to progressive renal disease.   Continue IV bumex at this time while trending creatinine.  According to patient and nurse, urine output is increased.  TTE from 6 months ago reviewed- preserved LVEF with no significant valvulopathy.  No ischemic evaluation in the setting of advanced CKD.     #HTN urgency-  BP elevated today. Start hydralazine 25 mg TID.     Over 35 minutes spent on total encounter; more than 50% of the visit was spent counseling and/or coordinating care by the attending physician.      Justin Vick MD [facility]  Cardiovascular Disease  [phone]

## 2018-05-28 NOTE — PROGRESS NOTE ADULT - ASSESSMENT
87 year-old woman with SHY, hemodynamically-meidated in the setting of volume overload on diuretics.   CKD 4, secondary to HTN and chronic NSAID use, with volume overload/LE edema.  Paraproteinemia with severe proteinuria.  Only mild hypoalbuminemia.  This still likely represents a nephrotic process.    Plan for diuresis until stable.  At that point, would like to do a kidney biopsy. Yesterday, I discussed with patient re:  # [id number] and she agrees.

## 2018-05-29 LAB
APTT BLD: 36.4 SEC — SIGNIFICANT CHANGE UP (ref 27.5–37.4)
BUN SERPL-MCNC: 47 MG/DL — HIGH (ref 7–23)
CALCIUM SERPL-MCNC: 9.1 MG/DL — SIGNIFICANT CHANGE UP (ref 8.4–10.5)
CHLORIDE SERPL-SCNC: 102 MMOL/L — SIGNIFICANT CHANGE UP (ref 98–107)
CO2 SERPL-SCNC: 24 MMOL/L — SIGNIFICANT CHANGE UP (ref 22–31)
CREAT SERPL-MCNC: 2.75 MG/DL — HIGH (ref 0.5–1.3)
GLUCOSE SERPL-MCNC: 96 MG/DL — SIGNIFICANT CHANGE UP (ref 70–99)
HCT VFR BLD CALC: 30.6 % — LOW (ref 34.5–45)
HGB BLD-MCNC: 9.8 G/DL — LOW (ref 11.5–15.5)
INR BLD: 0.91 — SIGNIFICANT CHANGE UP (ref 0.88–1.17)
MCHC RBC-ENTMCNC: 28.3 PG — SIGNIFICANT CHANGE UP (ref 27–34)
MCHC RBC-ENTMCNC: 32 % — SIGNIFICANT CHANGE UP (ref 32–36)
MCV RBC AUTO: 88.4 FL — SIGNIFICANT CHANGE UP (ref 80–100)
NRBC # FLD: 0 — SIGNIFICANT CHANGE UP
PLATELET # BLD AUTO: 277 K/UL — SIGNIFICANT CHANGE UP (ref 150–400)
PMV BLD: 11.1 FL — SIGNIFICANT CHANGE UP (ref 7–13)
POTASSIUM SERPL-MCNC: 4.5 MMOL/L — SIGNIFICANT CHANGE UP (ref 3.5–5.3)
POTASSIUM SERPL-SCNC: 4.5 MMOL/L — SIGNIFICANT CHANGE UP (ref 3.5–5.3)
PROTHROM AB SERPL-ACNC: 10.5 SEC — SIGNIFICANT CHANGE UP (ref 9.8–13.1)
RBC # BLD: 3.46 M/UL — LOW (ref 3.8–5.2)
RBC # FLD: 15.5 % — HIGH (ref 10.3–14.5)
SODIUM SERPL-SCNC: 140 MMOL/L — SIGNIFICANT CHANGE UP (ref 135–145)
WBC # BLD: 6.72 K/UL — SIGNIFICANT CHANGE UP (ref 3.8–10.5)
WBC # FLD AUTO: 6.72 K/UL — SIGNIFICANT CHANGE UP (ref 3.8–10.5)

## 2018-05-29 PROCEDURE — 93970 EXTREMITY STUDY: CPT | Mod: 26

## 2018-05-29 RX ORDER — HYDRALAZINE HCL 50 MG
50 TABLET ORAL EVERY 8 HOURS
Qty: 0 | Refills: 0 | Status: DISCONTINUED | OUTPATIENT
Start: 2018-05-29 | End: 2018-06-01

## 2018-05-29 RX ADMIN — Medication 25 MILLIGRAM(S): at 05:48

## 2018-05-29 RX ADMIN — Medication 100 MILLIGRAM(S): at 05:48

## 2018-05-29 RX ADMIN — HEPARIN SODIUM 5000 UNIT(S): 5000 INJECTION INTRAVENOUS; SUBCUTANEOUS at 21:21

## 2018-05-29 RX ADMIN — SIMVASTATIN 40 MILLIGRAM(S): 20 TABLET, FILM COATED ORAL at 21:21

## 2018-05-29 RX ADMIN — ISOSORBIDE MONONITRATE 120 MILLIGRAM(S): 60 TABLET, EXTENDED RELEASE ORAL at 12:20

## 2018-05-29 RX ADMIN — BUMETANIDE 1 MILLIGRAM(S): 0.25 INJECTION INTRAMUSCULAR; INTRAVENOUS at 05:48

## 2018-05-29 RX ADMIN — Medication 75 MILLIGRAM(S): at 17:17

## 2018-05-29 RX ADMIN — HEPARIN SODIUM 5000 UNIT(S): 5000 INJECTION INTRAVENOUS; SUBCUTANEOUS at 05:48

## 2018-05-29 RX ADMIN — Medication 50 MILLIGRAM(S): at 21:21

## 2018-05-29 RX ADMIN — BUMETANIDE 1 MILLIGRAM(S): 0.25 INJECTION INTRAMUSCULAR; INTRAVENOUS at 17:17

## 2018-05-29 RX ADMIN — HEPARIN SODIUM 5000 UNIT(S): 5000 INJECTION INTRAVENOUS; SUBCUTANEOUS at 13:27

## 2018-05-29 RX ADMIN — PANTOPRAZOLE SODIUM 40 MILLIGRAM(S): 20 TABLET, DELAYED RELEASE ORAL at 05:48

## 2018-05-29 RX ADMIN — Medication 75 MILLIGRAM(S): at 05:48

## 2018-05-29 RX ADMIN — Medication 50 MILLIGRAM(S): at 13:26

## 2018-05-29 NOTE — PROGRESS NOTE ADULT - SUBJECTIVE AND OBJECTIVE BOX
Subjective : Pt lying in bed comfortable, not in distress, denies any chest pain or SOB  	  MEDICATIONS  (STANDING):  buMETAnide Injectable 1 milliGRAM(s) IV Push every 12 hours  heparin  Injectable 5000 Unit(s) SubCutaneous every 8 hours  hydrALAZINE 50 milliGRAM(s) Oral every 8 hours  isosorbide   mononitrate ER Tablet (IMDUR) 120 milliGRAM(s) Oral daily  metoprolol succinate  milliGRAM(s) Oral daily  pantoprazole    Tablet 40 milliGRAM(s) Oral before breakfast  pregabalin 75 milliGRAM(s) Oral two times a day  simvastatin 40 milliGRAM(s) Oral at bedtime    MEDICATIONS  (PRN):    Vital Signs Last 24 Hrs  T(C): 36.7 (29 May 2018 20:30), Max: 36.9 (28 May 2018 21:28)  T(F): 98 (29 May 2018 20:30), Max: 98.5 (28 May 2018 21:28)  HR: 75 (29 May 2018 20:30) (72 - 80)  BP: 148/57 (29 May 2018 20:30) (147/68 - 188/87)  BP(mean): --  RR: 18 (29 May 2018 20:30) (18 - 18)  SpO2: 97% (29 May 2018 20:30) (97% - 100%)    Constitutional: No fever, fatigue  Skin: No rash.  Eyes: No recent vision problems or eye pain.  ENT: No congestion, ear pain, or sore throat.  Cardiovascular: No chest pain or palpation.  Respiratory: No cough, shortness of breath, congestion, or wheezing.  Gastrointestinal: No abdominal pain, nausea, vomiting, or diarrhea.  Genitourinary: No dysuria.  Musculoskeletal: No joint swelling.  Neurologic: No headache.      Appearance: Normal	  HEENT:   Normal oral mucosa, PERRL, EOMI	  Cardiovascular: Normal S1 S2, No JVD, No murmurs, No edema  Respiratory: Lungs clear to auscultation	  Gastrointestinal:  Soft, Non-tender, + BS	  Extremities: Normal range of motion, No clubbing, cyanosis or edema    LABS:  05-29    140  |  102  |  47<H>  ----------------------------<  96  4.5   |  24  |  2.75<H>    Ca    9.1      29 May 2018 06:50      Creatinine Trend: 2.75 <--, 2.67 <--, 2.62 <--, 2.66 <--                        9.8    6.72  )-----------( 277      ( 29 May 2018 06:50 )             30.6     Urine Studies:              PT/INR - ( 29 May 2018 12:02 )   PT: 10.5 SEC;   INR: 0.91          PTT - ( 29 May 2018 12:02 )  PTT:36.4 SEC

## 2018-05-29 NOTE — PROGRESS NOTE ADULT - ASSESSMENT
87 year-old woman with SHY, hemodynamically-meidated in the setting of volume overload on diuretics.   CKD 4, secondary to HTN and chronic NSAID use, with volume overload/LE edema.  Paraproteinemia with severe proteinuria.  Only mild hypoalbuminemia.  This still likely represents a nephrotic process.    Continue diuresis.  Now stable.  Plan for kidney biopsy today (or tomorrow).

## 2018-05-29 NOTE — DIETITIAN INITIAL EVALUATION ADULT. - NS AS NUTRI INTERV MEALS SNACK
Carbohydrate - modified diet/General/healthful diet/Composition of meals/snacks/Mineral - modified diet

## 2018-05-29 NOTE — PROGRESS NOTE ADULT - SUBJECTIVE AND OBJECTIVE BOX
Cardiovascular Disease Progress Note    Overnight events: No acute events overnight. Ms. Villagomez is eating breakfast. She denies chest pain or SOB.   Otherwise review of systems negative    Objective Findings:  T(C): 36.6 (05-29-18 @ 05:43), Max: 36.9 (05-28-18 @ 21:28)  HR: 72 (05-29-18 @ 07:08) (68 - 82)  BP: 155/76 (05-29-18 @ 07:08) (155/76 - 197/76)  RR: 18 (05-29-18 @ 05:43) (18 - 18)  SpO2: 100% (05-29-18 @ 05:43) (100% - 100%)  Wt(kg): --  Daily     Daily       Physical Exam:  Gen: NAD  HEENT: EOMI  CV: RRR, normal S1 + S2, no m/r/g  Lungs: CTAB  Abd: soft, non-tender  Ext: 2+ pedal edema    Telemetry: Sinus; 2 second pause    Laboratory Data:                        9.8    6.72  )-----------( 277      ( 29 May 2018 06:50 )             30.6     05-29    140  |  102  |  47<H>  ----------------------------<  96  4.5   |  24  |  2.75<H>    Ca    9.1      29 May 2018 06:50                Inpatient Medications:  MEDICATIONS  (STANDING):  buMETAnide Injectable 1 milliGRAM(s) IV Push every 12 hours  heparin  Injectable 5000 Unit(s) SubCutaneous every 8 hours  hydrALAZINE 25 milliGRAM(s) Oral every 8 hours  isosorbide   mononitrate ER Tablet (IMDUR) 120 milliGRAM(s) Oral daily  metoprolol succinate  milliGRAM(s) Oral daily  pantoprazole    Tablet 40 milliGRAM(s) Oral before breakfast  pregabalin 75 milliGRAM(s) Oral two times a day  simvastatin 40 milliGRAM(s) Oral at bedtime      Assessment: 87 year old woman with HTN, CKD IV, and HLD presents with acute diastolic CHF.    Plan of Care:    #Acute on chronic diastolic CHF-  Likely secondary to progressive renal disease.   Patient with persistent pedal edema on exam despite IV Bumex.  I will dose metolazone 2.5 mg PO x 1 now.  Continue IV bumex while trending creatinine.  TTE from 6 months ago reviewed- preserved LVEF with no significant valvulopathy.  No ischemic evaluation in the setting of advanced CKD.     #Sinus bradycardia with pause-  During sleep.   Continue beta-blocker.     #HTN urgency-  Hydralazine 25 mg TID started yesterday afternoon.  Continue to monitor.      Over 35 minutes spent on total encounter; more than 50% of the visit was spent counseling and/or coordinating care by the attending physician.      Justin Vick MD [facility]  Cardiovascular Disease  [phone]

## 2018-05-29 NOTE — DIETITIAN INITIAL EVALUATION ADULT. - OTHER INFO
Nutrition consult ordered for RD visit, 88 y/o female admitted with the DX of SOB, CKD STAGE 4, edema of lower extremities, reports good po and appetite, with no N /V/D at this time, pt denies  any weight changes,  however pt with 3 + edema in bilateral. legs, ankle and feet. RD was able to communicate pt in her native language Rom[place], no diet indiscretion reported, Labs reviewed, Nutrition consult ordered for RD visit, 86 y/o female admitted with the DX of SOB, CKD STAGE 4, edema of lower extremities, reports good po and appetite, with no N /V/D at this time, pt denies    any weight changes,  however pt with 3 + edema in bilateral. legs, ankle and feet. Current weight 83 kg,  Monitor weight daily, RD was able   to communicate with  pt In  her native language [place] I, no diet indiscretion reported, Labs reviewed, No phos levels available at this time,  monitor renal labs and adjust diet accordingly, add CSCHO with snacks to current diet related with elevated A1C level. Nutrition education provided for therapeutic  diet, RD remains available, pt made aware.

## 2018-05-29 NOTE — CONSULT NOTE ADULT - SUBJECTIVE AND OBJECTIVE BOX
Patient is a 87y old  Female who presents with a chief complaint of shortness of breath (26 May 2018 13:53)      HPI:   ID [id number]    87 year old Gujarati speaking female pmh of CKD stage 4, HTN, HLD, shingles 4/2018,  presents with lower extremity swelling and shortness of breath. One week ago saw Dr. Villagomez for leg swelling, intermittently better and worse over course of the week, he continued her water pill and elevate her legs. Since last night swelling worse and making hard to walk. states legs are heavy and notes VIRGEN with small amount of exertion. occasionally has chills.     Denies chest pain, abd pain, palpitations, fever, dysuria, hematuria, diarrhea, brbpr, melena, vertigo, dizziness, headache, tinnitus, hearting difficulty, syncope, LOC, seizure activity. no recent travel, no sick contact.     REVIEW OF SYSTEMS:  General:  No wt loss, fevers, chills, night sweats  Eyes:  Good vision, no reported pain  ENT:  No sore throat, pain, runny nose, dysphagia  CV:  No pain, palpitations, hypo/hypertension. Bilateral LE swelling  Resp:  No dyspnea, cough, tachypnea, wheezing  GI:  No pain, nausea, vomiting, diarrhea, constipation  :  No pain, bleeding, incontinence, nocturia  Muscle:  No pain, weakness  Breast:  No pain, abscess, mass, discharge  Neuro:  No weakness, tingling, memory problems  Psych:  No fatigue, insomnia, mood problems, depression  Endocrine:  No polyuria, polydypsia, cold/heat intolerance  Heme:  No petechiae, ecchymosis, easy bruisability  Skin:  B/l lower extremity edema    PAST MEDICAL & SURGICAL HISTORY:  CKD (chronic kidney disease)  High cholesterol  Hypertension  History of hysterectomy    Allergies  No Known Allergies    Intolerances    MEDICATIONS  (STANDING):  buMETAnide Injectable 1 milliGRAM(s) IV Push every 12 hours  heparin  Injectable 5000 Unit(s) SubCutaneous every 8 hours  hydrALAZINE 50 milliGRAM(s) Oral every 8 hours  isosorbide   mononitrate ER Tablet (IMDUR) 120 milliGRAM(s) Oral daily  metoprolol succinate  milliGRAM(s) Oral daily  pantoprazole    Tablet 40 milliGRAM(s) Oral before breakfast  pregabalin 75 milliGRAM(s) Oral two times a day  simvastatin 40 milliGRAM(s) Oral at bedtime    MEDICATIONS  (PRN):    PHYSICAL EXAM:  Vital Signs Last 24 Hrs  T(C): 36.6 (29 May 2018 14:11), Max: 36.9 (28 May 2018 21:28)  T(F): 97.8 (29 May 2018 14:11), Max: 98.5 (28 May 2018 21:28)  HR: 79 (29 May 2018 17:20) (72 - 80)  BP: 171/81 (29 May 2018 17:20) (147/68 - 188/87)  BP(mean): --  RR: 18 (29 May 2018 14:11) (18 - 18)  SpO2: 98% (29 May 2018 14:11) (98% - 100%)    General:  Appears stated age, well-groomed, well-nourished, no distress  HEENT:  NC/AT, PERRL, EOMI, conjunctivae clear  Chest:  Full & symmetric excursion, no increased effort, breath sounds clear  Cardiovascular:  Regular rhythm, S1, S2, radial/pedal pulses 2+, bilateral LE edema  Abdomen:  Soft, non-tender, non-distended  Extremities: B/l lower extremity 2+ pitting edema  Neuro/Psych:  Alert, oriented, normal gait, sensation intact    LABS:                        9.8    6.72  )-----------( 277      ( 29 May 2018 06:50 )             30.6     05-29    140  |  102  |  47<H>  ----------------------------<  96  4.5   |  24  |  2.75<H>    Ca    9.1      29 May 2018 06:50      PT/INR - ( 29 May 2018 12:02 )   PT: 10.5 SEC;   INR: 0.91          PTT - ( 29 May 2018 12:02 )  PTT:36.4 SEC      ASSESSMENT:  87 year-old woman with SHY, CKD 4, secondary to HTN and chronic NSAID use, with volume overload/LE edema with Paraproteinemia with severe proteinuria.  - IR consulted for kidney biopsy  - Presently, the patient is refusing the procedure stating she had a bone marrow biopsy prior. Informed patient of the need for kidney biopsy. Pt is not agreeable to kidney biopsy until daughter or son speaks with nephrology.  - Will reassess pt's willingness to undergo procedure in the am. Will keep her on the schedule. Keep NPO    [id number]

## 2018-05-29 NOTE — DIETITIAN INITIAL EVALUATION ADULT. - DIET TYPE
DASH/TLC (sodium and cholesterol restricted diet)/no concentrated phosphorus/regular/no concentrated potassium

## 2018-05-29 NOTE — PROGRESS NOTE ADULT - SUBJECTIVE AND OBJECTIVE BOX
[facility] NEPHROLOGY- CONSULTATION NOTE     # [id number]    87 year old male h/o CKD-4 (baseline  creatinine ~ 2.3) secondary to HTN and chronic NSAID use, paraproteinemia who presented to the hospital with SOB and worsening LE edema. She had been on bumex 1mg bid, but was noncompliant with fluid and fluid restriction. No ACEi/ARBs/NSAIDs/IV Contrast.    Pt has a paraproteinemia with a negative bone marrow biopsy.  Pt has not yet had a kidney biopsy.  She continues to have nephrotic range proteinuria (TP/CR 12 on last outpatient labs last month).      Pt feels well.  Tolerating bumex iv bid well.      REVIEW OF SYSTEMS:  +B LE edema. NO h/a, cp, sob.      VITALS:  T(F): 97.8 (05-29-18 @ 05:43), Max: 98.5 (05-28-18 @ 21:28)  HR: 72 (05-29-18 @ 07:08)  BP: 155/76 (05-29-18 @ 07:08)  RR: 18 (05-29-18 @ 05:43)  SpO2: 100% (05-29-18 @ 05:43)  Wt(kg): --    05-28 @ 07:01  -  05-29 @ 07:00  --------------------------------------------------------  IN: 240 mL / OUT: 425 mL / NET: -185 mL    05-29 @ 07:01  -  05-29 @ 10:24  --------------------------------------------------------  IN: 0 mL / OUT: 300 mL / NET: -300 mL      PHYSICAL EXAM:  Constitutional: NAD  HEENT: anicteric sclera, oropharynx clear, MMM  Neck: No JVD  Respiratory: CTAB, no wheezes, rales or rhonchi  Cardiovascular: S1, S2, RRR  Gastrointestinal: BS+, soft, NT/ND  Extremities: No cyanosis or clubbing. +B LE edema  Neurological: A/O x 3, no focal deficits  Psychiatric: Normal mood, normal affect  : No CVA tenderness. No lynn.   Skin: No rashes    LABS:  05-29    140  |  102  |  47<H>  ----------------------------<  96  4.5   |  24  |  2.75<H>    Ca    9.1      29 May 2018 06:50      Creatinine Trend: 2.75 <--, 2.67 <--, 2.62 <--, 2.66 <--                        9.8    6.72  )-----------( 277      ( 29 May 2018 06:50 )             30.6

## 2018-05-30 LAB
APTT BLD: 32.2 SEC — SIGNIFICANT CHANGE UP (ref 27.5–37.4)
BUN SERPL-MCNC: 50 MG/DL — HIGH (ref 7–23)
CALCIUM SERPL-MCNC: 9 MG/DL — SIGNIFICANT CHANGE UP (ref 8.4–10.5)
CHLORIDE SERPL-SCNC: 103 MMOL/L — SIGNIFICANT CHANGE UP (ref 98–107)
CO2 SERPL-SCNC: 24 MMOL/L — SIGNIFICANT CHANGE UP (ref 22–31)
CREAT SERPL-MCNC: 2.86 MG/DL — HIGH (ref 0.5–1.3)
GLUCOSE SERPL-MCNC: 101 MG/DL — HIGH (ref 70–99)
HCT VFR BLD CALC: 30.4 % — LOW (ref 34.5–45)
HGB BLD-MCNC: 9.6 G/DL — LOW (ref 11.5–15.5)
INR BLD: 0.86 — LOW (ref 0.88–1.17)
MAGNESIUM SERPL-MCNC: 2 MG/DL — SIGNIFICANT CHANGE UP (ref 1.6–2.6)
MCHC RBC-ENTMCNC: 27.9 PG — SIGNIFICANT CHANGE UP (ref 27–34)
MCHC RBC-ENTMCNC: 31.6 % — LOW (ref 32–36)
MCV RBC AUTO: 88.4 FL — SIGNIFICANT CHANGE UP (ref 80–100)
NRBC # FLD: 0 — SIGNIFICANT CHANGE UP
PLATELET # BLD AUTO: 274 K/UL — SIGNIFICANT CHANGE UP (ref 150–400)
PMV BLD: 11.3 FL — SIGNIFICANT CHANGE UP (ref 7–13)
POTASSIUM SERPL-MCNC: 4.6 MMOL/L — SIGNIFICANT CHANGE UP (ref 3.5–5.3)
POTASSIUM SERPL-SCNC: 4.6 MMOL/L — SIGNIFICANT CHANGE UP (ref 3.5–5.3)
PROTHROM AB SERPL-ACNC: 9.9 SEC — SIGNIFICANT CHANGE UP (ref 9.8–13.1)
RBC # BLD: 3.44 M/UL — LOW (ref 3.8–5.2)
RBC # FLD: 15.7 % — HIGH (ref 10.3–14.5)
SODIUM SERPL-SCNC: 142 MMOL/L — SIGNIFICANT CHANGE UP (ref 135–145)
WBC # BLD: 7.66 K/UL — SIGNIFICANT CHANGE UP (ref 3.8–10.5)
WBC # FLD AUTO: 7.66 K/UL — SIGNIFICANT CHANGE UP (ref 3.8–10.5)

## 2018-05-30 RX ADMIN — Medication 100 MILLIGRAM(S): at 05:37

## 2018-05-30 RX ADMIN — BUMETANIDE 1 MILLIGRAM(S): 0.25 INJECTION INTRAMUSCULAR; INTRAVENOUS at 05:37

## 2018-05-30 RX ADMIN — SIMVASTATIN 40 MILLIGRAM(S): 20 TABLET, FILM COATED ORAL at 22:18

## 2018-05-30 RX ADMIN — Medication 75 MILLIGRAM(S): at 17:42

## 2018-05-30 RX ADMIN — BUMETANIDE 1 MILLIGRAM(S): 0.25 INJECTION INTRAMUSCULAR; INTRAVENOUS at 17:42

## 2018-05-30 RX ADMIN — Medication 50 MILLIGRAM(S): at 22:18

## 2018-05-30 RX ADMIN — PANTOPRAZOLE SODIUM 40 MILLIGRAM(S): 20 TABLET, DELAYED RELEASE ORAL at 05:37

## 2018-05-30 RX ADMIN — Medication 50 MILLIGRAM(S): at 05:37

## 2018-05-30 RX ADMIN — Medication 50 MILLIGRAM(S): at 15:08

## 2018-05-30 RX ADMIN — HEPARIN SODIUM 5000 UNIT(S): 5000 INJECTION INTRAVENOUS; SUBCUTANEOUS at 22:18

## 2018-05-30 RX ADMIN — HEPARIN SODIUM 5000 UNIT(S): 5000 INJECTION INTRAVENOUS; SUBCUTANEOUS at 15:08

## 2018-05-30 RX ADMIN — Medication 75 MILLIGRAM(S): at 05:37

## 2018-05-30 RX ADMIN — ISOSORBIDE MONONITRATE 120 MILLIGRAM(S): 60 TABLET, EXTENDED RELEASE ORAL at 12:07

## 2018-05-30 NOTE — PROGRESS NOTE ADULT - ASSESSMENT
87 year-old woman with SHY, hemodynamically-meidated in the setting of volume overload on diuretics.   CKD 4, secondary to HTN and chronic NSAID use, with volume overload/LE edema.  Paraproteinemia with severe proteinuria.  Only mild hypoalbuminemia.  This still likely represents a nephrotic process.      Continue diuresis.    Will try to contact daughter and set up kidney biopsy. 87 year-old woman with SHY, hemodynamically-meidated in the setting of volume overload on diuretics.   CKD 4, secondary to HTN and chronic NSAID use, with volume overload/LE edema.  Paraproteinemia with severe proteinuria.  Only mild hypoalbuminemia.  This still likely represents a nephrotic process.      Continue diuresis.    Pt wanted me to speak with son before she would agree to kidney biopsy.  I spoke with son, who after a long discussion, very reasonably does not want biopys.  Will not biopys.  Will continue medical management.

## 2018-05-30 NOTE — PROGRESS NOTE ADULT - SUBJECTIVE AND OBJECTIVE BOX
Subjective : Pt lying in bed comfortable, not in distress, denies any chest pain or SOB  	  MEDICATIONS  (STANDING):  buMETAnide Injectable 1 milliGRAM(s) IV Push every 12 hours  heparin  Injectable 5000 Unit(s) SubCutaneous every 8 hours  hydrALAZINE 50 milliGRAM(s) Oral every 8 hours  isosorbide   mononitrate ER Tablet (IMDUR) 120 milliGRAM(s) Oral daily  metoprolol succinate  milliGRAM(s) Oral daily  pantoprazole    Tablet 40 milliGRAM(s) Oral before breakfast  pregabalin 75 milliGRAM(s) Oral two times a day  simvastatin 40 milliGRAM(s) Oral at bedtime    MEDICATIONS  (PRN):    Vital Signs Last 24 Hrs  T(C): 36.9 (30 May 2018 21:12), Max: 36.9 (30 May 2018 21:12)  T(F): 98.5 (30 May 2018 21:12), Max: 98.5 (30 May 2018 21:12)  HR: 77 (30 May 2018 21:12) (76 - 92)  BP: 140/60 (30 May 2018 21:12) (136/52 - 171/77)  BP(mean): --  RR: 18 (30 May 2018 21:12) (18 - 18)  SpO2: 100% (30 May 2018 21:12) (99% - 100%)    Constitutional: No fever, fatigue  Skin: No rash.  Eyes: No recent vision problems or eye pain.  ENT: No congestion, ear pain, or sore throat.  Cardiovascular: No chest pain or palpation.  Respiratory: No cough, shortness of breath, congestion, or wheezing.  Gastrointestinal: No abdominal pain, nausea, vomiting, or diarrhea.  Genitourinary: No dysuria.  Musculoskeletal: No joint swelling.  Neurologic: No headache.      Appearance: Normal	  HEENT:   Normal oral mucosa, PERRL, EOMI	  Cardiovascular: Normal S1 S2, No JVD, No murmurs, No edema  Respiratory: Lungs clear to auscultation	  Gastrointestinal:  Soft, Non-tender, + BS	  Extremities: Normal range of motion, No clubbing, cyanosis or edema    LABS:  05-30    142  |  103  |  50<H>  ----------------------------<  101<H>  4.6   |  24  |  2.86<H>    Ca    9.0      30 May 2018 06:40  Mg     2.0     05-30      Creatinine Trend: 2.86 <--, 2.75 <--, 2.67 <--, 2.62 <--, 2.66 <--                        9.6    7.66  )-----------( 274      ( 30 May 2018 06:40 )             30.4     Urine Studies:              PT/INR - ( 30 May 2018 06:40 )   PT: 9.9 SEC;   INR: 0.86          PTT - ( 30 May 2018 06:40 )  PTT:32.2 SEC

## 2018-05-30 NOTE — PROGRESS NOTE ADULT - SUBJECTIVE AND OBJECTIVE BOX
Cardiovascular Disease Progress Note    Overnight events: No acute events overnight. Ms. Villagomez denies chest pain or SOB.   Otherwise review of systems negative    Objective Findings:  T(C): 36.4 (18 @ 05:31), Max: 36.7 (18 @ 20:30)  HR: 88 (18 @ 05:31) (72 - 88)  BP: 167/84 (18 @ 05:31) (147/68 - 175/68)  RR: 18 (18 @ 05:31) (18 - 18)  SpO2: 99% (18 @ 05:31) (97% - 100%)  Wt(kg): --  Daily     Daily Weight in k (29 May 2018 13:39)      Physical Exam:  Gen: NAD  HEENT: EOMI  CV: RRR, normal S1 + S2, no m/r/g  Lungs: CTAB  Abd: soft, non-tender  Ext: trace edema    Telemetry: Sinus    Laboratory Data:                        9.6    7.66  )-----------( 274      ( 30 May 2018 06:40 )             30.4     05    142  |  103  |  50<H>  ----------------------------<  101<H>  4.6   |  24  |  2.86<H>    Ca    9.0      30 May 2018 06:40  Mg     2.0     30      PT/INR - ( 30 May 2018 06:40 )   PT: 9.9 SEC;   INR: 0.86          PTT - ( 30 May 2018 06:40 )  PTT:32.2 SEC          Inpatient Medications:  MEDICATIONS  (STANDING):  buMETAnide Injectable 1 milliGRAM(s) IV Push every 12 hours  heparin  Injectable 5000 Unit(s) SubCutaneous every 8 hours  hydrALAZINE 50 milliGRAM(s) Oral every 8 hours  isosorbide   mononitrate ER Tablet (IMDUR) 120 milliGRAM(s) Oral daily  metoprolol succinate  milliGRAM(s) Oral daily  pantoprazole    Tablet 40 milliGRAM(s) Oral before breakfast  pregabalin 75 milliGRAM(s) Oral two times a day  simvastatin 40 milliGRAM(s) Oral at bedtime      Assessment: 87 year old woman with HTN, CKD IV, and HLD presents with acute diastolic CHF.    Plan of Care:    #Acute on chronic diastolic CHF-  Likely secondary to progressive renal disease.   Pedal edema improved after giving one dose metolazone.  TTE from 6 months ago reviewed- preserved LVEF with no significant valvulopathy.  No cardiac objection to renal biopsy.  Patient is not on antiplatelet therapy.     #Sinus bradycardia with pause-  During sleep.   Continue beta-blocker.     Over 35 minutes spent on total encounter; more than 50% of the visit was spent counseling and/or coordinating care by the attending physician.      Justin Vick MD [facility]  Cardiovascular Disease  [phone]

## 2018-05-30 NOTE — PROGRESS NOTE ADULT - SUBJECTIVE AND OBJECTIVE BOX
[facility] NEPHROLOGY- CONSULTATION NOTE    87 year old male h/o CKD-4 (baseline  creatinine ~ 2.3) secondary to HTN and chronic NSAID use, paraproteinemia who presented to the hospital with SOB and worsening LE edema. She had been on bumex 1mg bid, but was noncompliant with fluid and fluid restriction. No ACEi/ARBs/NSAIDs/IV Contrast.    Pt has a paraproteinemia with a negative bone marrow biopsy.  Pt has not yet had a kidney biopsy.  She continues to have nephrotic range proteinuria (TP/CR 12 on last outpatient labs last month).      Pt feels better today.  Tolerating bumex iv well.    Pt wanted me to speak with daughter before she would agree to kidney biopsy. I have called several times, but I have not been able to reach daughter.  Will continue to try.  Alternatively, kidney biopsy can be done as an outpatient, but would recommend doing it here as soon as possible.      REVIEW OF SYSTEMS: no h/a, cp, sob, abd pain.    VITALS:  T(F): 97.6 (05-30-18 @ 05:31), Max: 98 (05-29-18 @ 20:30)  HR: 78 (05-30-18 @ 15:06)  BP: 161/62 (05-30-18 @ 15:06)  RR: 18 (05-30-18 @ 05:31)  SpO2: 99% (05-30-18 @ 05:31)  Wt(kg): --    05-29 @ 07:01  -  05-30 @ 07:00  --------------------------------------------------------  IN: 960 mL / OUT: 525 mL / NET: 435 mL      PHYSICAL EXAM:  Constitutional: NAD  HEENT: anicteric sclera, oropharynx clear, MMM  Neck: No JVD  Respiratory: CTAB, no wheezes, rales or rhonchi  Cardiovascular: S1, S2, RRR  Gastrointestinal: BS+, soft, NT/ND  Extremities: No cyanosis or clubbing. No peripheral edema  Neurological: A/O x 3, no focal deficits  Psychiatric: Normal mood, normal affect  : No CVA tenderness. No lynn.     LABS:  05-30    142  |  103  |  50<H>  ----------------------------<  101<H>  4.6   |  24  |  2.86<H>    Ca    9.0      30 May 2018 06:40  Mg     2.0     05-30      Creatinine Trend: 2.86 <--, 2.75 <--, 2.67 <--, 2.62 <--, 2.66 <--                        9.6    7.66  )-----------( 274      ( 30 May 2018 06:40 )             30.4 [facility] NEPHROLOGY- CONSULTATION NOTE    87 year old male h/o CKD-4 (baseline  creatinine ~ 2.3) secondary to HTN and chronic NSAID use, paraproteinemia who presented to the hospital with SOB and worsening LE edema. She had been on bumex 1mg bid, but was noncompliant with fluid and fluid restriction. No ACEi/ARBs/NSAIDs/IV Contrast.    Pt has a paraproteinemia with a negative bone marrow biopsy.  Pt has not yet had a kidney biopsy.  She continues to have nephrotic range proteinuria (TP/CR 12 on last outpatient labs last month).      Pt feels better today.  Tolerating bumex iv well.    Pt wanted me to speak with son before she would agree to kidney biopsy.  I spoke with son, who after a long discussion, very reasonably does not want biopys.  Will not biopys.  Will continue medical management.      REVIEW OF SYSTEMS: no h/a, cp, sob, abd pain.    VITALS:  T(F): 97.6 (05-30-18 @ 05:31), Max: 98 (05-29-18 @ 20:30)  HR: 78 (05-30-18 @ 15:06)  BP: 161/62 (05-30-18 @ 15:06)  RR: 18 (05-30-18 @ 05:31)  SpO2: 99% (05-30-18 @ 05:31)  Wt(kg): --    05-29 @ 07:01  -  05-30 @ 07:00  --------------------------------------------------------  IN: 960 mL / OUT: 525 mL / NET: 435 mL      PHYSICAL EXAM:  Constitutional: NAD  HEENT: anicteric sclera, oropharynx clear, MMM  Neck: No JVD  Respiratory: CTAB, no wheezes, rales or rhonchi  Cardiovascular: S1, S2, RRR  Gastrointestinal: BS+, soft, NT/ND  Extremities: No cyanosis or clubbing. No peripheral edema  Neurological: A/O x 3, no focal deficits  Psychiatric: Normal mood, normal affect  : No CVA tenderness. No lynn.     LABS:  05-30    142  |  103  |  50<H>  ----------------------------<  101<H>  4.6   |  24  |  2.86<H>    Ca    9.0      30 May 2018 06:40  Mg     2.0     05-30      Creatinine Trend: 2.86 <--, 2.75 <--, 2.67 <--, 2.62 <--, 2.66 <--                        9.6    7.66  )-----------( 274      ( 30 May 2018 06:40 )             30.4

## 2018-05-31 LAB
BUN SERPL-MCNC: 52 MG/DL — HIGH (ref 7–23)
CALCIUM SERPL-MCNC: 8.8 MG/DL — SIGNIFICANT CHANGE UP (ref 8.4–10.5)
CHLORIDE SERPL-SCNC: 102 MMOL/L — SIGNIFICANT CHANGE UP (ref 98–107)
CO2 SERPL-SCNC: 24 MMOL/L — SIGNIFICANT CHANGE UP (ref 22–31)
CREAT SERPL-MCNC: 3.06 MG/DL — HIGH (ref 0.5–1.3)
GLUCOSE SERPL-MCNC: 102 MG/DL — HIGH (ref 70–99)
HCT VFR BLD CALC: 28.3 % — LOW (ref 34.5–45)
HGB BLD-MCNC: 9.1 G/DL — LOW (ref 11.5–15.5)
MCHC RBC-ENTMCNC: 27.6 PG — SIGNIFICANT CHANGE UP (ref 27–34)
MCHC RBC-ENTMCNC: 32.2 % — SIGNIFICANT CHANGE UP (ref 32–36)
MCV RBC AUTO: 85.8 FL — SIGNIFICANT CHANGE UP (ref 80–100)
NRBC # FLD: 0 — SIGNIFICANT CHANGE UP
PLATELET # BLD AUTO: 240 K/UL — SIGNIFICANT CHANGE UP (ref 150–400)
PMV BLD: 11.4 FL — SIGNIFICANT CHANGE UP (ref 7–13)
POTASSIUM SERPL-MCNC: 4.4 MMOL/L — SIGNIFICANT CHANGE UP (ref 3.5–5.3)
POTASSIUM SERPL-SCNC: 4.4 MMOL/L — SIGNIFICANT CHANGE UP (ref 3.5–5.3)
RBC # BLD: 3.3 M/UL — LOW (ref 3.8–5.2)
RBC # FLD: 15.6 % — HIGH (ref 10.3–14.5)
SODIUM SERPL-SCNC: 139 MMOL/L — SIGNIFICANT CHANGE UP (ref 135–145)
WBC # BLD: 7.83 K/UL — SIGNIFICANT CHANGE UP (ref 3.8–10.5)
WBC # FLD AUTO: 7.83 K/UL — SIGNIFICANT CHANGE UP (ref 3.8–10.5)

## 2018-05-31 RX ORDER — BUMETANIDE 0.25 MG/ML
1 INJECTION INTRAMUSCULAR; INTRAVENOUS
Qty: 0 | Refills: 0 | Status: DISCONTINUED | OUTPATIENT
Start: 2018-05-31 | End: 2018-06-01

## 2018-05-31 RX ADMIN — Medication 50 MILLIGRAM(S): at 15:42

## 2018-05-31 RX ADMIN — Medication 75 MILLIGRAM(S): at 17:42

## 2018-05-31 RX ADMIN — ISOSORBIDE MONONITRATE 120 MILLIGRAM(S): 60 TABLET, EXTENDED RELEASE ORAL at 15:42

## 2018-05-31 RX ADMIN — Medication 50 MILLIGRAM(S): at 21:17

## 2018-05-31 RX ADMIN — PANTOPRAZOLE SODIUM 40 MILLIGRAM(S): 20 TABLET, DELAYED RELEASE ORAL at 05:41

## 2018-05-31 RX ADMIN — Medication 50 MILLIGRAM(S): at 05:41

## 2018-05-31 RX ADMIN — Medication 100 MILLIGRAM(S): at 05:41

## 2018-05-31 RX ADMIN — HEPARIN SODIUM 5000 UNIT(S): 5000 INJECTION INTRAVENOUS; SUBCUTANEOUS at 15:42

## 2018-05-31 RX ADMIN — BUMETANIDE 1 MILLIGRAM(S): 0.25 INJECTION INTRAMUSCULAR; INTRAVENOUS at 17:40

## 2018-05-31 RX ADMIN — HEPARIN SODIUM 5000 UNIT(S): 5000 INJECTION INTRAVENOUS; SUBCUTANEOUS at 05:41

## 2018-05-31 RX ADMIN — SIMVASTATIN 40 MILLIGRAM(S): 20 TABLET, FILM COATED ORAL at 21:18

## 2018-05-31 RX ADMIN — Medication 75 MILLIGRAM(S): at 05:41

## 2018-05-31 RX ADMIN — HEPARIN SODIUM 5000 UNIT(S): 5000 INJECTION INTRAVENOUS; SUBCUTANEOUS at 21:17

## 2018-05-31 RX ADMIN — BUMETANIDE 1 MILLIGRAM(S): 0.25 INJECTION INTRAMUSCULAR; INTRAVENOUS at 05:41

## 2018-05-31 NOTE — PROGRESS NOTE ADULT - SUBJECTIVE AND OBJECTIVE BOX
Subjective : Pt lying in bed comfortable, not in distress, denies any chest pain or SOB  	  MEDICATIONS  (STANDING):  buMETAnide 1 milliGRAM(s) Oral two times a day  heparin  Injectable 5000 Unit(s) SubCutaneous every 8 hours  hydrALAZINE 50 milliGRAM(s) Oral every 8 hours  isosorbide   mononitrate ER Tablet (IMDUR) 120 milliGRAM(s) Oral daily  metoprolol succinate  milliGRAM(s) Oral daily  pantoprazole    Tablet 40 milliGRAM(s) Oral before breakfast  pregabalin 75 milliGRAM(s) Oral two times a day  simvastatin 40 milliGRAM(s) Oral at bedtime    MEDICATIONS  (PRN):      Vital Signs Last 24 Hrs  T(C): 36.8 (31 May 2018 21:17), Max: 36.9 (31 May 2018 05:35)  T(F): 98.2 (31 May 2018 21:17), Max: 98.5 (31 May 2018 05:35)  HR: 83 (31 May 2018 21:17) (83 - 88)  BP: 150/60 (31 May 2018 21:17) (126/53 - 169/82)  BP(mean): --  RR: 18 (31 May 2018 21:17) (18 - 18)  SpO2: 98% (31 May 2018 21:17) (97% - 100%)    Constitutional: No fever, fatigue  Skin: No rash.  Eyes: No recent vision problems or eye pain.  ENT: No congestion, ear pain, or sore throat.  Cardiovascular: No chest pain or palpation.  Respiratory: No cough, shortness of breath, congestion, or wheezing.  Gastrointestinal: No abdominal pain, nausea, vomiting, or diarrhea.  Genitourinary: No dysuria.  Musculoskeletal: No joint swelling.  Neurologic: No headache.      Appearance: Normal	  HEENT:   Normal oral mucosa, PERRL, EOMI	  Cardiovascular: Normal S1 S2, No JVD, No murmurs, No edema  Respiratory: Lungs clear to auscultation	  Gastrointestinal:  Soft, Non-tender, + BS	  Extremities: Normal range of motion, No clubbing, cyanosis or edema    LABS:  05-31    139  |  102  |  52<H>  ----------------------------<  102<H>  4.4   |  24  |  3.06<H>    Ca    8.8      31 May 2018 06:45  Mg     2.0     05-30      Creatinine Trend: 3.06 <--, 2.86 <--, 2.75 <--, 2.67 <--, 2.62 <--, 2.66 <--                        9.1    7.83  )-----------( 240      ( 31 May 2018 06:45 )             28.3     Urine Studies:              PT/INR - ( 30 May 2018 06:40 )   PT: 9.9 SEC;   INR: 0.86          PTT - ( 30 May 2018 06:40 )  PTT:32.2 SEC

## 2018-05-31 NOTE — PROGRESS NOTE ADULT - SUBJECTIVE AND OBJECTIVE BOX
[facility] NEPHROLOGY- CONSULTATION NOTE    87 year old male h/o CKD-4 (baseline  creatinine ~ 2.3) secondary to HTN and chronic NSAID use, paraproteinemia who presented to the hospital with SOB and worsening LE edema. She had been on bumex 1mg bid, but was noncompliant with fluid and fluid restriction. No ACEi/ARBs/NSAIDs/IV Contrast.    Pt has a paraproteinemia with a negative bone marrow biopsy. She continues to have nephrotic range proteinuria (TP/CR 12 on last outpatient labs last month).  I spoke with pt and son, who after a long discussion, very reasonably do not want a biopsy (although I do recommend it).  Will not biopsy.  Will continue medical management.    Pt reports no further SOB.    REVIEW OF SYSTEMS: no h/a, cp, sob, abd pain.    VITALS:  T(F): 98.5 (05-31-18 @ 05:35), Max: 98.5 (05-30-18 @ 21:12)  HR: 88 (05-31-18 @ 05:35)  BP: 169/82 (05-31-18 @ 05:35)  RR: 18 (05-31-18 @ 05:35)  SpO2: 100% (05-31-18 @ 05:35)  Wt(kg): --    05-29 @ 07:01  -  05-30 @ 07:00  --------------------------------------------------------  IN: 960 mL / OUT: 525 mL / NET: 435 mL    05-30 @ 07:01  -  05-31 @ 06:59  --------------------------------------------------------  IN: 340 mL / OUT: 350 mL / NET: -10 mL        PHYSICAL EXAM:  Constitutional: NAD  HEENT: anicteric sclera, oropharynx clear, MMM  Neck: No JVD  Respiratory: CTAB, no wheezes, rales or rhonchi  Cardiovascular: S1, S2, RRR  Gastrointestinal: BS+, soft, NT/ND  Extremities: No cyanosis or clubbing. No peripheral edema  Neurological: A/O x 3, no focal deficits  Psychiatric: Normal mood, normal affect  : No CVA tenderness. No lynn.       LABS: pending today    05-30    142  |  103  |  50<H>  ----------------------------<  101<H>  4.6   |  24  |  2.86<H>    Ca    9.0      30 May 2018 06:40  Mg     2.0     05-30      Creatinine Trend: 2.86 <--, 2.75 <--, 2.67 <--, 2.62 <--, 2.66 <--                        9.6    7.66  )-----------( 274      ( 30 May 2018 06:40 )             30.4

## 2018-05-31 NOTE — PROGRESS NOTE ADULT - SUBJECTIVE AND OBJECTIVE BOX
Cardiovascular Disease Progress Note    Overnight events: No acute events overnight. Ms. Villagomez denies chest pain or SOB. She has pain in the legs.   Otherwise review of systems negative    Objective Findings:  T(C): 36.9 (05-31-18 @ 05:35), Max: 36.9 (05-30-18 @ 21:12)  HR: 88 (05-31-18 @ 05:35) (77 - 92)  BP: 169/82 (05-31-18 @ 05:35) (136/52 - 169/82)  RR: 18 (05-31-18 @ 05:35) (18 - 18)  SpO2: 100% (05-31-18 @ 05:35) (100% - 100%)  Wt(kg): --  Daily     Daily       Physical Exam:  Gen: NAD  HEENT: EOMI  CV: RRR, normal S1 + S2, no m/r/g  Lungs: CTAB  Abd: soft, non-tender  Ext: 1+ pedal edema    Telemetry: Sinus    Laboratory Data:                        9.1    7.83  )-----------( 240      ( 31 May 2018 06:45 )             28.3     05-31    139  |  102  |  52<H>  ----------------------------<  102<H>  4.4   |  24  |  3.06<H>    Ca    8.8      31 May 2018 06:45  Mg     2.0     05-30      PT/INR - ( 30 May 2018 06:40 )   PT: 9.9 SEC;   INR: 0.86          PTT - ( 30 May 2018 06:40 )  PTT:32.2 SEC          Inpatient Medications:  MEDICATIONS  (STANDING):  buMETAnide Injectable 1 milliGRAM(s) IV Push every 12 hours  heparin  Injectable 5000 Unit(s) SubCutaneous every 8 hours  hydrALAZINE 50 milliGRAM(s) Oral every 8 hours  isosorbide   mononitrate ER Tablet (IMDUR) 120 milliGRAM(s) Oral daily  metoprolol succinate  milliGRAM(s) Oral daily  pantoprazole    Tablet 40 milliGRAM(s) Oral before breakfast  pregabalin 75 milliGRAM(s) Oral two times a day  simvastatin 40 milliGRAM(s) Oral at bedtime      Assessment: 87 year old woman with HTN, CKD IV, and HLD presents with acute diastolic CHF.    Plan of Care:    #Acute on chronic diastolic CHF-  Likely secondary to progressive renal disease.   Pedal edema improving.  Please transition to Bumex 1 mg PO BID.  TTE from 6 months ago reviewed- preserved LVEF with no significant valvulopathy.  Patient and family declining renal biopsy.     No further inpatient cardiac work up.     Over 35 minutes spent on total encounter; more than 50% of the visit was spent counseling and/or coordinating care by the attending physician.      Justin Vick MD [facility]  Cardiovascular Disease  [phone]

## 2018-05-31 NOTE — PROGRESS NOTE ADULT - ASSESSMENT
87 year-old woman with SHY, hemodynamically-meidated in the setting of volume overload on diuretics.   CKD 4, secondary to HTN and chronic NSAID use, with volume overload/LE edema.  Paraproteinemia with severe proteinuria.  Only mild hypoalbuminemia.  This still likely represents a nephrotic process.  Pt and son refuse biopsy.  Will continue medical management.

## 2018-06-01 ENCOUNTER — TRANSCRIPTION ENCOUNTER (OUTPATIENT)
Age: 83
End: 2018-06-01

## 2018-06-01 VITALS — SYSTOLIC BLOOD PRESSURE: 120 MMHG | HEART RATE: 75 BPM | DIASTOLIC BLOOD PRESSURE: 88 MMHG

## 2018-06-01 LAB
BASOPHILS # BLD AUTO: 0.06 K/UL — SIGNIFICANT CHANGE UP (ref 0–0.2)
BASOPHILS NFR BLD AUTO: 0.7 % — SIGNIFICANT CHANGE UP (ref 0–2)
BUN SERPL-MCNC: 54 MG/DL — HIGH (ref 7–23)
CALCIUM SERPL-MCNC: 8.7 MG/DL — SIGNIFICANT CHANGE UP (ref 8.4–10.5)
CHLORIDE SERPL-SCNC: 101 MMOL/L — SIGNIFICANT CHANGE UP (ref 98–107)
CO2 SERPL-SCNC: 23 MMOL/L — SIGNIFICANT CHANGE UP (ref 22–31)
CREAT SERPL-MCNC: 3.24 MG/DL — HIGH (ref 0.5–1.3)
EOSINOPHIL # BLD AUTO: 0.31 K/UL — SIGNIFICANT CHANGE UP (ref 0–0.5)
EOSINOPHIL NFR BLD AUTO: 3.8 % — SIGNIFICANT CHANGE UP (ref 0–6)
GLUCOSE SERPL-MCNC: 105 MG/DL — HIGH (ref 70–99)
HCT VFR BLD CALC: 28.7 % — LOW (ref 34.5–45)
HGB BLD-MCNC: 9 G/DL — LOW (ref 11.5–15.5)
IMM GRANULOCYTES # BLD AUTO: 0.02 # — SIGNIFICANT CHANGE UP
IMM GRANULOCYTES NFR BLD AUTO: 0.2 % — SIGNIFICANT CHANGE UP (ref 0–1.5)
LYMPHOCYTES # BLD AUTO: 2.1 K/UL — SIGNIFICANT CHANGE UP (ref 1–3.3)
LYMPHOCYTES # BLD AUTO: 25.5 % — SIGNIFICANT CHANGE UP (ref 13–44)
MAGNESIUM SERPL-MCNC: 2 MG/DL — SIGNIFICANT CHANGE UP (ref 1.6–2.6)
MCHC RBC-ENTMCNC: 27.2 PG — SIGNIFICANT CHANGE UP (ref 27–34)
MCHC RBC-ENTMCNC: 31.4 % — LOW (ref 32–36)
MCV RBC AUTO: 86.7 FL — SIGNIFICANT CHANGE UP (ref 80–100)
MONOCYTES # BLD AUTO: 1.09 K/UL — HIGH (ref 0–0.9)
MONOCYTES NFR BLD AUTO: 13.3 % — SIGNIFICANT CHANGE UP (ref 2–14)
NEUTROPHILS # BLD AUTO: 4.64 K/UL — SIGNIFICANT CHANGE UP (ref 1.8–7.4)
NEUTROPHILS NFR BLD AUTO: 56.5 % — SIGNIFICANT CHANGE UP (ref 43–77)
NRBC # FLD: 0 — SIGNIFICANT CHANGE UP
PLATELET # BLD AUTO: 235 K/UL — SIGNIFICANT CHANGE UP (ref 150–400)
PMV BLD: 11.8 FL — SIGNIFICANT CHANGE UP (ref 7–13)
POTASSIUM SERPL-MCNC: 4.6 MMOL/L — SIGNIFICANT CHANGE UP (ref 3.5–5.3)
POTASSIUM SERPL-SCNC: 4.6 MMOL/L — SIGNIFICANT CHANGE UP (ref 3.5–5.3)
RBC # BLD: 3.31 M/UL — LOW (ref 3.8–5.2)
RBC # FLD: 15.8 % — HIGH (ref 10.3–14.5)
SODIUM SERPL-SCNC: 139 MMOL/L — SIGNIFICANT CHANGE UP (ref 135–145)
WBC # BLD: 8.22 K/UL — SIGNIFICANT CHANGE UP (ref 3.8–10.5)
WBC # FLD AUTO: 8.22 K/UL — SIGNIFICANT CHANGE UP (ref 3.8–10.5)

## 2018-06-01 PROCEDURE — 93306 TTE W/DOPPLER COMPLETE: CPT | Mod: 26

## 2018-06-01 RX ORDER — AMLODIPINE BESYLATE 2.5 MG/1
1 TABLET ORAL
Qty: 0 | Refills: 0 | COMMUNITY

## 2018-06-01 RX ORDER — HYDRALAZINE HCL 50 MG
1 TABLET ORAL
Qty: 90 | Refills: 0 | OUTPATIENT
Start: 2018-06-01 | End: 2018-06-30

## 2018-06-01 RX ADMIN — PANTOPRAZOLE SODIUM 40 MILLIGRAM(S): 20 TABLET, DELAYED RELEASE ORAL at 05:33

## 2018-06-01 RX ADMIN — Medication 50 MILLIGRAM(S): at 05:33

## 2018-06-01 RX ADMIN — Medication 75 MILLIGRAM(S): at 18:23

## 2018-06-01 RX ADMIN — Medication 75 MILLIGRAM(S): at 05:33

## 2018-06-01 RX ADMIN — BUMETANIDE 1 MILLIGRAM(S): 0.25 INJECTION INTRAMUSCULAR; INTRAVENOUS at 05:34

## 2018-06-01 RX ADMIN — Medication 50 MILLIGRAM(S): at 13:14

## 2018-06-01 RX ADMIN — BUMETANIDE 1 MILLIGRAM(S): 0.25 INJECTION INTRAMUSCULAR; INTRAVENOUS at 18:23

## 2018-06-01 RX ADMIN — ISOSORBIDE MONONITRATE 120 MILLIGRAM(S): 60 TABLET, EXTENDED RELEASE ORAL at 13:14

## 2018-06-01 RX ADMIN — HEPARIN SODIUM 5000 UNIT(S): 5000 INJECTION INTRAVENOUS; SUBCUTANEOUS at 13:14

## 2018-06-01 RX ADMIN — HEPARIN SODIUM 5000 UNIT(S): 5000 INJECTION INTRAVENOUS; SUBCUTANEOUS at 05:33

## 2018-06-01 RX ADMIN — Medication 100 MILLIGRAM(S): at 05:33

## 2018-06-01 NOTE — PROGRESS NOTE ADULT - ATTENDING COMMENTS
[facility] NEPHROLOGY  Bubba Mercado M.D.  Reagan Pierre D.O.  Kristin Martinez M.D.  Paola Monique, MSN, ANP-C    Telephone: [phone]  Facsimile: [phone]    [address] [place] [address]
[facility] NEPHROLOGY  Bubba Mercado M.D.  Reagan Pierre D.O.  Kristin Martinez M.D.  Paola Monique, MSN, ANP-C    Telephone: [phone]  Facsimile: [phone]    [address] [place] [address]
[facility] NEPHROLOGY  Bubba Mercado M.D.  Reagan Pierre D.O.  Kristin Martinez M.D.  Paola Monique, MSN, ANP-C    Telephone: [phone]  Facsimile: [phone]    [address] [place] [address]
[facility] NEPHROLOGY  Bubba Mercado M.D.  Reagan Pierre D.O.  Kristin Martinez M.D.  Paola Monique, MSN, ANP-C    Telephone: [phone]  Facsimile: [phone]    [address] [place] [address]
[facility] NEPHROLOGY  Bubba Mercado M.D.  Reagan Pierre D.O.  Kristin Martinez M.D.  Paola Monique, MSN, ANP-C    Telephone: [phone]  Facsimile: [phone]    [address] [place] [address]

## 2018-06-01 NOTE — PROGRESS NOTE ADULT - PROBLEM SELECTOR PROBLEM 4
Hypertensive kidney disease with chronic kidney disease, stage 1 through stage 4 or unspecified 
Proteinuria, unspecified type

## 2018-06-01 NOTE — PROGRESS NOTE ADULT - SUBJECTIVE AND OBJECTIVE BOX
Subjective : Pt lying in bed comfortable, not in distress, denies any chest pain or SOB  	  MEDICATIONS  (STANDING):  buMETAnide 1 milliGRAM(s) Oral two times a day  heparin  Injectable 5000 Unit(s) SubCutaneous every 8 hours  hydrALAZINE 50 milliGRAM(s) Oral every 8 hours  isosorbide   mononitrate ER Tablet (IMDUR) 120 milliGRAM(s) Oral daily  metoprolol succinate  milliGRAM(s) Oral daily  pantoprazole    Tablet 40 milliGRAM(s) Oral before breakfast  pregabalin 75 milliGRAM(s) Oral two times a day  simvastatin 40 milliGRAM(s) Oral at bedtime    MEDICATIONS  (PRN):      Vital Signs Last 24 Hrs  T(C): 36.3 (01 Jun 2018 13:09), Max: 36.8 (31 May 2018 21:17)  T(F): 97.4 (01 Jun 2018 13:09), Max: 98.2 (31 May 2018 21:17)  HR: 72 (01 Jun 2018 13:09) (72 - 87)  BP: 136/52 (01 Jun 2018 13:09) (126/53 - 166/65)  BP(mean): --  RR: 18 (01 Jun 2018 13:09) (18 - 18)  SpO2: 99% (01 Jun 2018 13:09) (98% - 100%)    Constitutional: No fever, fatigue  Skin: No rash.  Eyes: No recent vision problems or eye pain.  ENT: No congestion, ear pain, or sore throat.  Cardiovascular: No chest pain or palpation.  Respiratory: No cough, shortness of breath, congestion, or wheezing.  Gastrointestinal: No abdominal pain, nausea, vomiting, or diarrhea.  Genitourinary: No dysuria.  Musculoskeletal: No joint swelling.  Neurologic: No headache.      Appearance: Normal	  HEENT:   Normal oral mucosa, PERRL, EOMI	  Cardiovascular: Normal S1 S2, No JVD, No murmurs, No edema  Respiratory: Lungs clear to auscultation	  Gastrointestinal:  Soft, Non-tender, + BS	  Extremities: Normal range of motion, No clubbing, cyanosis or edema    LABS:  06-01    139  |  101  |  54<H>  ----------------------------<  105<H>  4.6   |  23  |  3.24<H>    Ca    8.7      01 Jun 2018 05:45  Mg     2.0     06-01      Creatinine Trend: 3.24 <--, 3.06 <--, 2.86 <--, 2.75 <--, 2.67 <--, 2.62 <--, 2.66 <--                        9.0    8.22  )-----------( 235      ( 01 Jun 2018 05:45 )             28.7     Urine Studies:

## 2018-06-01 NOTE — DISCHARGE NOTE ADULT - CARE PROVIDER_API CALL
Bubba Mercado), Internal Medicine; Nephrology  [address] [place] [address]  Phone: [phone]  Fax: [phone]    Dr Dahlia Fields,   PCP  Phone: (   )    -  Fax: (   )    -

## 2018-06-01 NOTE — PROGRESS NOTE ADULT - SUBJECTIVE AND OBJECTIVE BOX
Cardiovascular Disease Progress Note    Overnight events: No acute events overnight. Ms. Villagomez denies chest pain or SOB.     Otherwise review of systems negative    Objective Findings:  T(C): 36.7 (06-01-18 @ 05:29), Max: 36.8 (05-31-18 @ 12:16)  HR: 87 (06-01-18 @ 05:29) (83 - 87)  BP: 166/65 (06-01-18 @ 05:29) (126/53 - 166/65)  RR: 18 (06-01-18 @ 05:29) (18 - 18)  SpO2: 98% (06-01-18 @ 05:29) (97% - 100%)  Wt(kg): --  Daily     Daily       Physical Exam:  Gen: NAD  HEENT: EOMI  CV: RRR, normal S1 + S2, no m/r/g  Lungs: CTAB  Abd: soft, non-tender  Ext: 1+ pedal edema    Telemetry: Sinus    Laboratory Data:                        9.0    8.22  )-----------( 235      ( 01 Jun 2018 05:45 )             28.7     06-01    139  |  101  |  54<H>  ----------------------------<  105<H>  4.6   |  23  |  3.24<H>    Ca    8.7      01 Jun 2018 05:45  Mg     2.0     06-01                Inpatient Medications:  MEDICATIONS  (STANDING):  buMETAnide 1 milliGRAM(s) Oral two times a day  heparin  Injectable 5000 Unit(s) SubCutaneous every 8 hours  hydrALAZINE 50 milliGRAM(s) Oral every 8 hours  isosorbide   mononitrate ER Tablet (IMDUR) 120 milliGRAM(s) Oral daily  metoprolol succinate  milliGRAM(s) Oral daily  pantoprazole    Tablet 40 milliGRAM(s) Oral before breakfast  pregabalin 75 milliGRAM(s) Oral two times a day  simvastatin 40 milliGRAM(s) Oral at bedtime      Assessment: 87 year old woman with HTN, CKD IV, and HLD presents with acute diastolic CHF.    Plan of Care:    #Acute on chronic diastolic CHF-  Likely secondary to progressive renal disease.   Pedal edema improving.  Continue Bumex 1 mg PO BID.  TTE from 6 months ago reviewed- preserved LVEF with no significant valvulopathy.  Patient and family declining renal biopsy.     No plan for ischemic evaluation in the setting of advanced renal disease.     Over 35 minutes spent on total encounter; more than 50% of the visit was spent counseling and/or coordinating care by the attending physician.      Justin Vick MD [facility]  Cardiovascular Disease  [phone] Cardiovascular Disease Progress Note    Overnight events: No acute events overnight. Ms. Villagomez denies chest pain or SOB.     Otherwise review of systems negative    Objective Findings:  T(C): 36.7 (06-01-18 @ 05:29), Max: 36.8 (05-31-18 @ 12:16)  HR: 87 (06-01-18 @ 05:29) (83 - 87)  BP: 166/65 (06-01-18 @ 05:29) (126/53 - 166/65)  RR: 18 (06-01-18 @ 05:29) (18 - 18)  SpO2: 98% (06-01-18 @ 05:29) (97% - 100%)  Wt(kg): --  Daily     Daily       Physical Exam:  Gen: NAD  HEENT: EOMI  CV: RRR, normal S1 + S2, no m/r/g  Lungs: CTAB  Abd: soft, non-tender  Ext: 1+ pedal edema    Telemetry: Sinus    Laboratory Data:                        9.0    8.22  )-----------( 235      ( 01 Jun 2018 05:45 )             28.7     06-01    139  |  101  |  54<H>  ----------------------------<  105<H>  4.6   |  23  |  3.24<H>    Ca    8.7      01 Jun 2018 05:45  Mg     2.0     06-01                Inpatient Medications:  MEDICATIONS  (STANDING):  buMETAnide 1 milliGRAM(s) Oral two times a day  heparin  Injectable 5000 Unit(s) SubCutaneous every 8 hours  hydrALAZINE 50 milliGRAM(s) Oral every 8 hours  isosorbide   mononitrate ER Tablet (IMDUR) 120 milliGRAM(s) Oral daily  metoprolol succinate  milliGRAM(s) Oral daily  pantoprazole    Tablet 40 milliGRAM(s) Oral before breakfast  pregabalin 75 milliGRAM(s) Oral two times a day  simvastatin 40 milliGRAM(s) Oral at bedtime      Assessment: 87 year old woman with HTN, CKD IV, and HLD presents with acute diastolic CHF.    Plan of Care:    #Acute on chronic diastolic CHF-  Likely secondary to progressive renal disease.   Pedal edema improving.  Patient now transitioned to oral Bumex.  TTE from 6 months ago reviewed- preserved LVEF with no significant valvulopathy.  Patient and family declining renal biopsy.     No plan for ischemic evaluation in the setting of advanced renal disease.     Over 35 minutes spent on total encounter; more than 50% of the visit was spent counseling and/or coordinating care by the attending physician.      Justin Vick MD [facility]  Cardiovascular Disease  [phone]

## 2018-06-01 NOTE — PROGRESS NOTE ADULT - PROVIDER SPECIALTY LIST ADULT
Cardiology
Internal Medicine
Nephrology
Cardiology
Nephrology

## 2018-06-01 NOTE — DISCHARGE NOTE ADULT - CARE PLAN
Principal Discharge DX:	Hypervolemia, unspecified hypervolemia type  Goal:	To relieve and prevent worsening symptoms associated with congestive heart failure, to improve quality of life, and to treat underlying conditions such as coronary heart disease, high blood pressure, or diabetes, and to maintain euvolemia.  Assessment and plan of treatment:	Low salt diet, fluid restriction to 1500 ml daily, monitor your fluid intake and weight daily, exercise as tolerated 30 minutes daily, and follow up with your physician within 1 to 2 weeks.  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  Assessment and plan of treatment:	Low sodium and fat diet, continue anti-hypertensive medications, and follow up with primary care physician.  Secondary Diagnosis:	Stage 4 chronic kidney disease  Goal:	To prevent shortness of breath, fluid overload, and electrolytes imbalance, and to slow down worsening kidney disease.  Assessment and plan of treatment:	Continue blood pressure, cholesterol and diabetic medications. Goal of hemoglobin A1C (HgbA1C) < 7%.  Avoid nephrotoxic drugs such as nonsteroidal anti-inflammatory agents (NSAIDs).   Please follow up with your nephrologist in 1-2 weeks to monitor your kidney function, continue with low protein and potassium diet. Principal Discharge DX:	Hypervolemia, unspecified hypervolemia type  Goal:	To relieve and prevent worsening symptoms associated with congestive heart failure, to improve quality of life, and to treat underlying conditions such as coronary heart disease, high blood pressure, or diabetes, and to maintain euvolemia.  Assessment and plan of treatment:	Low salt diet, fluid restriction to 1500 ml daily, monitor your fluid intake and weight daily, exercise as tolerated 30 minutes daily, and follow up with your physician within 1 to 2 weeks.  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  Assessment and plan of treatment:	Low sodium and fat diet, continue anti-hypertensive medications, and follow up with primary care physician.  Secondary Diagnosis:	Stage 4 chronic kidney disease  Goal:	To prevent shortness of breath, fluid overload, and electrolytes imbalance, and to slow down worsening kidney disease.  Assessment and plan of treatment:	Continue blood pressure, cholesterol and diabetic medications. Goal of hemoglobin A1C (HgbA1C) < 7%.  Avoid nephrotoxic drugs such as nonsteroidal anti-inflammatory agents (NSAIDs).   Please follow up with your nephrologist in 1-2 weeks to monitor your kidney function, continue with low protein and potassium diet.  Secondary Diagnosis:	Pleural effusion  Assessment and plan of treatment:	left pleural effusion continue bumex and follow up with your PMD in 1-2 week Principal Discharge DX:	Hypervolemia, unspecified hypervolemia type  Goal:	To relieve and prevent worsening symptoms associated with congestive heart failure, to improve quality of life, and to treat underlying conditions such as coronary heart disease, high blood pressure, or diabetes, and to maintain euvolemia.  Assessment and plan of treatment:	Low salt diet, fluid restriction to 1500 ml daily, monitor your fluid intake and weight daily, exercise as tolerated 30 minutes daily, and follow up with your physician within 1 to 2 weeks.  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  Assessment and plan of treatment:	Low sodium and fat diet, continue anti-hypertensive medications, and follow up with primary care physician.  Secondary Diagnosis:	Stage 4 chronic kidney disease  Goal:	To prevent shortness of breath, fluid overload, and electrolytes imbalance, and to slow down worsening kidney disease.  Assessment and plan of treatment:	Continue blood pressure, cholesterol and diabetic medications. Goal of hemoglobin A1C (HgbA1C) < 7%.  Avoid nephrotoxic drugs such as nonsteroidal anti-inflammatory agents (NSAIDs).   Please follow up with your nephrologist in 1-2 weeks to monitor your kidney function, continue with low protein and potassium diet.  Secondary Diagnosis:	Pleural effusion  Assessment and plan of treatment:	left pleural effusion continue bumex and follow up with your PMD in 1 week

## 2018-06-01 NOTE — CHART NOTE - NSCHARTNOTEFT_GEN_A_CORE
TTE result reviewed with attending, cleared for discharge with recommendation to followup with PMD regarding pleural effusion. Writer also spoke with David at [facility] to inform pt cleared for discharge to reinstate home service.

## 2018-06-01 NOTE — DISCHARGE NOTE ADULT - PATIENT PORTAL LINK FT
You can access the [facility] Portal, offered by [facility], by registering with the following website: [email or web address]

## 2018-06-01 NOTE — PROGRESS NOTE ADULT - SUBJECTIVE AND OBJECTIVE BOX
[facility] NEPHROLOGY- CONSULTATION NOTE    87 year old male h/o CKD-4 (baseline  creatinine ~ 2.3) secondary to HTN and chronic NSAID use, paraproteinemia who presented to the hospital with SOB and worsening LE edema. She had been on bumex 1mg bid, but was noncompliant with fluid and fluid restriction. No ACEi/ARBs/NSAIDs/IV Contrast.    Pt has a paraproteinemia with a negative bone marrow biopsy. She continues to have nephrotic range proteinuria (TP/CR 12 on last outpatient labs last month).  I spoke with pt and son, who after a long discussion, very reasonably do not want a biopsy (although I do recommend it).  Will not biopsy.  Will continue medical management.    Pt reports no further SOB.    REVIEW OF SYSTEMS: no h/a, cp, sob, abd pain.    VITALS:  T(F): 97.4 (06-01-18 @ 13:09), Max: 98.1 (06-01-18 @ 05:29)  HR: 75 (06-01-18 @ 18:22)  BP: 120/88 (06-01-18 @ 18:22)  RR: 18 (06-01-18 @ 13:09)  SpO2: 99% (06-01-18 @ 13:09)  Wt(kg): --    05-31 @ 07:01  -  06-01 @ 07:00  --------------------------------------------------------  IN: 510 mL / OUT: 1190 mL / NET: -680 mL    06-01 @ 07:01  -  06-01 @ 22:08  --------------------------------------------------------  IN: 0 mL / OUT: 550 mL / NET: -550 mL        Weight (kg): 59.9 (06-01 @ 17:59)    PHYSICAL EXAM:  Constitutional: NAD  HEENT: anicteric sclera, oropharynx clear, MMM  Neck: No JVD  Respiratory: CTAB, no wheezes, rales or rhonchi  Cardiovascular: S1, S2, RRR  Gastrointestinal: BS+, soft, NT/ND  Extremities: No cyanosis or clubbing. No peripheral edema  Neurological: A/O x 3, no focal deficits  Psychiatric: Normal mood, normal affect  : No CVA tenderness. No lynn.       LABS:  06-01    139  |  101  |  54<H>  ----------------------------<  105<H>  4.6   |  23  |  3.24<H>    Ca    8.7      01 Jun 2018 05:45  Mg     2.0     06-01      Creatinine Trend: 3.24 <--, 3.06 <--, 2.86 <--, 2.75 <--, 2.67 <--, 2.62 <--, 2.66 <--                        9.0    8.22  )-----------( 235      ( 01 Jun 2018 05:45 )             28.7     Urine Studies:

## 2018-06-01 NOTE — PROGRESS NOTE ADULT - PROBLEM SELECTOR PLAN 3
S/P CAPSULE - No evidence of bleeding in the entire colon.                       - Diverticulosis in the sigmoid colon.                       - Non-bleeding internal hemorrhoids.    < end of copied text >      stable hb/ hct at present  poss BM BIOPSY AS OUT PT AS PER HEME
Agree with bumex IV.
Agree with bumex. Consider changing to po soon.
Agree with bumex. Consider changing to po soon.

## 2018-06-01 NOTE — DISCHARGE NOTE ADULT - HOSPITAL COURSE
87 year old female pmh of CKD stage 4, HTN, HLD, shingles 4/2018,  presents with lower extremity swelling and shortness of breath. One week ago saw Dr. Villagomez for leg swelling, intermittently better and worse over course of the week, he continued her water pill and elevate her legs. Since last night swelling worse and making hard to walk. states legs are heavy and notes VIRGEN with small amount of exertion. occasionally has chills. Denies chest pain, abd pain, palpitations, fever, dysuria, hematuria, diarrhea, brbpr, melena, vertigo, dizziness, headache, tinnitus, hearting difficulty, syncope, LOC, seizure activity. no recent travel, no sick contact.     Hospital course:  EKG NSR @ 97 with LVH and repol  Trop #1 0.11  BNP 8440  CXR Bilateral small effusion with passive atelectasis  LE doppler: No evidence of bilateral lower extremity deep venous thrombosis.    CE in setting of CKD/SHY. Pt with fluid overload likely secondary to renal disease. Per cards no plan for ischemic eval in setting of CKD. Pt started on IV bumex with improvement and transitioned to PO. Renal following for worsen kidney function recommended renal bx which pt refused. Pt with controlled BP, BP regimen tailored. PT recommended rehab but family and pt wants home with service. As per Dr Moreno pt cleared for discharge on 6/1 87 year old female pmh of CKD stage 4, HTN, HLD, shingles 4/2018,  presents with lower extremity swelling and shortness of breath. One week ago saw Dr. Villagomez for leg swelling, intermittently better and worse over course of the week, he continued her water pill and elevate her legs. Since last night swelling worse and making hard to walk. states legs are heavy and notes VIRGEN with small amount of exertion. occasionally has chills. Denies chest pain, abd pain, palpitations, fever, dysuria, hematuria, diarrhea, brbpr, melena, vertigo, dizziness, headache, tinnitus, hearting difficulty, syncope, LOC, seizure activity. no recent travel, no sick contact.     Hospital course:  EKG NSR @ 97 with LVH and repol  Trop #1 0.11  BNP 8440  CXR Bilateral small effusion with passive atelectasis  LE doppler: No evidence of bilateral lower extremity deep venous thrombosis.  TTE: EF62  Mitral annular calcification, otherwise normal mitral valve. Mild mitral regurgitation. Calcified trileaflet aortic valve with decreased opening. Peak transaortic valve gradient equals 20 mm Hg, mean transaortic valve gradient equals 11 mm Hg, consistent with mild aortic stenosis. Mildly dilated left atrium.  LA volume index = 37 cc/m2. Normal left ventricular internal dimensions and wall thicknesses. Normal left ventricular systolic function. No segmental wall motion abnormalities. Normal right ventricular size and function. Left pleural effusion.    CE in setting of CKD/SHY. Pt with fluid overload likely secondary to renal disease. Per cards no plan for ischemic eval in setting of CKD. Pt started on IV bumex with improvement and transitioned to PO. TTE noted no wall motion abnormality, left pleural effusion. Renal following for worsen kidney function recommended renal bx which pt refused. Pt with controlled BP, BP regimen tailored. PT recommended rehab but family and pt wants home with service. As per Dr Moreno pt cleared for discharge on 6/1

## 2018-06-01 NOTE — PROGRESS NOTE ADULT - PROBLEM SELECTOR PLAN 6
Agree with hydralazine.  F/u cardiology

## 2018-06-01 NOTE — PROGRESS NOTE ADULT - PROBLEM SELECTOR PLAN 4
cont metoprolol  hydralazine titrate as per cards
cont metoprolol  hydralazine titrate as per cards
cont metoprolol+ norvasc
cont metoprolol+ norvasc
cont metoprolol+ norvasc  hydralazine titrate as per cards
cont metoprolol+ norvasc  hydralazine titrate as per cards
Severe proteinuria, certainly nephrotic range, and probably due to a nephrotic process.  Kidney biopsy today or tomorrow.
Severe proteinuria, certainly nephrotic range, and probably due to a nephrotic process.  Recommend kidney biopsy, but pt and son refuse.
Severe proteinuria, certainly nephrotic range, and probably due to a nephrotic process.  Recommend kidney biopsy.
Severe proteinuria, certainly nephrotic range, and probably due to a nephrotic process.  Recommend kidney biopsy.
Severe proteinuria, certainly nephrotic range, and probably due to a nephrotic process.  Recommend kidney biopsy, but pt and son refuse.

## 2018-06-01 NOTE — PROGRESS NOTE ADULT - PROBLEM SELECTOR PROBLEM 3
Anemia of renal disease
Edema of lower extremity

## 2018-06-01 NOTE — DISCHARGE NOTE ADULT - PLAN OF CARE
To relieve and prevent worsening symptoms associated with congestive heart failure, to improve quality of life, and to treat underlying conditions such as coronary heart disease, high blood pressure, or diabetes, and to maintain euvolemia. Low salt diet, fluid restriction to 1500 ml daily, monitor your fluid intake and weight daily, exercise as tolerated 30 minutes daily, and follow up with your physician within 1 to 2 weeks. To maintain a normal blood pressure to prevent heart attack, stroke and renal failure. Low sodium and fat diet, continue anti-hypertensive medications, and follow up with primary care physician. To prevent shortness of breath, fluid overload, and electrolytes imbalance, and to slow down worsening kidney disease. Continue blood pressure, cholesterol and diabetic medications. Goal of hemoglobin A1C (HgbA1C) < 7%.  Avoid nephrotoxic drugs such as nonsteroidal anti-inflammatory agents (NSAIDs).   Please follow up with your nephrologist in 1-2 weeks to monitor your kidney function, continue with low protein and potassium diet. left pleural effusion continue bumex and follow up with your PMD in 1-2 week left pleural effusion continue bumex and follow up with your PMD in 1 week

## 2018-06-01 NOTE — PROGRESS NOTE ADULT - PROBLEM SELECTOR PLAN 5
Bone marrow biopsy negative.  Kidney biopsy today or tomorrow.
Bone marrow biopsy negative.  Recommend kidney biopsy, but pt and son refuse..
Bone marrow biopsy negative.  Recommend kidney biopsy.
Bone marrow biopsy negative.  Recommend kidney biopsy.
Bone marrow biopsy negative.  Recommend kidney biopsy, but pt and son refuse..

## 2018-06-01 NOTE — PROGRESS NOTE ADULT - PROBLEM SELECTOR PLAN 2
stable at present , k Is nl  RENAL BIOPSY AS PER RENAL - out pt
stable at present , k Is nl  RENAL BIOPSY AS PER RENAL - out pt
stable at present , k Is nl  RENAL BIOPSY AS PER RENAL - pt refused
stable at present , k Is nl  RENAL BIOPSY AS PER RENAL - pt refused  renal cleared pt for dc
CKD Stage  4: secondary to HTN and chronic NSAID use  Renal function stable. Avoid nephrotoxins/ RCA/ NSAIDs. Monitor BMP.

## 2018-06-01 NOTE — PROGRESS NOTE ADULT - PROBLEM SELECTOR PROBLEM 6
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

## 2018-06-01 NOTE — PROGRESS NOTE ADULT - PROBLEM SELECTOR PROBLEM 1
Shortness of breath
Acute kidney injury

## 2018-06-01 NOTE — DISCHARGE NOTE ADULT - PROVIDER TOKENS
TOKEN:'[id number]',FREE:[LAST:[Dr Dahlia Fields],PHONE:[(   )    -],FAX:[(   )    -],ADDRESS:[PCP]]

## 2018-06-01 NOTE — DISCHARGE NOTE ADULT - MEDICATION SUMMARY - MEDICATIONS TO TAKE
I will START or STAY ON the medications listed below when I get home from the hospital:    isosorbide mononitrate 120 mg oral tablet, extended release  -- 1 tab(s) by mouth once a day  -- Indication: For Pain    Lyrica 75 mg oral capsule  -- 1 cap(s) by mouth 2 times a day  -- Indication: For Pain    simvastatin 40 mg oral tablet  -- 1 tab(s) by mouth once a day (at bedtime)  -- Indication: For Hld    metoprolol succinate 100 mg oral tablet, extended release  -- 1 tab(s) by mouth once a day  -- Indication: For Htn    bumetanide 1 mg oral tablet  -- 1 tab(s) by mouth 2 times a day  -- Indication: For fluid overload    pantoprazole 40 mg oral delayed release tablet  -- 1 tab(s) by mouth once a day  -- Indication: For gerd    hydrALAZINE 50 mg oral tablet  -- 1 tab(s) by mouth every 8 hours  -- Indication: For Htn

## 2018-06-01 NOTE — PROGRESS NOTE ADULT - PROBLEM SELECTOR PROBLEM 2
CKD (chronic kidney disease), stage IV
CKD (chronic kidney disease) stage 4, GFR 15-29 ml/min

## 2018-06-01 NOTE — DISCHARGE NOTE ADULT - HOME CARE AGENCY
[facility] OF -[phone] [facility]ISIT[facility] NUR[phone] VISITING NURSE WILL CALL for APPOINTMENT

## 2018-06-01 NOTE — PROGRESS NOTE ADULT - PROBLEM SELECTOR PLAN 1
BUMEX TO MARIANO rojo f/u reviewed
BUMEX TO PO   cards f/u reviewed
cont bumex
cont bumex
cont iv bumex  zaroxylyn - Started BY CARDS
cont iv bumex  zaroxylyn - Started BY CARDS
Due to volume overload on diuretics.    Agree with bumex IV.  Monitor renal function.
Due to volume overload on diuretics.    Continue bumex. Consider changing to po soon.  Monitor renal function.
Due to volume overload on diuretics.    Continue bumex. Consider changing to po soon.  Monitor renal function.

## 2018-08-21 ENCOUNTER — INPATIENT (INPATIENT)
Facility: HOSPITAL | Age: 83
LOS: 5 days | Discharge: HOME HEALTH | End: 2018-08-27
Attending: INTERNAL MEDICINE | Admitting: INTERNAL MEDICINE
Payer: MEDICARE

## 2018-08-21 VITALS
DIASTOLIC BLOOD PRESSURE: 99 MMHG | HEART RATE: 102 BPM | OXYGEN SATURATION: 99 % | TEMPERATURE: 98 F | RESPIRATION RATE: 18 BRPM | SYSTOLIC BLOOD PRESSURE: 220 MMHG

## 2018-08-21 DIAGNOSIS — D63.1 ANEMIA IN CHRONIC KIDNEY DISEASE: ICD-10-CM

## 2018-08-21 DIAGNOSIS — N18.5 CHRONIC KIDNEY DISEASE, STAGE 5: ICD-10-CM

## 2018-08-21 DIAGNOSIS — N25.81 SECONDARY HYPERPARATHYROIDISM OF RENAL ORIGIN: ICD-10-CM

## 2018-08-21 DIAGNOSIS — M79.602 PAIN IN LEFT ARM: ICD-10-CM

## 2018-08-21 DIAGNOSIS — J45.909 UNSPECIFIED ASTHMA, UNCOMPLICATED: ICD-10-CM

## 2018-08-21 DIAGNOSIS — Z29.9 ENCOUNTER FOR PROPHYLACTIC MEASURES, UNSPECIFIED: ICD-10-CM

## 2018-08-21 DIAGNOSIS — R60.0 LOCALIZED EDEMA: ICD-10-CM

## 2018-08-21 DIAGNOSIS — Z90.710 ACQUIRED ABSENCE OF BOTH CERVIX AND UTERUS: Chronic | ICD-10-CM

## 2018-08-21 DIAGNOSIS — I12.9 HYPERTENSIVE CHRONIC KIDNEY DISEASE WITH STAGE 1 THROUGH STAGE 4 CHRONIC KIDNEY DISEASE, OR UNSPECIFIED CHRONIC KIDNEY DISEASE: ICD-10-CM

## 2018-08-21 DIAGNOSIS — I50.32 CHRONIC DIASTOLIC (CONGESTIVE) HEART FAILURE: ICD-10-CM

## 2018-08-21 DIAGNOSIS — I10 ESSENTIAL (PRIMARY) HYPERTENSION: ICD-10-CM

## 2018-08-21 DIAGNOSIS — M79.609 PAIN IN UNSPECIFIED LIMB: ICD-10-CM

## 2018-08-21 PROBLEM — E78.00 PURE HYPERCHOLESTEROLEMIA, UNSPECIFIED: Chronic | Status: ACTIVE | Noted: 2018-05-26

## 2018-08-21 PROBLEM — N18.9 CHRONIC KIDNEY DISEASE, UNSPECIFIED: Chronic | Status: ACTIVE | Noted: 2018-05-26

## 2018-08-21 LAB
ALBUMIN SERPL ELPH-MCNC: 3.5 G/DL — SIGNIFICANT CHANGE UP (ref 3.3–5)
ALP SERPL-CCNC: 56 U/L — SIGNIFICANT CHANGE UP (ref 40–120)
ALT FLD-CCNC: 11 U/L — SIGNIFICANT CHANGE UP (ref 4–33)
AST SERPL-CCNC: 21 U/L — SIGNIFICANT CHANGE UP (ref 4–32)
BASE EXCESS BLDV CALC-SCNC: 0.8 MMOL/L — SIGNIFICANT CHANGE UP
BASOPHILS # BLD AUTO: 0.07 K/UL — SIGNIFICANT CHANGE UP (ref 0–0.2)
BASOPHILS NFR BLD AUTO: 0.6 % — SIGNIFICANT CHANGE UP (ref 0–2)
BILIRUB SERPL-MCNC: 0.3 MG/DL — SIGNIFICANT CHANGE UP (ref 0.2–1.2)
BLOOD GAS VENOUS - CREATININE: 2.97 MG/DL — HIGH (ref 0.5–1.3)
BUN SERPL-MCNC: 50 MG/DL — HIGH (ref 7–23)
CALCIUM SERPL-MCNC: 9.5 MG/DL — SIGNIFICANT CHANGE UP (ref 8.4–10.5)
CHLORIDE BLDV-SCNC: 103 MMOL/L — SIGNIFICANT CHANGE UP (ref 96–108)
CHLORIDE SERPL-SCNC: 100 MMOL/L — SIGNIFICANT CHANGE UP (ref 98–107)
CK SERPL-CCNC: 218 U/L — HIGH (ref 25–170)
CO2 SERPL-SCNC: 21 MMOL/L — LOW (ref 22–31)
CREAT SERPL-MCNC: 2.84 MG/DL — HIGH (ref 0.5–1.3)
CRP SERPL-MCNC: 7.8 MG/L — HIGH
EOSINOPHIL # BLD AUTO: 0.21 K/UL — SIGNIFICANT CHANGE UP (ref 0–0.5)
EOSINOPHIL NFR BLD AUTO: 1.8 % — SIGNIFICANT CHANGE UP (ref 0–6)
ERYTHROCYTE [SEDIMENTATION RATE] IN BLOOD: 75 MM/HR — HIGH (ref 4–25)
GAS PNL BLDV: 137 MMOL/L — SIGNIFICANT CHANGE UP (ref 136–146)
GLUCOSE BLDV-MCNC: 139 — HIGH (ref 70–99)
GLUCOSE SERPL-MCNC: 125 MG/DL — HIGH (ref 70–99)
HCO3 BLDV-SCNC: 24 MMOL/L — SIGNIFICANT CHANGE UP (ref 20–27)
HCT VFR BLD CALC: 27.6 % — LOW (ref 34.5–45)
HCT VFR BLDV CALC: 28.4 % — LOW (ref 34.5–45)
HGB BLD-MCNC: 8.7 G/DL — LOW (ref 11.5–15.5)
HGB BLDV-MCNC: 9.2 G/DL — LOW (ref 11.5–15.5)
IMM GRANULOCYTES # BLD AUTO: 0.04 # — SIGNIFICANT CHANGE UP
IMM GRANULOCYTES NFR BLD AUTO: 0.3 % — SIGNIFICANT CHANGE UP (ref 0–1.5)
LACTATE BLDV-MCNC: 0.8 MMOL/L — SIGNIFICANT CHANGE UP (ref 0.5–2)
LYMPHOCYTES # BLD AUTO: 0.69 K/UL — LOW (ref 1–3.3)
LYMPHOCYTES # BLD AUTO: 6 % — LOW (ref 13–44)
MCHC RBC-ENTMCNC: 27 PG — SIGNIFICANT CHANGE UP (ref 27–34)
MCHC RBC-ENTMCNC: 31.5 % — LOW (ref 32–36)
MCV RBC AUTO: 85.7 FL — SIGNIFICANT CHANGE UP (ref 80–100)
MONOCYTES # BLD AUTO: 1.13 K/UL — HIGH (ref 0–0.9)
MONOCYTES NFR BLD AUTO: 9.8 % — SIGNIFICANT CHANGE UP (ref 2–14)
NEUTROPHILS # BLD AUTO: 9.44 K/UL — HIGH (ref 1.8–7.4)
NEUTROPHILS NFR BLD AUTO: 81.5 % — HIGH (ref 43–77)
NRBC # FLD: 0 — SIGNIFICANT CHANGE UP
PCO2 BLDV: 43 MMHG — SIGNIFICANT CHANGE UP (ref 41–51)
PH BLDV: 7.39 PH — SIGNIFICANT CHANGE UP (ref 7.32–7.43)
PLATELET # BLD AUTO: 391 K/UL — SIGNIFICANT CHANGE UP (ref 150–400)
PMV BLD: 10.5 FL — SIGNIFICANT CHANGE UP (ref 7–13)
PO2 BLDV: 26 MMHG — LOW (ref 35–40)
POTASSIUM BLDV-SCNC: 4.4 MMOL/L — SIGNIFICANT CHANGE UP (ref 3.4–4.5)
POTASSIUM SERPL-MCNC: 4.7 MMOL/L — SIGNIFICANT CHANGE UP (ref 3.5–5.3)
POTASSIUM SERPL-SCNC: 4.7 MMOL/L — SIGNIFICANT CHANGE UP (ref 3.5–5.3)
PROT SERPL-MCNC: 6.8 G/DL — SIGNIFICANT CHANGE UP (ref 6–8.3)
RBC # BLD: 3.22 M/UL — LOW (ref 3.8–5.2)
RBC # FLD: 14.1 % — SIGNIFICANT CHANGE UP (ref 10.3–14.5)
SAO2 % BLDV: 42.5 % — LOW (ref 60–85)
SODIUM SERPL-SCNC: 137 MMOL/L — SIGNIFICANT CHANGE UP (ref 135–145)
WBC # BLD: 11.58 K/UL — HIGH (ref 3.8–10.5)
WBC # FLD AUTO: 11.58 K/UL — HIGH (ref 3.8–10.5)

## 2018-08-21 PROCEDURE — 73110 X-RAY EXAM OF WRIST: CPT | Mod: 26,LT

## 2018-08-21 PROCEDURE — 73120 X-RAY EXAM OF HAND: CPT | Mod: 26,LT

## 2018-08-21 PROCEDURE — 73030 X-RAY EXAM OF SHOULDER: CPT | Mod: 26,LT

## 2018-08-21 PROCEDURE — 93971 EXTREMITY STUDY: CPT | Mod: 26

## 2018-08-21 PROCEDURE — 99223 1ST HOSP IP/OBS HIGH 75: CPT

## 2018-08-21 PROCEDURE — 71046 X-RAY EXAM CHEST 2 VIEWS: CPT | Mod: 26

## 2018-08-21 RX ORDER — ACETAMINOPHEN 500 MG
650 TABLET ORAL EVERY 6 HOURS
Qty: 0 | Refills: 0 | Status: DISCONTINUED | OUTPATIENT
Start: 2018-08-21 | End: 2018-08-27

## 2018-08-21 RX ORDER — BUMETANIDE 0.25 MG/ML
2 INJECTION INTRAMUSCULAR; INTRAVENOUS ONCE
Qty: 0 | Refills: 0 | Status: COMPLETED | OUTPATIENT
Start: 2018-08-21 | End: 2018-08-21

## 2018-08-21 RX ORDER — METOPROLOL TARTRATE 50 MG
100 TABLET ORAL DAILY
Qty: 0 | Refills: 0 | Status: DISCONTINUED | OUTPATIENT
Start: 2018-08-21 | End: 2018-08-27

## 2018-08-21 RX ORDER — GABAPENTIN 400 MG/1
100 CAPSULE ORAL EVERY 12 HOURS
Qty: 0 | Refills: 0 | Status: DISCONTINUED | OUTPATIENT
Start: 2018-08-21 | End: 2018-08-27

## 2018-08-21 RX ORDER — ERGOCALCIFEROL 1.25 MG/1
50000 CAPSULE ORAL
Qty: 0 | Refills: 0 | Status: DISCONTINUED | OUTPATIENT
Start: 2018-08-21 | End: 2018-08-27

## 2018-08-21 RX ORDER — PANTOPRAZOLE SODIUM 20 MG/1
40 TABLET, DELAYED RELEASE ORAL
Qty: 0 | Refills: 0 | Status: DISCONTINUED | OUTPATIENT
Start: 2018-08-21 | End: 2018-08-27

## 2018-08-21 RX ORDER — ACETAMINOPHEN 500 MG
650 TABLET ORAL ONCE
Qty: 0 | Refills: 0 | Status: COMPLETED | OUTPATIENT
Start: 2018-08-21 | End: 2018-08-21

## 2018-08-21 RX ORDER — BUDESONIDE AND FORMOTEROL FUMARATE DIHYDRATE 160; 4.5 UG/1; UG/1
2 AEROSOL RESPIRATORY (INHALATION)
Qty: 0 | Refills: 0 | Status: DISCONTINUED | OUTPATIENT
Start: 2018-08-21 | End: 2018-08-27

## 2018-08-21 RX ORDER — MORPHINE SULFATE 50 MG/1
4 CAPSULE, EXTENDED RELEASE ORAL ONCE
Qty: 0 | Refills: 0 | Status: DISCONTINUED | OUTPATIENT
Start: 2018-08-21 | End: 2018-08-21

## 2018-08-21 RX ORDER — HYDRALAZINE HCL 50 MG
50 TABLET ORAL EVERY 8 HOURS
Qty: 0 | Refills: 0 | Status: DISCONTINUED | OUTPATIENT
Start: 2018-08-21 | End: 2018-08-27

## 2018-08-21 RX ORDER — BUMETANIDE 0.25 MG/ML
2 INJECTION INTRAMUSCULAR; INTRAVENOUS DAILY
Qty: 0 | Refills: 0 | Status: DISCONTINUED | OUTPATIENT
Start: 2018-08-21 | End: 2018-08-24

## 2018-08-21 RX ORDER — METOPROLOL TARTRATE 50 MG
100 TABLET ORAL ONCE
Qty: 0 | Refills: 0 | Status: COMPLETED | OUTPATIENT
Start: 2018-08-21 | End: 2018-08-21

## 2018-08-21 RX ORDER — ISOSORBIDE MONONITRATE 60 MG/1
120 TABLET, EXTENDED RELEASE ORAL DAILY
Qty: 0 | Refills: 0 | Status: DISCONTINUED | OUTPATIENT
Start: 2018-08-21 | End: 2018-08-27

## 2018-08-21 RX ORDER — HYDROMORPHONE HYDROCHLORIDE 2 MG/ML
0.5 INJECTION INTRAMUSCULAR; INTRAVENOUS; SUBCUTANEOUS EVERY 4 HOURS
Qty: 0 | Refills: 0 | Status: DISCONTINUED | OUTPATIENT
Start: 2018-08-21 | End: 2018-08-23

## 2018-08-21 RX ORDER — SIMVASTATIN 20 MG/1
40 TABLET, FILM COATED ORAL AT BEDTIME
Qty: 0 | Refills: 0 | Status: DISCONTINUED | OUTPATIENT
Start: 2018-08-21 | End: 2018-08-27

## 2018-08-21 RX ORDER — BUMETANIDE 0.25 MG/ML
1 INJECTION INTRAMUSCULAR; INTRAVENOUS
Qty: 0 | Refills: 0 | Status: DISCONTINUED | OUTPATIENT
Start: 2018-08-21 | End: 2018-08-21

## 2018-08-21 RX ORDER — ISOSORBIDE MONONITRATE 60 MG/1
120 TABLET, EXTENDED RELEASE ORAL ONCE
Qty: 0 | Refills: 0 | Status: COMPLETED | OUTPATIENT
Start: 2018-08-21 | End: 2018-08-21

## 2018-08-21 RX ORDER — HYDRALAZINE HCL 50 MG
50 TABLET ORAL ONCE
Qty: 0 | Refills: 0 | Status: COMPLETED | OUTPATIENT
Start: 2018-08-21 | End: 2018-08-21

## 2018-08-21 RX ORDER — MORPHINE SULFATE 50 MG/1
2 CAPSULE, EXTENDED RELEASE ORAL EVERY 4 HOURS
Qty: 0 | Refills: 0 | Status: DISCONTINUED | OUTPATIENT
Start: 2018-08-21 | End: 2018-08-21

## 2018-08-21 RX ORDER — HEPARIN SODIUM 5000 [USP'U]/ML
5000 INJECTION INTRAVENOUS; SUBCUTANEOUS EVERY 12 HOURS
Qty: 0 | Refills: 0 | Status: DISCONTINUED | OUTPATIENT
Start: 2018-08-21 | End: 2018-08-27

## 2018-08-21 RX ORDER — ONDANSETRON 8 MG/1
4 TABLET, FILM COATED ORAL EVERY 6 HOURS
Qty: 0 | Refills: 0 | Status: DISCONTINUED | OUTPATIENT
Start: 2018-08-21 | End: 2018-08-27

## 2018-08-21 RX ORDER — AMLODIPINE BESYLATE 2.5 MG/1
5 TABLET ORAL DAILY
Qty: 0 | Refills: 0 | Status: DISCONTINUED | OUTPATIENT
Start: 2018-08-21 | End: 2018-08-27

## 2018-08-21 RX ADMIN — Medication 50 MILLIGRAM(S): at 20:07

## 2018-08-21 RX ADMIN — MORPHINE SULFATE 4 MILLIGRAM(S): 50 CAPSULE, EXTENDED RELEASE ORAL at 06:47

## 2018-08-21 RX ADMIN — BUDESONIDE AND FORMOTEROL FUMARATE DIHYDRATE 2 PUFF(S): 160; 4.5 AEROSOL RESPIRATORY (INHALATION) at 22:44

## 2018-08-21 RX ADMIN — BUMETANIDE 2 MILLIGRAM(S): 0.25 INJECTION INTRAMUSCULAR; INTRAVENOUS at 08:11

## 2018-08-21 RX ADMIN — Medication 100 MILLIGRAM(S): at 07:20

## 2018-08-21 RX ADMIN — SIMVASTATIN 40 MILLIGRAM(S): 20 TABLET, FILM COATED ORAL at 22:44

## 2018-08-21 RX ADMIN — Medication 650 MILLIGRAM(S): at 07:50

## 2018-08-21 RX ADMIN — GABAPENTIN 100 MILLIGRAM(S): 400 CAPSULE ORAL at 17:26

## 2018-08-21 RX ADMIN — MORPHINE SULFATE 4 MILLIGRAM(S): 50 CAPSULE, EXTENDED RELEASE ORAL at 09:38

## 2018-08-21 RX ADMIN — Medication 50 MILLIGRAM(S): at 07:20

## 2018-08-21 RX ADMIN — AMLODIPINE BESYLATE 5 MILLIGRAM(S): 2.5 TABLET ORAL at 17:26

## 2018-08-21 RX ADMIN — ISOSORBIDE MONONITRATE 120 MILLIGRAM(S): 60 TABLET, EXTENDED RELEASE ORAL at 07:20

## 2018-08-21 RX ADMIN — HEPARIN SODIUM 5000 UNIT(S): 5000 INJECTION INTRAVENOUS; SUBCUTANEOUS at 17:26

## 2018-08-21 RX ADMIN — MORPHINE SULFATE 4 MILLIGRAM(S): 50 CAPSULE, EXTENDED RELEASE ORAL at 07:50

## 2018-08-21 NOTE — H&P ADULT - PROBLEM SELECTOR PLAN 3
likely secondary to HTN  -hydralazine/imdur and BB likely secondary to HTN  -hydralazine/imdur and BB/bumetanide

## 2018-08-21 NOTE — CONSULT NOTE ADULT - ASSESSMENT
88y Female with history of CKD-5 with proteinuria in setting of paraproteinemia presents with L arm pain. Nephrology consulted for elevated Scr.

## 2018-08-21 NOTE — H&P ADULT - PROBLEM SELECTOR PLAN 2
-monitor Cr currently at baseline -monitor Cr currently at baseline  -rayaldee -monitor Cr currently at baseline  -rayaldee non formulary d/w pharmacy start calcitrol 0.25mcg qdaily and titrate as needed

## 2018-08-21 NOTE — H&P ADULT - NSHPREVIEWOFSYSTEMS_GEN_ALL_CORE
Review of Systems:   CONSTITUTIONAL: No fever, weight loss, or fatigue  EYES: No eye pain, visual disturbances, or discharge  ENMT:  No difficulty hearing, tinnitus, vertigo; No sinus or throat pain  NECK: No pain or stiffness  BREASTS: No pain, masses, or nipple discharge  RESPIRATORY: No cough, wheezing, chills or hemoptysis; No shortness of breath  CARDIOVASCULAR: No chest pain, palpitations, dizziness, or leg swelling  GASTROINTESTINAL: No abdominal or epigastric pain. No nausea, vomiting, or hematemesis; No diarrhea or constipation. No melena or hematochezia.  GENITOURINARY: No dysuria, frequency, hematuria, or incontinence  NEUROLOGICAL: No headaches, memory loss, loss of strength, numbness, or tremors  SKIN: No itching, burning, rashes, or lesions   LYMPH NODES: No enlarged glands  ENDOCRINE: No heat or cold intolerance; No hair loss  MUSCULOSKELETAL: +LT shoulder pain no swelling/redness  PSYCHIATRIC: No depression, anxiety, mood swings, or difficulty sleeping  HEME/LYMPH: No easy bruising, or bleeding gums  ALLERY AND IMMUNOLOGIC: No hives or eczema

## 2018-08-21 NOTE — CONSULT NOTE ADULT - SUBJECTIVE AND OBJECTIVE BOX
[facility] NEPHROLOGY- CONSULTATION NOTE    88y Female with history of below presents with L arm pain. Nephrology consulted for elevated Scr.    Patient well known to nephrology as follows with me as an outpatient for h/o CKD-5 with proteinuria in setting of positive serum NHUNG for which patient was offered a kidney biopsy in the past but refused.    Patient was last seen on 7/5/18 where renal function remained at baseline (2.93). Patient takes bumex 2 mg daily at home for volume overload.    REVIEW OF SYSTEMS:  Gen: no changes in weight  HEENT: no rhinorrhea  Neck: no sore throat  Cards: no chest pain  Resp: no dyspnea  GI: no nausea or vomiting or diarrhea  : no dysuria or hematuria  Vascular: no LE edema  Derm: no rashes  Neuro: + L arm numbness/tingling wit pain and swelling    No Known Allergies      Home Medications Reviewed  Hospital Medications:   MEDICATIONS  (STANDING):  buDESOnide  80 MICROgram(s)/formoterol 4.5 MICROgram(s) Inhaler 2 Puff(s) Inhalation two times a day  buMETAnide 1 milliGRAM(s) Oral two times a day  gabapentin 100 milliGRAM(s) Oral every 12 hours  heparin  Injectable 5000 Unit(s) SubCutaneous every 12 hours  hydrALAZINE 50 milliGRAM(s) Oral every 8 hours  isosorbide   mononitrate ER Tablet (IMDUR) 120 milliGRAM(s) Oral daily  metoprolol succinate  milliGRAM(s) Oral daily  pantoprazole    Tablet 40 milliGRAM(s) Oral before breakfast  simvastatin 40 milliGRAM(s) Oral at bedtime      PAST MEDICAL & SURGICAL HISTORY:  Asthma  CKD (chronic kidney disease)  High cholesterol  Hypertension  History of hysterectomy      FAMILY HISTORY:  No pertinent family history in first degree relatives      SOCIAL HISTORY:  Denies toxic substance use     VITALS:  T(F): 97 (08-21-18 @ 16:37), Max: 97.8 (08-21-18 @ 13:30)  HR: 81 (08-21-18 @ 16:37)  BP: 177/82 (08-21-18 @ 16:37)  RR: 18 (08-21-18 @ 16:37)  SpO2: 98% (08-21-18 @ 16:37)  Wt(kg): --        PHYSICAL EXAM:  Gen: NAD, calm  HEENT: MMM  Neck: no JVD  Cards: RRR, +S1/S2, no M/G/R  Resp: CTA B/L  GI: soft, NT/ND, NABS  : no CVA tenderness  Vascular: trace-1+ LE edema B/L, LUE TTP with edema  Derm: no rashes  Neuro: non-focal    LABS:  08-21    137  |  100  |  50<H>  ----------------------------<  125<H>  4.7   |  21<L>  |  2.84<H>    Ca    9.5      21 Aug 2018 05:32    TPro  6.8  /  Alb  3.5  /  TBili  0.3  /  DBili      /  AST  21  /  ALT  11  /  AlkPhos  56  08-21    Creatinine Trend: 2.84 <--                        8.7    11.58 )-----------( 391      ( 21 Aug 2018 05:32 )             27.6     Urine Studies:        RADIOLOGY & ADDITIONAL STUDIES:  < from: Xray Chest 2 Views PA/Lat (08.21.18 @ 09:01) >  IMPRESSION:  Bibasilar consolidative changes left greater than right. Hazy indistinct   left CP angle could be due to overlying soft tissues versus a small left   pleural reaction. Sharp right CP angle.     Clear remaining visualized lungs. No pneumothorax.    Stable prominent appearing cardiac and mediastinal silhouettes and scant   faint aortic arch calcifications.    Trachea midline.    Generalized osteopenia and mild spinal degenerative change again noted.      < end of copied text >      < from: Xray Wrist 3 Views, Left (08.21.18 @ 06:58) >  IMPRESSION:  No acute bone or joint disease.    < end of copied text >

## 2018-08-21 NOTE — ED PROVIDER NOTE - PHYSICAL EXAMINATION
GENERAL: in distress 2/2 pain, well-developed  HEAD:  Atraumatic, Normocephalic  ENT: PERRLA, conjunctiva and sclera clear, Neck supple, No JVD, moist mucosa, oropharynx clear  CHEST/LUNG: Clear to auscultation bilaterally; No wheeze, equal breath sounds bilaterally   BACK: No spinal tenderness, no CVA tenderness  HEART: Regular rate and rhythm; No murmurs, rubs, or gallops  ABDOMEN: Soft, Nontender, Nondistended; Bowel sounds present  EXTREMITIES:  No clubbing, cyanosis, or edema. Radial pulses palpable and equal. Left hand and wrist swollen, tender to touch, sensation to light hand intact.  PSYCH: Nl behavior, nl affect  NEUROLOGY: AAOx3, non-focal, moves all extremities spontaneously  SKIN: Normal color, No rashes or lesions, no vesicles GENERAL: in distress 2/2 pain, well-developed  HEAD:  Atraumatic, Normocephalic  ENT: PERRLA, conjunctiva and sclera clear, Neck supple, No JVD, moist mucosa, oropharynx clear  CHEST/LUNG: Clear to auscultation bilaterally; No wheeze, equal breath sounds bilaterally   BACK: No spinal tenderness, no CVA tenderness  HEART: Regular rate and rhythm; No murmurs, rubs, or gallops  ABDOMEN: Soft, Nontender, Nondistended; Bowel sounds present  EXTREMITIES:  No clubbing, cyanosis, or edema. Radial pulses palpable and equal. Left hand, fingers, and wrist swollen, tender to touch, no erythema, +sensation to light hand intact. Pain with left shoulder extension. L shoulder/elbow mildly TTP, no swelling/erythema.  PSYCH: Nl behavior, nl affect  NEUROLOGY: AAOx3, non-focal, moves all extremities spontaneously  SKIN: Normal color, No rashes or lesions, no vesicles GENERAL: in distress 2/2 pain, well-developed  HEAD:  Atraumatic, Normocephalic  ENT: PERRLA, conjunctiva and sclera clear, Neck supple, No JVD, moist mucosa, oropharynx clear  CHEST/LUNG: Clear to auscultation bilaterally; No wheeze, equal breath sounds bilaterally   BACK: No spinal tenderness, no CVA tenderness  HEART: Regular rate and rhythm; No murmurs, rubs, or gallops  ABDOMEN: Soft, Nontender, Nondistended; Bowel sounds present  EXTREMITIES:  No clubbing, cyanosis, or edema. Radial pulses palpable and equal. Left hand, fingers, and wrist swollen, tender to touch, no erythema, +sensation to light hand intact. Pain with left shoulder extension. L shoulder/elbow mildly TTP, no swelling/erythema.  PSYCH: Nl behavior, nl affect  NEUROLOGY: AAOx3, non-focal, moves all extremities spontaneously  SKIN: Normal color, No rashes or lesions, no vesicles    Klepfish: 1+ RLE pitting edema, dependent portions, LLE 2+ pitting edema, dependent portions - per family this chornically intermittent. Has very minimal edema of L fingers/hand/forearm (barely perceptible). no skin color or temp changes. no crepitus. Pain w/ light palpation of lateral aspect of LUE. No axillary LAD. Slightly decreased passive ROM of L wirst and lebow 2/2 pain but does have good ROM.

## 2018-08-21 NOTE — CONSULT NOTE ADULT - PROBLEM SELECTOR RECOMMENDATION 5
In setting of CKD with prior history of positive serum NHUNG. Check AM iron stores. RAUL pending results.

## 2018-08-21 NOTE — ED PROVIDER NOTE - NS ED ROS FT
CONSTITUTIONAL: see HPI  EYES/ENT: No visual changes;  No dysphagia  NECK: No pain or stiffness  RESPIRATORY: No cough, wheezing, hemoptysis; No shortness of breath  CARDIOVASCULAR: No chest pain or palpitations; No lower extremity edema  GASTROINTESTINAL: No abdominal or epigastric pain. No nausea, vomiting, or hematemesis; No diarrhea or constipation. No melena or hematochezia.  GENITOURINARY: No dysuria, frequency or hematuria  NEUROLOGICAL: No numbness or weakness  SKIN: No itching, burning, rashes, or lesions   All other review of systems is negative unless indicated above.

## 2018-08-21 NOTE — ED PROVIDER NOTE - MEDICAL DECISION MAKING DETAILS
87yo F hx CKD4 presents sudden onset severe L arm pain assoc with swelling on exam. Will obtain CBC, CMP, VBG, ESR, CRP, pain control, XR. 87yo F hx CKD4 presents sudden onset severe L arm pain assoc with swelling on exam. Will obtain CBC, CMP, VBG, ESR, CRP, pain control, XR.  PGY1/MD Keo. spoke with Dr. Moreno, admit for pain control, further work up including DM/PM, vs. polymyalgia rheumatica, drug induced myalgia, etc. 89yo F hx CKD4 presents sudden onset severe L arm pain assoc with swelling on exam. Will obtain CBC, CMP, VBG, ESR, CRP, pain control, XR.  PGY1/MD Keo. spoke with Dr. Moreno, admission for pain control, further work up including DM/PM, vs. polymyalgia rheumatica, drug induced myalgia, etc.

## 2018-08-21 NOTE — CONSULT NOTE ADULT - PROBLEM SELECTOR RECOMMENDATION 2
BP uncontrolled and likely due to pain. Restart home medications. Will titrate as needed. Monitor BP.

## 2018-08-21 NOTE — ED PROVIDER NOTE - PROGRESS NOTE DETAILS
limited relief from initial morphine, another 4mg morphine plus 650mg tylenol are given, nitrofulantoine started 10 days ago, wating for CK, esr, CMP. No temporal arterial pain. limited relief from initial morphine, another 4mg morphine plus 650mg tylenol are given, nitrofurantoine started 10 days ago, side effect including myalgia, wating for the result of CK, esr, CMP. No temporal arterial pain. She went to bed as normal, but sit up last night since her left arm pain started having pain a couple of hours after lying down. Vascular event or reperfusion even is less likely, brachial, radial arteries pulsating well, minimum but swollen on her left hand. signed out from Dr. Hare.    pt received a relief but limited from initial morphine, another 4mg morphine plus 650mg tylenol are given, nitrofurantoine started 10 days ago, side effect including myalgia, wating for the result of CK, esr, CMP. No temporal arterial pain. She went to bed as normal, but sit up last night since her left arm pain started having pain a couple of hours after she lied down. Brachial/radial arteries pulsating well, Vascular event or reperfusion even is less likely,  minimum but swollen on her left hand compared to right hand. signed out from Dr. Hare.    pt received a limited relief from initial morphine, another 4mg morphine plus 650mg tylenol are given, nitrofurantoine started 10 days ago, side effect including myalgia, wating for the result of CK, esr, CMP. No temporal arterial pain. She went to bed as normal, but sit up last night since her left arm pain started having pain a couple of hours after she lied down. Brachial/radial arteries pulsating well, Vascular event or reperfusion even is less likely,  minimum but swollen on her left hand compared to right hand. signed out from Dr. Hare.    pt received a limited relief from initial morphine, another 4mg morphine plus 650mg tylenol are given, nitrofurantoine started 10 days ago, side effect including myalgia, vs. polymyalgia rheumatica vs. infection, CRP 7.8, inflammation sign, fibromyalsia is less likely, wating for the result of CK, esr, CMP. No temporal arterial pain. She went to bed as normal, but sit up last night since her left arm pain started having pain a couple of hours after she lied down. Brachial/radial arteries pulsating well, Vascular event or reperfusion even is less likely,  minimum but swollen on her left hand compared to right hand. pt can move the left arm, /77, ESR 70, spoke with Dr. Moreno, admit for pain control, further work up including DM/PM, polymyalgia rheumatica, drug induced myalgia, etc. PGY1/MD Keo. signed out from Dr. Hare.    pt received a limited relief from initial morphine, another 4mg morphine plus 650mg tylenol are given, nitrofurantoine started 10 days ago, side effect including myalgia, vs. polymyalgia rheumatica vs. infection, CRP 7.8, inflammation sign, fibromyalsia is less likely, wating for the result of CK, esr, CMP. No temporal arterial pain. She went to bed as normal, but sit up last night since her left arm pain started having pain a couple of hours after she lied down. Brachial/radial arteries pulsating well, Vascular event or reperfusion even is less likely,  minimum but swollen on her left hand compared to right hand. PGY1/MD Keo. pt can move the left arm, some relief from pain medication, /77, ESR 70, spoke with Dr. Moreno, admit for pain control, further work up including DM/PM, polymyalgia rheumatica, drug induced myalgia, etc. Gosia- pt now admitted- d/w Dr. Rhodes who covers for Dr. Villagomez and she accepted pt.  Pt with LUE mild swelling mostly to wrist and hand, no redness or crepitus, FROM wrist but with pain.  No sob or cp, lungs equal and clear, heart s1,s2 rrr, and legs no swelling, likely rheum in nature, less likely other causes but admit for pain control and further workup

## 2018-08-21 NOTE — H&P ADULT - PROBLEM SELECTOR PLAN 6
IMPROVE VTE Individual Risk Assessment    RISK                                                          Points  [] Previous VTE                                           3  [] Thrombophilia                                        2  [] Lower limb paralysis                              2   [] Current Cancer                                       2   [x] Immobilization > 24 hrs                        1  [] ICU/CCU stay > 24 hours                       1  [x] Age > 60                                                   1    IMPROVE VTE Score:2 heparin sq

## 2018-08-21 NOTE — H&P ADULT - NSHPPHYSICALEXAM_GEN_ALL_CORE
Vital Signs Last 24 Hrs  T(C): 36.6 (21 Aug 2018 13:30), Max: 36.6 (21 Aug 2018 13:30)  T(F): 97.8 (21 Aug 2018 13:30), Max: 97.8 (21 Aug 2018 13:30)  HR: 80 (21 Aug 2018 13:30) (80 - 102)  BP: 141/79 (21 Aug 2018 13:30) (141/79 - 220/99)  BP(mean): --  RR: 18 (21 Aug 2018 13:30) (18 - 18)  SpO2: 97% (21 Aug 2018 13:30) (97% - 99%)    PHYSICAL EXAM:  GENERAL: sleeping complains of pain in Lt shoulder when awakens  HEAD:  Atraumatic, Normocephalic  EYES: EOMI, PERRLA, conjunctiva and sclera clear  NECK: Supple, No JVD  CHEST/LUNG: Clear to auscultation bilaterally; No wheeze  HEART: Regular rate and rhythm;   ABDOMEN: Soft, Nontender, Nondistended; Bowel sounds present  EXTREMITIES:  +LT UE +radial/ulnar pulses able to lift arm and touch hair with difficulty  PSYCH: AAOx3  NEUROLOGY: LT UE numbness and tingling lateral 3 digits motor exam limited by pain    SKIN: No rashes or lesions

## 2018-08-21 NOTE — ED PROVIDER NOTE - OBJECTIVE STATEMENT
87yo Gujarati-speaking F with hx CKD stage 4, HTN, HLD, shingles 4/2018 presents with L arm pain. Pt's son Vero Villagomez and DIL at bedside; all prefer for family to translate. Per pt's DIL, pt's pain started suddenly at 4am. Pt went with her family to a restaurant last night without pain. She woke up at 4am with severe left arm pain, unable to describe the quality. It is associated with weakness 2/2 pain, no numbness/tingling. Pain is exacerbated by lifting her L shoulder and squeezing her left hand. Pt has a UTI and has been treated with nitrofurantoin since 8/11. She has never had this pain before. Denies trauma. Denies F/C, rash, CP, SOB, N/V, abd pain, change of vision. Denies hx gout. 87yo Gujarati-speaking F with hx CKD stage 4, HTN, HLD, shingles 4/2018 presents with L arm pain. Pt's son Vero Villagomez and DIL at bedside; all prefer for family to translate. Per pt's DIL, pt's pain started suddenly at 4am. Pt went with her family to a restaurant last night without pain. She woke up at 4am with severe left arm pain, unable to describe the quality. It is associated with weakness 2/2 pain, no numbness/tingling. Pain is exacerbated by lifting her L shoulder and squeezing her left hand. Pt has a UTI and has been treated with nitrofurantoin since 8/11. She has never had this pain before. Denies trauma. Denies F/C, rash, CP, SOB, N/V, abd pain, change of vision. Denies hx gout.  [place]: Prefers family to translate. 88F PMH CKD, HTN, HLD, shingles p/w pain. Has gradual onset LUE (from lateral shoulder to fingers, only on lateral aspect, not circumferential) pain (contrary to triage - pain is not actually all over) since ~midnight so came to ED. Cannot recall specific trauma. Denies SOB/CP, abd pain, urinary complaints, HA, weakness/numbness, f/c. No hx prior similar symptoms. Has not yet taken her morning BP meds. On macrobid for UTI.

## 2018-08-21 NOTE — ED ADULT NURSE NOTE - CHIEF COMPLAINT QUOTE
pt mino speaking, pt refused translation services, son claudia lo to translate, pt c/o pain "all over my body from head to toe," pain is worse in left hand, pt's son states that she was fine last night and not complaining of any pain, of note pt is currently being treated for uti on po abx, denies fevers, n/v, no cp or sob. per son pt has "kidney issues" unable to elaborate, pt also had shingles 2-3 months ago, denies rash or open sores,. pt hypertensive no blurry vision dizziness, or headache, screaming in pain.

## 2018-08-21 NOTE — H&P ADULT - NSHPLABSRESULTS_GEN_ALL_CORE
8.7    11.58 )-----------( 391      ( 21 Aug 2018 05:32 )             27.6       08-21    137  |  100  |  50<H>  ----------------------------<  125<H>  4.7   |  21<L>  |  2.84<H>    Ca    9.5      21 Aug 2018 05:32    TPro  6.8  /  Alb  3.5  /  TBili  0.3  /  DBili  x   /  AST  21  /  ALT  11  /  AlkPhos  56  08-21    CAPILLARY BLOOD GLUCOSE  Radiology  < from: Xray Chest 2 Views PA/Lat (08.21.18 @ 09:01) >    INTERPRETATION:  CLINICAL INDICATION: CHF history; left chest and upper   extremity pain    EXAM:  Frontal and lateral chest from 8/21/2018 at 0901. Compared to prior study   from 5/26/2018.    IMPRESSION:  Bibasilar consolidative changes left greater than right. Hazy indistinct   left CP angle could be due to overlying soft tissues versus a small left   pleural reaction. Sharp right CP angle.     Clear remaining visualized lungs. No pneumothorax.    Stable prominent appearing cardiac and mediastinal silhouettes and scant   faint aortic arch calcifications.    Trachea midline.    Generalized osteopenia and mild spinal degenerative change again noted.    < end of copied text >    < from: Xray Hand 2 Views, Left (08.21.18 @ 06:58) >    INTERPRETATION:  TIME OF EXAM: August 21, 2018 at 6:26 AM    CLINICAL INFORMATION: Sudden onset of pain in left upper extremity.    TECHNIQUE:   External, internal rotation and Y scapula views of the left   shoulder; PA, oblique and lateral radiographs of the left wrist and hand.    INTERPRETATION:     There is no acute fracture or dislocation. Generalized osteopenia   consistent with age. No joint effusions. The carpal bones are normally   aligned.      COMPARISON:  None available      IMPRESSION:  No acute bone or joint disease.    < end of copied text >    < from: Xray Shoulder 2 Views, Left (08.21.18 @ 06:57) >    INTERPRETATION:  TIME OF EXAM: August 21, 2018 at 6:26 AM    CLINICAL INFORMATION: Sudden onset of pain in left upper extremity.    TECHNIQUE:   External, internal rotation and Y scapula views of the left   shoulder; PA, oblique and lateral radiographs of the left wrist and hand.    INTERPRETATION:     There is no acute fracture or dislocation. Generalized osteopenia   consistent with age. No joint effusions. The carpal bones are normally   aligned.      COMPARISON:  None available      IMPRESSION:  No acute bone or joint disease.    < end of copied text >

## 2018-08-21 NOTE — ED ADULT TRIAGE NOTE - CHIEF COMPLAINT QUOTE
pt mino speaking, pt refused translation services, son claudia lo to translate, pt c/o pain "all over my body from head to toe," pain is worse in left hand, pt's son states that she was fine last night and not complaining of any pain, of note pt is currently being treated for uti on po abx, denies fevers, n/v, no cp or sob. per son pt has "kidney issues" unable to elaborate, pt also had shingles 2-3 months ago. pt hypertensive no blurry vision dizziness, or headache, screaming in pain. pt mino speaking, pt refused translation services, son claudia lo to translate, pt c/o pain "all over my body from head to toe," pain is worse in left hand, pt's son states that she was fine last night and not complaining of any pain, of note pt is currently being treated for uti on po abx, denies fevers, n/v, no cp or sob. per son pt has "kidney issues" unable to elaborate, pt also had shingles 2-3 months ago, denies rash or open sores,. pt hypertensive no blurry vision dizziness, or headache, screaming in pain.

## 2018-08-21 NOTE — ED ADULT NURSE NOTE - OBJECTIVE STATEMENT
88yof A&ox4 hamzah rader speaking requesting son at bedside to translate. pt originally c/o "pain all over." when asked further, pain is mostly localized to L hand, which appears swollen, and b/l upper legs. pt denies cp, sob, dizziness, lightheadedness, n/v/d, fever/chills, breathing even/unlabored. pt appears uncomfortable. pt noted to be hypertensive. denies HA, vision changes. 22g iv lock placed to R hand. labs sent. medicated per orders. will continue to monitor.

## 2018-08-21 NOTE — ED ADULT NURSE NOTE - NSIMPLEMENTINTERV_GEN_ALL_ED
Implemented All Fall Risk Interventions:  [place] to call system. Call bell, personal items and telephone within reach. Instruct patient to call for assistance. Room bathroom lighting operational. Non-slip footwear when patient is off stretcher. Physically safe environment: no spills, clutter or unnecessary equipment. Stretcher in lowest position, wheels locked, appropriate side rails in place. Provide visual cue, wrist band, yellow gown, etc. Monitor gait and stability. Monitor for mental status changes and reorient to person, place, and time. Review medications for side effects contributing to fall risk. Reinforce activity limits and safety measures with patient and family.

## 2018-08-21 NOTE — H&P ADULT - ASSESSMENT
87 yo F w/ hx CKD stage 4-5 baseline Cr(2.8-3.2), HTN, CHF with normal EF, p/w LT shoulder pain with radiation down LT arm involving lateral 3 digits

## 2018-08-21 NOTE — ED ADULT NURSE REASSESSMENT NOTE - NS ED NURSE REASSESS COMMENT FT1
Received report from RN Silvia, pt AOX4 in NAD sleeping in bed. Marge only speaking, son at bedside for translation, vital signs stable, admitted to medicine for pain in extremity, will continue to monitor

## 2018-08-21 NOTE — H&P ADULT - PROBLEM SELECTOR PLAN 1
-possibly secondary to radiculopathy  - X ray neg for fracture +osteopenia  -case d/w neuro radiology MRI c spine if neg can consider MRI brachial plexus.   -try gabapentin low dose with morphine 2 mg IV q 4 PRN -possibly secondary to radiculopathy  - X ray neg for fracture +osteopenia  -case d/w neuro radiology MRI c spine if neg can consider MRI brachial plexus.   -try gabapentin low dose with morphine 2 mg IV q 4 PRN  -ZECHARIAH CRP slightly elevated   -CXR read as b/l consolidation without cough sputum production fever or chills

## 2018-08-21 NOTE — CONSULT NOTE ADULT - PROBLEM SELECTOR RECOMMENDATION 4
Patient on rayaldee as an outpatient for which will change to ergocalciferol as rayaldee not on formulary. Start low phosphorus diet. Monitor serum calcium and phosphorus.

## 2018-08-21 NOTE — H&P ADULT - HISTORY OF PRESENT ILLNESS
89yo F with hx CKD stage 4, HTN, HLD, CHF normal EF, shingles lower back 4/2018 presents with L arm pain. Pt at baseline walks with a walker and was in her usual state of health yesterday. She was at a party with any complaints and returned home without any complaints At 4 AM awoke and began complaining of LT arm pain and her son gave her Tylenol without relief. She was brought to ED to be evaluated for this. In the ED she was noted to be in pain and hypertensive was given morphine 4mg x2 with some relief and hydralazine, imdur and bumetanide and metoprolol with improvement in BP.

## 2018-08-21 NOTE — ED PROVIDER NOTE - ATTENDING CONTRIBUTION TO CARE
88F PMH CKD, HTN, HLD, shingles p/w gradual onset LUE (from lateral shoulder to fingers, only on lateral aspect, not circumferential) pain since ~midnight, no specific trauma, no other systemic symptoms. Hypertensive, slight triage tachycardia that self resolved, other vitals wnl. Exam as above. EKG grossly unchanged.  ddx: unclear etiology. Possibly just dependent edema and pain from edema. Low suspicion for fx or infection. Location of pain also not consistent w/ DVT. Clinically not nec fasc.  Clinically HTN is not related to symptoms.  Basic labs, XR, symptom control, reassess.

## 2018-08-21 NOTE — CONSULT NOTE ADULT - PROBLEM SELECTOR RECOMMENDATION 9
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 Avoid nephrotoxins. Monitor electrolytes. Please change morphine to dilaudid as unsafe in CKD population.

## 2018-08-22 LAB
BASOPHILS # BLD AUTO: 0.06 K/UL — SIGNIFICANT CHANGE UP (ref 0–0.2)
BASOPHILS NFR BLD AUTO: 0.5 % — SIGNIFICANT CHANGE UP (ref 0–2)
BUN SERPL-MCNC: 51 MG/DL — HIGH (ref 7–23)
CALCIUM SERPL-MCNC: 9.3 MG/DL — SIGNIFICANT CHANGE UP (ref 8.4–10.5)
CHLORIDE SERPL-SCNC: 98 MMOL/L — SIGNIFICANT CHANGE UP (ref 98–107)
CO2 SERPL-SCNC: 21 MMOL/L — LOW (ref 22–31)
CREAT SERPL-MCNC: 2.88 MG/DL — HIGH (ref 0.5–1.3)
EOSINOPHIL # BLD AUTO: 0.23 K/UL — SIGNIFICANT CHANGE UP (ref 0–0.5)
EOSINOPHIL NFR BLD AUTO: 1.9 % — SIGNIFICANT CHANGE UP (ref 0–6)
FERRITIN SERPL-MCNC: 72.94 NG/ML — SIGNIFICANT CHANGE UP (ref 15–150)
GLUCOSE SERPL-MCNC: 107 MG/DL — HIGH (ref 70–99)
HCT VFR BLD CALC: 26 % — LOW (ref 34.5–45)
HGB BLD-MCNC: 8.3 G/DL — LOW (ref 11.5–15.5)
IMM GRANULOCYTES # BLD AUTO: 0.04 # — SIGNIFICANT CHANGE UP
IMM GRANULOCYTES NFR BLD AUTO: 0.3 % — SIGNIFICANT CHANGE UP (ref 0–1.5)
IRON SATN MFR SERPL: 14 UG/DL — LOW (ref 30–160)
IRON SATN MFR SERPL: 197 UG/DL — SIGNIFICANT CHANGE UP (ref 140–530)
LYMPHOCYTES # BLD AUTO: 0.8 K/UL — LOW (ref 1–3.3)
LYMPHOCYTES # BLD AUTO: 6.6 % — LOW (ref 13–44)
MCHC RBC-ENTMCNC: 27.4 PG — SIGNIFICANT CHANGE UP (ref 27–34)
MCHC RBC-ENTMCNC: 31.9 % — LOW (ref 32–36)
MCV RBC AUTO: 85.8 FL — SIGNIFICANT CHANGE UP (ref 80–100)
MONOCYTES # BLD AUTO: 1.59 K/UL — HIGH (ref 0–0.9)
MONOCYTES NFR BLD AUTO: 13.1 % — SIGNIFICANT CHANGE UP (ref 2–14)
NEUTROPHILS # BLD AUTO: 9.4 K/UL — HIGH (ref 1.8–7.4)
NEUTROPHILS NFR BLD AUTO: 77.6 % — HIGH (ref 43–77)
NRBC # FLD: 0 — SIGNIFICANT CHANGE UP
PLATELET # BLD AUTO: 345 K/UL — SIGNIFICANT CHANGE UP (ref 150–400)
PMV BLD: 10.4 FL — SIGNIFICANT CHANGE UP (ref 7–13)
POTASSIUM SERPL-MCNC: 4.5 MMOL/L — SIGNIFICANT CHANGE UP (ref 3.5–5.3)
POTASSIUM SERPL-SCNC: 4.5 MMOL/L — SIGNIFICANT CHANGE UP (ref 3.5–5.3)
RBC # BLD: 3.03 M/UL — LOW (ref 3.8–5.2)
RBC # FLD: 14.1 % — SIGNIFICANT CHANGE UP (ref 10.3–14.5)
SODIUM SERPL-SCNC: 134 MMOL/L — LOW (ref 135–145)
UIBC SERPL-MCNC: 182.7 UG/DL — SIGNIFICANT CHANGE UP (ref 110–370)
WBC # BLD: 12.12 K/UL — HIGH (ref 3.8–10.5)
WBC # FLD AUTO: 12.12 K/UL — HIGH (ref 3.8–10.5)

## 2018-08-22 PROCEDURE — 72141 MRI NECK SPINE W/O DYE: CPT | Mod: 26

## 2018-08-22 PROCEDURE — 99222 1ST HOSP IP/OBS MODERATE 55: CPT | Mod: GC

## 2018-08-22 RX ORDER — IRON SUCROSE 20 MG/ML
200 INJECTION, SOLUTION INTRAVENOUS ONCE
Qty: 0 | Refills: 0 | Status: COMPLETED | OUTPATIENT
Start: 2018-08-22 | End: 2018-08-22

## 2018-08-22 RX ADMIN — SIMVASTATIN 40 MILLIGRAM(S): 20 TABLET, FILM COATED ORAL at 22:21

## 2018-08-22 RX ADMIN — ISOSORBIDE MONONITRATE 120 MILLIGRAM(S): 60 TABLET, EXTENDED RELEASE ORAL at 15:55

## 2018-08-22 RX ADMIN — GABAPENTIN 100 MILLIGRAM(S): 400 CAPSULE ORAL at 17:18

## 2018-08-22 RX ADMIN — AMLODIPINE BESYLATE 5 MILLIGRAM(S): 2.5 TABLET ORAL at 05:27

## 2018-08-22 RX ADMIN — IRON SUCROSE 110 MILLIGRAM(S): 20 INJECTION, SOLUTION INTRAVENOUS at 18:29

## 2018-08-22 RX ADMIN — Medication 650 MILLIGRAM(S): at 05:26

## 2018-08-22 RX ADMIN — Medication 650 MILLIGRAM(S): at 06:10

## 2018-08-22 RX ADMIN — Medication 50 MILLIGRAM(S): at 22:21

## 2018-08-22 RX ADMIN — PANTOPRAZOLE SODIUM 40 MILLIGRAM(S): 20 TABLET, DELAYED RELEASE ORAL at 05:27

## 2018-08-22 RX ADMIN — HEPARIN SODIUM 5000 UNIT(S): 5000 INJECTION INTRAVENOUS; SUBCUTANEOUS at 17:18

## 2018-08-22 RX ADMIN — BUMETANIDE 2 MILLIGRAM(S): 0.25 INJECTION INTRAMUSCULAR; INTRAVENOUS at 05:27

## 2018-08-22 RX ADMIN — Medication 50 MILLIGRAM(S): at 15:54

## 2018-08-22 RX ADMIN — HEPARIN SODIUM 5000 UNIT(S): 5000 INJECTION INTRAVENOUS; SUBCUTANEOUS at 05:28

## 2018-08-22 RX ADMIN — Medication 100 MILLIGRAM(S): at 05:27

## 2018-08-22 RX ADMIN — BUDESONIDE AND FORMOTEROL FUMARATE DIHYDRATE 2 PUFF(S): 160; 4.5 AEROSOL RESPIRATORY (INHALATION) at 12:02

## 2018-08-22 RX ADMIN — ERGOCALCIFEROL 50000 UNIT(S): 1.25 CAPSULE ORAL at 13:24

## 2018-08-22 RX ADMIN — Medication 650 MILLIGRAM(S): at 14:50

## 2018-08-22 RX ADMIN — Medication 30 MILLIGRAM(S): at 22:21

## 2018-08-22 RX ADMIN — Medication 50 MILLIGRAM(S): at 05:27

## 2018-08-22 RX ADMIN — BUDESONIDE AND FORMOTEROL FUMARATE DIHYDRATE 2 PUFF(S): 160; 4.5 AEROSOL RESPIRATORY (INHALATION) at 22:28

## 2018-08-22 RX ADMIN — GABAPENTIN 100 MILLIGRAM(S): 400 CAPSULE ORAL at 05:27

## 2018-08-22 NOTE — CONSULT NOTE ADULT - ASSESSMENT
87 yo F with hx of CKD, HTN, HLD presenting for acute onset L hand pain with Rheumatology consulted for polyarticular joint pains.    Patient with acute onset polyarticular joint pains. She denies any autoimmune hx, sick contacts. Post-viral arthritis is possible, along with late onset RA, although that is uncommon.    - Send for RF, CCP  - Check for Parvovirus antibodies  - Start daily solumedrol 30mg (give one time dose now)  - Will follow with you    Luis Jaquez  Rheumatology Fellow PGY IV 87 yo F with hx of CKD, HTN, HLD presenting for acute onset L hand pain with Rheumatology consulted for polyarticular joint pains.    Patient with acute onset polyarticular joint pains. With h/o CKD and CHF and acute presentation, most consistent with acute polyarticular gout but somewhat unusual in the absence of any triggers. She denies any autoimmune hx, sick contacts. Post-viral arthritis is possible, along with late onset RA, although that is uncommon.    - Send for RF, CCP, uric acid   - Check for Parvovirus antibodies  - Start daily solumedrol 30mg (give one time dose now)  - Will follow with you    Luis Jaquez  Rheumatology Fellow PGY IV

## 2018-08-22 NOTE — CONSULT NOTE ADULT - ATTENDING COMMENTS
Patient seen and examined at bedside. Please call [phone] for any questions or concerns
[facility] NEPHROLOGY  Bubba Mercado M.D.  Reagan Pierre D.O.  Kristin Martinez M.D.  Paola Monique, MSN, ANP-C    Telephone: [phone]  Facsimile: [phone]    [address] [place] [address]

## 2018-08-22 NOTE — CONSULT NOTE ADULT - SUBJECTIVE AND OBJECTIVE BOX
[facility]), [facility]        Patient is a 88y old  Female who presents with a chief complaint of Left arm pain (21 Aug 2018 16:35)      HPI:  87yo F with hx CKD stage 4, HTN, HLD, CHF normal EF, shingles lower back 2018 presents with L arm pain. Pt at baseline walks with a walker and was in her usual state of health yesterday. She was at a party with any complaints and returned home without any complaints At 4 AM awoke and began complaining of LT arm pain and her son gave her Tylenol without relief. She was brought to ED to be evaluated for this. In the ED she was noted to be in pain and hypertensive was given morphine 4mg x2 with some relief and hydralazine, imdur and bumetanide and metoprolol with improvement in BP. (21 Aug 2018 13:17)    complains of severe pain involving the left upper ext started on awakening, pt denies any trauma prior to it. She denies any falls.          *****PAST MEDICAL / Surgical  HISTORY:  PAST MEDICAL & SURGICAL HISTORY:  Asthma  CKD (chronic kidney disease)  High cholesterol  Hypertension  History of hysterectomy           *****FAMILY HISTORY:  FAMILY HISTORY:  No pertinent family history in first degree relatives           *****SOCIAL HISTORY:  Alcohol: None  Smoking: None         *****ALLERGIES:   Allergies    No Known Allergies    Intolerances             *****MEDICATIONS: current medication reviewed and documented.   MEDICATIONS  (STANDING):  amLODIPine   Tablet 5 milliGRAM(s) Oral daily  buDESOnide  80 MICROgram(s)/formoterol 4.5 MICROgram(s) Inhaler 2 Puff(s) Inhalation two times a day  buMETAnide 2 milliGRAM(s) Oral daily  ergocalciferol 81266 Unit(s) Oral every week  gabapentin 100 milliGRAM(s) Oral every 12 hours  heparin  Injectable 5000 Unit(s) SubCutaneous every 12 hours  hydrALAZINE 50 milliGRAM(s) Oral every 8 hours  iron sucrose IVPB 200 milliGRAM(s) IV Intermittent once  isosorbide   mononitrate ER Tablet (IMDUR) 120 milliGRAM(s) Oral daily  metoprolol succinate  milliGRAM(s) Oral daily  pantoprazole    Tablet 40 milliGRAM(s) Oral before breakfast  simvastatin 40 milliGRAM(s) Oral at bedtime    MEDICATIONS  (PRN):  acetaminophen   Tablet 650 milliGRAM(s) Oral every 6 hours PRN For Temp greater than 38 C (100.4 F)  acetaminophen   Tablet. 650 milliGRAM(s) Oral every 6 hours PRN Mild Pain (1 - 3)  HYDROmorphone  Injectable 0.5 milliGRAM(s) IV Push every 4 hours PRN Severe Pain (7 - 10)  ondansetron Injectable 4 milliGRAM(s) IV Push every 6 hours PRN Nausea           *****REVIEW OF SYSTEM:  GEN: no fever, no chills, no pain  RESP: no SOB, no cough, no sputum  CVS: no chest pain, no palpitations, no edema  GI: no abdominal pain, no nausea, no vomiting, no constipation, no diarrhea  : no dysurea, no frequency, no hematurea  Neuro: no headache, no dizziness  PSYCH: no anxiety, no depression  Derm : no itching, no rash         *****VITAL SIGNS:  T(C): 36.8 (18 @ 05:20), Max: 37.1 (18 @ 19:51)  HR: 78 (18 @ 05:20) (78 - 84)  BP: 147/81 (18 @ 05:20) (141/79 - 177/82)  RR: 18 (18 @ 05:20) (18 - 18)  SpO2: 100% (18 @ 05:20) (97% - 100%)  Wt(kg): --           *****PHYSICAL EXAM:     Alert oriented x 2    limited eval due to language      EOMI fundi not visualized,  VFF to confrontration  No facial asymmetry   Tongue is midline   Palate elevates symmetrically   Moving all 4 ext symmetrically no pronator drift. unable to examine Left upper ext due to extreme pain.      sensation is grossly symmetric  Gait : not assessed.  B/L down going toes               *****LAB AND IMAGIN.3    12.12 )-----------( 345      ( 22 Aug 2018 05:27 )             26.0                   134<L>  |  98  |  51<H>  ----------------------------<  107<H>  4.5   |  21<L>  |  2.88<H>    Ca    9.3      22 Aug 2018 05:27    TPro  6.8  /  Alb  3.5  /  TBili  0.3  /  DBili  x   /  AST  21  /  ALT  11  /  AlkPhos  56  08-21                CARDIAC MARKERS ( 21 Aug 2018 05:32 )  x     / x     / 218 u/L / x     / x                    Creatine Kinase, Serum: 218: SPECIMEN MILDLY HEMOLYZED  CKMB is no longer reflexively performed on elevated CK  results.  To get CKMB results please order "CK AND CKMB".  Effective Maya 15, 2016. u/L (18 @ 05:32)  Immunofixation, Urine: Reference Range: None Detected (18 @ 12:53)        [All pertinent recent Imaging reports reviewed]         *****A S S E S S M E N T   A N D   P L A N :        87yo F with hx CKD stage 4, HTN, HLD, CHF normal EF, shingles lower back 2018 presents with L arm pain. Pt at baseline walks with a walker and was in her usual state of health yesterday. She was at a party with any complaints and returned home without any complaints At 4 AM awoke and began complaining of LT arm pain and her son gave her Tylenol without relief. She was brought to ED to be evaluated for this. In the ED she was noted to be in pain and hypertensive was given morphine 4mg x2 with some relief and hydralazine, imdur and bumetanide and metoprolol with improvement in BP.        Problem/Recommendations 1: left arm edematous and tender to touch   limited eval due to severe pain, suspect inflammtory/infectious process.   denies any trauma.   reportedly no dvt in ed,   MRI c spine was limited due to mvt, however no cord compression as per neuro radiology.  elevate left arm, rest and observe closely.  would get bp in both arms to see if there is any differential.       Problem/Recommendations 2: hyponatremia likely secondary to pain related increased adh, defer to nephrology.         Differential diagnosis and plan of care discussed with patient after the evaluation.   Advanced care planning options discussed.   Pain assessed and judicious use of narcotics when appropriate was discussed.  Importance of Fall prevention discussed.    80 minutes spent on the total encounter;  more than 50 % of the visit was spent on counseling  and or coordinating care by the attending physician.    Thank you for allowing me to participate in the care of this sophie patient. Please do not hesitate to call me if you have any questions.   ___________________________  Will follow with you.  Thank you,  Chen Wolf MD  Diplomate of the American Board of Neurology and Psychiatry.  Diplomate of the American Board of Vascular Neurology.   [facility]   Ph: [phone]      This and subsequent notes were partially created using voice recognition software and will  inherently be subject to errors including those of syntax and sound alike substitutions which may escape proofreading. In such instances original meaning may be extrapolated by contextual derivation.

## 2018-08-22 NOTE — CONSULT NOTE ADULT - SUBJECTIVE AND OBJECTIVE BOX
FRANCIA TAHMINA  [id number]    HISTORY OF PRESENT ILLNESS:    Patient is an 87 yo F with hx of CKD, HTN, HLD presenting for acute onset L hand pain with Rheumatology consulted for polyarticular joint pains.    Patient stated that she had been having L hand swelling for 1 week prior to her admission. She was was doing well yesterday without any pain. She went to sleep and awoke at 4am with L hand pain along with pain in both of her feet, ankles and L shoulder. She took acetaminophen without relief and was brought to the ER. Patient denied every having joint pains like this before and denied any hx of gout. She denied any constitutional symptoms like fevers, night sweats, new rashes. She denied any recent illness and denied any sick contacts.    PAST MEDICAL & SURGICAL HISTORY:  Asthma  CKD (chronic kidney disease)  High cholesterol  Hypertension  History of hysterectomy      Review of Systems:  Gen:  No fevers/chills, weight loss  HEENT: No blurry vision, no difficulty swallowing, no oral or nasal ulcers  CVS: No chest pain/palpitations  Resp: No SOB/wheezing  GI: No N/V/C/D/abdominal pain  MSK: Diffuse joint pain  Skin: No new rashes  Neuro: No headaches    MEDICATIONS  (STANDING):  amLODIPine   Tablet 5 milliGRAM(s) Oral daily  buDESOnide  80 MICROgram(s)/formoterol 4.5 MICROgram(s) Inhaler 2 Puff(s) Inhalation two times a day  buMETAnide 2 milliGRAM(s) Oral daily  ergocalciferol 26272 Unit(s) Oral every week  gabapentin 100 milliGRAM(s) Oral every 12 hours  heparin  Injectable 5000 Unit(s) SubCutaneous every 12 hours  hydrALAZINE 50 milliGRAM(s) Oral every 8 hours  iron sucrose IVPB 200 milliGRAM(s) IV Intermittent once  isosorbide   mononitrate ER Tablet (IMDUR) 120 milliGRAM(s) Oral daily  metoprolol succinate  milliGRAM(s) Oral daily  pantoprazole    Tablet 40 milliGRAM(s) Oral before breakfast  simvastatin 40 milliGRAM(s) Oral at bedtime    MEDICATIONS  (PRN):  acetaminophen   Tablet 650 milliGRAM(s) Oral every 6 hours PRN For Temp greater than 38 C (100.4 F)  acetaminophen   Tablet. 650 milliGRAM(s) Oral every 6 hours PRN Mild Pain (1 - 3)  HYDROmorphone  Injectable 0.5 milliGRAM(s) IV Push every 4 hours PRN Severe Pain (7 - 10)  ondansetron Injectable 4 milliGRAM(s) IV Push every 6 hours PRN Nausea      Allergies    No Known Allergies    Intolerances        PERTINENT MEDICATION HISTORY:    SOCIAL HISTORY:  OCCUPATION:  TRAVEL HISTORY:    FAMILY HISTORY:  No pertinent family history in first degree relatives      Vital Signs Last 24 Hrs  T(C): 36.3 (22 Aug 2018 13:46), Max: 37.1 (21 Aug 2018 19:51)  T(F): 97.4 (22 Aug 2018 13:46), Max: 98.8 (21 Aug 2018 19:51)  HR: 82 (22 Aug 2018 13:46) (78 - 84)  BP: 129/63 (22 Aug 2018 13:46) (129/63 - 170/69)  BP(mean): --  RR: 18 (22 Aug 2018 13:46) (18 - 18)  SpO2: 97% (22 Aug 2018 13:46) (97% - 100%)    Physical Exam:  General: No apparent distress  HEENT: EOMI, MMM  CVS: +S1/S2, RRR, no murmurs/rubs/gallops  Resp: Wheezing  GI: Soft, NT/ND +BS  MSK: R hand swelling, b/l pitting edema of LE, TTP of L wrist, L shoulder and b/l MTP and ankles.  Neuro: AAOx3  Skin: no visible rashes    LABS:                        8.3    12.12 )-----------( 345      ( 22 Aug 2018 05:27 )             26.0     08-22    134<L>  |  98  |  51<H>  ----------------------------<  107<H>  4.5   |  21<L>  |  2.88<H>    Ca    9.3      22 Aug 2018 05:27    TPro  6.8  /  Alb  3.5  /  TBili  0.3  /  DBili  x   /  AST  21  /  ALT  11  /  AlkPhos  56  08-21          RADIOLOGY & ADDITIONAL STUDIES:

## 2018-08-23 DIAGNOSIS — E87.1 HYPO-OSMOLALITY AND HYPONATREMIA: ICD-10-CM

## 2018-08-23 DIAGNOSIS — N17.9 ACUTE KIDNEY FAILURE, UNSPECIFIED: ICD-10-CM

## 2018-08-23 LAB
ALBUMIN SERPL ELPH-MCNC: 3 G/DL — LOW (ref 3.3–5)
ALP SERPL-CCNC: 56 U/L — SIGNIFICANT CHANGE UP (ref 40–120)
ALT FLD-CCNC: 9 U/L — SIGNIFICANT CHANGE UP (ref 4–33)
ANA TITR SER: NEGATIVE — SIGNIFICANT CHANGE UP
APPEARANCE UR: SIGNIFICANT CHANGE UP
AST SERPL-CCNC: 15 U/L — SIGNIFICANT CHANGE UP (ref 4–32)
BACTERIA # UR AUTO: SIGNIFICANT CHANGE UP
BASOPHILS # BLD AUTO: 0.02 K/UL — SIGNIFICANT CHANGE UP (ref 0–0.2)
BASOPHILS NFR BLD AUTO: 0.2 % — SIGNIFICANT CHANGE UP (ref 0–2)
BILIRUB SERPL-MCNC: < 0.2 MG/DL — LOW (ref 0.2–1.2)
BILIRUB UR-MCNC: NEGATIVE — SIGNIFICANT CHANGE UP
BLOOD UR QL VISUAL: NEGATIVE — SIGNIFICANT CHANGE UP
BUN SERPL-MCNC: 63 MG/DL — HIGH (ref 7–23)
CALCIUM SERPL-MCNC: 9.2 MG/DL — SIGNIFICANT CHANGE UP (ref 8.4–10.5)
CHLORIDE SERPL-SCNC: 97 MMOL/L — LOW (ref 98–107)
CO2 SERPL-SCNC: 19 MMOL/L — LOW (ref 22–31)
COLOR SPEC: YELLOW — SIGNIFICANT CHANGE UP
CREAT SERPL-MCNC: 3.22 MG/DL — HIGH (ref 0.5–1.3)
EOSINOPHIL # BLD AUTO: 0 K/UL — SIGNIFICANT CHANGE UP (ref 0–0.5)
EOSINOPHIL NFR BLD AUTO: 0 % — SIGNIFICANT CHANGE UP (ref 0–6)
GLUCOSE SERPL-MCNC: 209 MG/DL — HIGH (ref 70–99)
GLUCOSE UR-MCNC: SIGNIFICANT CHANGE UP
HCT VFR BLD CALC: 26.5 % — LOW (ref 34.5–45)
HGB BLD-MCNC: 8.5 G/DL — LOW (ref 11.5–15.5)
IMM GRANULOCYTES # BLD AUTO: 0.07 # — SIGNIFICANT CHANGE UP
IMM GRANULOCYTES NFR BLD AUTO: 0.6 % — SIGNIFICANT CHANGE UP (ref 0–1.5)
KETONES UR-MCNC: NEGATIVE — SIGNIFICANT CHANGE UP
LEUKOCYTE ESTERASE UR-ACNC: SIGNIFICANT CHANGE UP
LYMPHOCYTES # BLD AUTO: 0.4 K/UL — LOW (ref 1–3.3)
LYMPHOCYTES # BLD AUTO: 3.2 % — LOW (ref 13–44)
MCHC RBC-ENTMCNC: 27.9 PG — SIGNIFICANT CHANGE UP (ref 27–34)
MCHC RBC-ENTMCNC: 32.1 % — SIGNIFICANT CHANGE UP (ref 32–36)
MCV RBC AUTO: 86.9 FL — SIGNIFICANT CHANGE UP (ref 80–100)
MONOCYTES # BLD AUTO: 0.24 K/UL — SIGNIFICANT CHANGE UP (ref 0–0.9)
MONOCYTES NFR BLD AUTO: 1.9 % — LOW (ref 2–14)
MUCOUS THREADS # UR AUTO: SIGNIFICANT CHANGE UP
NEUTROPHILS # BLD AUTO: 11.74 K/UL — HIGH (ref 1.8–7.4)
NEUTROPHILS NFR BLD AUTO: 94.1 % — HIGH (ref 43–77)
NITRITE UR-MCNC: NEGATIVE — SIGNIFICANT CHANGE UP
NRBC # FLD: 0 — SIGNIFICANT CHANGE UP
PH UR: 6 — SIGNIFICANT CHANGE UP (ref 5–8)
PLATELET # BLD AUTO: 339 K/UL — SIGNIFICANT CHANGE UP (ref 150–400)
PMV BLD: 10.4 FL — SIGNIFICANT CHANGE UP (ref 7–13)
POTASSIUM SERPL-MCNC: 5.1 MMOL/L — SIGNIFICANT CHANGE UP (ref 3.5–5.3)
POTASSIUM SERPL-SCNC: 5.1 MMOL/L — SIGNIFICANT CHANGE UP (ref 3.5–5.3)
PROT SERPL-MCNC: 6.3 G/DL — SIGNIFICANT CHANGE UP (ref 6–8.3)
PROT UR-MCNC: HIGH
RBC # BLD: 3.05 M/UL — LOW (ref 3.8–5.2)
RBC # FLD: 14 % — SIGNIFICANT CHANGE UP (ref 10.3–14.5)
RBC CASTS # UR COMP ASSIST: SIGNIFICANT CHANGE UP (ref 0–?)
RHEUMATOID FACT SERPL-ACNC: 16 IU/ML — HIGH (ref 0–13)
SODIUM SERPL-SCNC: 131 MMOL/L — LOW (ref 135–145)
SP GR SPEC: 1.02 — SIGNIFICANT CHANGE UP (ref 1–1.04)
SQUAMOUS # UR AUTO: SIGNIFICANT CHANGE UP
URATE SERPL-MCNC: 7.7 MG/DL — HIGH (ref 2.5–7)
UROBILINOGEN FLD QL: NORMAL — SIGNIFICANT CHANGE UP
WBC # BLD: 12.47 K/UL — HIGH (ref 3.8–10.5)
WBC # FLD AUTO: 12.47 K/UL — HIGH (ref 3.8–10.5)
WBC UR QL: HIGH (ref 0–?)

## 2018-08-23 RX ORDER — HYDROMORPHONE HYDROCHLORIDE 2 MG/ML
0.5 INJECTION INTRAMUSCULAR; INTRAVENOUS; SUBCUTANEOUS EVERY 4 HOURS
Qty: 0 | Refills: 0 | Status: DISCONTINUED | OUTPATIENT
Start: 2018-08-23 | End: 2018-08-24

## 2018-08-23 RX ORDER — IRON SUCROSE 20 MG/ML
200 INJECTION, SOLUTION INTRAVENOUS ONCE
Qty: 0 | Refills: 0 | Status: COMPLETED | OUTPATIENT
Start: 2018-08-23 | End: 2018-08-23

## 2018-08-23 RX ADMIN — HEPARIN SODIUM 5000 UNIT(S): 5000 INJECTION INTRAVENOUS; SUBCUTANEOUS at 18:19

## 2018-08-23 RX ADMIN — IRON SUCROSE 110 MILLIGRAM(S): 20 INJECTION, SOLUTION INTRAVENOUS at 11:19

## 2018-08-23 RX ADMIN — SIMVASTATIN 40 MILLIGRAM(S): 20 TABLET, FILM COATED ORAL at 22:25

## 2018-08-23 RX ADMIN — Medication 30 MILLIGRAM(S): at 05:52

## 2018-08-23 RX ADMIN — HEPARIN SODIUM 5000 UNIT(S): 5000 INJECTION INTRAVENOUS; SUBCUTANEOUS at 05:51

## 2018-08-23 RX ADMIN — AMLODIPINE BESYLATE 5 MILLIGRAM(S): 2.5 TABLET ORAL at 05:50

## 2018-08-23 RX ADMIN — PANTOPRAZOLE SODIUM 40 MILLIGRAM(S): 20 TABLET, DELAYED RELEASE ORAL at 05:51

## 2018-08-23 RX ADMIN — GABAPENTIN 100 MILLIGRAM(S): 400 CAPSULE ORAL at 18:18

## 2018-08-23 RX ADMIN — BUDESONIDE AND FORMOTEROL FUMARATE DIHYDRATE 2 PUFF(S): 160; 4.5 AEROSOL RESPIRATORY (INHALATION) at 22:25

## 2018-08-23 RX ADMIN — GABAPENTIN 100 MILLIGRAM(S): 400 CAPSULE ORAL at 05:50

## 2018-08-23 RX ADMIN — ISOSORBIDE MONONITRATE 120 MILLIGRAM(S): 60 TABLET, EXTENDED RELEASE ORAL at 11:13

## 2018-08-23 RX ADMIN — Medication 50 MILLIGRAM(S): at 14:52

## 2018-08-23 RX ADMIN — BUDESONIDE AND FORMOTEROL FUMARATE DIHYDRATE 2 PUFF(S): 160; 4.5 AEROSOL RESPIRATORY (INHALATION) at 11:13

## 2018-08-23 RX ADMIN — BUMETANIDE 2 MILLIGRAM(S): 0.25 INJECTION INTRAMUSCULAR; INTRAVENOUS at 05:50

## 2018-08-23 RX ADMIN — Medication 100 MILLIGRAM(S): at 05:49

## 2018-08-23 RX ADMIN — Medication 50 MILLIGRAM(S): at 22:25

## 2018-08-23 RX ADMIN — Medication 50 MILLIGRAM(S): at 05:51

## 2018-08-23 NOTE — PROGRESS NOTE ADULT - PROBLEM SELECTOR PLAN 7
Mild and likely secondary to hypovolemia. Will consider holding bumex on 8/24 if serum sodium decreases. Monitor serum sodium. Mild and likely secondary to hypovolemia. Will consider holding bumex on 8/24 if serum sodium decreases. Monitor serum sodium.    Check UA and urine culture given complaints of dysuria and recent UTI as an outpatient.

## 2018-08-23 NOTE — CHART NOTE - NSCHARTNOTEFT_GEN_A_CORE
Patient seen and examined. Improvement in her joint symptoms and ROM, still with some pain at the R wrist and L shoulder. Patient also complaining of dysuria. Uric acid elevated 7.7, RF 16, awaiting CCP and parvovirus serologies.    - Continue solumedrol 30mg daily for now  - Consider UA/UCx  - Awaiting CCP and Parvovirus serologies    Luis Jaquez  Rheumatology Fellow PGY IV

## 2018-08-24 DIAGNOSIS — N30.00 ACUTE CYSTITIS WITHOUT HEMATURIA: ICD-10-CM

## 2018-08-24 LAB
BUN SERPL-MCNC: 64 MG/DL — HIGH (ref 7–23)
CALCIUM SERPL-MCNC: 9 MG/DL — SIGNIFICANT CHANGE UP (ref 8.4–10.5)
CHLORIDE SERPL-SCNC: 96 MMOL/L — LOW (ref 98–107)
CO2 SERPL-SCNC: 18 MMOL/L — LOW (ref 22–31)
CREAT SERPL-MCNC: 3.48 MG/DL — HIGH (ref 0.5–1.3)
GLUCOSE SERPL-MCNC: 177 MG/DL — HIGH (ref 70–99)
HCT VFR BLD CALC: 23.4 % — LOW (ref 34.5–45)
HGB BLD-MCNC: 7.4 G/DL — LOW (ref 11.5–15.5)
MAGNESIUM SERPL-MCNC: 1.8 MG/DL — SIGNIFICANT CHANGE UP (ref 1.6–2.6)
MCHC RBC-ENTMCNC: 27.2 PG — SIGNIFICANT CHANGE UP (ref 27–34)
MCHC RBC-ENTMCNC: 31.6 % — LOW (ref 32–36)
MCV RBC AUTO: 86 FL — SIGNIFICANT CHANGE UP (ref 80–100)
NRBC # FLD: 0 — SIGNIFICANT CHANGE UP
PHOSPHATE SERPL-MCNC: 4.6 MG/DL — HIGH (ref 2.5–4.5)
PLATELET # BLD AUTO: 376 K/UL — SIGNIFICANT CHANGE UP (ref 150–400)
PMV BLD: 10.7 FL — SIGNIFICANT CHANGE UP (ref 7–13)
POTASSIUM SERPL-MCNC: 5.1 MMOL/L — SIGNIFICANT CHANGE UP (ref 3.5–5.3)
POTASSIUM SERPL-SCNC: 5.1 MMOL/L — SIGNIFICANT CHANGE UP (ref 3.5–5.3)
RBC # BLD: 2.72 M/UL — LOW (ref 3.8–5.2)
RBC # FLD: 14 % — SIGNIFICANT CHANGE UP (ref 10.3–14.5)
SODIUM SERPL-SCNC: 129 MMOL/L — LOW (ref 135–145)
WBC # BLD: 15.1 K/UL — HIGH (ref 3.8–10.5)
WBC # FLD AUTO: 15.1 K/UL — HIGH (ref 3.8–10.5)

## 2018-08-24 PROCEDURE — 99231 SBSQ HOSP IP/OBS SF/LOW 25: CPT | Mod: GC

## 2018-08-24 RX ORDER — DARBEPOETIN ALFA IN POLYSORBAT 200MCG/0.4
25 PEN INJECTOR (ML) SUBCUTANEOUS ONCE
Qty: 0 | Refills: 0 | Status: COMPLETED | OUTPATIENT
Start: 2018-08-24 | End: 2018-08-24

## 2018-08-24 RX ORDER — SODIUM CHLORIDE 9 MG/ML
1000 INJECTION INTRAMUSCULAR; INTRAVENOUS; SUBCUTANEOUS
Qty: 0 | Refills: 0 | Status: DISCONTINUED | OUTPATIENT
Start: 2018-08-24 | End: 2018-08-25

## 2018-08-24 RX ORDER — CEFTRIAXONE 500 MG/1
INJECTION, POWDER, FOR SOLUTION INTRAMUSCULAR; INTRAVENOUS
Qty: 0 | Refills: 0 | Status: DISCONTINUED | OUTPATIENT
Start: 2018-08-24 | End: 2018-08-27

## 2018-08-24 RX ORDER — CEFTRIAXONE 500 MG/1
1 INJECTION, POWDER, FOR SOLUTION INTRAMUSCULAR; INTRAVENOUS EVERY 24 HOURS
Qty: 0 | Refills: 0 | Status: DISCONTINUED | OUTPATIENT
Start: 2018-08-25 | End: 2018-08-27

## 2018-08-24 RX ORDER — OXYCODONE AND ACETAMINOPHEN 5; 325 MG/1; MG/1
1 TABLET ORAL EVERY 6 HOURS
Qty: 0 | Refills: 0 | Status: DISCONTINUED | OUTPATIENT
Start: 2018-08-24 | End: 2018-08-27

## 2018-08-24 RX ORDER — CEFTRIAXONE 500 MG/1
1 INJECTION, POWDER, FOR SOLUTION INTRAMUSCULAR; INTRAVENOUS ONCE
Qty: 0 | Refills: 0 | Status: COMPLETED | OUTPATIENT
Start: 2018-08-24 | End: 2018-08-24

## 2018-08-24 RX ADMIN — OXYCODONE AND ACETAMINOPHEN 1 TABLET(S): 5; 325 TABLET ORAL at 15:38

## 2018-08-24 RX ADMIN — AMLODIPINE BESYLATE 5 MILLIGRAM(S): 2.5 TABLET ORAL at 05:23

## 2018-08-24 RX ADMIN — Medication 50 MILLIGRAM(S): at 05:19

## 2018-08-24 RX ADMIN — BUDESONIDE AND FORMOTEROL FUMARATE DIHYDRATE 2 PUFF(S): 160; 4.5 AEROSOL RESPIRATORY (INHALATION) at 10:22

## 2018-08-24 RX ADMIN — OXYCODONE AND ACETAMINOPHEN 1 TABLET(S): 5; 325 TABLET ORAL at 16:10

## 2018-08-24 RX ADMIN — GABAPENTIN 100 MILLIGRAM(S): 400 CAPSULE ORAL at 17:06

## 2018-08-24 RX ADMIN — CEFTRIAXONE 100 GRAM(S): 500 INJECTION, POWDER, FOR SOLUTION INTRAMUSCULAR; INTRAVENOUS at 10:22

## 2018-08-24 RX ADMIN — GABAPENTIN 100 MILLIGRAM(S): 400 CAPSULE ORAL at 05:19

## 2018-08-24 RX ADMIN — BUDESONIDE AND FORMOTEROL FUMARATE DIHYDRATE 2 PUFF(S): 160; 4.5 AEROSOL RESPIRATORY (INHALATION) at 21:02

## 2018-08-24 RX ADMIN — Medication 50 MILLIGRAM(S): at 13:49

## 2018-08-24 RX ADMIN — SIMVASTATIN 40 MILLIGRAM(S): 20 TABLET, FILM COATED ORAL at 21:02

## 2018-08-24 RX ADMIN — Medication 30 MILLIGRAM(S): at 05:24

## 2018-08-24 RX ADMIN — Medication 50 MILLIGRAM(S): at 21:02

## 2018-08-24 RX ADMIN — HEPARIN SODIUM 5000 UNIT(S): 5000 INJECTION INTRAVENOUS; SUBCUTANEOUS at 05:20

## 2018-08-24 RX ADMIN — HEPARIN SODIUM 5000 UNIT(S): 5000 INJECTION INTRAVENOUS; SUBCUTANEOUS at 17:06

## 2018-08-24 RX ADMIN — PANTOPRAZOLE SODIUM 40 MILLIGRAM(S): 20 TABLET, DELAYED RELEASE ORAL at 06:31

## 2018-08-24 RX ADMIN — BUMETANIDE 2 MILLIGRAM(S): 0.25 INJECTION INTRAMUSCULAR; INTRAVENOUS at 05:19

## 2018-08-24 RX ADMIN — Medication 25 MICROGRAM(S): at 12:49

## 2018-08-24 RX ADMIN — ISOSORBIDE MONONITRATE 120 MILLIGRAM(S): 60 TABLET, EXTENDED RELEASE ORAL at 12:52

## 2018-08-24 RX ADMIN — SODIUM CHLORIDE 60 MILLILITER(S): 9 INJECTION INTRAMUSCULAR; INTRAVENOUS; SUBCUTANEOUS at 10:23

## 2018-08-24 RX ADMIN — Medication 100 MILLIGRAM(S): at 05:19

## 2018-08-24 NOTE — PROGRESS NOTE ADULT - PROBLEM SELECTOR PLAN 7
Mild and likely secondary to hypovolemia. Plan to discontinue bumex this morning. Gentle IVF as above. Check urine sodium and urine osm. Monitor serum sodium.

## 2018-08-24 NOTE — CHART NOTE - NSCHARTNOTEFT_GEN_A_CORE
spoke with Dr. Duncan and patient is medically cleared for d/c today. CM made aware during rounds this AM

## 2018-08-25 LAB
BACTERIA UR CULT: SIGNIFICANT CHANGE UP
BUN SERPL-MCNC: 80 MG/DL — HIGH (ref 7–23)
CALCIUM SERPL-MCNC: 9.2 MG/DL — SIGNIFICANT CHANGE UP (ref 8.4–10.5)
CHLORIDE SERPL-SCNC: 93 MMOL/L — LOW (ref 98–107)
CO2 SERPL-SCNC: 19 MMOL/L — LOW (ref 22–31)
CREAT ?TM UR-MCNC: 88.8 MG/DL — SIGNIFICANT CHANGE UP
CREAT SERPL-MCNC: 3.69 MG/DL — HIGH (ref 0.5–1.3)
GLUCOSE SERPL-MCNC: 155 MG/DL — HIGH (ref 70–99)
HCT VFR BLD CALC: 25.3 % — LOW (ref 34.5–45)
HGB BLD-MCNC: 8.1 G/DL — LOW (ref 11.5–15.5)
MCHC RBC-ENTMCNC: 27 PG — SIGNIFICANT CHANGE UP (ref 27–34)
MCHC RBC-ENTMCNC: 32 % — SIGNIFICANT CHANGE UP (ref 32–36)
MCV RBC AUTO: 84.3 FL — SIGNIFICANT CHANGE UP (ref 80–100)
NRBC # FLD: 0.02 — SIGNIFICANT CHANGE UP
OB PNL STL: NEGATIVE — SIGNIFICANT CHANGE UP
OSMOLALITY UR: 277 MOSMO/KG — SIGNIFICANT CHANGE UP (ref 50–1200)
PLATELET # BLD AUTO: 429 K/UL — HIGH (ref 150–400)
PMV BLD: 10.3 FL — SIGNIFICANT CHANGE UP (ref 7–13)
POTASSIUM SERPL-MCNC: 5.1 MMOL/L — SIGNIFICANT CHANGE UP (ref 3.5–5.3)
POTASSIUM SERPL-SCNC: 5.1 MMOL/L — SIGNIFICANT CHANGE UP (ref 3.5–5.3)
RBC # BLD: 3 M/UL — LOW (ref 3.8–5.2)
RBC # FLD: 14.1 % — SIGNIFICANT CHANGE UP (ref 10.3–14.5)
SODIUM SERPL-SCNC: 128 MMOL/L — LOW (ref 135–145)
SODIUM UR-SCNC: < 20 MMOL/L — SIGNIFICANT CHANGE UP
SPECIMEN SOURCE: SIGNIFICANT CHANGE UP
WBC # BLD: 14.66 K/UL — HIGH (ref 3.8–10.5)
WBC # FLD AUTO: 14.66 K/UL — HIGH (ref 3.8–10.5)

## 2018-08-25 RX ORDER — SODIUM CHLORIDE 9 MG/ML
1000 INJECTION INTRAMUSCULAR; INTRAVENOUS; SUBCUTANEOUS
Qty: 0 | Refills: 0 | Status: DISCONTINUED | OUTPATIENT
Start: 2018-08-25 | End: 2018-08-26

## 2018-08-25 RX ADMIN — Medication 100 MILLIGRAM(S): at 05:13

## 2018-08-25 RX ADMIN — Medication 50 MILLIGRAM(S): at 13:13

## 2018-08-25 RX ADMIN — Medication 50 MILLIGRAM(S): at 05:13

## 2018-08-25 RX ADMIN — BUDESONIDE AND FORMOTEROL FUMARATE DIHYDRATE 2 PUFF(S): 160; 4.5 AEROSOL RESPIRATORY (INHALATION) at 21:03

## 2018-08-25 RX ADMIN — GABAPENTIN 100 MILLIGRAM(S): 400 CAPSULE ORAL at 17:45

## 2018-08-25 RX ADMIN — PANTOPRAZOLE SODIUM 40 MILLIGRAM(S): 20 TABLET, DELAYED RELEASE ORAL at 06:25

## 2018-08-25 RX ADMIN — HEPARIN SODIUM 5000 UNIT(S): 5000 INJECTION INTRAVENOUS; SUBCUTANEOUS at 17:45

## 2018-08-25 RX ADMIN — SIMVASTATIN 40 MILLIGRAM(S): 20 TABLET, FILM COATED ORAL at 21:03

## 2018-08-25 RX ADMIN — BUDESONIDE AND FORMOTEROL FUMARATE DIHYDRATE 2 PUFF(S): 160; 4.5 AEROSOL RESPIRATORY (INHALATION) at 09:03

## 2018-08-25 RX ADMIN — HEPARIN SODIUM 5000 UNIT(S): 5000 INJECTION INTRAVENOUS; SUBCUTANEOUS at 05:13

## 2018-08-25 RX ADMIN — CEFTRIAXONE 100 GRAM(S): 500 INJECTION, POWDER, FOR SOLUTION INTRAMUSCULAR; INTRAVENOUS at 09:02

## 2018-08-25 RX ADMIN — ISOSORBIDE MONONITRATE 120 MILLIGRAM(S): 60 TABLET, EXTENDED RELEASE ORAL at 11:05

## 2018-08-25 RX ADMIN — SODIUM CHLORIDE 60 MILLILITER(S): 9 INJECTION INTRAMUSCULAR; INTRAVENOUS; SUBCUTANEOUS at 12:10

## 2018-08-25 RX ADMIN — Medication 50 MILLIGRAM(S): at 21:03

## 2018-08-25 RX ADMIN — Medication 30 MILLIGRAM(S): at 05:13

## 2018-08-25 RX ADMIN — AMLODIPINE BESYLATE 5 MILLIGRAM(S): 2.5 TABLET ORAL at 05:13

## 2018-08-25 RX ADMIN — GABAPENTIN 100 MILLIGRAM(S): 400 CAPSULE ORAL at 05:13

## 2018-08-25 NOTE — PHYSICAL THERAPY INITIAL EVALUATION ADULT - ADDITIONAL COMMENTS
Patient's daughter-in-law provided information for PT evaluation. Patient lives with her son and daughter-in-law in a private house, 3 steps to enter. Patient requires assistance with ADLs (pt's granddaughter acts as caregiver), and ambulates with a rolling walker at baseline.     Patient was left semi-supine in bed as found, all lines/tubes intact and call harmon within reach, SANJIV valera

## 2018-08-25 NOTE — PHYSICAL THERAPY INITIAL EVALUATION ADULT - PERTINENT HX OF CURRENT PROBLEM, REHAB EVAL
Patient is an 88 year old female admitted to [facility] on 8/21 with left arm pain. PMH includes: CKD stage 4, HTN, HLD, CHF normal EF, shingles lower back.

## 2018-08-25 NOTE — PHYSICAL THERAPY INITIAL EVALUATION ADULT - GAIT DEVIATIONS NOTED, PT EVAL
decreased step length/decreased stride length/decreased weight-shifting ability/decreased veronica/increased time in double stance

## 2018-08-25 NOTE — PHYSICAL THERAPY INITIAL EVALUATION ADULT - GENERAL OBSERVATIONS, REHAB EVAL
Patient was received semi-supine in bed in NAD, pt's daughter-in-law at bedside to provide translation.

## 2018-08-25 NOTE — PHYSICAL THERAPY INITIAL EVALUATION ADULT - PATIENT PROFILE REVIEW, REHAB EVAL
yes/PT orders received-->ambulate as tolerated. Consult with SANJIV GERONIMO -->pt OK to participate in PT evaluation.

## 2018-08-26 ENCOUNTER — TRANSCRIPTION ENCOUNTER (OUTPATIENT)
Age: 83
End: 2018-08-26

## 2018-08-26 LAB
ALBUMIN SERPL ELPH-MCNC: 3.2 G/DL — LOW (ref 3.3–5)
BUN SERPL-MCNC: 82 MG/DL — HIGH (ref 7–23)
CALCIUM SERPL-MCNC: 9 MG/DL — SIGNIFICANT CHANGE UP (ref 8.4–10.5)
CHLORIDE SERPL-SCNC: 93 MMOL/L — LOW (ref 98–107)
CO2 SERPL-SCNC: 17 MMOL/L — LOW (ref 22–31)
CREAT ?TM UR-MCNC: 92.1 MG/DL — SIGNIFICANT CHANGE UP
CREAT SERPL-MCNC: 3.63 MG/DL — HIGH (ref 0.5–1.3)
GLUCOSE BLDC GLUCOMTR-MCNC: 204 MG/DL — HIGH (ref 70–99)
GLUCOSE SERPL-MCNC: 149 MG/DL — HIGH (ref 70–99)
HCT VFR BLD CALC: 26 % — LOW (ref 34.5–45)
HGB BLD-MCNC: 8.3 G/DL — LOW (ref 11.5–15.5)
MCHC RBC-ENTMCNC: 27.5 PG — SIGNIFICANT CHANGE UP (ref 27–34)
MCHC RBC-ENTMCNC: 31.9 % — LOW (ref 32–36)
MCV RBC AUTO: 86.1 FL — SIGNIFICANT CHANGE UP (ref 80–100)
NRBC # FLD: 0.04 — SIGNIFICANT CHANGE UP
OSMOLALITY SERPL: 296 MOSMO/KG — HIGH (ref 275–295)
OSMOLALITY UR: 286 MOSMO/KG — SIGNIFICANT CHANGE UP (ref 50–1200)
PHOSPHATE SERPL-MCNC: 4.5 MG/DL — SIGNIFICANT CHANGE UP (ref 2.5–4.5)
PLATELET # BLD AUTO: 468 K/UL — HIGH (ref 150–400)
PMV BLD: 10.2 FL — SIGNIFICANT CHANGE UP (ref 7–13)
POTASSIUM SERPL-MCNC: 5.2 MMOL/L — SIGNIFICANT CHANGE UP (ref 3.5–5.3)
POTASSIUM SERPL-SCNC: 5.2 MMOL/L — SIGNIFICANT CHANGE UP (ref 3.5–5.3)
RBC # BLD: 3.02 M/UL — LOW (ref 3.8–5.2)
RBC # FLD: 14.2 % — SIGNIFICANT CHANGE UP (ref 10.3–14.5)
SODIUM SERPL-SCNC: 127 MMOL/L — LOW (ref 135–145)
SODIUM UR-SCNC: < 20 MMOL/L — SIGNIFICANT CHANGE UP
WBC # BLD: 17.07 K/UL — HIGH (ref 3.8–10.5)
WBC # FLD AUTO: 17.07 K/UL — HIGH (ref 3.8–10.5)

## 2018-08-26 RX ORDER — SODIUM CHLORIDE 9 MG/ML
1000 INJECTION INTRAMUSCULAR; INTRAVENOUS; SUBCUTANEOUS
Qty: 0 | Refills: 0 | Status: DISCONTINUED | OUTPATIENT
Start: 2018-08-26 | End: 2018-08-27

## 2018-08-26 RX ADMIN — BUDESONIDE AND FORMOTEROL FUMARATE DIHYDRATE 2 PUFF(S): 160; 4.5 AEROSOL RESPIRATORY (INHALATION) at 21:10

## 2018-08-26 RX ADMIN — SODIUM CHLORIDE 80 MILLILITER(S): 9 INJECTION INTRAMUSCULAR; INTRAVENOUS; SUBCUTANEOUS at 21:11

## 2018-08-26 RX ADMIN — Medication 50 MILLIGRAM(S): at 12:09

## 2018-08-26 RX ADMIN — AMLODIPINE BESYLATE 5 MILLIGRAM(S): 2.5 TABLET ORAL at 05:23

## 2018-08-26 RX ADMIN — GABAPENTIN 100 MILLIGRAM(S): 400 CAPSULE ORAL at 21:10

## 2018-08-26 RX ADMIN — Medication 650 MILLIGRAM(S): at 22:23

## 2018-08-26 RX ADMIN — HEPARIN SODIUM 5000 UNIT(S): 5000 INJECTION INTRAVENOUS; SUBCUTANEOUS at 18:46

## 2018-08-26 RX ADMIN — Medication 650 MILLIGRAM(S): at 22:53

## 2018-08-26 RX ADMIN — HEPARIN SODIUM 5000 UNIT(S): 5000 INJECTION INTRAVENOUS; SUBCUTANEOUS at 05:26

## 2018-08-26 RX ADMIN — Medication 50 MILLIGRAM(S): at 05:23

## 2018-08-26 RX ADMIN — GABAPENTIN 100 MILLIGRAM(S): 400 CAPSULE ORAL at 05:23

## 2018-08-26 RX ADMIN — Medication 30 MILLIGRAM(S): at 05:23

## 2018-08-26 RX ADMIN — SIMVASTATIN 40 MILLIGRAM(S): 20 TABLET, FILM COATED ORAL at 21:10

## 2018-08-26 RX ADMIN — CEFTRIAXONE 100 GRAM(S): 500 INJECTION, POWDER, FOR SOLUTION INTRAMUSCULAR; INTRAVENOUS at 08:52

## 2018-08-26 RX ADMIN — BUDESONIDE AND FORMOTEROL FUMARATE DIHYDRATE 2 PUFF(S): 160; 4.5 AEROSOL RESPIRATORY (INHALATION) at 09:36

## 2018-08-26 RX ADMIN — Medication 100 MILLIGRAM(S): at 05:23

## 2018-08-26 RX ADMIN — PANTOPRAZOLE SODIUM 40 MILLIGRAM(S): 20 TABLET, DELAYED RELEASE ORAL at 06:08

## 2018-08-26 RX ADMIN — ISOSORBIDE MONONITRATE 120 MILLIGRAM(S): 60 TABLET, EXTENDED RELEASE ORAL at 12:09

## 2018-08-26 RX ADMIN — SODIUM CHLORIDE 80 MILLILITER(S): 9 INJECTION INTRAMUSCULAR; INTRAVENOUS; SUBCUTANEOUS at 12:09

## 2018-08-26 RX ADMIN — Medication 50 MILLIGRAM(S): at 21:10

## 2018-08-26 NOTE — DISCHARGE NOTE ADULT - PATIENT PORTAL LINK FT
You can access the [facility] Patient Portal, offered by [facility], by registering with the following website: [email or web address]

## 2018-08-26 NOTE — DISCHARGE NOTE ADULT - CARE PLAN
Principal Discharge DX:	Pain of left upper extremity  Goal:	resolution of pain  Assessment and plan of treatment:	MRI c spine was limited due to movement, however no cord compression. Steroids started as per rheumatology- please continue medications as prescribed. Follow up with your PCP within 1-2 weeks for further outpatient evaluation.  Secondary Diagnosis:	CKD (chronic kidney disease), stage V  Assessment and plan of treatment:	Nephrology offered kidney biopsy in the past but you refused. Please follow up with Dr. Ulloa in 1-2 weeks for further outpatinet monitoring.  Secondary Diagnosis:	Hyponatremia  Secondary Diagnosis:	Chronic diastolic congestive heart failure  Assessment and plan of treatment:	Continue home medications.  Secondary Diagnosis:	Hypertension, unspecified type  Assessment and plan of treatment:	Continue blood pressure medication regimen as directed. Monitor for any visual changes, headaches or dizziness.  Monitor blood pressure regularly.  Follow up with your PCP for further management for high blood pressure.  Secondary Diagnosis:	Asthma  Assessment and plan of treatment:	Continue home medications.  Secondary Diagnosis:	Cystitis  Assessment and plan of treatment:	You were treated with antibiotics in the hospital Principal Discharge DX:	Pain of left upper extremity  Goal:	resolution of pain  Assessment and plan of treatment:	MRI cervical spine was limited due to movement, however no cord compression.   Steroids started as per rheumatology likely have gout flare. Please continue medications as prescribed.   Please follow up with Rheumatologist Dr. Callejas within 1-2 weeks for further outpatient workup.   Follow up with your PCP within 1-2 weeks for further outpatient evaluation.  Secondary Diagnosis:	CKD (chronic kidney disease), stage V  Assessment and plan of treatment:	***BUMEX stopped in the hospital***  Nephrology offered kidney biopsy in the past but you refused. Please follow up with Dr. Ulloa in 1-2 days for further outpatient monitoring.  Secondary Diagnosis:	Hyponatremia  Assessment and plan of treatment:	Continue sodium bicarb as prescribed. Please have BMP retaken with nephrologist Dr. Ulloa on Thursday August 30th 12 pm appointment has been made.  Secondary Diagnosis:	Chronic diastolic congestive heart failure  Assessment and plan of treatment:	***Bumex has been held in hospital. Please follow up with your PCP within 1-2 weeks for further outpatient management.  Secondary Diagnosis:	Hypertension, unspecified type  Assessment and plan of treatment:	Continue blood pressure medication regimen as directed. Monitor for any visual changes, headaches or dizziness.  Monitor blood pressure regularly.  Follow up with your PCP for further management for high blood pressure.  Secondary Diagnosis:	Asthma  Assessment and plan of treatment:	Continue home medications.  Secondary Diagnosis:	Cystitis  Assessment and plan of treatment:	You were treated with antibiotics in the hospital. Follow up with your PCP within 1-2 weeks. Principal Discharge DX:	Pain of left upper extremity  Goal:	resolution of pain  Assessment and plan of treatment:	MRI cervical spine was limited due to movement, however no cord compression.   Steroids started as per rheumatology likely have gout flare. Please steroid taper medications as prescribed.   Please follow up with Rheumatologist Dr. Callejas within 1-2 weeks for further outpatient workup.   Follow up with your PCP within 1-2 weeks for further outpatient evaluation.  Secondary Diagnosis:	CKD (chronic kidney disease), stage V  Assessment and plan of treatment:	***BUMEX stopped in the hospital***  Nephrology offered kidney biopsy in the past but you refused. Please follow up with Dr. Ulloa in 1-2 days for further outpatient monitoring.  Secondary Diagnosis:	Hyponatremia  Assessment and plan of treatment:	Continue sodium bicarb as prescribed. Please have BMP retaken with nephrologist Dr. Ulloa on Thursday August 30th 12 pm appointment has been made.  Secondary Diagnosis:	Chronic diastolic congestive heart failure  Assessment and plan of treatment:	***Bumex has been held in hospital. Please follow up with your PCP within 1-2 weeks for further outpatient management.  Secondary Diagnosis:	Hypertension, unspecified type  Assessment and plan of treatment:	Continue blood pressure medication regimen as directed. Monitor for any visual changes, headaches or dizziness.  Monitor blood pressure regularly.  Follow up with your PCP for further management for high blood pressure.  Secondary Diagnosis:	Asthma  Assessment and plan of treatment:	Continue home medications.  Secondary Diagnosis:	Cystitis  Assessment and plan of treatment:	You were treated with antibiotics in the hospital. Follow up with your PCP within 1-2 weeks. Principal Discharge DX:	Pain of left upper extremity  Goal:	resolution of pain  Assessment and plan of treatment:	MRI cervical spine was limited due to movement, however no cord compression.   Steroids started as per rheumatology likely have gout flare. Please steroid taper medications as prescribed.   Please follow up with Rheumatologist Dr. Callejas within 1-2 weeks for further outpatient workup.   Follow up with your PCP within 1-2 weeks for further outpatient evaluation.  Secondary Diagnosis:	CKD (chronic kidney disease), stage V  Assessment and plan of treatment:	***BUMEX stopped in the hospital***  Nephrology offered kidney biopsy in the past but you refused. Please follow up with Dr. Ulloa in 1-2 days for further outpatient monitoring.  Secondary Diagnosis:	Hyponatremia  Assessment and plan of treatment:	Continue sodium bicarb as prescribed. Please have BMP retaken with nephrologist Dr. Ulloa on Thursday August 30th 12 pm appointment has been made.  Secondary Diagnosis:	Chronic diastolic congestive heart failure  Assessment and plan of treatment:	***Bumex has been held in hospital. Please follow up with your PCP within 1-2 weeks for further outpatient management.  Secondary Diagnosis:	Hypertension, unspecified type  Assessment and plan of treatment:	Continue blood pressure medication regimen as directed. Monitor for any visual changes, headaches or dizziness.  Monitor blood pressure regularly.  Follow up with your PCP for further management for high blood pressure.  Secondary Diagnosis:	Asthma  Assessment and plan of treatment:	Continue home medications.  Secondary Diagnosis:	UTI (urinary tract infection), uncomplicated  Assessment and plan of treatment:	You were treated with antibiotics in the hospital. Follow up with your PCP within 1-2 weeks.

## 2018-08-26 NOTE — DISCHARGE NOTE ADULT - HOSPITAL COURSE
89 yo F w/ hx CKD stage 4-5 baseline Cr(2.8-3.2), HTN, CHF with normal EF, p/w LT shoulder pain with radiation down LT arm involving lateral 3 digits     hospital course:     Extremity pain:   - Continue solumedrol 30mg daily for now  - Consider UA/UCx  - Awaiting CCP and Parvovirus serologiesPain of left upper extremity------  --possibly secondary to radiculopathy  - X ray neg for fracture +osteopenia  -Left VA duplex:The left internal jugular, innominate,  subclavian, axillary, brachial, radial, and ulnar veins  are patent, demonstrating full compressibility with phasic  Doppler waveforms.  No evidence of thrombosis.  The left basilic and cephalic veins appear sonographically  normal and compressible without evidence of thrombosis.  -MRI of Cervical Spine: Multilevel degenerative spondylosis. There is moderate to severe spinal   canal stenosis at C5-C6 with flattening of the ventral spinal cord. There is no definite intrinsic cord signal abnormality though imaging is limited by patient motion.At C4-C5, there is moderate canal stenosis with mild ventral flattening of the spinal cord.Degenerative spondylosis otherwise described above.    gout  -Rheum consulted   -- Continue solumedrol 30mg daily for now  - Consider UA/UCx- treated with ceftriaxone  -RF: 16.0 Elevated,, uric acid: 7.7 elevated   -prednisone x5 days   -f/u outpt Britta Yanez       CKD  -rayaldee non formulary d/w pharmacy start calcitrol 0.25mcg qdaily and titrate as needed. -monitor Cr currently at baseline  -rayaldee   -occult negative     CHF  - likely secondary to HTN  -hydralazine/imdur and BB/bumetanide    HTN  -monitor BP meds as above.     Asthma  -cont dulera.     dispo home

## 2018-08-26 NOTE — DISCHARGE NOTE ADULT - PLAN OF CARE
resolution of pain MRI c spine was limited due to movement, however no cord compression. Steroids started as per rheumatology- please continue medications as prescribed. Follow up with your PCP within 1-2 weeks for further outpatient evaluation. Nephrology offered kidney biopsy in the past but you refused. Please follow up with Dr. Ulloa in 1-2 weeks for further outpatinet monitoring. Continue home medications. Continue blood pressure medication regimen as directed. Monitor for any visual changes, headaches or dizziness.  Monitor blood pressure regularly.  Follow up with your PCP for further management for high blood pressure. You were treated with antibiotics in the hospital MRI cervical spine was limited due to movement, however no cord compression.   Steroids started as per rheumatology likely have gout flare. Please continue medications as prescribed.   Please follow up with Rheumatologist Dr. Callejas within 1-2 weeks for further outpatient workup.   Follow up with your PCP within 1-2 weeks for further outpatient evaluation. ***BUMEX stopped in the hospital***  Nephrology offered kidney biopsy in the past but you refused. Please follow up with Dr. Ulloa in 1-2 days for further outpatient monitoring. Continue sodium bicarb as prescribed. Please have BMP retaken with nephrologist Dr. Ulloa on Thursday August 30th 12 pm appointment has been made. ***Bumex has been held in hospital. Please follow up with your PCP within 1-2 weeks for further outpatient management. You were treated with antibiotics in the hospital. Follow up with your PCP within 1-2 weeks. MRI cervical spine was limited due to movement, however no cord compression.   Steroids started as per rheumatology likely have gout flare. Please steroid taper medications as prescribed.   Please follow up with Rheumatologist Dr. Callejas within 1-2 weeks for further outpatient workup.   Follow up with your PCP within 1-2 weeks for further outpatient evaluation.

## 2018-08-26 NOTE — DISCHARGE NOTE ADULT - MEDICATION SUMMARY - MEDICATIONS TO STOP TAKING
I will STOP taking the medications listed below when I get home from the hospital:    bumetanide 1 mg oral tablet  -- 1 tab(s) by mouth 2 times a day

## 2018-08-26 NOTE — DISCHARGE NOTE ADULT - CARE PROVIDER_API CALL
Bubba Mercado), Internal Medicine; Nephrology  [facility] [place] [address]  Phone: [phone]  Fax: [phone] Bubba Mercado (MD), Internal Medicine; Nephrology  [address] [place] [address]  Phone: [phone]  Fax: [phone]    Viky Callejas; MPH), Internal Medicine; Rheumatology  [address] [address]  Phone: [phone]  Fax: [phone]

## 2018-08-26 NOTE — DISCHARGE NOTE ADULT - CARE PROVIDERS DIRECT ADDRESSES
,DirectAddress_Unknown ,DirectAddress_Unknown,[email or web address]

## 2018-08-26 NOTE — DISCHARGE NOTE ADULT - MEDICATION SUMMARY - MEDICATIONS TO TAKE
I will START or STAY ON the medications listed below when I get home from the hospital:    rollator   -- Indication: For walker     predniSONE 20 mg oral tablet  -- 1 tab(s) by mouth once a day   -- Indication: For gout    acetaminophen 325 mg oral tablet  -- 2 tab(s) by mouth every 6 hours, As needed, Mild Pain (1 - 3)  -- Indication: For Pain in extremity    sodium bicarbonate 650 mg oral tablet  -- 1 tab(s) by mouth 3 times a day  -- Indication: For Hyponatremia    isosorbide mononitrate 120 mg oral tablet, extended release  -- 1 tab(s) by mouth once a day  -- Indication: For Hypertension, unspecified type    gabapentin 100 mg oral capsule  -- 1 cap(s) by mouth every 12 hours  -- Indication: For Pain in extremity    simvastatin 40 mg oral tablet  -- 1 tab(s) by mouth once a day (at bedtime)  -- Indication: For Hyperlipidemia    metoprolol succinate 100 mg oral tablet, extended release  -- 1 tab(s) by mouth once a day  -- Indication: For Hypertension, unspecified type    Dulera 100 mcg-5 mcg/inh inhalation aerosol  -- 2 puff(s) inhaled 2 times a day, As Needed  -- Indication: For Asthma    amLODIPine 10 mg oral tablet  -- 1 tab(s) by mouth once a day   -- It is very important that you take or use this exactly as directed.  Do not skip doses or discontinue unless directed by your doctor.  Some non-prescription drugs may aggravate your condition.  Read all labels carefully.  If a warning appears, check with your doctor before taking.    -- Indication: For Hypertension, unspecified type    pantoprazole 40 mg oral delayed release tablet  -- 1 tab(s) by mouth once a day  -- Indication: For gi protection    hydrALAZINE 50 mg oral tablet  -- 1 tab(s) by mouth every 8 hours  -- Indication: For Hypertension, unspecified type    Rayaldee 30 mcg oral capsule, extended release  -- 1 cap(s) by mouth once a day (at bedtime)  -- Indication: For Supplement I will START or STAY ON the medications listed below when I get home from the hospital:    rollator   -- Indication: For walker     predniSONE 5 mg oral tablet  -- 4 tab(s) oral once a day x 4 days, 3 tab(s) oral once a day x 5 days, 2 tab(s) oral once a day x 5 days,1 tab oral - once a day x 5 days  -- It is very important that you take or use this exactly as directed.  Do not skip doses or discontinue unless directed by your doctor.  Obtain medical advice before taking any non-prescription drugs as some may affect the action of this medication.  Take with food or milk.    -- Indication: For gout     acetaminophen 325 mg oral tablet  -- 2 tab(s) by mouth every 6 hours, As needed, Mild Pain (1 - 3)  -- Indication: For Pain in extremity    sodium bicarbonate 650 mg oral tablet  -- 1 tab(s) by mouth 3 times a day  -- Indication: For Hyponatremia    isosorbide mononitrate 120 mg oral tablet, extended release  -- 1 tab(s) by mouth once a day  -- Indication: For Hypertension, unspecified type    gabapentin 100 mg oral capsule  -- 1 cap(s) by mouth every 12 hours  -- Indication: For Pain in extremity    simvastatin 40 mg oral tablet  -- 1 tab(s) by mouth once a day (at bedtime)  -- Indication: For Hyperlipidemia    metoprolol succinate 100 mg oral tablet, extended release  -- 1 tab(s) by mouth once a day  -- Indication: For Hypertension, unspecified type    Dulera 100 mcg-5 mcg/inh inhalation aerosol  -- 2 puff(s) inhaled 2 times a day, As Needed  -- Indication: For Asthma    amLODIPine 10 mg oral tablet  -- 1 tab(s) by mouth once a day   -- It is very important that you take or use this exactly as directed.  Do not skip doses or discontinue unless directed by your doctor.  Some non-prescription drugs may aggravate your condition.  Read all labels carefully.  If a warning appears, check with your doctor before taking.    -- Indication: For Hypertension, unspecified type    pantoprazole 40 mg oral delayed release tablet  -- 1 tab(s) by mouth once a day  -- Indication: For gi protection    hydrALAZINE 50 mg oral tablet  -- 1 tab(s) by mouth every 8 hours  -- Indication: For Hypertension, unspecified type    Rayaldee 30 mcg oral capsule, extended release  -- 1 cap(s) by mouth once a day (at bedtime)  -- Indication: For Supplement

## 2018-08-26 NOTE — DISCHARGE NOTE ADULT - SECONDARY DIAGNOSIS.
CKD (chronic kidney disease), stage V Hyponatremia Chronic diastolic congestive heart failure Hypertension, unspecified type Asthma Cystitis UTI (urinary tract infection), uncomplicated

## 2018-08-27 VITALS
OXYGEN SATURATION: 99 % | DIASTOLIC BLOOD PRESSURE: 60 MMHG | RESPIRATION RATE: 18 BRPM | HEART RATE: 80 BPM | SYSTOLIC BLOOD PRESSURE: 146 MMHG | TEMPERATURE: 98 F

## 2018-08-27 DIAGNOSIS — E87.2 ACIDOSIS: ICD-10-CM

## 2018-08-27 LAB
B19V IGG SER QL: POSITIVE — SIGNIFICANT CHANGE UP
B19V IGG SER-ACNC: 2.7 INDEX — HIGH (ref 0–0.8)
B19V IGM FLD-ACNC: 2.4 INDEX — HIGH (ref 0–0.8)
B19V IGM SER-ACNC: POSITIVE — SIGNIFICANT CHANGE UP
BUN SERPL-MCNC: 85 MG/DL — HIGH (ref 7–23)
CALCIUM SERPL-MCNC: 9 MG/DL — SIGNIFICANT CHANGE UP (ref 8.4–10.5)
CHLORIDE SERPL-SCNC: 94 MMOL/L — LOW (ref 98–107)
CO2 SERPL-SCNC: 17 MMOL/L — LOW (ref 22–31)
CREAT SERPL-MCNC: 3.55 MG/DL — HIGH (ref 0.5–1.3)
GLUCOSE SERPL-MCNC: 177 MG/DL — HIGH (ref 70–99)
HCT VFR BLD CALC: 26.5 % — LOW (ref 34.5–45)
HGB BLD-MCNC: 8.5 G/DL — LOW (ref 11.5–15.5)
MCHC RBC-ENTMCNC: 27.6 PG — SIGNIFICANT CHANGE UP (ref 27–34)
MCHC RBC-ENTMCNC: 32.1 % — SIGNIFICANT CHANGE UP (ref 32–36)
MCV RBC AUTO: 86 FL — SIGNIFICANT CHANGE UP (ref 80–100)
NRBC # FLD: 0.12 — SIGNIFICANT CHANGE UP
PLATELET # BLD AUTO: 495 K/UL — HIGH (ref 150–400)
PMV BLD: 9.9 FL — SIGNIFICANT CHANGE UP (ref 7–13)
POTASSIUM SERPL-MCNC: 5.2 MMOL/L — SIGNIFICANT CHANGE UP (ref 3.5–5.3)
POTASSIUM SERPL-SCNC: 5.2 MMOL/L — SIGNIFICANT CHANGE UP (ref 3.5–5.3)
RBC # BLD: 3.08 M/UL — LOW (ref 3.8–5.2)
RBC # FLD: 14.2 % — SIGNIFICANT CHANGE UP (ref 10.3–14.5)
SODIUM SERPL-SCNC: 127 MMOL/L — LOW (ref 135–145)
WBC # BLD: 20.51 K/UL — HIGH (ref 3.8–10.5)
WBC # FLD AUTO: 20.51 K/UL — HIGH (ref 3.8–10.5)

## 2018-08-27 RX ORDER — ACETAMINOPHEN 500 MG
2 TABLET ORAL
Qty: 0 | Refills: 0 | COMMUNITY
Start: 2018-08-27

## 2018-08-27 RX ORDER — GABAPENTIN 400 MG/1
1 CAPSULE ORAL
Qty: 60 | Refills: 0
Start: 2018-08-27 | End: 2018-09-25

## 2018-08-27 RX ORDER — SODIUM BICARBONATE 1 MEQ/ML
1 SYRINGE (ML) INTRAVENOUS
Qty: 90 | Refills: 0 | OUTPATIENT
Start: 2018-08-27 | End: 2018-09-25

## 2018-08-27 RX ORDER — AMLODIPINE BESYLATE 2.5 MG/1
1 TABLET ORAL
Qty: 30 | Refills: 0 | OUTPATIENT
Start: 2018-08-27 | End: 2018-09-25

## 2018-08-27 RX ORDER — SODIUM BICARBONATE 1 MEQ/ML
650 SYRINGE (ML) INTRAVENOUS THREE TIMES A DAY
Qty: 0 | Refills: 0 | Status: DISCONTINUED | OUTPATIENT
Start: 2018-08-27 | End: 2018-08-27

## 2018-08-27 RX ORDER — GABAPENTIN 400 MG/1
1 CAPSULE ORAL
Qty: 0 | Refills: 0 | DISCHARGE
Start: 2018-08-27 | End: 2018-09-25

## 2018-08-27 RX ADMIN — Medication 50 MILLIGRAM(S): at 12:57

## 2018-08-27 RX ADMIN — CEFTRIAXONE 100 GRAM(S): 500 INJECTION, POWDER, FOR SOLUTION INTRAMUSCULAR; INTRAVENOUS at 08:38

## 2018-08-27 RX ADMIN — BUDESONIDE AND FORMOTEROL FUMARATE DIHYDRATE 2 PUFF(S): 160; 4.5 AEROSOL RESPIRATORY (INHALATION) at 08:38

## 2018-08-27 RX ADMIN — ISOSORBIDE MONONITRATE 120 MILLIGRAM(S): 60 TABLET, EXTENDED RELEASE ORAL at 12:56

## 2018-08-27 RX ADMIN — AMLODIPINE BESYLATE 5 MILLIGRAM(S): 2.5 TABLET ORAL at 05:09

## 2018-08-27 RX ADMIN — Medication 100 MILLIGRAM(S): at 05:08

## 2018-08-27 RX ADMIN — Medication 50 MILLIGRAM(S): at 05:08

## 2018-08-27 RX ADMIN — GABAPENTIN 100 MILLIGRAM(S): 400 CAPSULE ORAL at 05:08

## 2018-08-27 RX ADMIN — Medication 650 MILLIGRAM(S): at 12:56

## 2018-08-27 RX ADMIN — GABAPENTIN 100 MILLIGRAM(S): 400 CAPSULE ORAL at 17:24

## 2018-08-27 RX ADMIN — HEPARIN SODIUM 5000 UNIT(S): 5000 INJECTION INTRAVENOUS; SUBCUTANEOUS at 05:08

## 2018-08-27 RX ADMIN — HEPARIN SODIUM 5000 UNIT(S): 5000 INJECTION INTRAVENOUS; SUBCUTANEOUS at 17:24

## 2018-08-27 RX ADMIN — Medication 40 MILLIGRAM(S): at 05:08

## 2018-08-27 RX ADMIN — PANTOPRAZOLE SODIUM 40 MILLIGRAM(S): 20 TABLET, DELAYED RELEASE ORAL at 05:09

## 2018-08-27 NOTE — PROGRESS NOTE ADULT - PROBLEM SELECTOR PLAN 8
Start empiric CTX. Follow up urine culture (P).
On empiric CTX. UCx contaminated.  Would treat with short course of abx.
Start empiric CTX. Follow up urine culture (P).
Mild and likely secondary to poor solute intake? given indeterminate urine osm. Hold bumex on discharge. Sodium bicarbonate tablets as above. Monitor serum sodium.

## 2018-08-27 NOTE — PROGRESS NOTE ADULT - PROBLEM SELECTOR PLAN 1
Patient with SHY likely secondary to diuretic therapy and infection. Discontinued bumex. IVF as above.
Patient with mild SHY likely secondary to diuretic therapy. Will consider holding bumex on 8/24 if Scr continues to rise. Avoid nephrotoxins.
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 Avoid nephrotoxins. Monitor electrolytes.
evaluate by neuro  MRI c spine was limited due to mvt, however no cord compression as per neuro radiology.  elevate left arm, rest and observe closely.  rheum eval
evaluated by neuro  MRI c spine was limited due to mvt, however no cord compression as per neuro radiology.  elevate left arm, rest and observe closely.  rheum evaluated pt - started on steroids
evaluated by neuro  MRI c spine was limited due to mvt, however no cord compression as per neuro radiology.\elevate left arm, rest and observe closely.  rheum evaluated pt - started on steroids , pts rom is improving
evaluated by neuro  MRI c spine was limited due to mvt, however no cord compression as per neuro radiology.\elevate left arm, rest and observe closely.  rheum evaluated pt - started on steroids , pts rom is improving  change iv to po steroids, dc tomorrow
Patient with SHY likely secondary to diuretic therapy and infection. Discontinued bumex. Continue  IVF - ordered. Repeat urine sodium and urine creatinine in am. Avoid nephrotoxins.
Patient with SHY with low FeNa suggestive of hypovolemia which has stabilized with IVF. Keep patient off of bumex for now. Avoid nephrotoxins.
evaluated by neuro  MRI c spine was limited due to mvt, however no cord compression as per neuro radiology.  elevate left arm, rest and observe closely.  rheum evaluated pt - started on steroids
Patient with SHY likely secondary to diuretic therapy and infection. Discontinue bumex. Gentle IVF ordered. Check urine sodium and urine creatinine. Avoid nephrotoxins.

## 2018-08-27 NOTE — PROGRESS NOTE ADULT - PROBLEM SELECTOR PROBLEM 4
Chronic diastolic congestive heart failure
Chronic diastolic congestive heart failure
Edema, lower extremity
Edema, lower extremity
Hypertension, unspecified type
Hypertension, unspecified type
Secondary hyperparathyroidism of renal origin
Edema, lower extremity
Edema, lower extremity
Hypertension, unspecified type
Edema, lower extremity

## 2018-08-27 NOTE — PROGRESS NOTE ADULT - ASSESSMENT
87 yo F w/ hx CKD stage 4-5 baseline Cr(2.8-3.2), HTN, CHF with normal EF, p/w LT shoulder pain with radiation down LT arm involving lateral 3 digits
87 yo F w/ hx CKD stage 4-5 baseline Cr(2.8-3.2), HTN, CHF with normal EF, p/w LT shoulder pain with radiation down LT arm involving lateral 3 digits
88y Female with history of CKD-5 with proteinuria in setting of paraproteinemia presents with L arm pain. Nephrology consulted for elevated Scr.
88y Female with history of CKD-5 with proteinuria in setting of paraproteinemia presents with L arm pain. Nephrology consulted for elevated Scr.      Pt with rising creatinine. O/n received IVF again. Creatinine is stable, but sodium continues to decrease.  urine studies c/w dehydration. There may be a component of poor solute intake.  Pt with no s/s volume overload. Will increase rate of fluids and give continuously.
89 yo F w/ hx CKD stage 4-5 baseline Cr(2.8-3.2), HTN, CHF with normal EF, p/w LT shoulder pain with radiation down LT arm involving lateral 3 digits
89 yo F w/ hx CKD stage 4-5 baseline Cr(2.8-3.2), HTN, CHF with normal EF, p/w LT shoulder pain with radiation down LT arm involving lateral 3 digits
89 yo F with hx of CKD, HTN, HLD presenting for acute onset L hand pain with Rheumatology consulted for polyarticular joint pains.    Patient with acute onset polyarticular joint pains. With h/o CKD and CHF and acute presentation, most consistent with acute polyarticular gout but somewhat unusual in the absence of any triggers. She denies any autoimmune hx, sick contacts. Post-viral arthritis is possible, along with late onset RA, although that is uncommon.    - Uric acid 7.7, RF 16, ZECHARIAH negative  - CCP pending  - Follow-up Parvovirus antibodies  - Give solumedrol 30mg for 2 more days (Saturday and Sunday), then drop down to 20mg on Monday for 5 days  - Will follow with you    Luis Jaquez  Rheumatology Fellow PGY IV
88y Female with history of CKD-5 with proteinuria in setting of paraproteinemia presents with L arm pain. Nephrology consulted for elevated Scr.      Pt with rising creatinine. O/n received IVF. Creatinine increased slightly and sodium decreased slightly.  urine studies c/w dehydration (vs. decreased renal perfusion in setting of CHF, but pt does not appear volume overloaded).  Will give one more day of gentle hydration with isotonic IVF and monitor renal function and sodium.
87 yo F w/ hx CKD stage 4-5 baseline Cr(2.8-3.2), HTN, CHF with normal EF, p/w LT shoulder pain with radiation down LT arm involving lateral 3 digits
88y Female with history of CKD-5 with proteinuria in setting of paraproteinemia presents with L arm pain. Nephrology consulted for elevated Scr.

## 2018-08-27 NOTE — PROGRESS NOTE ADULT - PROBLEM SELECTOR PROBLEM 6
Anemia of renal disease
Prophylactic measure
Anemia of renal disease
Anemia of renal disease
Asthma
Asthma
Prophylactic measure
Prophylactic measure
Anemia of renal disease
Anemia of renal disease

## 2018-08-27 NOTE — PROGRESS NOTE ADULT - PROBLEM SELECTOR PROBLEM 8
Acute cystitis without hematuria
Hyponatremia

## 2018-08-27 NOTE — PROGRESS NOTE ADULT - SUBJECTIVE AND OBJECTIVE BOX
[facility]        - Patient seen and examined.  - Today, patient is without complaints.         *****MEDICATIONS: Current medication reviewed and documented.    MEDICATIONS  (STANDING):  amLODIPine   Tablet 5 milliGRAM(s) Oral daily  buDESOnide  80 MICROgram(s)/formoterol 4.5 MICROgram(s) Inhaler 2 Puff(s) Inhalation two times a day  buMETAnide 2 milliGRAM(s) Oral daily  ergocalciferol 12888 Unit(s) Oral every week  gabapentin 100 milliGRAM(s) Oral every 12 hours  heparin  Injectable 5000 Unit(s) SubCutaneous every 12 hours  hydrALAZINE 50 milliGRAM(s) Oral every 8 hours  isosorbide   mononitrate ER Tablet (IMDUR) 120 milliGRAM(s) Oral daily  methylPREDNISolone sodium succinate Injectable 30 milliGRAM(s) IV Push daily  metoprolol succinate  milliGRAM(s) Oral daily  pantoprazole    Tablet 40 milliGRAM(s) Oral before breakfast  simvastatin 40 milliGRAM(s) Oral at bedtime    MEDICATIONS  (PRN):  acetaminophen   Tablet 650 milliGRAM(s) Oral every 6 hours PRN For Temp greater than 38 C (100.4 F)  acetaminophen   Tablet. 650 milliGRAM(s) Oral every 6 hours PRN Mild Pain (1 - 3)  HYDROmorphone  Injectable 0.5 milliGRAM(s) IV Push every 4 hours PRN Severe Pain (7 - 10)  ondansetron Injectable 4 milliGRAM(s) IV Push every 6 hours PRN Nausea           ***** REVIEW OF SYSTEM:  GEN: no fever, no chills, no pain  RESP: no SOB, no cough, no sputum  CVS: no chest pain, no palpitations, no edema  GI: no abdominal pain, no nausea, no vomiting, no constipation, no diarrhea  : no dysurea, no frequency  NEURO: no headache, no diziness  PSYCH: no depression, not anxious  Derm : no itching, no rash         ***** VITAL SIGNS:  T(F): 98.1 (18 @ 22:23), Max: 98.7 (18 @ 14:48)  HR: 85 (18 @ 22:23) (68 - 85)  BP: 152/71 (18 @ 22:23) (145/72 - 152/71)  RR: 18 (18 @ 22:23) (16 - 18)  SpO2: 100% (18 @ 22:23) (99% - 100%)  Wt(kg): --  ,   I&O's Summary       Alert oriented x 2    limited eval due to language      EOMI fundi not visualized,  VFF to confrontration  No facial asymmetry   Tongue is midline   Palate elevates symmetrically   Moving all 4 ext symmetrically no pronator drift. pt is much better able to ambulate using walker.     sensation is grossly symmetric  Gait : not assessed.  B/L down going toes        *****LAB AND IMAGIN.5    12.47 )-----------( 339      ( 23 Aug 2018 06:00 )             26.5                   131<L>  |  97<L>  |  63<H>  ----------------------------<  209<H>  5.1   |  19<L>  |  3.22<H>    Ca    9.2      23 Aug 2018 06:00    TPro  6.3  /  Alb  3.0<L>  /  TBili  < 0.2<L>  /  DBili  x   /  AST  15  /  ALT  9   /  AlkPhos  56                         Urinalysis Basic - ( 23 Aug 2018 20:00 )    Color: YELLOW / Appearance: Lt TURBID / S.016 / pH: 6.0  Gluc: SMALL / Ketone: NEGATIVE  / Bili: NEGATIVE / Urobili: NORMAL   Blood: NEGATIVE / Protein: LARGE / Nitrite: NEGATIVE   Leuk Esterase: MODERATE / RBC: 0-2 / WBC 26-50   Sq Epi: FEW / Non Sq Epi: x / Bacteria: FEW      [All pertinent recent Imaging/Reports reviewed]           *****A S S E S S M E N T   A N D   P L A N :         87yo F with hx CKD stage 4, HTN, HLD, CHF normal EF, shingles lower back 2018 presents with L arm pain. Pt at baseline walks with a walker and was in her usual state of health yesterday. She was at a party with any complaints and returned home without any complaints At 4 AM awoke and began complaining of LT arm pain and her son gave her Tylenol without relief. She was brought to ED to be evaluated for this. In the ED she was noted to be in pain and hypertensive was given morphine 4mg x2 with some relief and hydralazine, imdur and bumetanide and metoprolol with improvement in BP.        Problem/Recommendations 1: left arm edematous and tender to touch   limited eval due to severe pain, suspect inflammtory/infectious process, rheum input appreciated.  sig improvement of pain today, with steroids.      reportedly no dvt in ed,   MRI c spine was limited due to mvt, however no cord compression as per neuro radiology.  elevate left arm, rest and observe closely.       Problem/Recommendations 2: hyponatremia likely secondary to pain related increased adh, defer to nephrology.     Thank you for allowing me to participate in the care of this patient. Please do not hesitate to call me if you have any  questions.        ________________  Chen Wolf MD  [facility] ([facility]  [phone]     30 minutes spent on total encounter; more than 50 % of the visit was  spent counseling and or  coordinating care by the attending physician.   At the present time, [facility] does not  provide outpatient followup, best to call the your insurance to find a participating provider.  This was explained to you at the time of the visit. Alternatively, if your insurance allows it, you can follow up with a neurologist  Dr. Luis Mcclure([place]) [phone] or Dr. Rajan Paz ( [place]) [phone]
[facility] NEPHROLOGY- PROGRESS NOTE    88y Female with history of CKD-5 with proteinuria in setting of paraproteinemia presents with L arm pain. Nephrology consulted for elevated Scr.    REVIEW OF SYSTEMS:  Gen: no changes in weight  Cards: no chest pain  Resp: no dyspnea  GI: no nausea or vomiting or diarrhea  Vascular: no LE edema, + L arm pain/numbness/tingling    No Known Allergies      Hospital Medications: Medications reviewed    VITALS:  T(F): 98.3 (08-22-18 @ 05:20), Max: 98.8 (08-21-18 @ 19:51)  HR: 78 (08-22-18 @ 05:20)  BP: 147/81 (08-22-18 @ 05:20)  RR: 18 (08-22-18 @ 05:20)  SpO2: 100% (08-22-18 @ 05:20)  Wt(kg): --  Height (cm): 157.48 (08-21 @ 19:51)  Weight (kg): 63.5 (08-21 @ 19:51)  BMI (kg/m2): 25.6 (08-21 @ 19:51)  BSA (m2): 1.64 (08-21 @ 19:51)      PHYSICAL EXAM:    Gen: NAD, calm  Cards: RRR, +S1/S2, no M/G/R  Resp: CTA B/L  GI: soft, NT/ND, NABS  Vascular: trace LE edema B/L, LUE TTP with edema    LABS:  08-22    134<L>  |  98  |  51<H>  ----------------------------<  107<H>  4.5   |  21<L>  |  2.88<H>    Ca    9.3      22 Aug 2018 05:27    TPro  6.8  /  Alb  3.5  /  TBili  0.3  /  DBili      /  AST  21  /  ALT  11  /  AlkPhos  56  08-21    Creatinine Trend: 2.88 <--, 2.84 <--                        8.3    12.12 )-----------( 345      ( 22 Aug 2018 05:27 )             26.0
FRANCIA MARTEL  [id number]    INTERVAL HPI/OVERNIGHT EVENTS:    Patient reports improvement in her joint symptoms, stated her R wrist and L shoulder still hurt, but marked improvement from admission.    MEDICATIONS  (STANDING):  amLODIPine   Tablet 5 milliGRAM(s) Oral daily  buDESOnide  80 MICROgram(s)/formoterol 4.5 MICROgram(s) Inhaler 2 Puff(s) Inhalation two times a day  cefTRIAXone   IVPB      ergocalciferol 24347 Unit(s) Oral every week  gabapentin 100 milliGRAM(s) Oral every 12 hours  heparin  Injectable 5000 Unit(s) SubCutaneous every 12 hours  hydrALAZINE 50 milliGRAM(s) Oral every 8 hours  isosorbide   mononitrate ER Tablet (IMDUR) 120 milliGRAM(s) Oral daily  methylPREDNISolone sodium succinate Injectable 30 milliGRAM(s) IV Push daily  metoprolol succinate  milliGRAM(s) Oral daily  pantoprazole    Tablet 40 milliGRAM(s) Oral before breakfast  simvastatin 40 milliGRAM(s) Oral at bedtime  sodium chloride 0.9%. 1000 milliLiter(s) (60 mL/Hr) IV Continuous <Continuous>    MEDICATIONS  (PRN):  acetaminophen   Tablet 650 milliGRAM(s) Oral every 6 hours PRN For Temp greater than 38 C (100.4 F)  acetaminophen   Tablet. 650 milliGRAM(s) Oral every 6 hours PRN Mild Pain (1 - 3)  ondansetron Injectable 4 milliGRAM(s) IV Push every 6 hours PRN Nausea  oxyCODONE    5 mG/acetaminophen 325 mG 1 Tablet(s) Oral every 6 hours PRN moderate and severe pain      Allergies    No Known Allergies    Intolerances        Review of Systems:  Gen:  No fevers/chills, weight loss  HEENT: No blurry vision, no difficulty swallowing, no oral or nasal ulcers  CVS: No chest pain/palpitations  Resp: No SOB/wheezing  GI: No N/V/C/D/abdominal pain  MSK: Diffuse joint pain  Skin: No new rashes  Neuro: No headaches      Vital Signs Last 24 Hrs  T(C): 36.6 (24 Aug 2018 13:26), Max: 36.9 (24 Aug 2018 05:17)  T(F): 97.8 (24 Aug 2018 13:26), Max: 98.4 (24 Aug 2018 05:17)  HR: 64 (24 Aug 2018 13:26) (64 - 88)  BP: 131/63 (24 Aug 2018 13:26) (131/63 - 152/71)  BP(mean): --  RR: 16 (24 Aug 2018 13:26) (16 - 18)  SpO2: 100% (24 Aug 2018 13:26) (98% - 100%)    Physical Exam:  General: No apparent distress  HEENT: EOMI, MMM  CVS: +S1/S2, RRR, no murmurs/rubs/gallops  Resp: Wheezing  GI: Soft, NT/ND +BS  MSK: R hand swelling, b/l pitting edema of LE, TTP of L wrist, L shoulder and b/l MTP and ankles.  Neuro: AAOx3  Skin: no visible rashes    LABS:                        7.4    15.10 )-----------( 376      ( 24 Aug 2018 06:09 )             23.4     08-24    129<L>  |  96<L>  |  64<H>  ----------------------------<  177<H>  5.1   |  18<L>  |  3.48<H>    Ca    9.0      24 Aug 2018 06:09  Phos  4.6     08-24  Mg     1.8     08-24    TPro  6.3  /  Alb  3.0<L>  /  TBili  < 0.2<L>  /  DBili  x   /  AST  15  /  ALT  9   /  AlkPhos  56  08-23      Urinalysis Basic - ( 23 Aug 2018 20:00 )    Color: YELLOW / Appearance: Lt TURBID / S.016 / pH: 6.0  Gluc: SMALL / Ketone: NEGATIVE  / Bili: NEGATIVE / Urobili: NORMAL   Blood: NEGATIVE / Protein: LARGE / Nitrite: NEGATIVE   Leuk Esterase: MODERATE / RBC: 0-2 / WBC 26-50   Sq Epi: FEW / Non Sq Epi: x / Bacteria: FEW          RADIOLOGY & ADDITIONAL TESTS:
[facility] NEPHROLOGY- PROGRESS NOTE    88y Female with history of CKD-5 with proteinuria in setting of paraproteinemia presents with L arm pain. Nephrology consulted for elevated Scr.    REVIEW OF SYSTEMS:  Gen: no changes in weight  Cards: no chest pain  Resp: no dyspnea  GI: no nausea or vomiting or diarrhea  : + dysuria  Vascular: no LE edema, + B/L arm pain/numbness/tingling    No Known Allergies      Hospital Medications: Medications reviewed      VITALS:  T(F): 98.3 (08-23-18 @ 05:47), Max: 98.3 (08-23-18 @ 05:47)  HR: 74 (08-23-18 @ 05:47)  BP: 151/69 (08-23-18 @ 05:47)  RR: 18 (08-23-18 @ 05:47)  SpO2: 99% (08-23-18 @ 05:47)  Wt(kg): --      PHYSICAL EXAM:    Gen: NAD, calm  Cards: RRR, +S1/S2, + KESHA  Resp: CTA B/L  GI: soft, NT/ND, NABS  Vascular: no LE edema B/L, LUE TTP with edema now improving      LABS:  08-23    131<L>  |  97<L>  |  63<H>  ----------------------------<  209<H>  5.1   |  19<L>  |  3.22<H>    Ca    9.2      23 Aug 2018 06:00    TPro  6.3  /  Alb  3.0<L>  /  TBili  < 0.2<L>  /  DBili      /  AST  15  /  ALT  9   /  AlkPhos  56  08-23    Creatinine Trend: 3.22 <--, 2.88 <--, 2.84 <--                        8.5    12.47 )-----------( 339      ( 23 Aug 2018 06:00 )             26.5
[facility] NEPHROLOGY- PROGRESS NOTE    88y Female with history of CKD-5 with proteinuria in setting of paraproteinemia presents with L arm pain. Nephrology consulted for elevated Scr.    REVIEW OF SYSTEMS:  Gen: no changes in weight  Cards: no chest pain  Resp: no dyspnea  GI: no nausea or vomiting or diarrhea  Vascular: no LE edema    No Known Allergies      Hospital Medications: Medications reviewed      VITALS:  T(F): 98.5 (08-27-18 @ 05:05), Max: 98.5 (08-27-18 @ 05:05)  HR: 71 (08-27-18 @ 05:05)  BP: 164/65 (08-27-18 @ 05:05)  RR: 18 (08-27-18 @ 05:05)  SpO2: 99% (08-27-18 @ 05:05)  Wt(kg): --      PHYSICAL EXAM:    Gen: NAD, calm  Cards: RRR, +S1/S2, + KESHA  Resp: CTA B/L  GI: soft, NT/ND, NABS  Vascular: no LE edema B/L      LABS:  08-27    127<L>  |  94<L>  |  85<H>  ----------------------------<  177<H>  5.2   |  17<L>  |  3.55<H>    Ca    9.0      27 Aug 2018 05:40  Phos  4.5     08-26    TPro      /  Alb  3.2<L>  /  TBili      /  DBili      /  AST      /  ALT      /  AlkPhos      08-26    Creatinine Trend: 3.55 <--, 3.63 <--, 3.69 <--, 3.48 <--, 3.22 <--, 2.88 <--, 2.84 <--                        8.5    20.51 )-----------( 495      ( 27 Aug 2018 05:40 )             26.5
[facility] NEPHROLOGY- PROGRESS NOTE    88y Female with history of CKD-5 with proteinuria in setting of paraproteinemia presents with L arm pain. Nephrology consulted for elevated Scr.    Pt breathing okay, no complaints today.  She reports no LE edema and breathing well.      REVIEW OF SYSTEMS:  Gen: no changes in weight  Cards: no chest pain  Resp: no dyspnea  GI: no nausea or vomiting or diarrhea  : no dysuria today.  Vascular: no LE edema, + B/L arm pain/numbness/tingling improving    No Known Allergies      Hospital Medications: Medications reviewed    VITALS:  T(F): 97.7 (18 @ 05:19), Max: 97.7 (18 @ 13:12)  HR: 88 (18 @ 05:19)  BP: 148/58 (18 @ 05:19)  RR: 18 (18 @ 05:19)  SpO2: 100% (18 @ 05:19)  Wt(kg): --      PHYSICAL EXAM:  Gen: NAD, calm  Cards: RRR, +S1/S2, + KESHA  Resp: CTA B/L  GI: soft, NT/ND, NABS  Vascular: no LE edema B/L, LUE TTP with edema now improving        LABS:      127<L>  |  93<L>  |  82<H>  ----------------------------<  149<H>  5.2   |  17<L>  |  3.63<H>    Ca    9.0      26 Aug 2018 06:00  Phos  4.5         TPro      /  Alb  3.2<L>  /  TBili      /  DBili      /  AST      /  ALT      /  AlkPhos          Creatinine Trend: 3.63 <--, 3.69 <--, 3.48 <--, 3.22 <--, 2.88 <--, 2.84 <--                        8.3    17.07 )-----------( 468      ( 26 Aug 2018 06:00 )             26.0     Urine Studies:  Urinalysis Basic - ( 23 Aug 2018 20:00 )    Color: YELLOW / Appearance: Lt TURBID / S.016 / pH: 6.0  Gluc: SMALL / Ketone: NEGATIVE  / Bili: NEGATIVE / Urobili: NORMAL   Blood: NEGATIVE / Protein: LARGE / Nitrite: NEGATIVE   Leuk Esterase: MODERATE / RBC: 0-2 / WBC 26-50   Sq Epi: FEW / Non Sq Epi:  / Bacteria: FEW      Sodium, Random Urine: < 20 mmol/L ( @ 04:05)  Creatinine, Random Urine: 92.10 mg/dL ( @ 04:05)  Osmolality, Random Urine: 286 mosmo/kg ( @ 04:05)  Sodium, Random Urine: < 20 mmol/L ( @ 05:11)  Osmolality, Random Urine: 277 mosmo/kg ( @ 05:11)  Creatinine, Random Urine: 88.80 mg/dL ( @ 05:11)      Culture - Urine (18 @ 01:03)    Culture - Urine:   Culture grew 3 or more types of organisms which indicate  collection contamination; consider recollection only if  clinically indicated.    Specimen Source: URINE MIDSTREAM
chief complaint :  left hand pain         SUBJECTIVE / OVERNIGHT EVENTS: pt c/o left hand pain & multiple joint pains      MEDICATIONS  (STANDING):  amLODIPine   Tablet 5 milliGRAM(s) Oral daily  buDESOnide  80 MICROgram(s)/formoterol 4.5 MICROgram(s) Inhaler 2 Puff(s) Inhalation two times a day  buMETAnide 2 milliGRAM(s) Oral daily  ergocalciferol 11243 Unit(s) Oral every week  gabapentin 100 milliGRAM(s) Oral every 12 hours  heparin  Injectable 5000 Unit(s) SubCutaneous every 12 hours  hydrALAZINE 50 milliGRAM(s) Oral every 8 hours  isosorbide   mononitrate ER Tablet (IMDUR) 120 milliGRAM(s) Oral daily  methylPREDNISolone sodium succinate Injectable 30 milliGRAM(s) IV Push daily  metoprolol succinate  milliGRAM(s) Oral daily  pantoprazole    Tablet 40 milliGRAM(s) Oral before breakfast  simvastatin 40 milliGRAM(s) Oral at bedtime    MEDICATIONS  (PRN):  acetaminophen   Tablet 650 milliGRAM(s) Oral every 6 hours PRN For Temp greater than 38 C (100.4 F)  acetaminophen   Tablet. 650 milliGRAM(s) Oral every 6 hours PRN Mild Pain (1 - 3)  HYDROmorphone  Injectable 0.5 milliGRAM(s) IV Push every 4 hours PRN Severe Pain (7 - 10)  ondansetron Injectable 4 milliGRAM(s) IV Push every 6 hours PRN Nausea    Vital Signs Last 24 Hrs  T(C): 36.8 (22 Aug 2018 20:55), Max: 36.8 (22 Aug 2018 05:20)  T(F): 98.2 (22 Aug 2018 20:55), Max: 98.3 (22 Aug 2018 05:20)  HR: 74 (22 Aug 2018 22:17) (74 - 82)  BP: 125/99 (22 Aug 2018 22:17) (125/99 - 147/81)  BP(mean): --  RR: 18 (22 Aug 2018 20:55) (18 - 18)  SpO2: 98% (22 Aug 2018 20:55) (97% - 100%)    CAPILLARY BLOOD GLUCOSE        I&O's Summary      Constitutional: No fever, fatigue  Skin: No rash.  Eyes: No recent vision problems or eye pain.  ENT: No congestion, ear pain, or sore throat.  Cardiovascular: No chest pain or palpation.  Respiratory: No cough, shortness of breath, congestion, or wheezing.  Gastrointestinal: No abdominal pain, nausea, vomiting, or diarrhea.  Genitourinary: No dysuria.  Musculoskeletal: No joint swelling.  Neurologic: No headache.    PHYSICAL EXAM:  GENERAL: NAD  EYES: EOMI, PERRLA  NECK: Supple, No JVD  CHEST/LUNG: dec breath sounds a bases   HEART:  S1 , S2 +  ABDOMEN: sof,t bs+  EXTREMITIES:  dec rom left hand   NEUROLOGY:alert awake oriented       LABS:                        8.3    12.12 )-----------( 345      ( 22 Aug 2018 05:27 )             26.0     08-22    134<L>  |  98  |  51<H>  ----------------------------<  107<H>  4.5   |  21<L>  |  2.88<H>    Ca    9.3      22 Aug 2018 05:27    TPro  6.8  /  Alb  3.5  /  TBili  0.3  /  DBili  x   /  AST  21  /  ALT  11  /  AlkPhos  56  08-21      CARDIAC MARKERS ( 21 Aug 2018 05:32 )  x     / x     / 218 u/L / x     / x              RADIOLOGY & ADDITIONAL TESTS:    Imaging Personally Reviewed:    Consultant(s) Notes Reviewed:      Care Discussed with Consultants/Other Providers:
chief complaint :  left hand pain         SUBJECTIVE / OVERNIGHT EVENTS: pt says her  left hand pain & multiple joint pains is much better today     MEDICATIONS  (STANDING):  amLODIPine   Tablet 5 milliGRAM(s) Oral daily  buDESOnide  80 MICROgram(s)/formoterol 4.5 MICROgram(s) Inhaler 2 Puff(s) Inhalation two times a day  buMETAnide 2 milliGRAM(s) Oral daily  ergocalciferol 68595 Unit(s) Oral every week  gabapentin 100 milliGRAM(s) Oral every 12 hours  heparin  Injectable 5000 Unit(s) SubCutaneous every 12 hours  hydrALAZINE 50 milliGRAM(s) Oral every 8 hours  isosorbide   mononitrate ER Tablet (IMDUR) 120 milliGRAM(s) Oral daily  methylPREDNISolone sodium succinate Injectable 30 milliGRAM(s) IV Push daily  metoprolol succinate  milliGRAM(s) Oral daily  pantoprazole    Tablet 40 milliGRAM(s) Oral before breakfast  simvastatin 40 milliGRAM(s) Oral at bedtime    MEDICATIONS  (PRN):  acetaminophen   Tablet 650 milliGRAM(s) Oral every 6 hours PRN For Temp greater than 38 C (100.4 F)  acetaminophen   Tablet. 650 milliGRAM(s) Oral every 6 hours PRN Mild Pain (1 - 3)  HYDROmorphone  Injectable 0.5 milliGRAM(s) IV Push every 4 hours PRN Severe Pain (7 - 10)  ondansetron Injectable 4 milliGRAM(s) IV Push every 6 hours PRN Nausea    Vital Signs Last 24 Hrs  T(C): 37.1 (23 Aug 2018 14:48), Max: 37.1 (23 Aug 2018 14:48)  T(F): 98.7 (23 Aug 2018 14:48), Max: 98.7 (23 Aug 2018 14:48)  HR: 82 (23 Aug 2018 14:48) (68 - 82)  BP: 145/72 (23 Aug 2018 14:48) (125/99 - 151/69)  BP(mean): --  RR: 16 (23 Aug 2018 14:48) (16 - 18)  SpO2: 100% (23 Aug 2018 14:48) (99% - 100%)    Constitutional: No fever, fatigue  Skin: No rash.  Eyes: No recent vision problems or eye pain.  ENT: No congestion, ear pain, or sore throat.  Cardiovascular: No chest pain or palpation.  Respiratory: No cough, shortness of breath, congestion, or wheezing.  Gastrointestinal: No abdominal pain, nausea, vomiting, or diarrhea.  Genitourinary: No dysuria.  Musculoskeletal: No joint swelling.  Neurologic: No headache.    PHYSICAL EXAM:  GENERAL: NAD  EYES: EOMI, PERRLA  NECK: Supple, No JVD  CHEST/LUNG: dec breath sounds a bases   HEART:  S1 , S2 +  ABDOMEN: sof,t bs+  EXTREMITIES:  dec rom left hand   NEUROLOGY:alert awake oriented   LABS:      131<L>  |  97<L>  |  63<H>  ----------------------------<  209<H>  5.1   |  19<L>  |  3.22<H>    Ca    9.2      23 Aug 2018 06:00    TPro  6.3  /  Alb  3.0<L>  /  TBili  < 0.2<L>  /  DBili      /  AST  15  /  ALT  9   /  AlkPhos  56      Creatinine Trend: 3.22 <--, 2.88 <--, 2.84 <--                        8.5    12.47 )-----------( 339      ( 23 Aug 2018 06:00 )             26.5     Urine Studies:  Urinalysis Basic - ( 23 Aug 2018 20:00 )    Color: YELLOW / Appearance: Lt TURBID / S.016 / pH: 6.0  Gluc: SMALL / Ketone: NEGATIVE  / Bili: NEGATIVE / Urobili: NORMAL   Blood: NEGATIVE / Protein: LARGE / Nitrite: NEGATIVE   Leuk Esterase: MODERATE / RBC: 0-2 / WBC 26-50   Sq Epi: FEW / Non Sq Epi:  / Bacteria: FEW              LIVER FUNCTIONS - ( 23 Aug 2018 06:00 )  Alb: 3.0 g/dL / Pro: 6.3 g/dL / ALK PHOS: 56 u/L / ALT: 9 u/L / AST: 15 u/L / GGT: x
chief complaint :  left hand pain         SUBJECTIVE / OVERNIGHT EVENTS: pt says her  left hand pain & multiple joint pains is much better today , able to ambulate slowly with assistance    MEDICATIONS  (STANDING):  amLODIPine   Tablet 5 milliGRAM(s) Oral daily  buDESOnide  80 MICROgram(s)/formoterol 4.5 MICROgram(s) Inhaler 2 Puff(s) Inhalation two times a day  cefTRIAXone   IVPB      cefTRIAXone   IVPB 1 Gram(s) IV Intermittent every 24 hours  ergocalciferol 27316 Unit(s) Oral every week  gabapentin 100 milliGRAM(s) Oral every 12 hours  heparin  Injectable 5000 Unit(s) SubCutaneous every 12 hours  hydrALAZINE 50 milliGRAM(s) Oral every 8 hours  isosorbide   mononitrate ER Tablet (IMDUR) 120 milliGRAM(s) Oral daily  metoprolol succinate  milliGRAM(s) Oral daily  pantoprazole    Tablet 40 milliGRAM(s) Oral before breakfast  simvastatin 40 milliGRAM(s) Oral at bedtime  sodium chloride 0.9%. 1000 milliLiter(s) (80 mL/Hr) IV Continuous <Continuous>    MEDICATIONS  (PRN):  acetaminophen   Tablet 650 milliGRAM(s) Oral every 6 hours PRN For Temp greater than 38 C (100.4 F)  acetaminophen   Tablet. 650 milliGRAM(s) Oral every 6 hours PRN Mild Pain (1 - 3)  ondansetron Injectable 4 milliGRAM(s) IV Push every 6 hours PRN Nausea  oxyCODONE    5 mG/acetaminophen 325 mG 1 Tablet(s) Oral every 6 hours PRN moderate and severe pain    Vital Signs Last 24 Hrs  T(C): 36.4 (26 Aug 2018 21:08), Max: 36.5 (26 Aug 2018 05:19)  T(F): 97.5 (26 Aug 2018 21:08), Max: 97.7 (26 Aug 2018 05:19)  HR: 80 (26 Aug 2018 21:08) (73 - 88)  BP: 170/67 (26 Aug 2018 21:08) (146/63 - 170/67)  BP(mean): --  RR: 18 (26 Aug 2018 21:08) (18 - 18)  SpO2: 98% (26 Aug 2018 21:08) (97% - 100%)    Constitutional: No fever, fatigue  Skin: No rash.  Eyes: No recent vision problems or eye pain.  ENT: No congestion, ear pain, or sore throat.  Cardiovascular: No chest pain or palpation.  Respiratory: No cough, shortness of breath, congestion, or wheezing.  Gastrointestinal: No abdominal pain, nausea, vomiting, or diarrhea.  Genitourinary: No dysuria.  Musculoskeletal: No joint swelling.  Neurologic: No headache.    PHYSICAL EXAM:  GENERAL: NAD  EYES: EOMI, PERRLA  NECK: Supple, No JVD  CHEST/LUNG: dec breath sounds a bases   HEART:  S1 , S2 +  ABDOMEN: sof,t bs+  EXTREMITIES:  rom left hand improved   NEUROLOGY:alert awake oriented     LABS:      127<L>  |  93<L>  |  82<H>  ----------------------------<  149<H>  5.2   |  17<L>  |  3.63<H>    Ca    9.0      26 Aug 2018 06:00  Phos  4.5         TPro      /  Alb  3.2<L>  /  TBili      /  DBili      /  AST      /  ALT      /  AlkPhos          Creatinine Trend: 3.63 <--, 3.69 <--, 3.48 <--, 3.22 <--, 2.88 <--, 2.84 <--                        8.3    17.07 )-----------( 468      ( 26 Aug 2018 06:00 )             26.0     Urine Studies:  Urinalysis Basic - ( 23 Aug 2018 20:00 )    Color: YELLOW / Appearance: Lt TURBID / S.016 / pH: 6.0  Gluc: SMALL / Ketone: NEGATIVE  / Bili: NEGATIVE / Urobili: NORMAL   Blood: NEGATIVE / Protein: LARGE / Nitrite: NEGATIVE   Leuk Esterase: MODERATE / RBC: 0-2 / WBC 26-50   Sq Epi: FEW / Non Sq Epi:  / Bacteria: FEW      Sodium, Random Urine: < 20 mmol/L ( @ 04:05)  Creatinine, Random Urine: 92.10 mg/dL ( @ 04:05)  Osmolality, Random Urine: 286 mosmo/kg ( @ 04:05)  Sodium, Random Urine: < 20 mmol/L ( @ 05:11)  Osmolality, Random Urine: 277 mosmo/kg ( @ 05:11)  Creatinine, Random Urine: 88.80 mg/dL ( @ 05:11)          LIVER FUNCTIONS - ( 26 Aug 2018 06:00 )  Alb: 3.2 g/dL / Pro: x     / ALK PHOS: x     / ALT: x     / AST: x     / GGT: x
chief complaint :  left hand pain         SUBJECTIVE / OVERNIGHT EVENTS: pt says her  left hand pain & multiple joint pains is much better today , able to ambulate slowly with assistance    MEDICATIONS  (STANDING):  amLODIPine   Tablet 5 milliGRAM(s) Oral daily  buDESOnide  80 MICROgram(s)/formoterol 4.5 MICROgram(s) Inhaler 2 Puff(s) Inhalation two times a day  cefTRIAXone   IVPB      cefTRIAXone   IVPB 1 Gram(s) IV Intermittent every 24 hours  ergocalciferol 87956 Unit(s) Oral every week  gabapentin 100 milliGRAM(s) Oral every 12 hours  heparin  Injectable 5000 Unit(s) SubCutaneous every 12 hours  hydrALAZINE 50 milliGRAM(s) Oral every 8 hours  isosorbide   mononitrate ER Tablet (IMDUR) 120 milliGRAM(s) Oral daily  methylPREDNISolone sodium succinate Injectable 30 milliGRAM(s) IV Push daily  metoprolol succinate  milliGRAM(s) Oral daily  pantoprazole    Tablet 40 milliGRAM(s) Oral before breakfast  simvastatin 40 milliGRAM(s) Oral at bedtime  sodium chloride 0.9%. 1000 milliLiter(s) (60 mL/Hr) IV Continuous <Continuous>    MEDICATIONS  (PRN):  acetaminophen   Tablet 650 milliGRAM(s) Oral every 6 hours PRN For Temp greater than 38 C (100.4 F)  acetaminophen   Tablet. 650 milliGRAM(s) Oral every 6 hours PRN Mild Pain (1 - 3)  ondansetron Injectable 4 milliGRAM(s) IV Push every 6 hours PRN Nausea  oxyCODONE    5 mG/acetaminophen 325 mG 1 Tablet(s) Oral every 6 hours PRN moderate and severe pain    Vital Signs Last 24 Hrs  T(C): 36.5 (25 Aug 2018 13:12), Max: 36.7 (24 Aug 2018 20:43)  T(F): 97.7 (25 Aug 2018 13:12), Max: 98.1 (24 Aug 2018 20:43)  HR: 81 (25 Aug 2018 13:12) (80 - 82)  BP: 141/61 (25 Aug 2018 13:12) (130/58 - 141/61)  BP(mean): --  RR: 18 (25 Aug 2018 13:12) (18 - 18)  SpO2: 98% (25 Aug 2018 13:12) (98% - 99%)    Constitutional: No fever, fatigue  Skin: No rash.  Eyes: No recent vision problems or eye pain.  ENT: No congestion, ear pain, or sore throat.  Cardiovascular: No chest pain or palpation.  Respiratory: No cough, shortness of breath, congestion, or wheezing.  Gastrointestinal: No abdominal pain, nausea, vomiting, or diarrhea.  Genitourinary: No dysuria.  Musculoskeletal: No joint swelling.  Neurologic: No headache.    PHYSICAL EXAM:  GENERAL: NAD  EYES: EOMI, PERRLA  NECK: Supple, No JVD  CHEST/LUNG: dec breath sounds a bases   HEART:  S1 , S2 +  ABDOMEN: sof,t bs+  EXTREMITIES:  rom left hand improved   NEUROLOGY:alert awake oriented     LLABS:      128<L>  |  93<L>  |  80<H>  ----------------------------<  155<H>  5.1   |  19<L>  |  3.69<H>    Ca    9.2      25 Aug 2018 06:01  Phos  4.6     -  Mg     1.8           Creatinine Trend: 3.69 <--, 3.48 <--, 3.22 <--, 2.88 <--, 2.84 <--                        8.1    14.66 )-----------( 429      ( 25 Aug 2018 06:01 )             25.3     Urine Studies:  Urinalysis Basic - ( 23 Aug 2018 20:00 )    Color: YELLOW / Appearance: Lt TURBID / S.016 / pH: 6.0  Gluc: SMALL / Ketone: NEGATIVE  / Bili: NEGATIVE / Urobili: NORMAL   Blood: NEGATIVE / Protein: LARGE / Nitrite: NEGATIVE   Leuk Esterase: MODERATE / RBC: 0-2 / WBC 26-50   Sq Epi: FEW / Non Sq Epi:  / Bacteria: FEW      Sodium, Random Urine: < 20 mmol/L ( @ 05:11)  Osmolality, Random Urine: 277 mosmo/kg ( @ 05:11)  Creatinine, Random Urine: 88.80 mg/dL ( @ 05:11)
[facility] NEPHROLOGY- PROGRESS NOTE    88y Female with history of CKD-5 with proteinuria in setting of paraproteinemia presents with L arm pain. Nephrology consulted for elevated Scr.    Pt breathing okay, no complaints today.  She reports much improved LE edema.      REVIEW OF SYSTEMS:  Gen: no changes in weight  Cards: no chest pain  Resp: no dyspnea  GI: no nausea or vomiting or diarrhea  : no dysuria today.  Vascular: no LE edema, + B/L arm pain/numbness/tingling improving    No Known Allergies      Hospital Medications: Medications reviewed      VITALS:  T(F): 97.9 (18 @ 05:11), Max: 98.1 (18 @ 20:43)  HR: 82 (18 @ 05:11)  BP: 133/80 (18 @ 05:11)  RR: 18 (18 @ 05:11)  SpO2: 99% (18 @ 05:11)  Wt(kg): --        PHYSICAL EXAM:  Gen: NAD, calm  Cards: RRR, +S1/S2, + KESHA  Resp: CTA B/L  GI: soft, NT/ND, NABS  Vascular: no LE edema B/L, LUE TTP with edema now improving      LABS:      128<L>  |  93<L>  |  80<H>  ----------------------------<  155<H>  5.1   |  19<L>  |  3.69<H>    Ca    9.2      25 Aug 2018 06:01  Phos  4.6     08-24  Mg     1.8     08-24      Creatinine Trend: 3.69 <--, 3.48 <--, 3.22 <--, 2.88 <--, 2.84 <--                        8.1    14.66 )-----------( 429      ( 25 Aug 2018 06:01 )             25.3     Urine Studies:  Urinalysis Basic - ( 23 Aug 2018 20:00 )    Color: YELLOW / Appearance: Lt TURBID / S.016 / pH: 6.0  Gluc: SMALL / Ketone: NEGATIVE  / Bili: NEGATIVE / Urobili: NORMAL   Blood: NEGATIVE / Protein: LARGE / Nitrite: NEGATIVE   Leuk Esterase: MODERATE / RBC: 0-2 / WBC 26-50   Sq Epi: FEW / Non Sq Epi:  / Bacteria: FEW      Sodium, Random Urine: < 20 mmol/L ( @ 05:11)  Osmolality, Random Urine: 277 mosmo/kg ( @ 05:11)  Creatinine, Random Urine: 88.80 mg/dL ( @ 05:11)
chief complaint :  left hand pain         SUBJECTIVE / OVERNIGHT EVENTS: pt says her  left hand pain & multiple joint pains is much better today     MEDICATIONS  (STANDING):  amLODIPine   Tablet 5 milliGRAM(s) Oral daily  buDESOnide  80 MICROgram(s)/formoterol 4.5 MICROgram(s) Inhaler 2 Puff(s) Inhalation two times a day  cefTRIAXone   IVPB      ergocalciferol 33845 Unit(s) Oral every week  gabapentin 100 milliGRAM(s) Oral every 12 hours  heparin  Injectable 5000 Unit(s) SubCutaneous every 12 hours  hydrALAZINE 50 milliGRAM(s) Oral every 8 hours  isosorbide   mononitrate ER Tablet (IMDUR) 120 milliGRAM(s) Oral daily  methylPREDNISolone sodium succinate Injectable 30 milliGRAM(s) IV Push daily  metoprolol succinate  milliGRAM(s) Oral daily  pantoprazole    Tablet 40 milliGRAM(s) Oral before breakfast  simvastatin 40 milliGRAM(s) Oral at bedtime  sodium chloride 0.9%. 1000 milliLiter(s) (60 mL/Hr) IV Continuous <Continuous>    MEDICATIONS  (PRN):  acetaminophen   Tablet 650 milliGRAM(s) Oral every 6 hours PRN For Temp greater than 38 C (100.4 F)  acetaminophen   Tablet. 650 milliGRAM(s) Oral every 6 hours PRN Mild Pain (1 - 3)  ondansetron Injectable 4 milliGRAM(s) IV Push every 6 hours PRN Nausea  oxyCODONE    5 mG/acetaminophen 325 mG 1 Tablet(s) Oral every 6 hours PRN moderate and severe pain    Vital Signs Last 24 Hrs  T(C): 36.6 (24 Aug 2018 13:26), Max: 36.9 (24 Aug 2018 05:17)  T(F): 97.8 (24 Aug 2018 13:26), Max: 98.4 (24 Aug 2018 05:17)  HR: 64 (24 Aug 2018 13:26) (64 - 88)  BP: 131/63 (24 Aug 2018 13:26) (131/63 - 152/71)  BP(mean): --  RR: 16 (24 Aug 2018 13:26) (16 - 18)  SpO2: 100% (24 Aug 2018 13:26) (98% - 100%)    Constitutional: No fever, fatigue  Skin: No rash.  Eyes: No recent vision problems or eye pain.  ENT: No congestion, ear pain, or sore throat.  Cardiovascular: No chest pain or palpation.  Respiratory: No cough, shortness of breath, congestion, or wheezing.  Gastrointestinal: No abdominal pain, nausea, vomiting, or diarrhea.  Genitourinary: No dysuria.  Musculoskeletal: No joint swelling.  Neurologic: No headache.    PHYSICAL EXAM:  GENERAL: NAD  EYES: EOMI, PERRLA  NECK: Supple, No JVD  CHEST/LUNG: dec breath sounds a bases   HEART:  S1 , S2 +  ABDOMEN: sof,t bs+  EXTREMITIES:  rom left hand improved   NEUROLOGY:alert awake oriented     LABS:      129<L>  |  96<L>  |  64<H>  ----------------------------<  177<H>  5.1   |  18<L>  |  3.48<H>    Ca    9.0      24 Aug 2018 06:09  Phos  4.6       Mg     1.8         TPro  6.3  /  Alb  3.0<L>  /  TBili  < 0.2<L>  /  DBili      /  AST  15  /  ALT  9   /  AlkPhos  56      Creatinine Trend: 3.48 <--, 3.22 <--, 2.88 <--, 2.84 <--                        7.4    15.10 )-----------( 376      ( 24 Aug 2018 06:09 )             23.4     Urine Studies:  Urinalysis Basic - ( 23 Aug 2018 20:00 )    Color: YELLOW / Appearance: Lt TURBID / S.016 / pH: 6.0  Gluc: SMALL / Ketone: NEGATIVE  / Bili: NEGATIVE / Urobili: NORMAL   Blood: NEGATIVE / Protein: LARGE / Nitrite: NEGATIVE   Leuk Esterase: MODERATE / RBC: 0-2 / WBC 26-50   Sq Epi: FEW / Non Sq Epi:  / Bacteria: FEW              LIVER FUNCTIONS - ( 23 Aug 2018 06:00 )  Alb: 3.0 g/dL / Pro: 6.3 g/dL / ALK PHOS: 56 u/L / ALT: 9 u/L / AST: 15 u/L / GGT: x
[facility] [place]- [place]GRESS [place]    88y Female with history of CKD-5 with proteinuria in setting of paraproteinemia presents with L arm pain. Nephrology consulted for elevated Scr.    REVIEW OF SYSTEMS:  Gen: no changes in weight  Cards: no chest pain  Resp: no dyspnea  GI: no nausea or vomiting or diarrhea  : + dysuria with suprapubic burning  Vascular: no LE edema, + B/L arm pain/numbness/tingling improving    No Known Allergies      Hospital Medications: Medications reviewed      VITALS:  T(F): 98.4 (08-24-18 @ 05:17), Max: 98.7 (08-23-18 @ 14:48)  HR: 88 (08-24-18 @ 05:17)  BP: 136/66 (08-24-18 @ 05:17)  RR: 18 (08-24-18 @ 05:17)  SpO2: 98% (08-24-18 @ 05:17)  Wt(kg): --      PHYSICAL EXAM:    Gen: NAD, calm  Cards: RRR, +S1/S2, + KESHA  Resp: CTA B/L  GI: soft, NT/ND, NABS  Vascular: no LE edema B/L, LUE TTP with edema now improving      LABS:  08-24    129<L>  |  96<L>  |  64<H>  ----------------------------<  177<H>  5.1   |  18<L>  |  3.48<H>    Ca    9.0      24 Aug 2018 06:09  Phos  4.6     08-24  Mg     1.8     08-24    TPro  6.3  /  Alb  3.0<L>  /  TBili  < 0.2<L>  /  DBili      /  AST  15  /  ALT  9   /  AlkPhos  56  08-23    Creatinine Trend: 3.48 <--, 3.22 <--, 2.88 <--, 2.84 <--                        7.4    15.10 )-----------( 376      ( 24 Aug 2018 06:09 )             23.4

## 2018-08-27 NOTE — PROGRESS NOTE ADULT - PROVIDER SPECIALTY LIST ADULT
Internal Medicine
Nephrology
Neurology
Rheumatology
Nephrology
Internal Medicine
Nephrology

## 2018-08-27 NOTE — PROGRESS NOTE ADULT - PROBLEM SELECTOR PLAN 2
BP improving. Continue with current medications and low sodium diet. Monitor BP.
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 Monitor electrolytes.
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 Avoid nephrotoxins. Monitor electrolytes.
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 Avoid nephrotoxins. Monitor electrolytes.
little worsened today   monitor closely
mild worsening   renal f/u
mild worsening   renal f/u
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 Monitor electrolytes.
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 Monitor electrolytes.
stable today , sandy f/u  monitor closely
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 Monitor electrolytes.

## 2018-08-27 NOTE — PROGRESS NOTE ADULT - PROBLEM SELECTOR PLAN 4
-monitor BP meds as above
-monitor BP meds as above
Patient on rayaldee as an outpatient which has been changed to ergocalciferol as rayaldee not on formulary. Continue with low phosphorus diet. Monitor serum calcium and phosphorus.
Patient with improving LE edema for which would continue with bumex 2 mg daily. Monitor UO.
Resolved. Plan as above
pt apperas euvolemic at present  \cont current cardiac meds
pt apperas euvolemic at present  \cont current cardiac meds
Resolved. Will discontinue bumex 2 mg daily given SHY. Gentle IVF as above. Monitor UO.
Resolved. Holding bumex 2 mg daily given SHY. Monitor UO.
-monitor BP meds as above
Resolved. Will discontinue bumex 2 mg daily given SHY. Gentle IVF as above. Monitor UO.

## 2018-08-27 NOTE — CHART NOTE - NSCHARTNOTEFT_GEN_A_CORE
Pt seen at bedside. reports improvement in joint pains from steroids.  Noted to have +Parvovirus IgG and IgM- likely cause of pt's polyarticular joint pains    Please taper steroids as follows:  Prednisone 20mg x 5 days, then 15mg x 5 days, then 10mg x 5 days, then 5 mg x 5 days then stop.    Can call Rheumatology Office for followup appt if joint pains recur after completion of taper  #[phone] [address]     d/w attending

## 2018-08-27 NOTE — PROGRESS NOTE ADULT - PROBLEM SELECTOR PROBLEM 2
CKD (chronic kidney disease), stage V
CKD (chronic kidney disease), stage V
Hypertensive kidney disease with chronic kidney disease, stage 1 through stage 4 or unspecified 
Stage 5 chronic kidney disease not on chronic dialysis
CKD (chronic kidney disease), stage V
CKD (chronic kidney disease), stage V
Stage 5 chronic kidney disease not on chronic dialysis
CKD (chronic kidney disease), stage V

## 2018-08-27 NOTE — PROGRESS NOTE ADULT - PROBLEM SELECTOR PROBLEM 3
Chronic diastolic congestive heart failure
Chronic diastolic congestive heart failure
Edema, lower extremity
Hypertensive kidney disease with chronic kidney disease, stage 1 through stage 4 or unspecified 
Hypertensive kidney disease with chronic kidney disease, stage 1 through stage 4 or unspecified 
Hyponatremia
Hyponatremia
Hypertensive kidney disease with chronic kidney disease, stage 1 through stage 4 or unspecified 
Hypertensive kidney disease with chronic kidney disease, stage 1 through stage 4 or unspecified 
Chronic diastolic congestive heart failure
Hypertensive kidney disease with chronic kidney disease, stage 1 through stage 4 or unspecified

## 2018-08-27 NOTE — PROGRESS NOTE ADULT - ATTENDING COMMENTS
[facility] NEPHROLOGY  Bubba Mercado M.D.  Reagan Pierre D.O.  Kristin Martinez M.D.  Paola Monique, MSN, ANP-C    Telephone: [phone]  Facsimile: [phone]    [address] [place] [address]
Patient seen and examined at bedside. Please call [phone] for any questions or concerns
[facility] NEPHROLOGY  Bubba Mercado M.D.  Reagan Pierre D.O.  Kristin Martinez M.D.  Paola Monique, MSN, ANP-C    Telephone: [phone]  Facsimile: [phone]    [address] [place] [address]
[facility] NEPHROLOGY  Bubba Mercado M.D.  Reagan Pierre D.O.  Kristin Martinez M.D.  Paola Monique, MSN, ANP-C    Telephone: [phone]  Facsimile: [phone]    [address] [place] [address]
[facility] NEPHROLOGY  Bubba Mercado M.D.  Reagan Pierre D.O.  Kristin Martinez M.D.  Paola Monique, MSN, ANP-C    Telephone: [phone]  Facsimile: [phone]    [address] [place] [address]
[facility] NEPHROLOGY  Bubba Mercado M.D.  Reagan Pierre D.O.  Kristin Martinez M.D.  Paola Monique, MSN, ANP-C    Telephone: [phone]  Facsimile: [phone]    [address] [place] [address]
[facility] NEPHROLOGY  Bubba Mercado M.D.  Reagan Pierre D.O.  Kristin Martinez M.D.  Paola Monique, MSN, ANP-C    Telephone: [phone]  Facsimile: [phone]    [address] [place] [address]

## 2018-08-27 NOTE — PROGRESS NOTE ADULT - PROBLEM SELECTOR PROBLEM 5
Anemia of renal disease
Asthma
Asthma
Hypertension, unspecified type
Hypertension, unspecified type
Secondary hyperparathyroidism of renal origin
Asthma
Secondary hyperparathyroidism of renal origin

## 2018-08-27 NOTE — PROGRESS NOTE ADULT - PROBLEM SELECTOR PLAN 6
In setting of CKD with prior history of positive serum NHUNG s/p venofor for iron deficiency. Check occult blood r/o GIB. Will given aranesp 25 mcg X 1 dose today. Monitor Hb.
IMPROVE VTE Individual Risk Assessment    RISK                                                          Points  [] Previous VTE                                           3  [] Thrombophilia                                        2  [] Lower limb paralysis                              2   [] Current Cancer                                       2   [x] Immobilization > 24 hrs                        1  [] ICU/CCU stay > 24 hours                       1  [x] Age > 60                                                   1    IMPROVE VTE Score:2 heparin sq
In setting of CKD with prior history of positive serum NHUNG s/p venofor for iron deficiency. Check occult blood r/o GIB. Will given aranesp 25 mcg X 1 dose today. Monitor Hb.
In setting of CKD with prior history of positive serum NHUNG. Given iron deficiency, continue with venofer 200 mg IV today (dose #2). RAUL once iron stores replete.
cont dulera
cont dulera
In setting of CKD with prior history of positive serum NHUNG s/p venofor for iron deficiency. Check occult blood r/o GIB. Will given aranesp 25 mcg X 1 dose today. Monitor Hb.
In setting of CKD with prior history of positive serum NHUNG s/p venofor for iron deficiency s/p aranesp 25 mcg X 1 on 8/24. Monitor Hb.

## 2018-08-27 NOTE — PROGRESS NOTE ADULT - PROBLEM SELECTOR PROBLEM 1
Acute kidney injury
Acute kidney injury
CKD (chronic kidney disease), stage V
Pain of left upper extremity
Acute kidney injury
Acute kidney injury
Pain of left upper extremity
Acute kidney injury

## 2018-08-27 NOTE — PROGRESS NOTE ADULT - PROBLEM SELECTOR PLAN 3
BP acceptable. Continue with current medications and low sodium diet. Monitor BP.
BP improving. Continue with current medications and low sodium diet. Monitor BP.
Patient with improving LE edema for which would continue with bumex 2 mg daily. Monitor UO.
likely secondary to HTN  -hydralazine/imdur and BB/bumetanide
likely secondary to HTN  -hydralazine/imdur and BB/bumetanide
will fluid restrict if sodium cont to be low
will fluid restrict if sodium cont to be low
BP acceptable. Continue with current medications and low sodium diet. Monitor BP.
BP uncontrolled likely due to steroids. Continue with current medications and low sodium diet. Will titrate amlodipine as an outpatient as needed. Monitor BP.
likely secondary to HTN  -hydralazine/imdur and BB/bumetanide
BP acceptable. Continue with current medications and low sodium diet. Monitor BP.

## 2018-08-27 NOTE — PROGRESS NOTE ADULT - PROBLEM SELECTOR PROBLEM 7
Hyponatremia
Prophylactic measure
Prophylactic measure
Hyponatremia
Metabolic acidosis

## 2018-08-27 NOTE — PROGRESS NOTE ADULT - PROBLEM SELECTOR PLAN 5
Patient on rayaldee as an outpatient which has been changed to ergocalciferol as rayaldee not on formulary. Continue with low phosphorus diet. Monitor serum calcium and phosphorus.
cont dulera
-monitor BP meds as above
-monitor BP meds as above
In setting of CKD with prior history of positive serum NHUNG. Given iron deficiency, will start venofer 200 mg IV X 2 doses. RAUL once iron stores replete.
Patient on rayaldee as an outpatient which has been changed to ergocalciferol as rayaldee not on formulary. Continue with low phosphorus diet. Monitor serum calcium and phosphorus.
Patient on rayaldee as an outpatient which has been changed to ergocalciferol as rayaldee not on formulary. Continue with low phosphorus diet. Monitor serum calcium and phosphorus.
cont dulera
cont dulera
Patient on rayaldee as an outpatient which has been changed to ergocalciferol as rayaldee not on formulary. Continue with low phosphorus diet. Monitor serum calcium and phosphorus.
Patient on rayaldee as an outpatient which has been changed to ergocalciferol as rayaldee not on formulary. Continue with low phosphorus diet. Monitor serum calcium and phosphorus.

## 2018-08-29 ENCOUNTER — INBOUND DOCUMENT (OUTPATIENT)
Age: 83
End: 2018-08-29

## 2018-08-29 LAB — CCP AB SER-ACNC: <8 — SIGNIFICANT CHANGE UP

## 2018-09-07 ENCOUNTER — INBOUND DOCUMENT (OUTPATIENT)
Age: 83
End: 2018-09-07

## 2018-10-05 ENCOUNTER — INPATIENT (INPATIENT)
Facility: HOSPITAL | Age: 83
LOS: 16 days | Discharge: HOME | End: 2018-10-22
Attending: INTERNAL MEDICINE | Admitting: INTERNAL MEDICINE
Payer: MEDICARE

## 2018-10-05 VITALS
HEART RATE: 88 BPM | TEMPERATURE: 98 F | RESPIRATION RATE: 14 BRPM | DIASTOLIC BLOOD PRESSURE: 57 MMHG | OXYGEN SATURATION: 96 % | SYSTOLIC BLOOD PRESSURE: 164 MMHG

## 2018-10-05 DIAGNOSIS — N18.9 CHRONIC KIDNEY DISEASE, UNSPECIFIED: ICD-10-CM

## 2018-10-05 DIAGNOSIS — I16.0 HYPERTENSIVE URGENCY: ICD-10-CM

## 2018-10-05 DIAGNOSIS — Z29.9 ENCOUNTER FOR PROPHYLACTIC MEASURES, UNSPECIFIED: ICD-10-CM

## 2018-10-05 DIAGNOSIS — I50.33 ACUTE ON CHRONIC DIASTOLIC (CONGESTIVE) HEART FAILURE: ICD-10-CM

## 2018-10-05 DIAGNOSIS — Z90.710 ACQUIRED ABSENCE OF BOTH CERVIX AND UTERUS: Chronic | ICD-10-CM

## 2018-10-05 DIAGNOSIS — E78.00 PURE HYPERCHOLESTEROLEMIA, UNSPECIFIED: ICD-10-CM

## 2018-10-05 DIAGNOSIS — N18.5 CHRONIC KIDNEY DISEASE, STAGE 5: ICD-10-CM

## 2018-10-05 DIAGNOSIS — E87.2 ACIDOSIS: ICD-10-CM

## 2018-10-05 PROBLEM — J45.909 UNSPECIFIED ASTHMA, UNCOMPLICATED: Chronic | Status: ACTIVE | Noted: 2018-08-21

## 2018-10-05 LAB
ALBUMIN SERPL ELPH-MCNC: 3.1 G/DL — LOW (ref 3.3–5)
ALP SERPL-CCNC: 64 U/L — SIGNIFICANT CHANGE UP (ref 40–120)
ALT FLD-CCNC: 9 U/L — SIGNIFICANT CHANGE UP (ref 4–33)
APPEARANCE UR: CLEAR — SIGNIFICANT CHANGE UP
AST SERPL-CCNC: 20 U/L — SIGNIFICANT CHANGE UP (ref 4–32)
BASOPHILS # BLD AUTO: 0.06 K/UL — SIGNIFICANT CHANGE UP (ref 0–0.2)
BASOPHILS NFR BLD AUTO: 0.8 % — SIGNIFICANT CHANGE UP (ref 0–2)
BILIRUB SERPL-MCNC: < 0.2 MG/DL — LOW (ref 0.2–1.2)
BILIRUB UR-MCNC: NEGATIVE — SIGNIFICANT CHANGE UP
BLOOD UR QL VISUAL: NEGATIVE — SIGNIFICANT CHANGE UP
BUN SERPL-MCNC: 39 MG/DL — HIGH (ref 7–23)
CALCIUM SERPL-MCNC: 8.3 MG/DL — LOW (ref 8.4–10.5)
CHLORIDE SERPL-SCNC: 94 MMOL/L — LOW (ref 98–107)
CO2 SERPL-SCNC: 26 MMOL/L — SIGNIFICANT CHANGE UP (ref 22–31)
COLOR SPEC: COLORLESS — SIGNIFICANT CHANGE UP
CREAT SERPL-MCNC: 3.46 MG/DL — HIGH (ref 0.5–1.3)
EOSINOPHIL # BLD AUTO: 0.15 K/UL — SIGNIFICANT CHANGE UP (ref 0–0.5)
EOSINOPHIL NFR BLD AUTO: 2 % — SIGNIFICANT CHANGE UP (ref 0–6)
GLUCOSE SERPL-MCNC: 121 MG/DL — HIGH (ref 70–99)
GLUCOSE UR-MCNC: NEGATIVE — SIGNIFICANT CHANGE UP
HCT VFR BLD CALC: 27.2 % — LOW (ref 34.5–45)
HGB BLD-MCNC: 8.5 G/DL — LOW (ref 11.5–15.5)
IMM GRANULOCYTES # BLD AUTO: 0.03 # — SIGNIFICANT CHANGE UP
IMM GRANULOCYTES NFR BLD AUTO: 0.4 % — SIGNIFICANT CHANGE UP (ref 0–1.5)
KETONES UR-MCNC: NEGATIVE — SIGNIFICANT CHANGE UP
LEUKOCYTE ESTERASE UR-ACNC: NEGATIVE — SIGNIFICANT CHANGE UP
LYMPHOCYTES # BLD AUTO: 1.12 K/UL — SIGNIFICANT CHANGE UP (ref 1–3.3)
LYMPHOCYTES # BLD AUTO: 15.3 % — SIGNIFICANT CHANGE UP (ref 13–44)
MCHC RBC-ENTMCNC: 28.2 PG — SIGNIFICANT CHANGE UP (ref 27–34)
MCHC RBC-ENTMCNC: 31.3 % — LOW (ref 32–36)
MCV RBC AUTO: 90.4 FL — SIGNIFICANT CHANGE UP (ref 80–100)
MONOCYTES # BLD AUTO: 1.32 K/UL — HIGH (ref 0–0.9)
MONOCYTES NFR BLD AUTO: 18 % — HIGH (ref 2–14)
NEUTROPHILS # BLD AUTO: 4.64 K/UL — SIGNIFICANT CHANGE UP (ref 1.8–7.4)
NEUTROPHILS NFR BLD AUTO: 63.5 % — SIGNIFICANT CHANGE UP (ref 43–77)
NITRITE UR-MCNC: NEGATIVE — SIGNIFICANT CHANGE UP
NRBC # FLD: 0 — SIGNIFICANT CHANGE UP
NT-PROBNP SERPL-SCNC: SIGNIFICANT CHANGE UP PG/ML
PH UR: 7 — SIGNIFICANT CHANGE UP (ref 5–8)
PLATELET # BLD AUTO: 336 K/UL — SIGNIFICANT CHANGE UP (ref 150–400)
PMV BLD: 9.7 FL — SIGNIFICANT CHANGE UP (ref 7–13)
POTASSIUM SERPL-MCNC: 4.5 MMOL/L — SIGNIFICANT CHANGE UP (ref 3.5–5.3)
POTASSIUM SERPL-SCNC: 4.5 MMOL/L — SIGNIFICANT CHANGE UP (ref 3.5–5.3)
PROT SERPL-MCNC: 6 G/DL — SIGNIFICANT CHANGE UP (ref 6–8.3)
PROT UR-MCNC: 70 — SIGNIFICANT CHANGE UP
RBC # BLD: 3.01 M/UL — LOW (ref 3.8–5.2)
RBC # FLD: 14.6 % — HIGH (ref 10.3–14.5)
SODIUM SERPL-SCNC: 133 MMOL/L — LOW (ref 135–145)
SP GR SPEC: 1.01 — SIGNIFICANT CHANGE UP (ref 1–1.04)
UROBILINOGEN FLD QL: NORMAL — SIGNIFICANT CHANGE UP
WBC # BLD: 7.32 K/UL — SIGNIFICANT CHANGE UP (ref 3.8–10.5)
WBC # FLD AUTO: 7.32 K/UL — SIGNIFICANT CHANGE UP (ref 3.8–10.5)

## 2018-10-05 PROCEDURE — 71045 X-RAY EXAM CHEST 1 VIEW: CPT | Mod: 26

## 2018-10-05 PROCEDURE — 93970 EXTREMITY STUDY: CPT | Mod: 26

## 2018-10-05 RX ORDER — ISOSORBIDE MONONITRATE 60 MG/1
120 TABLET, EXTENDED RELEASE ORAL DAILY
Qty: 0 | Refills: 0 | Status: DISCONTINUED | OUTPATIENT
Start: 2018-10-05 | End: 2018-10-22

## 2018-10-05 RX ORDER — SIMVASTATIN 20 MG/1
40 TABLET, FILM COATED ORAL AT BEDTIME
Qty: 0 | Refills: 0 | Status: DISCONTINUED | OUTPATIENT
Start: 2018-10-05 | End: 2018-10-22

## 2018-10-05 RX ORDER — HEPARIN SODIUM 5000 [USP'U]/ML
5000 INJECTION INTRAVENOUS; SUBCUTANEOUS EVERY 12 HOURS
Qty: 0 | Refills: 0 | Status: DISCONTINUED | OUTPATIENT
Start: 2018-10-05 | End: 2018-10-22

## 2018-10-05 RX ORDER — SEVELAMER CARBONATE 2400 MG/1
800 POWDER, FOR SUSPENSION ORAL THREE TIMES A DAY
Qty: 0 | Refills: 0 | Status: DISCONTINUED | OUTPATIENT
Start: 2018-10-05 | End: 2018-10-22

## 2018-10-05 RX ORDER — ERGOCALCIFEROL 1.25 MG/1
50000 CAPSULE ORAL
Qty: 0 | Refills: 0 | Status: DISCONTINUED | OUTPATIENT
Start: 2018-10-05 | End: 2018-10-22

## 2018-10-05 RX ORDER — GABAPENTIN 400 MG/1
100 CAPSULE ORAL EVERY 12 HOURS
Qty: 0 | Refills: 0 | Status: DISCONTINUED | OUTPATIENT
Start: 2018-10-05 | End: 2018-10-22

## 2018-10-05 RX ORDER — SODIUM BICARBONATE 1 MEQ/ML
650 SYRINGE (ML) INTRAVENOUS THREE TIMES A DAY
Qty: 0 | Refills: 0 | Status: DISCONTINUED | OUTPATIENT
Start: 2018-10-05 | End: 2018-10-09

## 2018-10-05 RX ORDER — HYDRALAZINE HCL 50 MG
50 TABLET ORAL EVERY 8 HOURS
Qty: 0 | Refills: 0 | Status: DISCONTINUED | OUTPATIENT
Start: 2018-10-05 | End: 2018-10-10

## 2018-10-05 RX ORDER — BUDESONIDE AND FORMOTEROL FUMARATE DIHYDRATE 160; 4.5 UG/1; UG/1
2 AEROSOL RESPIRATORY (INHALATION)
Qty: 0 | Refills: 0 | Status: DISCONTINUED | OUTPATIENT
Start: 2018-10-05 | End: 2018-10-22

## 2018-10-05 RX ORDER — CALCIFEDIOL 30 UG/1
1 CAPSULE, EXTENDED RELEASE ORAL
Qty: 0 | Refills: 0 | COMMUNITY

## 2018-10-05 RX ORDER — PANTOPRAZOLE SODIUM 20 MG/1
40 TABLET, DELAYED RELEASE ORAL
Qty: 0 | Refills: 0 | Status: DISCONTINUED | OUTPATIENT
Start: 2018-10-05 | End: 2018-10-22

## 2018-10-05 RX ORDER — PANTOPRAZOLE SODIUM 20 MG/1
1 TABLET, DELAYED RELEASE ORAL
Qty: 0 | Refills: 0 | COMMUNITY

## 2018-10-05 RX ORDER — METOPROLOL TARTRATE 50 MG
100 TABLET ORAL DAILY
Qty: 0 | Refills: 0 | Status: DISCONTINUED | OUTPATIENT
Start: 2018-10-05 | End: 2018-10-22

## 2018-10-05 RX ORDER — BUMETANIDE 0.25 MG/ML
2 INJECTION INTRAMUSCULAR; INTRAVENOUS EVERY 12 HOURS
Qty: 0 | Refills: 0 | Status: DISCONTINUED | OUTPATIENT
Start: 2018-10-05 | End: 2018-10-11

## 2018-10-05 RX ADMIN — SIMVASTATIN 40 MILLIGRAM(S): 20 TABLET, FILM COATED ORAL at 23:12

## 2018-10-05 RX ADMIN — Medication 650 MILLIGRAM(S): at 23:12

## 2018-10-05 RX ADMIN — SEVELAMER CARBONATE 800 MILLIGRAM(S): 2400 POWDER, FOR SUSPENSION ORAL at 15:20

## 2018-10-05 RX ADMIN — HEPARIN SODIUM 5000 UNIT(S): 5000 INJECTION INTRAVENOUS; SUBCUTANEOUS at 18:10

## 2018-10-05 RX ADMIN — SEVELAMER CARBONATE 800 MILLIGRAM(S): 2400 POWDER, FOR SUSPENSION ORAL at 23:12

## 2018-10-05 RX ADMIN — ERGOCALCIFEROL 50000 UNIT(S): 1.25 CAPSULE ORAL at 11:50

## 2018-10-05 RX ADMIN — Medication 50 MILLIGRAM(S): at 23:12

## 2018-10-05 RX ADMIN — GABAPENTIN 100 MILLIGRAM(S): 400 CAPSULE ORAL at 18:10

## 2018-10-05 RX ADMIN — BUMETANIDE 2 MILLIGRAM(S): 0.25 INJECTION INTRAMUSCULAR; INTRAVENOUS at 11:50

## 2018-10-05 NOTE — H&P ADULT - MUSCULOSKELETAL
details… detailed exam ROM intact/no joint swelling/no joint erythema/normal strength/no joint warmth

## 2018-10-05 NOTE — CONSULT NOTE ADULT - PROBLEM SELECTOR RECOMMENDATION 3
Patient with increasing LE edema for which would start bumex 2 mg IV twice daily and HOLD metolazone for now given h/o hyponatremia in the past. Monitor UO. Patient with increasing LE edema for which would start bumex 2 mg IV twice daily and HOLD metolazone for now given h/o hyponatremia in the past. Agree with LE doppler r/o DVT. F/U CXR. Monitor UO.

## 2018-10-05 NOTE — CONSULT NOTE ADULT - ATTENDING COMMENTS
[facility] NEPHROLOGY  Bubba Mercado M.D.  Reagan Pierre D.O.  Kristin Martinez M.D.  Paola Monique, MSN, ANP-C    Telephone: [phone]  Facsimile: [phone]    [address] [place] [address]

## 2018-10-05 NOTE — ED PROVIDER NOTE - CARE PLAN
Principal Discharge DX:	CKD (chronic kidney disease)  Secondary Diagnosis:	CHF (congestive heart failure)

## 2018-10-05 NOTE — H&P ADULT - NSHPLANGTRANSLATORFT_GEN_A_CORE
[facility]  # [id number] and augustine [facility]  # [id number] and patient gave permission to get history from [facility]

## 2018-10-05 NOTE — ED PROVIDER NOTE - ATTENDING CONTRIBUTION TO CARE
[place]  pt with persistent LE edema  which appears to be limiting ambulation  Pt w/o subjective response to diuretic  will do CXR  renal function  with asymmetry  will duplex [place]  pt with persistent LE edema  which appears to be limiting ambulation  Pt w/o subjective response to diuretic  will do CXR  renal function  with asymmetry  will duplex    add  CXR enlarged heart  mild congestion    edma likely combination of renal and cardiac cause  will admit for diuresis  ECHO  renal and cardiac evaluation

## 2018-10-05 NOTE — H&P ADULT - PMH
Asthma    CKD (chronic kidney disease)    High cholesterol    Hypertension Asthma    CHF (congestive heart failure)  diastolic heart failure  CKD (chronic kidney disease)    High cholesterol    Hypertension

## 2018-10-05 NOTE — H&P ADULT - NEGATIVE OPHTHALMOLOGIC SYMPTOMS
no blurred vision R/no pain L/no loss of vision L/no loss of vision R/no pain R/no diplopia/no blurred vision L

## 2018-10-05 NOTE — ED PROVIDER NOTE - MEDICAL DECISION MAKING DETAILS
Pt presents with LE edema and worsening ambulation, pt seen by nephrology who recommended admission, pt stable, not sob has no CP no other acute changes. Admit for diuresis and continued nephrology evaluation.

## 2018-10-05 NOTE — ED PROVIDER NOTE - OBJECTIVE STATEMENT
Pt c/o worsening LE chris  Feels began after being placed on steroids  However  persisted after steroids stoppd 2 weeks ago  Pt denies SOB  but is minimally ambulatory  due to LE edema  Also c/o pain over area of mons  worse with ambulation  Pt was started on metolazone last week  with no significant effect on edema acc to family  Pt with h/o CKD as well Pt c/o worsening LE edema  Feels began after being placed on steroids  However  persisted after steroids stopped 2 weeks ago  Pt denies SOB  but is minimally ambulatory  due to LE edema  Also c/o pain over area of mons  worse with ambulation  Pt was started on metolazone last week  with no significant effect on edema acc to family  Pt with h/o CKD as well    CESILIA Benites: Agree with above, 87 Y/O F presents with worsening edema, pt has been on steroids and had a recent change to her diuretic therapy which she was already noncompliant with. Pt has been having worsening edema and difficulty ambulation, no CP syncope or any other changes. Pt c/o worsening LE edema  Feels began after being placed on steroids  However  persisted after steroids stopped 2 weeks ago  Pt denies SOB  but is minimally ambulatory  due to LE edema  Also c/o pain over area of mons  worse with ambulation  Pt was started on metolazone last week  with no significant effect on edema acc to family  Pt with h/o CKD as well    CESILIA Benites: Agree with above, 87 Y/O F PMH HTN HLD CKD not currently on dialysis presents with worsening edema, pt has been on steroids and had a recent change to her diuretic therapy which she was already noncompliant with. Pt has been having worsening edema and difficulty ambulation, no CP syncope or any other changes.

## 2018-10-05 NOTE — H&P ADULT - NEUROLOGICAL DETAILS
responds to pain/normal strength/responds to verbal commands/sensation intact/cranial nerves intact/alert and oriented x 3

## 2018-10-05 NOTE — ED ADULT NURSE NOTE - NSIMPLEMENTINTERV_GEN_ALL_ED
Implemented All Universal Safety Interventions:  Clear to call system. Call bell, personal items and telephone within reach. Instruct patient to call for assistance. Room bathroom lighting operational. Non-slip footwear when patient is off stretcher. Physically safe environment: no spills, clutter or unnecessary equipment. Stretcher in lowest position, wheels locked, appropriate side rails in place. Implemented All Fall with Harm Risk Interventions:  Challenge to call system. Call bell, personal items and telephone within reach. Instruct patient to call for assistance. Room bathroom lighting operational. Non-slip footwear when patient is off stretcher. Physically safe environment: no spills, clutter or unnecessary equipment. Stretcher in lowest position, wheels locked, appropriate side rails in place. Provide visual cue, wrist band, yellow gown, etc. Monitor gait and stability. Monitor for mental status changes and reorient to person, place, and time. Review medications for side effects contributing to fall risk. Reinforce activity limits and safety measures with patient and family. Provide visual clues: red socks.

## 2018-10-05 NOTE — CONSULT NOTE ADULT - ASSESSMENT
88y Female with history of CKD-5 with proteinuria in setting of paraproteinemia presents with LE edema. Nephrology consulted for elevated Scr.

## 2018-10-05 NOTE — H&P ADULT - NSHPSOCIALHISTORY_GEN_ALL_CORE
lives at home with son , daughter in law and granddaughter  No smoking, ETOH drug abuse  Ambulates with a walker  Follows special diet: vegetarian, no citrus, diary, low K, P.

## 2018-10-05 NOTE — H&P ADULT - NEGATIVE MUSCULOSKELETAL SYMPTOMS
no leg pain R/no arthralgia/no arthritis/no joint swelling/no back pain/no leg pain L/no muscle weakness

## 2018-10-05 NOTE — H&P ADULT - HISTORY OF PRESENT ILLNESS
87 y/o F with PMHx of CKD (not currently on dialysis), HLD, HTN presents to the ED c/o b/l worsening LE edema x 1 day. Pt speaks Hui, granddaughter is here translating and providing history. Since starting steroids after last hospitalization for cervical radiculopathy in 8/2018, pt admits to edema from the foot up to her shin. Pt stopped the steroids 2 wks ago and edema persisted, has mild relief with diuretics and elevation. Dr. Villagomez start pt on Metolazone last week. This morning, noticed that the LE edema is up to her thigh and granddaughter brought her to the ER. Has VIRGEN when she walks too but is usually at home and ambulation is limited due to LE edema. No recent episodes of VIRGEN or SOB. Pt also admits to a dry cough x 1 wk, takes OTC Delsym. Pt denies palpitations, chest pain, SOB, orthopnea, wheezing, dizziness, syncope, abdominal pain, n/v/d, fevers. 89 y/o F with PMHx of CKD (not currently on dialysis), HLD, HTN presents to the ED c/o b/l worsening LE edema x 1 day. Since starting steroids after last hospitalization for cervical radiculopathy in 8/2018, pt admits to edema from the foot up to her shin. Pt stopped the steroids 2 wks ago and edema persisted, has mild relief with diuretics and elevation. Dr. Villagomez start pt on Metolazone last week. This morning, noticed that the LE edema is up to her thigh and granddaughter brought her to the ER. Has VIRGEN when she walks too but is usually at home and ambulation is limited due to LE edema. No recent episodes of VIRGEN or SOB. Pt also admits to a dry cough x 1 wk, takes OTC Delsym. Pt denies palpitations, chest pain, SOB, diaphoresis, orthopnea, PND wheezing, dizziness, syncope, abdominal pain, n/v/d, fevers.

## 2018-10-05 NOTE — ED ADULT NURSE NOTE - OBJECTIVE STATEMENT
Pt received in spot 13. Alert and oriented x3, ambulatory. Primarily Gujarati speaking, gives daughter permission to translate. Co edema starting at abdomen and radiating down to bilateral lower extremities x "few weeks". Was on prednisone x 1 month ago after right hand injury. Also co "lump" to groin. Labs sent, IV placed. VS as stated. Comfort measures provided. Awaiting MD hensley.

## 2018-10-05 NOTE — H&P ADULT - PROBLEM SELECTOR PLAN 2
Monitor BP as per routine   Continue home med Hydralazine, mononitrate isosorbide, Metoprolol  Consider starting ACE/ARB Monitor BP as per routine   Continue home med Hydralazine, mononitrate isosorbide, Metoprolol  No ACE/ARB due to CKD

## 2018-10-05 NOTE — CONSULT NOTE ADULT - SUBJECTIVE AND OBJECTIVE BOX
[facility] NEPHROLOGY- CONSULTATION NOTE    88y Female with history of below presents with LE edema. Nephrology consulted for elevated Scr.    Patient well known to nephrology as follows with me as an outpatient for h/o CKD-5 with proteinuria in setting of positive serum NHUNG for which patient was offered a kidney biopsy in the past but refused.    Patient was last seen on 18 where Scr remained stable and at baseline (3.10 eGFR 13 ml/min). Patient had been on bumex 2 mg daily as an outpatient however began developing increasing LE edema for which she was also started on metolazone 2.5 mg daily. Patient however did not notice improvement in LE edema and thus presents for further evaluation.      REVIEW OF SYSTEMS:  Gen: no changes in weight  HEENT: no rhinorrhea  Neck: no sore throat  Cards: no chest pain  Resp: no dyspnea  GI: no nausea or vomiting or diarrhea, + ab distention  : no dysuria or hematuria  Vascular: + LE edema  Derm: no rashes  Neuro: no numbness/tingling.    No Known Allergies      Home Medications Reviewed  Hospital Medications:   MEDICATIONS  (STANDING):  buDESOnide  80 MICROgram(s)/formoterol 4.5 MICROgram(s) Inhaler 2 Puff(s) Inhalation two times a day  buMETAnide 1 milliGRAM(s) Oral two times a day  gabapentin 100 milliGRAM(s) Oral every 12 hours  heparin  Injectable 5000 Unit(s) SubCutaneous every 12 hours  hydrALAZINE 50 milliGRAM(s) Oral every 8 hours  isosorbide   mononitrate ER Tablet (IMDUR) 120 milliGRAM(s) Oral daily  metoprolol succinate  milliGRAM(s) Oral daily  pantoprazole    Tablet 40 milliGRAM(s) Oral before breakfast  simvastatin 40 milliGRAM(s) Oral at bedtime      PAST MEDICAL & SURGICAL HISTORY:  Asthma  CKD (chronic kidney disease)  High cholesterol  Hypertension  History of hysterectomy      FAMILY HISTORY:  No pertinent family history in first degree relatives      SOCIAL HISTORY:  Denies toxic substance use       VITALS:  T(F): 98.3 (10-05-18 @ 08:40), Max: 98.5 (10-05-18 @ 07:27)  HR: 69 (10-05-18 @ 08:40)  BP: 186/46 (10-05-18 @ 08:40)  RR: 18 (10-05-18 @ 08:40)  SpO2: 98% (10-05-18 @ 08:40)  Wt(kg): --      PHYSICAL EXAM:  Gen: NAD, calm  HEENT: MMM  Neck: no JVD  Cards: RRR, +S1/S2, no M/G/R  Resp: CTA B/L  GI: soft, NT, + ab distention, NABS  : no CVA tenderness  Vascular: 2+ LE edema B/L  Derm: no rashes  Neuro: non-focal      LABS:  10-05    133<L>  |  94<L>  |  39<H>  ----------------------------<  121<H>  4.5   |  26  |  3.46<H>    Ca    8.3<L>      05 Oct 2018 08:40    TPro  6.0  /  Alb  3.1<L>  /  TBili  < 0.2<L>  /  DBili      /  AST  20  /  ALT  9   /  AlkPhos  64  10    Creatinine Trend: 3.46 <--                        8.5    7.32  )-----------( 336      ( 05 Oct 2018 08:40 )             27.2     Urine Studies:  Urinalysis Basic - ( 05 Oct 2018 09:40 )    Color: COLORLESS / Appearance: CLEAR / S.007 / pH: 7.0  Gluc: NEGATIVE / Ketone: NEGATIVE  / Bili: NEGATIVE / Urobili: NORMAL   Blood: NEGATIVE / Protein: 70 / Nitrite: NEGATIVE   Leuk Esterase: NEGATIVE / RBC:  / WBC    Sq Epi:  / Non Sq Epi:  / Bacteria

## 2018-10-05 NOTE — H&P ADULT - ASSESSMENT
87 y/o F with PMHx of CKD (not currently on dialysis), HLD, HTN presents to the ED c/o b/l worsening LE edema x 1 day found to have pulmonary edema on CXR, elevated BNP, and elevated Cr admitted to telemetry for monitoring     EKG: NSR @79bpm  H/H: 8.5/27.2  Cr: 3.46 (2.93 baseline)  BNP: 16, 477  UA: neg  b/l LE US duplex: No evidence of bilateral lower extremity deep venous thrombosis.  CXR: Interstitial pulmonary edema  TTE 06/01/18: Preserved LVEF with no significant valvulopathy

## 2018-10-05 NOTE — ED PROVIDER NOTE - PROGRESS NOTE DETAILS
CESILIA flores; Spoke with Dr. Moreno, will admit to medicine on telly with ckd, likely comorbid chf.

## 2018-10-05 NOTE — H&P ADULT - RS GEN PE MLT RESP DETAILS PC
airway patent/breath sounds equal/clear to auscultation bilaterally/good air movement/no rhonchi/no wheezes/respirations non-labored/no chest wall tenderness/no rales

## 2018-10-05 NOTE — ED ADULT TRIAGE NOTE - CHIEF COMPLAINT QUOTE
Pt c/o lower body swelling. Pt states was placed on prednisone for hand injury 1 month ago, told swelling as a side effect that would resolve when medication was discontinued. Pt has been off medication for 2 weeks but has persistent swelling. Pt also reports swelling to groin/ "a lump". +Pitting edema to bilateral lower extremities.

## 2018-10-05 NOTE — H&P ADULT - PROBLEM SELECTOR PLAN 1
Admit to telemetry for monitoring  Continue home meds sodium bicarb, Auryxia, Rayaldee  Start IV Bumex   Monitor Cr, daily weights, I&O's O>I  Labs CBC, CMP  Appreciate nephrology consult Admit to telemetry for monitoring  Continue home meds sodium bicarb, Auryxia, Rayaldee  Start IV Bumex   Monitor Cr, daily weights, I&O's O>I  Labs CBC, CMP  Appreciate nephrology consult and consider dialysis Admit to telemetry for monitoring  Continue home meds sodium bicarb,   Start IV Bumex , stop metolazone   Monitor Cr, daily weights, I&O's O>I  Labs CBC, CMP  Appreciate nephrology consult and consider dialysis

## 2018-10-05 NOTE — CONSULT NOTE ADULT - PROBLEM SELECTOR RECOMMENDATION 5
Patient on rayaldee as an outpatient for which will change to ergocalciferol as rayaldee not on formulary. Patient on auryxia as an outpatient for which will change to renagel with meals as auryxia not on formulary. Start low phosphorus diet. Monitor serum calcium and phosphorus.

## 2018-10-05 NOTE — H&P ADULT - PROBLEM SELECTOR PLAN 3
Continue home med Metoprolol  Start IV Bumex   Consider starting ACE/ARB  Monitor daily weights, I&O's O>I Continue home med Metoprolol  Start IV Bumex   Consider starting ACE/ARB  Monitor daily weights, I&O's O>I  TTE done 4 months ago, does not need repeat now

## 2018-10-05 NOTE — CONSULT NOTE ADULT - PROBLEM SELECTOR RECOMMENDATION 9
Patient with h/o CKD-5 with nephrotic range proteinuria in setting of paraproteinemia for which patient offered kidney biopsy in the past but refused. Baseline Scr 3.1-3.2 as per most recent office labs. Renal function currently near baseline. Patient does not want HD if needed. Avoid nephrotoxins. Monitor electrolytes.

## 2018-10-05 NOTE — H&P ADULT - NEGATIVE NEUROLOGICAL SYMPTOMS
no transient paralysis/no weakness/no paresthesias/no syncope/no vertigo/no headache/no loss of consciousness

## 2018-10-05 NOTE — H&P ADULT - NEGATIVE GASTROINTESTINAL SYMPTOMS
no diarrhea/no nausea/no hematochezia/no vomiting/no change in bowel habits/no abdominal pain/no melena

## 2018-10-06 LAB
BASOPHILS # BLD AUTO: 0.06 K/UL — SIGNIFICANT CHANGE UP (ref 0–0.2)
BASOPHILS NFR BLD AUTO: 1 % — SIGNIFICANT CHANGE UP (ref 0–2)
BUN SERPL-MCNC: 40 MG/DL — HIGH (ref 7–23)
CALCIUM SERPL-MCNC: 8.2 MG/DL — LOW (ref 8.4–10.5)
CHLORIDE SERPL-SCNC: 94 MMOL/L — LOW (ref 98–107)
CHOLEST SERPL-MCNC: 121 MG/DL — SIGNIFICANT CHANGE UP (ref 120–199)
CO2 SERPL-SCNC: 26 MMOL/L — SIGNIFICANT CHANGE UP (ref 22–31)
CREAT SERPL-MCNC: 3.48 MG/DL — HIGH (ref 0.5–1.3)
EOSINOPHIL # BLD AUTO: 0.15 K/UL — SIGNIFICANT CHANGE UP (ref 0–0.5)
EOSINOPHIL NFR BLD AUTO: 2.6 % — SIGNIFICANT CHANGE UP (ref 0–6)
FERRITIN SERPL-MCNC: 145.8 NG/ML — SIGNIFICANT CHANGE UP (ref 15–150)
GLUCOSE SERPL-MCNC: 94 MG/DL — SIGNIFICANT CHANGE UP (ref 70–99)
HBA1C BLD-MCNC: 5.8 % — HIGH (ref 4–5.6)
HCT VFR BLD CALC: 27 % — LOW (ref 34.5–45)
HDLC SERPL-MCNC: 40 MG/DL — LOW (ref 45–65)
HGB BLD-MCNC: 8.5 G/DL — LOW (ref 11.5–15.5)
IMM GRANULOCYTES # BLD AUTO: 0.02 # — SIGNIFICANT CHANGE UP
IMM GRANULOCYTES NFR BLD AUTO: 0.3 % — SIGNIFICANT CHANGE UP (ref 0–1.5)
IRON SATN MFR SERPL: 168 UG/DL — SIGNIFICANT CHANGE UP (ref 140–530)
IRON SATN MFR SERPL: 35 UG/DL — SIGNIFICANT CHANGE UP (ref 30–160)
LIPID PNL WITH DIRECT LDL SERPL: 61 MG/DL — SIGNIFICANT CHANGE UP
LYMPHOCYTES # BLD AUTO: 1.18 K/UL — SIGNIFICANT CHANGE UP (ref 1–3.3)
LYMPHOCYTES # BLD AUTO: 20.2 % — SIGNIFICANT CHANGE UP (ref 13–44)
MAGNESIUM SERPL-MCNC: 1.6 MG/DL — SIGNIFICANT CHANGE UP (ref 1.6–2.6)
MCHC RBC-ENTMCNC: 27.1 PG — SIGNIFICANT CHANGE UP (ref 27–34)
MCHC RBC-ENTMCNC: 31.5 % — LOW (ref 32–36)
MCV RBC AUTO: 86 FL — SIGNIFICANT CHANGE UP (ref 80–100)
MONOCYTES # BLD AUTO: 1.16 K/UL — HIGH (ref 0–0.9)
MONOCYTES NFR BLD AUTO: 19.9 % — HIGH (ref 2–14)
NEUTROPHILS # BLD AUTO: 3.26 K/UL — SIGNIFICANT CHANGE UP (ref 1.8–7.4)
NEUTROPHILS NFR BLD AUTO: 56 % — SIGNIFICANT CHANGE UP (ref 43–77)
NRBC # FLD: 0 — SIGNIFICANT CHANGE UP
PHOSPHATE SERPL-MCNC: 3.9 MG/DL — SIGNIFICANT CHANGE UP (ref 2.5–4.5)
PLATELET # BLD AUTO: 331 K/UL — SIGNIFICANT CHANGE UP (ref 150–400)
PMV BLD: 9.8 FL — SIGNIFICANT CHANGE UP (ref 7–13)
POTASSIUM SERPL-MCNC: 3.8 MMOL/L — SIGNIFICANT CHANGE UP (ref 3.5–5.3)
POTASSIUM SERPL-SCNC: 3.8 MMOL/L — SIGNIFICANT CHANGE UP (ref 3.5–5.3)
RBC # BLD: 3.14 M/UL — LOW (ref 3.8–5.2)
RBC # FLD: 14.7 % — HIGH (ref 10.3–14.5)
SODIUM SERPL-SCNC: 135 MMOL/L — SIGNIFICANT CHANGE UP (ref 135–145)
TRIGL SERPL-MCNC: 148 MG/DL — SIGNIFICANT CHANGE UP (ref 10–149)
TSH SERPL-MCNC: 2.19 UIU/ML — SIGNIFICANT CHANGE UP (ref 0.27–4.2)
UIBC SERPL-MCNC: 132.5 UG/DL — SIGNIFICANT CHANGE UP (ref 110–370)
WBC # BLD: 5.83 K/UL — SIGNIFICANT CHANGE UP (ref 3.8–10.5)
WBC # FLD AUTO: 5.83 K/UL — SIGNIFICANT CHANGE UP (ref 3.8–10.5)

## 2018-10-06 RX ADMIN — SIMVASTATIN 40 MILLIGRAM(S): 20 TABLET, FILM COATED ORAL at 21:26

## 2018-10-06 RX ADMIN — Medication 650 MILLIGRAM(S): at 13:21

## 2018-10-06 RX ADMIN — GABAPENTIN 100 MILLIGRAM(S): 400 CAPSULE ORAL at 05:13

## 2018-10-06 RX ADMIN — HEPARIN SODIUM 5000 UNIT(S): 5000 INJECTION INTRAVENOUS; SUBCUTANEOUS at 17:16

## 2018-10-06 RX ADMIN — BUDESONIDE AND FORMOTEROL FUMARATE DIHYDRATE 2 PUFF(S): 160; 4.5 AEROSOL RESPIRATORY (INHALATION) at 00:07

## 2018-10-06 RX ADMIN — GABAPENTIN 100 MILLIGRAM(S): 400 CAPSULE ORAL at 17:16

## 2018-10-06 RX ADMIN — BUMETANIDE 2 MILLIGRAM(S): 0.25 INJECTION INTRAMUSCULAR; INTRAVENOUS at 05:14

## 2018-10-06 RX ADMIN — BUDESONIDE AND FORMOTEROL FUMARATE DIHYDRATE 2 PUFF(S): 160; 4.5 AEROSOL RESPIRATORY (INHALATION) at 08:12

## 2018-10-06 RX ADMIN — Medication 650 MILLIGRAM(S): at 21:25

## 2018-10-06 RX ADMIN — SEVELAMER CARBONATE 800 MILLIGRAM(S): 2400 POWDER, FOR SUSPENSION ORAL at 21:25

## 2018-10-06 RX ADMIN — BUMETANIDE 2 MILLIGRAM(S): 0.25 INJECTION INTRAMUSCULAR; INTRAVENOUS at 00:07

## 2018-10-06 RX ADMIN — Medication 50 MILLIGRAM(S): at 21:26

## 2018-10-06 RX ADMIN — PANTOPRAZOLE SODIUM 40 MILLIGRAM(S): 20 TABLET, DELAYED RELEASE ORAL at 05:13

## 2018-10-06 RX ADMIN — Medication 650 MILLIGRAM(S): at 05:13

## 2018-10-06 RX ADMIN — Medication 100 MILLIGRAM(S): at 05:14

## 2018-10-06 RX ADMIN — BUMETANIDE 2 MILLIGRAM(S): 0.25 INJECTION INTRAMUSCULAR; INTRAVENOUS at 17:16

## 2018-10-06 RX ADMIN — SEVELAMER CARBONATE 800 MILLIGRAM(S): 2400 POWDER, FOR SUSPENSION ORAL at 05:14

## 2018-10-06 RX ADMIN — BUDESONIDE AND FORMOTEROL FUMARATE DIHYDRATE 2 PUFF(S): 160; 4.5 AEROSOL RESPIRATORY (INHALATION) at 21:26

## 2018-10-06 RX ADMIN — Medication 50 MILLIGRAM(S): at 05:13

## 2018-10-06 RX ADMIN — HEPARIN SODIUM 5000 UNIT(S): 5000 INJECTION INTRAVENOUS; SUBCUTANEOUS at 05:14

## 2018-10-06 RX ADMIN — Medication 50 MILLIGRAM(S): at 13:20

## 2018-10-06 RX ADMIN — SEVELAMER CARBONATE 800 MILLIGRAM(S): 2400 POWDER, FOR SUSPENSION ORAL at 13:21

## 2018-10-06 RX ADMIN — ISOSORBIDE MONONITRATE 120 MILLIGRAM(S): 60 TABLET, EXTENDED RELEASE ORAL at 13:20

## 2018-10-06 NOTE — PROGRESS NOTE ADULT - SUBJECTIVE AND OBJECTIVE BOX
[facility] NEPHROLOGY- PROGRESS NOTE    88y Female with history of CKD-5 with proteinuria in setting of paraproteinemia presents with L arm pain. Nephrology consulted for elevated Scr.  Pt feels okay, no complaints.    REVIEW OF SYSTEMS:  Gen: no changes in weight  Cards: no chest pain  Resp: no dyspnea  GI: no nausea or vomiting or diarrhea  Vascular: no LE edema    No Known Allergies      Hospital Medications: Medications reviewed    VITALS:  T(F): 98.4 (10-06-18 @ 05:17), Max: 98.4 (10-06-18 @ 05:17)  HR: 77 (10-06-18 @ 05:17)  BP: 147/74 (10-06-18 @ 05:17)  RR: 19 (10-06-18 @ 05:17)  SpO2: 98% (10-06-18 @ 05:17)  Wt(kg): --    10-06 @ 07:01  -  10-06 @ 10:32  --------------------------------------------------------  IN: 0 mL / OUT: 250 mL / NET: -250 mL      Height (cm): 162.56 (10-05 @ 14:53)  Weight (kg): 60.3 (10-05 @ 14:53)  BMI (kg/m2): 22.8 (10-05 @ 14:53)  BSA (m2): 1.64 (10-05 @ 14:53)      PHYSICAL EXAM:    Gen: NAD, calm  Cards: RRR, +S1/S2, + KESHA  Resp: CTA B/L  GI: soft, NT/ND, NABS  Vascular: no LE edema B/L      LABS:  10-06    135  |  94<L>  |  40<H>  ----------------------------<  94  3.8   |  26  |  3.48<H>    Ca    8.2<L>      06 Oct 2018 07:05  Phos  3.9     10  Mg     1.6     10-06    TPro  6.0  /  Alb  3.1<L>  /  TBili  < 0.2<L>  /  DBili      /  AST  20  /  ALT  9   /  AlkPhos  64  10-05    Creatinine Trend: 3.48 <--, 3.46 <--                        8.5    5.83  )-----------( 331      ( 06 Oct 2018 07:05 )             27.0     Urine Studies:  Urinalysis Basic - ( 05 Oct 2018 09:40 )    Color: COLORLESS / Appearance: CLEAR / S.007 / pH: 7.0  Gluc: NEGATIVE / Ketone: NEGATIVE  / Bili: NEGATIVE / Urobili: NORMAL   Blood: NEGATIVE / Protein: 70 / Nitrite: NEGATIVE   Leuk Esterase: NEGATIVE / RBC:  / WBC    Sq Epi:  / Non Sq Epi:  / Bacteria:

## 2018-10-06 NOTE — PHYSICAL THERAPY INITIAL EVALUATION ADULT - PERTINENT HX OF CURRENT PROBLEM, REHAB EVAL
Pt. admitted for bilateral LE edema. Stage 5 chronic kidney disease not on chronic dialysis. Per radiology report, venous doppler bilateral LE revealed no evidence of bilateral lower extremity deep venous thrombosis. PMH of CKD, HLD, HTN.

## 2018-10-06 NOTE — PHYSICAL THERAPY INITIAL EVALUATION ADULT - ADDITIONAL COMMENTS
Pt. owns DME of rolling walker.     Pt. was left supine in bed post PT Evaluation, NAD, all lines/devices intact, call bell within reach, daughter in law present. SANJIV Stephen made aware of pt. status and participation in PT.

## 2018-10-06 NOTE — PHYSICAL THERAPY INITIAL EVALUATION ADULT - CRITERIA FOR SKILLED THERAPEUTIC INTERVENTIONS
rehab potential/predicted duration of therapy intervention/therapy frequency/anticipated discharge recommendation/risk reduction/prevention/impairments found

## 2018-10-06 NOTE — PHYSICAL THERAPY INITIAL EVALUATION ADULT - GENERAL OBSERVATIONS, REHAB EVAL
Consult received, chart reviewed. Patient received supine in bed, NAD, +tele, daughter in law present. Patient agreed to Evaluation from Physical Therapist.

## 2018-10-07 LAB
APTT BLD: 37.9 SEC — HIGH (ref 27.5–37.4)
BACTERIA UR CULT: SIGNIFICANT CHANGE UP
BLD GP AB SCN SERPL QL: NEGATIVE — SIGNIFICANT CHANGE UP
BUN SERPL-MCNC: 41 MG/DL — HIGH (ref 7–23)
CALCIUM SERPL-MCNC: 8.7 MG/DL — SIGNIFICANT CHANGE UP (ref 8.4–10.5)
CHLORIDE SERPL-SCNC: 95 MMOL/L — LOW (ref 98–107)
CO2 SERPL-SCNC: 28 MMOL/L — SIGNIFICANT CHANGE UP (ref 22–31)
CREAT SERPL-MCNC: 3.37 MG/DL — HIGH (ref 0.5–1.3)
GLUCOSE SERPL-MCNC: 85 MG/DL — SIGNIFICANT CHANGE UP (ref 70–99)
HCT VFR BLD CALC: 24.2 % — LOW (ref 34.5–45)
HCT VFR BLD CALC: 24.9 % — LOW (ref 34.5–45)
HGB BLD-MCNC: 7.6 G/DL — LOW (ref 11.5–15.5)
HGB BLD-MCNC: 7.8 G/DL — LOW (ref 11.5–15.5)
INR BLD: 0.96 — SIGNIFICANT CHANGE UP (ref 0.88–1.17)
MAGNESIUM SERPL-MCNC: 1.6 MG/DL — SIGNIFICANT CHANGE UP (ref 1.6–2.6)
MCHC RBC-ENTMCNC: 27.4 PG — SIGNIFICANT CHANGE UP (ref 27–34)
MCHC RBC-ENTMCNC: 27.7 PG — SIGNIFICANT CHANGE UP (ref 27–34)
MCHC RBC-ENTMCNC: 31.3 % — LOW (ref 32–36)
MCHC RBC-ENTMCNC: 31.4 % — LOW (ref 32–36)
MCV RBC AUTO: 87.4 FL — SIGNIFICANT CHANGE UP (ref 80–100)
MCV RBC AUTO: 88.3 FL — SIGNIFICANT CHANGE UP (ref 80–100)
NRBC # FLD: 0 — SIGNIFICANT CHANGE UP
NRBC # FLD: 0 — SIGNIFICANT CHANGE UP
OB PNL STL: POSITIVE — SIGNIFICANT CHANGE UP
PHOSPHATE SERPL-MCNC: 4 MG/DL — SIGNIFICANT CHANGE UP (ref 2.5–4.5)
PLATELET # BLD AUTO: 322 K/UL — SIGNIFICANT CHANGE UP (ref 150–400)
PLATELET # BLD AUTO: 325 K/UL — SIGNIFICANT CHANGE UP (ref 150–400)
PMV BLD: 9.9 FL — SIGNIFICANT CHANGE UP (ref 7–13)
PMV BLD: 9.9 FL — SIGNIFICANT CHANGE UP (ref 7–13)
POTASSIUM SERPL-MCNC: 3.8 MMOL/L — SIGNIFICANT CHANGE UP (ref 3.5–5.3)
POTASSIUM SERPL-SCNC: 3.8 MMOL/L — SIGNIFICANT CHANGE UP (ref 3.5–5.3)
PROTHROM AB SERPL-ACNC: 10.7 SEC — SIGNIFICANT CHANGE UP (ref 9.8–13.1)
RBC # BLD: 2.77 M/UL — LOW (ref 3.8–5.2)
RBC # BLD: 2.82 M/UL — LOW (ref 3.8–5.2)
RBC # FLD: 14.6 % — HIGH (ref 10.3–14.5)
RBC # FLD: 14.7 % — HIGH (ref 10.3–14.5)
RH IG SCN BLD-IMP: POSITIVE — SIGNIFICANT CHANGE UP
SODIUM SERPL-SCNC: 135 MMOL/L — SIGNIFICANT CHANGE UP (ref 135–145)
SPECIMEN SOURCE: SIGNIFICANT CHANGE UP
WBC # BLD: 6.22 K/UL — SIGNIFICANT CHANGE UP (ref 3.8–10.5)
WBC # BLD: 6.5 K/UL — SIGNIFICANT CHANGE UP (ref 3.8–10.5)
WBC # FLD AUTO: 6.22 K/UL — SIGNIFICANT CHANGE UP (ref 3.8–10.5)
WBC # FLD AUTO: 6.5 K/UL — SIGNIFICANT CHANGE UP (ref 3.8–10.5)

## 2018-10-07 RX ADMIN — Medication 650 MILLIGRAM(S): at 13:12

## 2018-10-07 RX ADMIN — PANTOPRAZOLE SODIUM 40 MILLIGRAM(S): 20 TABLET, DELAYED RELEASE ORAL at 05:28

## 2018-10-07 RX ADMIN — HEPARIN SODIUM 5000 UNIT(S): 5000 INJECTION INTRAVENOUS; SUBCUTANEOUS at 05:28

## 2018-10-07 RX ADMIN — GABAPENTIN 100 MILLIGRAM(S): 400 CAPSULE ORAL at 17:11

## 2018-10-07 RX ADMIN — SEVELAMER CARBONATE 800 MILLIGRAM(S): 2400 POWDER, FOR SUSPENSION ORAL at 13:12

## 2018-10-07 RX ADMIN — ISOSORBIDE MONONITRATE 120 MILLIGRAM(S): 60 TABLET, EXTENDED RELEASE ORAL at 13:12

## 2018-10-07 RX ADMIN — Medication 650 MILLIGRAM(S): at 21:53

## 2018-10-07 RX ADMIN — BUMETANIDE 2 MILLIGRAM(S): 0.25 INJECTION INTRAMUSCULAR; INTRAVENOUS at 05:27

## 2018-10-07 RX ADMIN — GABAPENTIN 100 MILLIGRAM(S): 400 CAPSULE ORAL at 05:27

## 2018-10-07 RX ADMIN — HEPARIN SODIUM 5000 UNIT(S): 5000 INJECTION INTRAVENOUS; SUBCUTANEOUS at 17:11

## 2018-10-07 RX ADMIN — Medication 50 MILLIGRAM(S): at 13:12

## 2018-10-07 RX ADMIN — BUMETANIDE 2 MILLIGRAM(S): 0.25 INJECTION INTRAMUSCULAR; INTRAVENOUS at 17:11

## 2018-10-07 RX ADMIN — Medication 650 MILLIGRAM(S): at 05:27

## 2018-10-07 RX ADMIN — BUDESONIDE AND FORMOTEROL FUMARATE DIHYDRATE 2 PUFF(S): 160; 4.5 AEROSOL RESPIRATORY (INHALATION) at 08:43

## 2018-10-07 RX ADMIN — SEVELAMER CARBONATE 800 MILLIGRAM(S): 2400 POWDER, FOR SUSPENSION ORAL at 05:27

## 2018-10-07 RX ADMIN — SEVELAMER CARBONATE 800 MILLIGRAM(S): 2400 POWDER, FOR SUSPENSION ORAL at 21:53

## 2018-10-07 RX ADMIN — Medication 100 MILLIGRAM(S): at 05:27

## 2018-10-07 RX ADMIN — Medication 50 MILLIGRAM(S): at 05:27

## 2018-10-07 RX ADMIN — Medication 50 MILLIGRAM(S): at 21:53

## 2018-10-07 RX ADMIN — SIMVASTATIN 40 MILLIGRAM(S): 20 TABLET, FILM COATED ORAL at 21:53

## 2018-10-07 RX ADMIN — BUDESONIDE AND FORMOTEROL FUMARATE DIHYDRATE 2 PUFF(S): 160; 4.5 AEROSOL RESPIRATORY (INHALATION) at 21:53

## 2018-10-07 NOTE — DIETITIAN INITIAL EVALUATION ADULT. - PROBLEM SELECTOR PLAN 3
Continue home med Metoprolol  Start IV Bumex   Consider starting ACE/ARB  Monitor daily weights, I&O's O>I  TTE done 4 months ago, does not need repeat now

## 2018-10-07 NOTE — DIETITIAN INITIAL EVALUATION ADULT. - PROBLEM SELECTOR PLAN 2
Monitor BP as per routine   Continue home med Hydralazine, mononitrate isosorbide, Metoprolol  No ACE/ARB due to CKD

## 2018-10-07 NOTE — DIETITIAN INITIAL EVALUATION ADULT. - NS AS NUTRI INTERV MEALS SNACK
Liberalize diet to renal, vegetarian. Encourage PO intake and honor food preferences as able. Please provide assistance with meals. Monitor weights, renal labs, BM's, skin integrity, p.o. intake.

## 2018-10-07 NOTE — DIETITIAN INITIAL EVALUATION ADULT. - ENERGY NEEDS
Ht: 64 in Wt: 136.9 pounds BMI: 23.5  IBW: 120 pounds  IBW%: 114  Skin: 3+ edema leg, hip, knee, ankle, foot. No pressure ulcers

## 2018-10-07 NOTE — DIETITIAN INITIAL EVALUATION ADULT. - PROBLEM SELECTOR PLAN 1
Admit to telemetry for monitoring  Continue home meds sodium bicarb,   Start IV Bumex , stop metolazone   Monitor Cr, daily weights, I&O's O>I  Labs CBC, CMP  Appreciate nephrology consult and consider dialysis

## 2018-10-07 NOTE — CONSULT NOTE ADULT - SUBJECTIVE AND OBJECTIVE BOX
Cardiovascular Disease Initial Evaluation    CHIEF COMPLAINT: Leg swelling    HISTORY OF PRESENT ILLNESS:  This is an 88 year old woman with CKD V (not currently on dialysis), HLD, HTN who presented to [facility] ED on 10/5/2018 with worsening pedal edema. The family states that pedal edema has increased ever since starting steroids for cervical radiculopathy. Since stopping the steroids, the edema has continued. Metolazone was started with little relief.   Currently, she is resting comfortably with no chest pain or SOB.       Allergies  No Known Allergies    MEDICATIONS:  buMETAnide Injectable 2 milliGRAM(s) IV Push every 12 hours  heparin  Injectable 5000 Unit(s) SubCutaneous every 12 hours  hydrALAZINE 50 milliGRAM(s) Oral every 8 hours  isosorbide   mononitrate ER Tablet (IMDUR) 120 milliGRAM(s) Oral daily  metoprolol succinate  milliGRAM(s) Oral daily  buDESOnide  80 MICROgram(s)/formoterol 4.5 MICROgram(s) Inhaler 2 Puff(s) Inhalation two times a day  gabapentin 100 milliGRAM(s) Oral every 12 hours  pantoprazole    Tablet 40 milliGRAM(s) Oral before breakfast  simvastatin 40 milliGRAM(s) Oral at bedtime  ergocalciferol 51043 Unit(s) Oral <User Schedule>  sodium bicarbonate 650 milliGRAM(s) Oral three times a day      PAST MEDICAL & SURGICAL HISTORY:  CHF (congestive heart failure): diastolic heart failure  Asthma  CKD (chronic kidney disease)  High cholesterol  Hypertension  History of hysterectomy      FAMILY HISTORY:  No pertinent family history in first degree relatives      SOCIAL HISTORY:    Non-smoker        REVIEW OF SYSTEMS:  See HPI, otherwise complete 10 point review of systems negative      PHYSICAL EXAM:  T(C): 37 (10-07-18 @ 05:10), Max: 37 (10-07-18 @ 05:10)  HR: 78 (10-07-18 @ 05:10) (71 - 78)  BP: 188/85 (10-07-18 @ 05:10) (142/58 - 188/85)  RR: 19 (10-07-18 @ 05:10) (18 - 19)  SpO2: 97% (10-07-18 @ 05:10) (96% - 98%)  Wt(kg): --  I&O's Summary    06 Oct 2018 07:01  -  07 Oct 2018 07:00  --------------------------------------------------------  IN: 300 mL / OUT: 1350 mL / NET: -1050 mL        Appearance: No Acute Distress	  HEENT:  Normal oral mucosa, PERRL, EOMI	  Cardiovascular: Normal S1 S2, No JVD, No murmurs/rubs/gallops  Respiratory: Lungs clear to auscultation bilaterally  Gastrointestinal:  Soft, Non-tender, + BS	  Skin: No rashes, No ecchymoses, No cyanosis	  Neurologic: Non-focal  Extremities: No clubbing, cyanosis or edema  Vascular: Peripheral pulses palpable 2+ bilaterally  Psychiatry: A & O x 3, Mood & affect appropriate    Laboratory Data:	 	    CBC Full  -  ( 07 Oct 2018 06:00 )  WBC Count : 6.50 K/uL  Hemoglobin : 7.6 g/dL  Hematocrit : 24.2 %  Platelet Count - Automated : 325 K/uL  Mean Cell Volume : 87.4 fL  Mean Cell Hemoglobin : 27.4 pg  Mean Cell Hemoglobin Concentration : 31.4 %  Auto Neutrophil # : x  Auto Lymphocyte # : x  Auto Monocyte # : x  Auto Eosinophil # : x  Auto Basophil # : x  Auto Neutrophil % : x  Auto Lymphocyte % : x  Auto Monocyte % : x  Auto Eosinophil % : x  Auto Basophil % : x    10-07    135  |  95<L>  |  41<H>  ----------------------------<  85  3.8   |  28  |  3.37<H>  10-06    135  |  94<L>  |  40<H>  ----------------------------<  94  3.8   |  26  |  3.48<H>    Ca    8.7      07 Oct 2018 06:00  Ca    8.2<L>      06 Oct 2018 07:05  Phos  4.0     10-07  Phos  3.9     10-06  Mg     1.6     10-07  Mg     1.6     10-06    TPro  6.0  /  Alb  3.1<L>  /  TBili  < 0.2<L>  /  DBili  x   /  AST  20  /  ALT  9   /  AlkPhos  64  10-05    Interpretation of Telemetry: Sinus	    ECG:  Sinus; non-specific t-wave changes    Assessment: 88 year old woman with CKD V, HTN, and HLD presents with acute diastolic CHF.    Plan of Care:    #Acute diastolic CHF-  Likely secondary to outpatient steroids and CKD.  Agree with current dose IV Bumex.  F/U BMP this morning.  TTE from 4 months ago reviewed.    #HTN-  Isolated elevated BP reading this morning.   Monitor today.  If persistently elevated, consider amlodipine.     62 minutes spent on total encounter; more than 50% of the visit was spent counseling and/or coordinating care by the attending physician.   	  Justin Vick MD [facility]  Cardiovascular Diseases  [phone]

## 2018-10-07 NOTE — PROGRESS NOTE ADULT - SUBJECTIVE AND OBJECTIVE BOX
SUBJECTIVE / OVERNIGHT EVENTS: pt denies chest pain,sob ,n,v        MEDICATIONS  (STANDING):  buDESOnide  80 MICROgram(s)/formoterol 4.5 MICROgram(s) Inhaler 2 Puff(s) Inhalation two times a day  buMETAnide Injectable 2 milliGRAM(s) IV Push every 12 hours  ergocalciferol 16064 Unit(s) Oral <User Schedule>  gabapentin 100 milliGRAM(s) Oral every 12 hours  heparin  Injectable 5000 Unit(s) SubCutaneous every 12 hours  hydrALAZINE 50 milliGRAM(s) Oral every 8 hours  isosorbide   mononitrate ER Tablet (IMDUR) 120 milliGRAM(s) Oral daily  metoprolol succinate  milliGRAM(s) Oral daily  pantoprazole    Tablet 40 milliGRAM(s) Oral before breakfast  sevelamer hydrochloride 800 milliGRAM(s) Oral three times a day  simvastatin 40 milliGRAM(s) Oral at bedtime  sodium bicarbonate 650 milliGRAM(s) Oral three times a day    MEDICATIONS  (PRN):    Vital Signs Last 24 Hrs  T(C): 36.7 (07 Oct 2018 21:03), Max: 37 (07 Oct 2018 05:10)  T(F): 98.1 (07 Oct 2018 21:03), Max: 98.6 (07 Oct 2018 05:10)  HR: 73 (07 Oct 2018 21:03) (73 - 80)  BP: 127/52 (07 Oct 2018 21:03) (127/52 - 188/85)  BP(mean): --  RR: 18 (07 Oct 2018 21:03) (18 - 19)  SpO2: 100% (07 Oct 2018 21:03) (97% - 100%)    CAPILLARY BLOOD GLUCOSE        I&O's Summary    06 Oct 2018 07:01  -  07 Oct 2018 07:00  --------------------------------------------------------  IN: 300 mL / OUT: 1350 mL / NET: -1050 mL    07 Oct 2018 07:01  -  07 Oct 2018 23:14  --------------------------------------------------------  IN: 0 mL / OUT: 750 mL / NET: -750 mL        Constitutional: No fever, fatigue  Skin: No rash.  Eyes: No recent vision problems or eye pain.  ENT: No congestion, ear pain, or sore throat.  Cardiovascular: No chest pain or palpation.  Respiratory: No cough, shortness of breath, congestion, or wheezing.  Gastrointestinal: No abdominal pain, nausea, vomiting, or diarrhea.  Genitourinary: No dysuria.  Musculoskeletal: No joint swelling.  Neurologic: No headache.    PHYSICAL EXAM:  GENERAL: NAD  EYES: EOMI, PERRLA  NECK: Supple, No JVD  CHEST/LUNG: dec breath sounds at bases   HEART:  S1 , S2 +  ABDOMEN: sof,t bs+  EXTREMITIES:  trace edema  NEUROLOGY:alert awake oriented       LABS:                        7.8    6.22  )-----------( 322      ( 07 Oct 2018 22:00 )             24.9     10-07    135  |  95<L>  |  41<H>  ----------------------------<  85  3.8   |  28  |  3.37<H>    Ca    8.7      07 Oct 2018 06:00  Phos  4.0     10-07  Mg     1.6     10-07                RADIOLOGY & ADDITIONAL TESTS:    Imaging Personally Reviewed:    Consultant(s) Notes Reviewed:      Care Discussed with Consultants/Other Providers:

## 2018-10-07 NOTE — DIETITIAN INITIAL EVALUATION ADULT. - OTHER INFO
Pt seen for nutrition consult for RD. Pt is a 89 y/o F admitted for CHF exacerbation. Pt has history of CKD. Pt reports good appetite and PO intake. No GI distress (nausea/vomiting/diarrhea/constipation.) No chewing or swallowing difficulties at this time. Pt unable to state UBW; pt with noted 3+ edema and current weight 132.9 pounds. Diet education not appropriate at this time as pt was lethargic. Pt requesting nutrition supplement.

## 2018-10-07 NOTE — DIETITIAN INITIAL EVALUATION ADULT. - DIET TYPE
Vegan (accepts vegetable products only)/DASH/TLC (sodium and cholesterol restricted diet)/renal replacement pts:no protein restr,no conc K & phos, low sodium

## 2018-10-07 NOTE — PROGRESS NOTE ADULT - SUBJECTIVE AND OBJECTIVE BOX
[facility] NEPHROLOGY- PROGRESS NOTE    88y Female with history of CKD-5 with proteinuria in setting of paraproteinemia presents with L arm pain. Nephrology consulted for elevated Scr.  Pt feels okay, no complaints.    REVIEW OF SYSTEMS:  Gen: no changes in weight  Cards: no chest pain  Resp: no dyspnea  GI: no nausea or vomiting or diarrhea  Vascular: no LE edema    No Known Allergies      Hospital Medications: Medications reviewed      VITALS:  T(F): 97.4 (10-07-18 @ 13:02), Max: 98.6 (10-07-18 @ 05:10)  HR: 78 (10-07-18 @ 17:13)  BP: 128/54 (10-07-18 @ 17:13)  RR: 18 (10-07-18 @ 17:13)  SpO2: 99% (10-07-18 @ 17:13)  Wt(kg): --    10-06 @ 07:01  -  10-07 @ 07:00  --------------------------------------------------------  IN: 300 mL / OUT: 1350 mL / NET: -1050 mL    10-07 @ 07:01  -  10-07 @ 20:54  --------------------------------------------------------  IN: 0 mL / OUT: 550 mL / NET: -550 mL    Weight (kg): 62.1 (10-07 @ 05:06)      PHYSICAL EXAM:  Gen: NAD, calm  Cards: RRR, +S1/S2, + KESHA  Resp: CTA B/L  GI: soft, NT/ND, NABS  Vascular: no LE edema B/L      LABS:  10-07    135  |  95<L>  |  41<H>  ----------------------------<  85  3.8   |  28  |  3.37<H>    Ca    8.7      07 Oct 2018 06:00  Phos  4.0     10-07  Mg     1.6     10      Creatinine Trend: 3.37 <--, 3.48 <--, 3.46 <--                        7.6    6.50  )-----------( 325      ( 07 Oct 2018 06:00 )             24.2     Urine Studies:  Urinalysis Basic - ( 05 Oct 2018 09:40 )    Color: COLORLESS / Appearance: CLEAR / S.007 / pH: 7.0  Gluc: NEGATIVE / Ketone: NEGATIVE  / Bili: NEGATIVE / Urobili: NORMAL   Blood: NEGATIVE / Protein: 70 / Nitrite: NEGATIVE   Leuk Esterase: NEGATIVE / RBC:  / WBC    Sq Epi:  / Non Sq Epi:  / Bacteria:

## 2018-10-08 DIAGNOSIS — K92.1 MELENA: ICD-10-CM

## 2018-10-08 LAB
BUN SERPL-MCNC: 43 MG/DL — HIGH (ref 7–23)
CALCIUM SERPL-MCNC: 8.6 MG/DL — SIGNIFICANT CHANGE UP (ref 8.4–10.5)
CHLORIDE SERPL-SCNC: 95 MMOL/L — LOW (ref 98–107)
CO2 SERPL-SCNC: 29 MMOL/L — SIGNIFICANT CHANGE UP (ref 22–31)
CREAT SERPL-MCNC: 3.56 MG/DL — HIGH (ref 0.5–1.3)
GLUCOSE SERPL-MCNC: 83 MG/DL — SIGNIFICANT CHANGE UP (ref 70–99)
HCT VFR BLD CALC: 24.8 % — LOW (ref 34.5–45)
HGB BLD-MCNC: 7.8 G/DL — LOW (ref 11.5–15.5)
MAGNESIUM SERPL-MCNC: 1.6 MG/DL — SIGNIFICANT CHANGE UP (ref 1.6–2.6)
MCHC RBC-ENTMCNC: 27.9 PG — SIGNIFICANT CHANGE UP (ref 27–34)
MCHC RBC-ENTMCNC: 31.5 % — LOW (ref 32–36)
MCV RBC AUTO: 88.6 FL — SIGNIFICANT CHANGE UP (ref 80–100)
NRBC # FLD: 0 — SIGNIFICANT CHANGE UP
PLATELET # BLD AUTO: 308 K/UL — SIGNIFICANT CHANGE UP (ref 150–400)
PMV BLD: 9.9 FL — SIGNIFICANT CHANGE UP (ref 7–13)
POTASSIUM SERPL-MCNC: 3.8 MMOL/L — SIGNIFICANT CHANGE UP (ref 3.5–5.3)
POTASSIUM SERPL-SCNC: 3.8 MMOL/L — SIGNIFICANT CHANGE UP (ref 3.5–5.3)
RBC # BLD: 2.8 M/UL — LOW (ref 3.8–5.2)
RBC # FLD: 14.6 % — HIGH (ref 10.3–14.5)
SODIUM SERPL-SCNC: 136 MMOL/L — SIGNIFICANT CHANGE UP (ref 135–145)
WBC # BLD: 6.49 K/UL — SIGNIFICANT CHANGE UP (ref 3.8–10.5)
WBC # FLD AUTO: 6.49 K/UL — SIGNIFICANT CHANGE UP (ref 3.8–10.5)

## 2018-10-08 RX ADMIN — HEPARIN SODIUM 5000 UNIT(S): 5000 INJECTION INTRAVENOUS; SUBCUTANEOUS at 18:16

## 2018-10-08 RX ADMIN — GABAPENTIN 100 MILLIGRAM(S): 400 CAPSULE ORAL at 06:31

## 2018-10-08 RX ADMIN — BUDESONIDE AND FORMOTEROL FUMARATE DIHYDRATE 2 PUFF(S): 160; 4.5 AEROSOL RESPIRATORY (INHALATION) at 21:48

## 2018-10-08 RX ADMIN — SIMVASTATIN 40 MILLIGRAM(S): 20 TABLET, FILM COATED ORAL at 21:48

## 2018-10-08 RX ADMIN — Medication 50 MILLIGRAM(S): at 06:31

## 2018-10-08 RX ADMIN — SEVELAMER CARBONATE 800 MILLIGRAM(S): 2400 POWDER, FOR SUSPENSION ORAL at 06:31

## 2018-10-08 RX ADMIN — Medication 650 MILLIGRAM(S): at 21:48

## 2018-10-08 RX ADMIN — Medication 50 MILLIGRAM(S): at 13:54

## 2018-10-08 RX ADMIN — HEPARIN SODIUM 5000 UNIT(S): 5000 INJECTION INTRAVENOUS; SUBCUTANEOUS at 06:31

## 2018-10-08 RX ADMIN — GABAPENTIN 100 MILLIGRAM(S): 400 CAPSULE ORAL at 18:15

## 2018-10-08 RX ADMIN — BUDESONIDE AND FORMOTEROL FUMARATE DIHYDRATE 2 PUFF(S): 160; 4.5 AEROSOL RESPIRATORY (INHALATION) at 10:41

## 2018-10-08 RX ADMIN — SEVELAMER CARBONATE 800 MILLIGRAM(S): 2400 POWDER, FOR SUSPENSION ORAL at 21:48

## 2018-10-08 RX ADMIN — ISOSORBIDE MONONITRATE 120 MILLIGRAM(S): 60 TABLET, EXTENDED RELEASE ORAL at 13:54

## 2018-10-08 RX ADMIN — BUMETANIDE 2 MILLIGRAM(S): 0.25 INJECTION INTRAMUSCULAR; INTRAVENOUS at 06:31

## 2018-10-08 RX ADMIN — BUMETANIDE 2 MILLIGRAM(S): 0.25 INJECTION INTRAMUSCULAR; INTRAVENOUS at 18:15

## 2018-10-08 RX ADMIN — Medication 50 MILLIGRAM(S): at 21:48

## 2018-10-08 RX ADMIN — PANTOPRAZOLE SODIUM 40 MILLIGRAM(S): 20 TABLET, DELAYED RELEASE ORAL at 06:31

## 2018-10-08 RX ADMIN — SEVELAMER CARBONATE 800 MILLIGRAM(S): 2400 POWDER, FOR SUSPENSION ORAL at 13:54

## 2018-10-08 RX ADMIN — Medication 650 MILLIGRAM(S): at 06:31

## 2018-10-08 RX ADMIN — Medication 650 MILLIGRAM(S): at 13:54

## 2018-10-08 RX ADMIN — Medication 100 MILLIGRAM(S): at 06:31

## 2018-10-08 NOTE — PROGRESS NOTE ADULT - SUBJECTIVE AND OBJECTIVE BOX
Cardiovascular Disease Progress Note    Overnight events: No acute events overnight.  Ms. Villagomez denies chest pain or SOB.   Otherwise review of systems negative    Objective Findings:  T(C): 36.8 (10-08-18 @ 06:26), Max: 36.8 (10-08-18 @ 06:26)  HR: 79 (10-08-18 @ 06:26) (73 - 80)  BP: 156/55 (10-08-18 @ 06:26) (127/52 - 156/55)  RR: 19 (10-08-18 @ 06:26) (18 - 19)  SpO2: 100% (10-08-18 @ 06:26) (97% - 100%)  Wt(kg): --  Daily     Daily Weight in k.6 (08 Oct 2018 07:39)      Physical Exam:  Gen: NAD  HEENT: EOMI  CV: RRR, normal S1 + S2, no m/r/g  Lungs: CTAB  Abd: soft, non-tender  Ext: No edema    Telemetry: Sinus    Laboratory Data:                        7.8    6.49  )-----------( 308      ( 08 Oct 2018 05:58 )             24.8     10-    136  |  95<L>  |  43<H>  ----------------------------<  83  3.8   |  29  |  3.56<H>    Ca    8.6      08 Oct 2018 05:58  Phos  4.0     10-07  Mg     1.6     10-08      PT/INR - ( 07 Oct 2018 22:00 )   PT: 10.7 SEC;   INR: 0.96          PTT - ( 07 Oct 2018 22:00 )  PTT:37.9 SEC          Inpatient Medications:  MEDICATIONS  (STANDING):  buDESOnide  80 MICROgram(s)/formoterol 4.5 MICROgram(s) Inhaler 2 Puff(s) Inhalation two times a day  buMETAnide Injectable 2 milliGRAM(s) IV Push every 12 hours  ergocalciferol 12518 Unit(s) Oral <User Schedule>  gabapentin 100 milliGRAM(s) Oral every 12 hours  heparin  Injectable 5000 Unit(s) SubCutaneous every 12 hours  hydrALAZINE 50 milliGRAM(s) Oral every 8 hours  isosorbide   mononitrate ER Tablet (IMDUR) 120 milliGRAM(s) Oral daily  metoprolol succinate  milliGRAM(s) Oral daily  pantoprazole    Tablet 40 milliGRAM(s) Oral before breakfast  sevelamer hydrochloride 800 milliGRAM(s) Oral three times a day  simvastatin 40 milliGRAM(s) Oral at bedtime  sodium bicarbonate 650 milliGRAM(s) Oral three times a day      Assessment: 88 year old woman with CKD V, HTN, and HLD presents with acute diastolic CHF.    Plan of Care:    #Acute diastolic CHF-  Likely secondary to outpatient steroids and CKD.  Pedal edema is improved.  Would transition to home dose Bumex.   TTE from 4 months ago reviewed.    #HTN-  BP acceptable on current regimen.     Over 25 minutes spent on total encounter; more than 50% of the visit was spent counseling and/or coordinating care by the attending physician.      Justin Vick MD [facility]  Cardiovascular Disease  [phone] Cardiovascular Disease Progress Note    Overnight events: No acute events overnight.  Ms. Villagomez denies chest pain or SOB.   Otherwise review of systems negative    Objective Findings:  T(C): 36.8 (10-08-18 @ 06:26), Max: 36.8 (10-08-18 @ 06:26)  HR: 79 (10-08-18 @ 06:26) (73 - 80)  BP: 156/55 (10-08-18 @ 06:26) (127/52 - 156/55)  RR: 19 (10-08-18 @ 06:26) (18 - 19)  SpO2: 100% (10-08-18 @ 06:26) (97% - 100%)  Wt(kg): --  Daily     Daily Weight in k.6 (08 Oct 2018 07:39)      Physical Exam:  Gen: NAD  HEENT: EOMI  CV: RRR, normal S1 + S2, no m/r/g  Lungs: CTAB  Abd: soft, non-tender  Ext: 2+ pedal edema    Telemetry: Sinus    Laboratory Data:                        7.8    6.49  )-----------( 308      ( 08 Oct 2018 05:58 )             24.8     10-08    136  |  95<L>  |  43<H>  ----------------------------<  83  3.8   |  29  |  3.56<H>    Ca    8.6      08 Oct 2018 05:58  Phos  4.0     10-07  Mg     1.6     10-08      PT/INR - ( 07 Oct 2018 22:00 )   PT: 10.7 SEC;   INR: 0.96          PTT - ( 07 Oct 2018 22:00 )  PTT:37.9 SEC          Inpatient Medications:  MEDICATIONS  (STANDING):  buDESOnide  80 MICROgram(s)/formoterol 4.5 MICROgram(s) Inhaler 2 Puff(s) Inhalation two times a day  buMETAnide Injectable 2 milliGRAM(s) IV Push every 12 hours  ergocalciferol 02497 Unit(s) Oral <User Schedule>  gabapentin 100 milliGRAM(s) Oral every 12 hours  heparin  Injectable 5000 Unit(s) SubCutaneous every 12 hours  hydrALAZINE 50 milliGRAM(s) Oral every 8 hours  isosorbide   mononitrate ER Tablet (IMDUR) 120 milliGRAM(s) Oral daily  metoprolol succinate  milliGRAM(s) Oral daily  pantoprazole    Tablet 40 milliGRAM(s) Oral before breakfast  sevelamer hydrochloride 800 milliGRAM(s) Oral three times a day  simvastatin 40 milliGRAM(s) Oral at bedtime  sodium bicarbonate 650 milliGRAM(s) Oral three times a day      Assessment: 88 year old woman with CKD V, HTN, and HLD presents with acute diastolic CHF.    Plan of Care:    #Acute diastolic CHF-  Likely secondary to outpatient steroids and progressive CKD.  Collateral information obtained from family  Pedal edema is improved, but not at baseline.   Would continue IV Bumex, as creatinine appears to be fluctuating around the baseline.   TTE from 4 months ago reviewed.    #HTN-  BP acceptable on current regimen.     Care discussed at length with family.     Over 25 minutes spent on total encounter; more than 50% of the visit was spent counseling and/or coordinating care by the attending physician.      Justin Vick MD [facility]  Cardiovascular Disease  [phone]

## 2018-10-08 NOTE — CONSULT NOTE ADULT - SUBJECTIVE AND OBJECTIVE BOX
Patient is a 88y old  Female who presents with a chief complaint of "b/l worsening LE edema x 1 day" (08 Oct 2018 13:18)      HPI:  87 y/o F with PMHx of CKD (not currently on dialysis), HLD, HTN presents to the ED c/o b/l worsening LE edema x 1 day. Since starting steroids after last hospitalization for cervical radiculopathy in 8/2018, pt admits to edema from the foot up to her shin. Pt stopped the steroids 2 wks ago and edema persisted, has mild relief with diuretics and elevation. Dr. Villagomez start pt on Metolazone last week. This morning, noticed that the LE edema is up to her thigh and granddaughter brought her to the ER. Has VIRGEN when she walks too but is usually at home and ambulation is limited due to LE edema. No recent episodes of VIRGEN or SOB. Pt also admits to a dry cough x 1 wk, takes OTC Delsym. Pt denies palpitations, chest pain, SOB, diaphoresis, orthopnea, PND wheezing, dizziness, syncope, abdominal pain, n/v/d, fevers. (05 Oct 2018 13:34)      PAST MEDICAL & SURGICAL HISTORY:  CHF (congestive heart failure): diastolic heart failure  Asthma  CKD (chronic kidney disease)  High cholesterol  Hypertension  History of hysterectomy      MEDICATIONS  (STANDING):  buDESOnide  80 MICROgram(s)/formoterol 4.5 MICROgram(s) Inhaler 2 Puff(s) Inhalation two times a day  buMETAnide Injectable 2 milliGRAM(s) IV Push every 12 hours  ergocalciferol 88211 Unit(s) Oral <User Schedule>  gabapentin 100 milliGRAM(s) Oral every 12 hours  heparin  Injectable 5000 Unit(s) SubCutaneous every 12 hours  hydrALAZINE 50 milliGRAM(s) Oral every 8 hours  isosorbide   mononitrate ER Tablet (IMDUR) 120 milliGRAM(s) Oral daily  metoprolol succinate  milliGRAM(s) Oral daily  pantoprazole    Tablet 40 milliGRAM(s) Oral before breakfast  sevelamer hydrochloride 800 milliGRAM(s) Oral three times a day  simvastatin 40 milliGRAM(s) Oral at bedtime  sodium bicarbonate 650 milliGRAM(s) Oral three times a day      Allergies    No Known Allergies    Intolerances        SOCIAL HISTORY:  Denies ETOh,Smoking,     FAMILY HISTORY:  No pertinent family history in first degree relatives      REVIEW OF SYSTEMS:    CONSTITUTIONAL: No weakness, fevers or chills  EYES/ENT: No visual changes;  No vertigo or throat pain   NECK: No pain or stiffness  RESPIRATORY: No cough, wheezing, hemoptysis; No shortness of breath  CARDIOVASCULAR: No chest pain or palpitations  GASTROINTESTINAL: No abdominal or epigastric pain. No nausea, vomiting, or hematemesis; No diarrhea or constipation. No melena or hematochezia.  GENITOURINARY: No dysuria, frequency or hematuria  NEUROLOGICAL: No numbness or weakness  SKIN: No itching, burning, rashes, or lesions   All other review of systems is negative unless indicated above.    VITAL:  T(C): , Max: 36.8 (10-08-18 @ 06:26)  T(F): , Max: 98.3 (10-08-18 @ 06:26)  HR: 77 (10-08-18 @ 13:30)  BP: 127/42 (10-08-18 @ 13:30)  BP(mean): --  RR: 17 (10-08-18 @ 13:30)  SpO2: 100% (10-08-18 @ 13:30)  Wt(kg): --    I and O's:    10-06 @ 07:01  -  10-07 @ 07:00  --------------------------------------------------------  IN: 300 mL / OUT: 1350 mL / NET: -1050 mL    10-07 @ 07:01  -  10-08 @ 07:00  --------------------------------------------------------  IN: 200 mL / OUT: 750 mL / NET: -550 mL    10-08 @ 07:01  -  10-08 @ 16:54  --------------------------------------------------------  IN: 0 mL / OUT: 450 mL / NET: -450 mL          PHYSICAL EXAM:    Constitutional: NAD  HEENT: PERRLA,   Neck: No JVD  Respiratory: CTA B/L  Cardiovascular: S1 and S2  Gastrointestinal: BS+, soft, NT/ND  Extremities: No peripheral edema  Neurological: A/O x 3, no focal deficits  Psychiatric: Normal mood, normal affect  : No Palafox  Skin: No rashes  Access: Not applicable  Back: No CVA tenderness    LABS:                        7.8    6.49  )-----------( 308      ( 08 Oct 2018 05:58 )             24.8     10-08    136  |  95<L>  |  43<H>  ----------------------------<  83  3.8   |  29  |  3.56<H>    Ca    8.6      08 Oct 2018 05:58  Phos  4.0     10-07  Mg     1.6     10-08            RADIOLOGY & ADDITIONAL STUDIES:

## 2018-10-08 NOTE — PROGRESS NOTE ADULT - PROBLEM SELECTOR PLAN 9
s/p empiric abx (urine culture contaminated).

## 2018-10-08 NOTE — PROGRESS NOTE ADULT - PROBLEM SELECTOR PLAN 8
Mild and likely secondary to poor solute intake? given indeterminate urine osm. Hold bumex on discharge. Sodium bicarbonate tablets as above. Monitor serum sodium.

## 2018-10-08 NOTE — CONSULT NOTE ADULT - ASSESSMENT
blood in stool, anemia, pt with diffuse abdominal pain, would not persue egd/colon based on age and comorbidieis.  ppi, non contrast ct abdomen and pelvis to rule out occult malignay

## 2018-10-08 NOTE — PROGRESS NOTE ADULT - SUBJECTIVE AND OBJECTIVE BOX
SUBJECTIVE / OVERNIGHT EVENTS: pt denies chest pain,sob ,n,v      MEDICATIONS  (STANDING):  buDESOnide  80 MICROgram(s)/formoterol 4.5 MICROgram(s) Inhaler 2 Puff(s) Inhalation two times a day  buMETAnide Injectable 2 milliGRAM(s) IV Push every 12 hours  ergocalciferol 48280 Unit(s) Oral <User Schedule>  gabapentin 100 milliGRAM(s) Oral every 12 hours  heparin  Injectable 5000 Unit(s) SubCutaneous every 12 hours  hydrALAZINE 50 milliGRAM(s) Oral every 8 hours  isosorbide   mononitrate ER Tablet (IMDUR) 120 milliGRAM(s) Oral daily  metoprolol succinate  milliGRAM(s) Oral daily  pantoprazole    Tablet 40 milliGRAM(s) Oral before breakfast  sevelamer hydrochloride 800 milliGRAM(s) Oral three times a day  simvastatin 40 milliGRAM(s) Oral at bedtime  sodium bicarbonate 650 milliGRAM(s) Oral three times a day    MEDICATIONS  (PRN):    Vital Signs Last 24 Hrs  T(C): 36.4 (08 Oct 2018 20:40), Max: 36.8 (08 Oct 2018 06:26)  T(F): 97.6 (08 Oct 2018 20:40), Max: 98.3 (08 Oct 2018 06:26)  HR: 66 (08 Oct 2018 20:40) (66 - 79)  BP: 127/49 (08 Oct 2018 20:40) (127/42 - 156/55)  BP(mean): --  RR: 18 (08 Oct 2018 20:40) (17 - 19)  SpO2: 100% (08 Oct 2018 20:40) (100% - 100%)    Constitutional: No fever, fatigue  Skin: No rash.  Eyes: No recent vision problems or eye pain.  ENT: No congestion, ear pain, or sore throat.  Cardiovascular: No chest pain or palpation.  Respiratory: No cough, shortness of breath, congestion, or wheezing.  Gastrointestinal: No abdominal pain, nausea, vomiting, or diarrhea.  Genitourinary: No dysuria.  Musculoskeletal: No joint swelling.  Neurologic: No headache.    PHYSICAL EXAM:  GENERAL: NAD  EYES: EOMI, PERRLA  NECK: Supple, No JVD  CHEST/LUNG: dec breath sounds at bases   HEART:  S1 , S2 +  ABDOMEN: sof,t bs+  EXTREMITIES:  trace edema  NEUROLOGY:alert awake oriented       LABS:  10-08    136  |  95<L>  |  43<H>  ----------------------------<  83  3.8   |  29  |  3.56<H>    Ca    8.6      08 Oct 2018 05:58  Phos  4.0     10-07  Mg     1.6     10-08      Creatinine Trend: 3.56 <--, 3.37 <--, 3.48 <--, 3.46 <--                        7.8    6.49  )-----------( 308      ( 08 Oct 2018 05:58 )             24.8     Urine Studies:  Urinalysis Basic - ( 05 Oct 2018 09:40 )    Color: COLORLESS / Appearance: CLEAR / S.007 / pH: 7.0  Gluc: NEGATIVE / Ketone: NEGATIVE  / Bili: NEGATIVE / Urobili: NORMAL   Blood: NEGATIVE / Protein: 70 / Nitrite: NEGATIVE   Leuk Esterase: NEGATIVE / RBC:  / WBC    Sq Epi:  / Non Sq Epi:  / Bacteria:                 PT/INR - ( 07 Oct 2018 22:00 )   PT: 10.7 SEC;   INR: 0.96          PTT - ( 07 Oct 2018 22:00 )  PTT:37.9 SEC

## 2018-10-08 NOTE — PROGRESS NOTE ADULT - SUBJECTIVE AND OBJECTIVE BOX
[facility] NEPHROLOGY- PROGRESS NOTE    88y Female with history of CKD-5 with proteinuria in setting of paraproteinemia presents with L arm pain. Nephrology consulted for elevated Scr.  Pt feels okay, no complaints.    REVIEW OF SYSTEMS:  Gen: no changes in weight  Cards: no chest pain  Resp: no dyspnea  GI: no nausea or vomiting or diarrhea  Vascular: no LE edema    No Known Allergies      Hospital Medications: Medications reviewed    VITALS:  T(F): 97.4 (10-08-18 @ 13:30), Max: 98.3 (10-08-18 @ 06:26)  HR: 77 (10-08-18 @ 13:30)  BP: 127/42 (10-08-18 @ 13:30)  RR: 17 (10-08-18 @ 13:30)  SpO2: 100% (10-08-18 @ 13:30)  Wt(kg): --    10-07 @ 07:01  -  10-08 @ 07:00  --------------------------------------------------------  IN: 200 mL / OUT: 750 mL / NET: -550 mL    10-08 @ 07:01  -  10-08 @ 17:58  --------------------------------------------------------  IN: 0 mL / OUT: 450 mL / NET: -450 mL      PHYSICAL EXAM:  Gen: NAD, calm  Cards: RRR, +S1/S2, + KESHA  Resp: CTA B/L  GI: soft, NT/ND, NABS  Vascular: no LE edema B/L        LABS:  10-08    136  |  95<L>  |  43<H>  ----------------------------<  83  3.8   |  29  |  3.56<H>    Ca    8.6      08 Oct 2018 05:58  Phos  4.0     10-07  Mg     1.6     10-08      Creatinine Trend: 3.56 <--, 3.37 <--, 3.48 <--, 3.46 <--                        7.8    6.49  )-----------( 308      ( 08 Oct 2018 05:58 )             24.8     Urine Studies:  Urinalysis Basic - ( 05 Oct 2018 09:40 )    Color: COLORLESS / Appearance: CLEAR / S.007 / pH: 7.0  Gluc: NEGATIVE / Ketone: NEGATIVE  / Bili: NEGATIVE / Urobili: NORMAL   Blood: NEGATIVE / Protein: 70 / Nitrite: NEGATIVE   Leuk Esterase: NEGATIVE / RBC:  / WBC    Sq Epi:  / Non Sq Epi:  / Bacteria:

## 2018-10-09 LAB
BUN SERPL-MCNC: 42 MG/DL — HIGH (ref 7–23)
CALCIUM SERPL-MCNC: 8.9 MG/DL — SIGNIFICANT CHANGE UP (ref 8.4–10.5)
CHLORIDE SERPL-SCNC: 93 MMOL/L — LOW (ref 98–107)
CO2 SERPL-SCNC: 30 MMOL/L — SIGNIFICANT CHANGE UP (ref 22–31)
CREAT SERPL-MCNC: 3.53 MG/DL — HIGH (ref 0.5–1.3)
GLUCOSE SERPL-MCNC: 82 MG/DL — SIGNIFICANT CHANGE UP (ref 70–99)
HCT VFR BLD CALC: 26.3 % — LOW (ref 34.5–45)
HGB BLD-MCNC: 8.3 G/DL — LOW (ref 11.5–15.5)
MAGNESIUM SERPL-MCNC: 1.6 MG/DL — SIGNIFICANT CHANGE UP (ref 1.6–2.6)
MCHC RBC-ENTMCNC: 28 PG — SIGNIFICANT CHANGE UP (ref 27–34)
MCHC RBC-ENTMCNC: 31.6 % — LOW (ref 32–36)
MCV RBC AUTO: 88.9 FL — SIGNIFICANT CHANGE UP (ref 80–100)
NRBC # FLD: 0 — SIGNIFICANT CHANGE UP
PLATELET # BLD AUTO: 310 K/UL — SIGNIFICANT CHANGE UP (ref 150–400)
PMV BLD: 10 FL — SIGNIFICANT CHANGE UP (ref 7–13)
POTASSIUM SERPL-MCNC: 3.7 MMOL/L — SIGNIFICANT CHANGE UP (ref 3.5–5.3)
POTASSIUM SERPL-SCNC: 3.7 MMOL/L — SIGNIFICANT CHANGE UP (ref 3.5–5.3)
RBC # BLD: 2.96 M/UL — LOW (ref 3.8–5.2)
RBC # FLD: 14.6 % — HIGH (ref 10.3–14.5)
SODIUM SERPL-SCNC: 136 MMOL/L — SIGNIFICANT CHANGE UP (ref 135–145)
WBC # BLD: 6.72 K/UL — SIGNIFICANT CHANGE UP (ref 3.8–10.5)
WBC # FLD AUTO: 6.72 K/UL — SIGNIFICANT CHANGE UP (ref 3.8–10.5)

## 2018-10-09 PROCEDURE — 74176 CT ABD & PELVIS W/O CONTRAST: CPT | Mod: 26

## 2018-10-09 RX ORDER — IRON SUCROSE 20 MG/ML
200 INJECTION, SOLUTION INTRAVENOUS ONCE
Qty: 0 | Refills: 0 | Status: COMPLETED | OUTPATIENT
Start: 2018-10-10 | End: 2018-10-10

## 2018-10-09 RX ORDER — ERYTHROPOIETIN 10000 [IU]/ML
6000 INJECTION, SOLUTION INTRAVENOUS; SUBCUTANEOUS ONCE
Qty: 0 | Refills: 0 | Status: COMPLETED | OUTPATIENT
Start: 2018-10-09 | End: 2018-10-09

## 2018-10-09 RX ADMIN — Medication 100 MILLIGRAM(S): at 05:52

## 2018-10-09 RX ADMIN — SIMVASTATIN 40 MILLIGRAM(S): 20 TABLET, FILM COATED ORAL at 23:08

## 2018-10-09 RX ADMIN — HEPARIN SODIUM 5000 UNIT(S): 5000 INJECTION INTRAVENOUS; SUBCUTANEOUS at 05:52

## 2018-10-09 RX ADMIN — Medication 50 MILLIGRAM(S): at 05:52

## 2018-10-09 RX ADMIN — ISOSORBIDE MONONITRATE 120 MILLIGRAM(S): 60 TABLET, EXTENDED RELEASE ORAL at 13:31

## 2018-10-09 RX ADMIN — BUMETANIDE 2 MILLIGRAM(S): 0.25 INJECTION INTRAMUSCULAR; INTRAVENOUS at 17:54

## 2018-10-09 RX ADMIN — BUDESONIDE AND FORMOTEROL FUMARATE DIHYDRATE 2 PUFF(S): 160; 4.5 AEROSOL RESPIRATORY (INHALATION) at 23:06

## 2018-10-09 RX ADMIN — SEVELAMER CARBONATE 800 MILLIGRAM(S): 2400 POWDER, FOR SUSPENSION ORAL at 13:31

## 2018-10-09 RX ADMIN — BUMETANIDE 2 MILLIGRAM(S): 0.25 INJECTION INTRAMUSCULAR; INTRAVENOUS at 05:52

## 2018-10-09 RX ADMIN — Medication 650 MILLIGRAM(S): at 05:57

## 2018-10-09 RX ADMIN — HEPARIN SODIUM 5000 UNIT(S): 5000 INJECTION INTRAVENOUS; SUBCUTANEOUS at 17:54

## 2018-10-09 RX ADMIN — GABAPENTIN 100 MILLIGRAM(S): 400 CAPSULE ORAL at 05:51

## 2018-10-09 RX ADMIN — Medication 50 MILLIGRAM(S): at 23:06

## 2018-10-09 RX ADMIN — Medication 50 MILLIGRAM(S): at 13:31

## 2018-10-09 RX ADMIN — PANTOPRAZOLE SODIUM 40 MILLIGRAM(S): 20 TABLET, DELAYED RELEASE ORAL at 06:07

## 2018-10-09 RX ADMIN — ERYTHROPOIETIN 6000 UNIT(S): 10000 INJECTION, SOLUTION INTRAVENOUS; SUBCUTANEOUS at 13:30

## 2018-10-09 RX ADMIN — GABAPENTIN 100 MILLIGRAM(S): 400 CAPSULE ORAL at 17:54

## 2018-10-09 RX ADMIN — SEVELAMER CARBONATE 800 MILLIGRAM(S): 2400 POWDER, FOR SUSPENSION ORAL at 23:07

## 2018-10-09 NOTE — PROGRESS NOTE ADULT - SUBJECTIVE AND OBJECTIVE BOX
FRANCIA MARTEL:[id number],   88yFemale followed for:  No Known Allergies    PAST MEDICAL & SURGICAL HISTORY:  CHF (congestive heart failure): diastolic heart failure  Asthma  CKD (chronic kidney disease)  High cholesterol  Hypertension  History of hysterectomy    FAMILY HISTORY:  No pertinent family history in first degree relatives    MEDICATIONS  (STANDING):  buDESOnide  80 MICROgram(s)/formoterol 4.5 MICROgram(s) Inhaler 2 Puff(s) Inhalation two times a day  buMETAnide Injectable 2 milliGRAM(s) IV Push every 12 hours  epoetin cathy Injectable 6000 Unit(s) IV Push once  ergocalciferol 16063 Unit(s) Oral <User Schedule>  gabapentin 100 milliGRAM(s) Oral every 12 hours  heparin  Injectable 5000 Unit(s) SubCutaneous every 12 hours  hydrALAZINE 50 milliGRAM(s) Oral every 8 hours  isosorbide   mononitrate ER Tablet (IMDUR) 120 milliGRAM(s) Oral daily  metoprolol succinate  milliGRAM(s) Oral daily  pantoprazole    Tablet 40 milliGRAM(s) Oral before breakfast  sevelamer hydrochloride 800 milliGRAM(s) Oral three times a day  simvastatin 40 milliGRAM(s) Oral at bedtime    MEDICATIONS  (PRN):      Vital Signs Last 24 Hrs  T(C): 36.7 (09 Oct 2018 05:47), Max: 36.7 (09 Oct 2018 05:47)  T(F): 98 (09 Oct 2018 05:47), Max: 98 (09 Oct 2018 05:47)  HR: 69 (09 Oct 2018 05:47) (66 - 77)  BP: 164/61 (09 Oct 2018 05:47) (127/42 - 164/61)  BP(mean): --  RR: 18 (09 Oct 2018 05:47) (17 - 18)  SpO2: 100% (09 Oct 2018 05:47) (100% - 100%)  nc/at  s1s2  cta  soft, nt, nd no guarding or rebound  no c/c/e    CBC Full  -  ( 09 Oct 2018 07:00 )  WBC Count : 6.72 K/uL  Hemoglobin : 8.3 g/dL  Hematocrit : 26.3 %  Platelet Count - Automated : 310 K/uL  Mean Cell Volume : 88.9 fL  Mean Cell Hemoglobin : 28.0 pg  Mean Cell Hemoglobin Concentration : 31.6 %  Auto Neutrophil # : x  Auto Lymphocyte # : x  Auto Monocyte # : x  Auto Eosinophil # : x  Auto Basophil # : x  Auto Neutrophil % : x  Auto Lymphocyte % : x  Auto Monocyte % : x  Auto Eosinophil % : x  Auto Basophil % : x    10-09    136  |  93<L>  |  42<H>  ----------------------------<  82  3.7   |  30  |  3.53<H>    Ca    8.9      09 Oct 2018 07:00  Mg     1.6     10-09      PT/INR - ( 07 Oct 2018 22:00 )   PT: 10.7 SEC;   INR: 0.96          PTT - ( 07 Oct 2018 22:00 )  PTT:37.9 SEC

## 2018-10-09 NOTE — PROGRESS NOTE ADULT - SUBJECTIVE AND OBJECTIVE BOX
Cardiovascular Disease Progress Note    Overnight events: No acute events overnight. Ms. Villagomez denies chest pain or SOB.     Otherwise review of systems negative    Objective Findings:  T(C): 36.7 (10-09-18 @ 05:47), Max: 36.7 (10-09-18 @ 05:47)  HR: 69 (10-09-18 @ 05:47) (66 - 77)  BP: 164/61 (10-09-18 @ 05:47) (127/42 - 164/61)  RR: 18 (10-09-18 @ 05:47) (17 - 18)  SpO2: 100% (10-09-18 @ 05:47) (100% - 100%)  Wt(kg): --  Daily     Daily Weight in k.1 (09 Oct 2018 07:05)      Physical Exam:  Gen: NAD  HEENT: EOMI  CV: RRR, normal S1 + S2, no m/r/g  Lungs: CTAB  Abd: soft, non-tender  Ext: 1+ pedal edema    Telemetry: Sinus; rare PVDs    Laboratory Data:                        8.3    6.72  )-----------( 310      ( 09 Oct 2018 07:00 )             26.3     10    136  |  93<L>  |  42<H>  ----------------------------<  82  3.7   |  30  |  3.53<H>    Ca    8.9      09 Oct 2018 07:00  Mg     1.6     10-09      PT/INR - ( 07 Oct 2018 22:00 )   PT: 10.7 SEC;   INR: 0.96          PTT - ( 07 Oct 2018 22:00 )  PTT:37.9 SEC          Inpatient Medications:  MEDICATIONS  (STANDING):  buDESOnide  80 MICROgram(s)/formoterol 4.5 MICROgram(s) Inhaler 2 Puff(s) Inhalation two times a day  buMETAnide Injectable 2 milliGRAM(s) IV Push every 12 hours  epoetin cathy Injectable 6000 Unit(s) IV Push once  ergocalciferol 51606 Unit(s) Oral <User Schedule>  gabapentin 100 milliGRAM(s) Oral every 12 hours  heparin  Injectable 5000 Unit(s) SubCutaneous every 12 hours  hydrALAZINE 50 milliGRAM(s) Oral every 8 hours  isosorbide   mononitrate ER Tablet (IMDUR) 120 milliGRAM(s) Oral daily  metoprolol succinate  milliGRAM(s) Oral daily  pantoprazole    Tablet 40 milliGRAM(s) Oral before breakfast  sevelamer hydrochloride 800 milliGRAM(s) Oral three times a day  simvastatin 40 milliGRAM(s) Oral at bedtime      Assessment: 88 year old woman with CKD V, HTN, and HLD presents with acute diastolic CHF.    Plan of Care:    #Acute diastolic CHF-  Likely secondary to outpatient steroids and progressive CKD.  Collateral information obtained from family  Pedal edema is improved, but not at baseline.   Would continue IV Bumex, as creatinine appears to be fluctuating around the baseline.   TTE from 4 months ago reviewed.    #HTN-  BP acceptable on current regimen.     Care discussed at length with family.     Over 25 minutes spent on total encounter; more than 50% of the visit was spent counseling and/or coordinating care by the attending physician.      Justin Vick MD [facility]  Cardiovascular Disease  [phone]

## 2018-10-09 NOTE — PROGRESS NOTE ADULT - SUBJECTIVE AND OBJECTIVE BOX
SUBJECTIVE / OVERNIGHT EVENTS: pt denies chest pain,sob ,n,v      MEDICATIONS  (STANDING):  buDESOnide  80 MICROgram(s)/formoterol 4.5 MICROgram(s) Inhaler 2 Puff(s) Inhalation two times a day  buMETAnide Injectable 2 milliGRAM(s) IV Push every 12 hours  ergocalciferol 20506 Unit(s) Oral <User Schedule>  gabapentin 100 milliGRAM(s) Oral every 12 hours  heparin  Injectable 5000 Unit(s) SubCutaneous every 12 hours  hydrALAZINE 50 milliGRAM(s) Oral every 8 hours  isosorbide   mononitrate ER Tablet (IMDUR) 120 milliGRAM(s) Oral daily  metoprolol succinate  milliGRAM(s) Oral daily  pantoprazole    Tablet 40 milliGRAM(s) Oral before breakfast  sevelamer hydrochloride 800 milliGRAM(s) Oral three times a day  simvastatin 40 milliGRAM(s) Oral at bedtime    MEDICATIONS  (PRN):    Vital Signs Last 24 Hrs  T(C): 36.9 (09 Oct 2018 21:12), Max: 36.9 (09 Oct 2018 21:12)  T(F): 98.4 (09 Oct 2018 21:12), Max: 98.4 (09 Oct 2018 21:12)  HR: 72 (09 Oct 2018 21:12) (69 - 81)  BP: 179/72 (09 Oct 2018 21:12) (164/61 - 179/72)  BP(mean): --  RR: 18 (09 Oct 2018 21:12) (17 - 18)  SpO2: 99% (09 Oct 2018 21:12) (98% - 100%)    Constitutional: No fever, fatigue  Skin: No rash.  Eyes: No recent vision problems or eye pain.  ENT: No congestion, ear pain, or sore throat.  Cardiovascular: No chest pain or palpation.  Respiratory: No cough, shortness of breath, congestion, or wheezing.  Gastrointestinal: No abdominal pain, nausea, vomiting, or diarrhea.  Genitourinary: No dysuria.  Musculoskeletal: No joint swelling.  Neurologic: No headache.    PHYSICAL EXAM:  GENERAL: NAD  EYES: EOMI, PERRLA  NECK: Supple, No JVD  CHEST/LUNG: dec breath sounds at bases   HEART:  S1 , S2 +  ABDOMEN: sof,t bs+  EXTREMITIES:  trace edema  NEUROLOGY:alert awake oriented     LABS:  10-09    136  |  93<L>  |  42<H>  ----------------------------<  82  3.7   |  30  |  3.53<H>    Ca    8.9      09 Oct 2018 07:00  Mg     1.6     10-09      Creatinine Trend: 3.53 <--, 3.56 <--, 3.37 <--, 3.48 <--, 3.46 <--                        8.3    6.72  )-----------( 310      ( 09 Oct 2018 07:00 )             26.3     Urine Studies:  Urinalysis Basic - ( 05 Oct 2018 09:40 )    Color: COLORLESS / Appearance: CLEAR / S.007 / pH: 7.0  Gluc: NEGATIVE / Ketone: NEGATIVE  / Bili: NEGATIVE / Urobili: NORMAL   Blood: NEGATIVE / Protein: 70 / Nitrite: NEGATIVE   Leuk Esterase: NEGATIVE / RBC:  / WBC    Sq Epi:  / Non Sq Epi:  / Bacteria:

## 2018-10-09 NOTE — PROGRESS NOTE ADULT - SUBJECTIVE AND OBJECTIVE BOX
[facility] NEPHROLOGY- PROGRESS NOTE    88y Female with history of HTN, DM, CKD-5 presents with increasing LE edema. Nephrology consulted for elevated Scr.    REVIEW OF SYSTEMS:  Gen: no changes in weight  Cards: no chest pain  Resp: no dyspnea  GI: no nausea or vomiting or diarrhea  Vascular: + LE edema improving    No Known Allergies      Hospital Medications: Medications reviewed    VITALS:  T(F): 98 (10-09-18 @ 05:47), Max: 98 (10-09-18 @ 05:47)  HR: 69 (10-09-18 @ 05:47)  BP: 164/61 (10-09-18 @ 05:47)  RR: 18 (10-09-18 @ 05:47)  SpO2: 100% (10-09-18 @ 05:47)  Wt(kg): --  Height (cm): 162.56 (10-05 @ 14:53)  Weight (kg): 62.1 (10-07 @ 05:06)  BMI (kg/m2): 23.5 (10-07 @ 05:06)  BSA (m2): 1.67 (10-07 @ 05:06)    10-08 @ 07:01  -  10-09 @ 07:00  --------------------------------------------------------  IN: 800 mL / OUT: 1350 mL / NET: -550 mL    10-09 @ 07:01  -  10-09 @ 09:21  --------------------------------------------------------  IN: 0 mL / OUT: 700 mL / NET: -700 mL        PHYSICAL EXAM:    Gen: NAD, calm  Cards: RRR, +S1/S2, no M/G/R  Resp: CTA B/L  GI: soft, NT/ND, NABS  Vascular: trace-1+ LE edema B/L    LABS:  10-09    136  |  93<L>  |  42<H>  ----------------------------<  82  3.7   |  30  |  3.53<H>    Ca    8.9      09 Oct 2018 07:00  Mg     1.6     10-09      Creatinine Trend: 3.53 <--, 3.56 <--, 3.37 <--, 3.48 <--, 3.46 <--                        8.3    6.72  )-----------( 310      ( 09 Oct 2018 07:00 )             26.3     Urine Studies:  Urinalysis Basic - ( 05 Oct 2018 09:40 )    Color: COLORLESS / Appearance: CLEAR / S.007 / pH: 7.0  Gluc: NEGATIVE / Ketone: NEGATIVE  / Bili: NEGATIVE / Urobili: NORMAL   Blood: NEGATIVE / Protein: 70 / Nitrite: NEGATIVE   Leuk Esterase: NEGATIVE / RBC:  / WBC    Sq Epi:  / Non Sq Epi:  / Bacteria:

## 2018-10-10 LAB
BUN SERPL-MCNC: 41 MG/DL — HIGH (ref 7–23)
CALCIUM SERPL-MCNC: 8.3 MG/DL — LOW (ref 8.4–10.5)
CHLORIDE SERPL-SCNC: 94 MMOL/L — LOW (ref 98–107)
CO2 SERPL-SCNC: 27 MMOL/L — SIGNIFICANT CHANGE UP (ref 22–31)
CREAT SERPL-MCNC: 3.3 MG/DL — HIGH (ref 0.5–1.3)
GLUCOSE SERPL-MCNC: 91 MG/DL — SIGNIFICANT CHANGE UP (ref 70–99)
HCT VFR BLD CALC: 25.6 % — LOW (ref 34.5–45)
HGB BLD-MCNC: 8.1 G/DL — LOW (ref 11.5–15.5)
MAGNESIUM SERPL-MCNC: 1.4 MG/DL — LOW (ref 1.6–2.6)
MCHC RBC-ENTMCNC: 27.7 PG — SIGNIFICANT CHANGE UP (ref 27–34)
MCHC RBC-ENTMCNC: 31.6 % — LOW (ref 32–36)
MCV RBC AUTO: 87.7 FL — SIGNIFICANT CHANGE UP (ref 80–100)
NRBC # FLD: 0 — SIGNIFICANT CHANGE UP
PHOSPHATE SERPL-MCNC: 3.8 MG/DL — SIGNIFICANT CHANGE UP (ref 2.5–4.5)
PLATELET # BLD AUTO: 302 K/UL — SIGNIFICANT CHANGE UP (ref 150–400)
PMV BLD: 9.7 FL — SIGNIFICANT CHANGE UP (ref 7–13)
POTASSIUM SERPL-MCNC: 3.8 MMOL/L — SIGNIFICANT CHANGE UP (ref 3.5–5.3)
POTASSIUM SERPL-SCNC: 3.8 MMOL/L — SIGNIFICANT CHANGE UP (ref 3.5–5.3)
RBC # BLD: 2.92 M/UL — LOW (ref 3.8–5.2)
RBC # FLD: 14.4 % — SIGNIFICANT CHANGE UP (ref 10.3–14.5)
SODIUM SERPL-SCNC: 132 MMOL/L — LOW (ref 135–145)
WBC # BLD: 5.7 K/UL — SIGNIFICANT CHANGE UP (ref 3.8–10.5)
WBC # FLD AUTO: 5.7 K/UL — SIGNIFICANT CHANGE UP (ref 3.8–10.5)

## 2018-10-10 RX ORDER — HYDRALAZINE HCL 50 MG
75 TABLET ORAL THREE TIMES A DAY
Qty: 0 | Refills: 0 | Status: DISCONTINUED | OUTPATIENT
Start: 2018-10-10 | End: 2018-10-11

## 2018-10-10 RX ADMIN — PANTOPRAZOLE SODIUM 40 MILLIGRAM(S): 20 TABLET, DELAYED RELEASE ORAL at 06:30

## 2018-10-10 RX ADMIN — BUDESONIDE AND FORMOTEROL FUMARATE DIHYDRATE 2 PUFF(S): 160; 4.5 AEROSOL RESPIRATORY (INHALATION) at 22:37

## 2018-10-10 RX ADMIN — Medication 100 MILLIGRAM(S): at 06:31

## 2018-10-10 RX ADMIN — SEVELAMER CARBONATE 800 MILLIGRAM(S): 2400 POWDER, FOR SUSPENSION ORAL at 12:09

## 2018-10-10 RX ADMIN — Medication 75 MILLIGRAM(S): at 22:37

## 2018-10-10 RX ADMIN — IRON SUCROSE 110 MILLIGRAM(S): 20 INJECTION, SOLUTION INTRAVENOUS at 06:37

## 2018-10-10 RX ADMIN — Medication 50 MILLIGRAM(S): at 06:30

## 2018-10-10 RX ADMIN — GABAPENTIN 100 MILLIGRAM(S): 400 CAPSULE ORAL at 06:30

## 2018-10-10 RX ADMIN — ISOSORBIDE MONONITRATE 120 MILLIGRAM(S): 60 TABLET, EXTENDED RELEASE ORAL at 12:10

## 2018-10-10 RX ADMIN — SEVELAMER CARBONATE 800 MILLIGRAM(S): 2400 POWDER, FOR SUSPENSION ORAL at 22:37

## 2018-10-10 RX ADMIN — BUDESONIDE AND FORMOTEROL FUMARATE DIHYDRATE 2 PUFF(S): 160; 4.5 AEROSOL RESPIRATORY (INHALATION) at 11:10

## 2018-10-10 RX ADMIN — HEPARIN SODIUM 5000 UNIT(S): 5000 INJECTION INTRAVENOUS; SUBCUTANEOUS at 06:36

## 2018-10-10 RX ADMIN — BUMETANIDE 2 MILLIGRAM(S): 0.25 INJECTION INTRAMUSCULAR; INTRAVENOUS at 06:37

## 2018-10-10 RX ADMIN — SEVELAMER CARBONATE 800 MILLIGRAM(S): 2400 POWDER, FOR SUSPENSION ORAL at 14:32

## 2018-10-10 RX ADMIN — BUMETANIDE 2 MILLIGRAM(S): 0.25 INJECTION INTRAMUSCULAR; INTRAVENOUS at 18:22

## 2018-10-10 RX ADMIN — HEPARIN SODIUM 5000 UNIT(S): 5000 INJECTION INTRAVENOUS; SUBCUTANEOUS at 18:26

## 2018-10-10 RX ADMIN — SIMVASTATIN 40 MILLIGRAM(S): 20 TABLET, FILM COATED ORAL at 22:37

## 2018-10-10 RX ADMIN — Medication 75 MILLIGRAM(S): at 14:32

## 2018-10-10 RX ADMIN — GABAPENTIN 100 MILLIGRAM(S): 400 CAPSULE ORAL at 18:26

## 2018-10-10 NOTE — PROGRESS NOTE ADULT - SUBJECTIVE AND OBJECTIVE BOX
[facility] NEPHROLOGY- PROGRESS NOTE    88y Female with history of HTN, DM, CKD-5 presents with increasing LE edema. Nephrology consulted for elevated Scr.    REVIEW OF SYSTEMS:  Gen: no changes in weight  Cards: no chest pain  Resp: no dyspnea  GI: no nausea or vomiting or diarrhea  Vascular: + LE edema improving    No Known Allergies      Hospital Medications: Medications reviewed      VITALS:  T(F): 97.9 (10-10-18 @ 08:38), Max: 98.4 (10-09-18 @ 21:12)  HR: 75 (10-10-18 @ 08:38)  BP: 164/84 (10-10-18 @ 08:38)  RR: 18 (10-10-18 @ 08:38)  SpO2: 99% (10-10-18 @ 08:38)  Wt(kg): --    10-09 @ 07:01  -  10-10 @ 07:00  --------------------------------------------------------  IN: 120 mL / OUT: 1950 mL / NET: -1830 mL    10-10 @ 07:01  -  10-10 @ 11:18  --------------------------------------------------------  IN: 0 mL / OUT: 1050 mL / NET: -1050 mL      PHYSICAL EXAM:    Gen: NAD, calm  Cards: RRR, +S1/S2, no M/G/R  Resp: CTA B/L  GI: soft, NT/ND, NABS  Vascular: 2+ LE edema B/L      LABS:  10-10    132<L>  |  94<L>  |  41<H>  ----------------------------<  91  3.8   |  27  |  3.30<H>    Ca    8.3<L>      10 Oct 2018 07:28  Phos  3.8     10-10  Mg     1.4     1010      Creatinine Trend: 3.30 <--, 3.53 <--, 3.56 <--, 3.37 <--, 3.48 <--, 3.46 <--                        8.1    5.70  )-----------( 302      ( 10 Oct 2018 07:28 )             25.6     Urine Studies:  Urinalysis Basic - ( 05 Oct 2018 09:40 )    Color: COLORLESS / Appearance: CLEAR / S.007 / pH: 7.0  Gluc: NEGATIVE / Ketone: NEGATIVE  / Bili: NEGATIVE / Urobili: NORMAL   Blood: NEGATIVE / Protein: 70 / Nitrite: NEGATIVE   Leuk Esterase: NEGATIVE / RBC:  / WBC    Sq Epi:  / Non Sq Epi:  / Bacteria:

## 2018-10-10 NOTE — PROGRESS NOTE ADULT - SUBJECTIVE AND OBJECTIVE BOX
FRANCIA MARTEL:[id number],   88yFemale followed for:  No Known Allergies    PAST MEDICAL & SURGICAL HISTORY:  CHF (congestive heart failure): diastolic heart failure  Asthma  CKD (chronic kidney disease)  High cholesterol  Hypertension  History of hysterectomy    FAMILY HISTORY:  No pertinent family history in first degree relatives    MEDICATIONS  (STANDING):  buDESOnide  80 MICROgram(s)/formoterol 4.5 MICROgram(s) Inhaler 2 Puff(s) Inhalation two times a day  buMETAnide Injectable 2 milliGRAM(s) IV Push every 12 hours  ergocalciferol 58327 Unit(s) Oral <User Schedule>  gabapentin 100 milliGRAM(s) Oral every 12 hours  heparin  Injectable 5000 Unit(s) SubCutaneous every 12 hours  hydrALAZINE 50 milliGRAM(s) Oral every 8 hours  isosorbide   mononitrate ER Tablet (IMDUR) 120 milliGRAM(s) Oral daily  metoprolol succinate  milliGRAM(s) Oral daily  pantoprazole    Tablet 40 milliGRAM(s) Oral before breakfast  sevelamer hydrochloride 800 milliGRAM(s) Oral three times a day  simvastatin 40 milliGRAM(s) Oral at bedtime    MEDICATIONS  (PRN):      Vital Signs Last 24 Hrs  T(C): 36.7 (10 Oct 2018 06:50), Max: 36.9 (09 Oct 2018 21:12)  T(F): 98 (10 Oct 2018 06:50), Max: 98.4 (09 Oct 2018 21:12)  HR: 70 (10 Oct 2018 06:50) (70 - 81)  BP: 167/67 (10 Oct 2018 06:50) (160/68 - 179/72)  BP(mean): --  RR: 18 (10 Oct 2018 06:50) (17 - 18)  SpO2: 98% (10 Oct 2018 06:50) (98% - 99%)  nc/at  s1s2  cta  soft, nt, nd no guarding or rebound  no c/c/e    CBC Full  -  ( 10 Oct 2018 07:28 )  WBC Count : 5.70 K/uL  Hemoglobin : 8.1 g/dL  Hematocrit : 25.6 %  Platelet Count - Automated : 302 K/uL  Mean Cell Volume : 87.7 fL  Mean Cell Hemoglobin : 27.7 pg  Mean Cell Hemoglobin Concentration : 31.6 %  Auto Neutrophil # : x  Auto Lymphocyte # : x  Auto Monocyte # : x  Auto Eosinophil # : x  Auto Basophil # : x  Auto Neutrophil % : x  Auto Lymphocyte % : x  Auto Monocyte % : x  Auto Eosinophil % : x  Auto Basophil % : x    10-10    132<L>  |  94<L>  |  41<H>  ----------------------------<  91  3.8   |  27  |  3.30<H>    Ca    8.3<L>      10 Oct 2018 07:28  Phos  3.8     10-10  Mg     1.4     10-10

## 2018-10-10 NOTE — PROGRESS NOTE ADULT - SUBJECTIVE AND OBJECTIVE BOX
SUBJECTIVE / OVERNIGHT EVENTS: pt denies chest pain,sob ,n,v      MEDICATIONS  (STANDING):  buDESOnide  80 MICROgram(s)/formoterol 4.5 MICROgram(s) Inhaler 2 Puff(s) Inhalation two times a day  buMETAnide Injectable 2 milliGRAM(s) IV Push every 12 hours  ergocalciferol 12898 Unit(s) Oral <User Schedule>  gabapentin 100 milliGRAM(s) Oral every 12 hours  heparin  Injectable 5000 Unit(s) SubCutaneous every 12 hours  hydrALAZINE 75 milliGRAM(s) Oral three times a day  isosorbide   mononitrate ER Tablet (IMDUR) 120 milliGRAM(s) Oral daily  metoprolol succinate  milliGRAM(s) Oral daily  pantoprazole    Tablet 40 milliGRAM(s) Oral before breakfast  sevelamer hydrochloride 800 milliGRAM(s) Oral three times a day  simvastatin 40 milliGRAM(s) Oral at bedtime    MEDICATIONS  (PRN):    Vital Signs Last 24 Hrs  T(C): 36.7 (10 Oct 2018 14:30), Max: 36.9 (09 Oct 2018 21:12)  T(F): 98 (10 Oct 2018 14:30), Max: 98.4 (09 Oct 2018 21:12)  HR: 72 (10 Oct 2018 16:57) (70 - 81)  BP: 172/72 (10 Oct 2018 16:57) (159/74 - 179/72)  BP(mean): --  RR: 18 (10 Oct 2018 16:57) (18 - 18)  SpO2: 99% (10 Oct 2018 16:57) (98% - 99%)    Constitutional: No fever, fatigue  Skin: No rash.  Eyes: No recent vision problems or eye pain.  ENT: No congestion, ear pain, or sore throat.  Cardiovascular: No chest pain or palpation.  Respiratory: No cough, shortness of breath, congestion, or wheezing.  Gastrointestinal: No abdominal pain, nausea, vomiting, or diarrhea.  Genitourinary: No dysuria.  Musculoskeletal: No joint swelling.  Neurologic: No headache.    PHYSICAL EXAM:  GENERAL: NAD  EYES: EOMI, PERRLA  NECK: Supple, No JVD  CHEST/LUNG: dec breath sounds at bases   HEART:  S1 , S2 +  ABDOMEN: sof,t bs+  EXTREMITIES:  trace edema  NEUROLOGY:alert awake oriented     LABS:  10-10    132<L>  |  94<L>  |  41<H>  ----------------------------<  91  3.8   |  27  |  3.30<H>    Ca    8.3<L>      10 Oct 2018 07:28  Phos  3.8     10-10  Mg     1.4     10-10      Creatinine Trend: 3.30 <--, 3.53 <--, 3.56 <--, 3.37 <--, 3.48 <--, 3.46 <--                        8.1    5.70  )-----------( 302      ( 10 Oct 2018 07:28 )             25.6     Urine Studies:  Urinalysis Basic - ( 05 Oct 2018 09:40 )    Color: COLORLESS / Appearance: CLEAR / S.007 / pH: 7.0  Gluc: NEGATIVE / Ketone: NEGATIVE  / Bili: NEGATIVE / Urobili: NORMAL   Blood: NEGATIVE / Protein: 70 / Nitrite: NEGATIVE   Leuk Esterase: NEGATIVE / RBC:  / WBC    Sq Epi:  / Non Sq Epi:  / Bacteria:

## 2018-10-10 NOTE — PROGRESS NOTE ADULT - SUBJECTIVE AND OBJECTIVE BOX
Cardiovascular Disease Progress Note    Overnight events: No acute events overnight. Ms. Villagomez denies chest pain or SOB.    Otherwise review of systems negative    Objective Findings:  T(C): 36.7 (10-10-18 @ 06:50), Max: 36.9 (10-09-18 @ 21:12)  HR: 70 (10-10-18 @ 06:50) (70 - 81)  BP: 167/67 (10-10-18 @ 06:50) (160/68 - 179/72)  RR: 18 (10-10-18 @ 06:50) (17 - 18)  SpO2: 98% (10-10-18 @ 06:50) (98% - 99%)  Wt(kg): --  Daily     Daily Weight in k.1 (10 Oct 2018 06:06)      Physical Exam:  Gen: NAD  HEENT: EOMI  CV: RRR, normal S1 + S2, no m/r/g  Lungs: CTAB  Abd: soft, non-tender  Ext: 1+ pedal edema    Telemetry: Sinus; rare PVDs    Laboratory Data:                        8.1    5.70  )-----------( 302      ( 10 Oct 2018 07:28 )             25.6     10-10    132<L>  |  94<L>  |  41<H>  ----------------------------<  91  3.8   |  27  |  3.30<H>    Ca    8.3<L>      10 Oct 2018 07:28  Phos  3.8     10-10  Mg     1.4     10-10                Inpatient Medications:  MEDICATIONS  (STANDING):  buDESOnide  80 MICROgram(s)/formoterol 4.5 MICROgram(s) Inhaler 2 Puff(s) Inhalation two times a day  buMETAnide Injectable 2 milliGRAM(s) IV Push every 12 hours  ergocalciferol 67764 Unit(s) Oral <User Schedule>  gabapentin 100 milliGRAM(s) Oral every 12 hours  heparin  Injectable 5000 Unit(s) SubCutaneous every 12 hours  hydrALAZINE 50 milliGRAM(s) Oral every 8 hours  isosorbide   mononitrate ER Tablet (IMDUR) 120 milliGRAM(s) Oral daily  metoprolol succinate  milliGRAM(s) Oral daily  pantoprazole    Tablet 40 milliGRAM(s) Oral before breakfast  sevelamer hydrochloride 800 milliGRAM(s) Oral three times a day  simvastatin 40 milliGRAM(s) Oral at bedtime      Assessment: 88 year old woman with CKD V, HTN, and HLD presents with acute diastolic CHF.    Plan of Care:    #Acute diastolic CHF-  Likely secondary to outpatient steroids and progressive CKD.  Collateral information obtained from family  Pedal edema is improving.   Would continue IV Bumex for today.  Creatinine appears to be fluctuating around the baseline.   TTE from 4 months ago reviewed.    #HTN-  BP acceptable on current regimen.      Over 25 minutes spent on total encounter; more than 50% of the visit was spent counseling and/or coordinating care by the attending physician.      Justin Vick MD [facility]  Cardiovascular Disease  [phone]

## 2018-10-11 LAB
BUN SERPL-MCNC: 42 MG/DL — HIGH (ref 7–23)
CALCIUM SERPL-MCNC: 8.8 MG/DL — SIGNIFICANT CHANGE UP (ref 8.4–10.5)
CHLORIDE SERPL-SCNC: 93 MMOL/L — LOW (ref 98–107)
CO2 SERPL-SCNC: 26 MMOL/L — SIGNIFICANT CHANGE UP (ref 22–31)
CREAT SERPL-MCNC: 3.41 MG/DL — HIGH (ref 0.5–1.3)
GLUCOSE BLDC GLUCOMTR-MCNC: 135 MG/DL — HIGH (ref 70–99)
GLUCOSE SERPL-MCNC: 89 MG/DL — SIGNIFICANT CHANGE UP (ref 70–99)
HCT VFR BLD CALC: 26.1 % — LOW (ref 34.5–45)
HGB BLD-MCNC: 8.1 G/DL — LOW (ref 11.5–15.5)
MAGNESIUM SERPL-MCNC: 1.6 MG/DL — SIGNIFICANT CHANGE UP (ref 1.6–2.6)
MCHC RBC-ENTMCNC: 27.3 PG — SIGNIFICANT CHANGE UP (ref 27–34)
MCHC RBC-ENTMCNC: 31 % — LOW (ref 32–36)
MCV RBC AUTO: 87.9 FL — SIGNIFICANT CHANGE UP (ref 80–100)
NRBC # FLD: 0 — SIGNIFICANT CHANGE UP
PHOSPHATE SERPL-MCNC: 3.7 MG/DL — SIGNIFICANT CHANGE UP (ref 2.5–4.5)
PLATELET # BLD AUTO: 309 K/UL — SIGNIFICANT CHANGE UP (ref 150–400)
PMV BLD: 10 FL — SIGNIFICANT CHANGE UP (ref 7–13)
POTASSIUM SERPL-MCNC: 3.9 MMOL/L — SIGNIFICANT CHANGE UP (ref 3.5–5.3)
POTASSIUM SERPL-SCNC: 3.9 MMOL/L — SIGNIFICANT CHANGE UP (ref 3.5–5.3)
RBC # BLD: 2.97 M/UL — LOW (ref 3.8–5.2)
RBC # FLD: 14.4 % — SIGNIFICANT CHANGE UP (ref 10.3–14.5)
SODIUM SERPL-SCNC: 133 MMOL/L — LOW (ref 135–145)
WBC # BLD: 7.09 K/UL — SIGNIFICANT CHANGE UP (ref 3.8–10.5)
WBC # FLD AUTO: 7.09 K/UL — SIGNIFICANT CHANGE UP (ref 3.8–10.5)

## 2018-10-11 RX ORDER — HYDRALAZINE HCL 50 MG
100 TABLET ORAL THREE TIMES A DAY
Qty: 0 | Refills: 0 | Status: DISCONTINUED | OUTPATIENT
Start: 2018-10-11 | End: 2018-10-22

## 2018-10-11 RX ORDER — BUMETANIDE 0.25 MG/ML
2 INJECTION INTRAMUSCULAR; INTRAVENOUS EVERY 12 HOURS
Qty: 0 | Refills: 0 | Status: DISCONTINUED | OUTPATIENT
Start: 2018-10-12 | End: 2018-10-12

## 2018-10-11 RX ADMIN — BUMETANIDE 2 MILLIGRAM(S): 0.25 INJECTION INTRAMUSCULAR; INTRAVENOUS at 06:27

## 2018-10-11 RX ADMIN — Medication 100 MILLIGRAM(S): at 06:28

## 2018-10-11 RX ADMIN — SEVELAMER CARBONATE 800 MILLIGRAM(S): 2400 POWDER, FOR SUSPENSION ORAL at 13:39

## 2018-10-11 RX ADMIN — SIMVASTATIN 40 MILLIGRAM(S): 20 TABLET, FILM COATED ORAL at 21:53

## 2018-10-11 RX ADMIN — ISOSORBIDE MONONITRATE 120 MILLIGRAM(S): 60 TABLET, EXTENDED RELEASE ORAL at 13:39

## 2018-10-11 RX ADMIN — GABAPENTIN 100 MILLIGRAM(S): 400 CAPSULE ORAL at 06:28

## 2018-10-11 RX ADMIN — BUDESONIDE AND FORMOTEROL FUMARATE DIHYDRATE 2 PUFF(S): 160; 4.5 AEROSOL RESPIRATORY (INHALATION) at 09:54

## 2018-10-11 RX ADMIN — GABAPENTIN 100 MILLIGRAM(S): 400 CAPSULE ORAL at 17:50

## 2018-10-11 RX ADMIN — HEPARIN SODIUM 5000 UNIT(S): 5000 INJECTION INTRAVENOUS; SUBCUTANEOUS at 17:50

## 2018-10-11 RX ADMIN — Medication 75 MILLIGRAM(S): at 13:39

## 2018-10-11 RX ADMIN — BUDESONIDE AND FORMOTEROL FUMARATE DIHYDRATE 2 PUFF(S): 160; 4.5 AEROSOL RESPIRATORY (INHALATION) at 21:53

## 2018-10-11 RX ADMIN — SEVELAMER CARBONATE 800 MILLIGRAM(S): 2400 POWDER, FOR SUSPENSION ORAL at 06:28

## 2018-10-11 RX ADMIN — SEVELAMER CARBONATE 800 MILLIGRAM(S): 2400 POWDER, FOR SUSPENSION ORAL at 21:53

## 2018-10-11 RX ADMIN — Medication 100 MILLIGRAM(S): at 21:54

## 2018-10-11 RX ADMIN — PANTOPRAZOLE SODIUM 40 MILLIGRAM(S): 20 TABLET, DELAYED RELEASE ORAL at 06:28

## 2018-10-11 RX ADMIN — HEPARIN SODIUM 5000 UNIT(S): 5000 INJECTION INTRAVENOUS; SUBCUTANEOUS at 06:28

## 2018-10-11 RX ADMIN — Medication 75 MILLIGRAM(S): at 06:28

## 2018-10-11 NOTE — PROGRESS NOTE ADULT - SUBJECTIVE AND OBJECTIVE BOX
SUBJECTIVE / OVERNIGHT EVENTS: pt denies chest pain,sob ,n,v      MEDICATIONS  (STANDING):  buDESOnide  80 MICROgram(s)/formoterol 4.5 MICROgram(s) Inhaler 2 Puff(s) Inhalation two times a day  buMETAnide Injectable 2 milliGRAM(s) IV Push every 12 hours  ergocalciferol 70031 Unit(s) Oral <User Schedule>  gabapentin 100 milliGRAM(s) Oral every 12 hours  heparin  Injectable 5000 Unit(s) SubCutaneous every 12 hours  hydrALAZINE 100 milliGRAM(s) Oral three times a day  isosorbide   mononitrate ER Tablet (IMDUR) 120 milliGRAM(s) Oral daily  metoprolol succinate  milliGRAM(s) Oral daily  pantoprazole    Tablet 40 milliGRAM(s) Oral before breakfast  sevelamer hydrochloride 800 milliGRAM(s) Oral three times a day  simvastatin 40 milliGRAM(s) Oral at bedtime    MEDICATIONS  (PRN):    Vital Signs Last 24 Hrs  T(C): 36.4 (11 Oct 2018 13:19), Max: 36.8 (11 Oct 2018 06:18)  T(F): 97.6 (11 Oct 2018 13:19), Max: 98.3 (11 Oct 2018 06:18)  HR: 73 (11 Oct 2018 21:58) (67 - 73)  BP: 159/63 (11 Oct 2018 21:58) (150/61 - 159/63)  BP(mean): --  RR: 18 (11 Oct 2018 21:58) (18 - 18)  SpO2: 99% (11 Oct 2018 21:58) (97% - 99%)    Constitutional: No fever, fatigue  Skin: No rash.  Eyes: No recent vision problems or eye pain.  ENT: No congestion, ear pain, or sore throat.  Cardiovascular: No chest pain or palpation.  Respiratory: No cough, shortness of breath, congestion, or wheezing.  Gastrointestinal: No abdominal pain, nausea, vomiting, or diarrhea.  Genitourinary: No dysuria.  Musculoskeletal: No joint swelling.  Neurologic: No headache.    PHYSICAL EXAM:  GENERAL: NAD  EYES: EOMI, PERRLA  NECK: Supple, No JVD  CHEST/LUNG: dec breath sounds at bases   HEART:  S1 , S2 +  ABDOMEN: sof,t bs+  EXTREMITIES:  trace edema  NEUROLOGY:alert awake oriented     LABS:  10-11    133<L>  |  93<L>  |  42<H>  ----------------------------<  89  3.9   |  26  |  3.41<H>    Ca    8.8      11 Oct 2018 06:45  Phos  3.7     10-11  Mg     1.6     10-11      Creatinine Trend: 3.41 <--, 3.30 <--, 3.53 <--, 3.56 <--, 3.37 <--, 3.48 <--, 3.46 <--                        8.1    7.09  )-----------( 309      ( 11 Oct 2018 06:45 )             26.1     Urine Studies:  Urinalysis Basic - ( 05 Oct 2018 09:40 )    Color: COLORLESS / Appearance: CLEAR / S.007 / pH: 7.0  Gluc: NEGATIVE / Ketone: NEGATIVE  / Bili: NEGATIVE / Urobili: NORMAL   Blood: NEGATIVE / Protein: 70 / Nitrite: NEGATIVE   Leuk Esterase: NEGATIVE / RBC:  / WBC    Sq Epi:  / Non Sq Epi:  / Bacteria:

## 2018-10-11 NOTE — PROGRESS NOTE ADULT - SUBJECTIVE AND OBJECTIVE BOX
FRANCIA MARTEL:[id number],   88yFemale followed for:  No Known Allergies    PAST MEDICAL & SURGICAL HISTORY:  CHF (congestive heart failure): diastolic heart failure  Asthma  CKD (chronic kidney disease)  High cholesterol  Hypertension  History of hysterectomy    FAMILY HISTORY:  No pertinent family history in first degree relatives    MEDICATIONS  (STANDING):  buDESOnide  80 MICROgram(s)/formoterol 4.5 MICROgram(s) Inhaler 2 Puff(s) Inhalation two times a day  buMETAnide Injectable 2 milliGRAM(s) IV Push every 12 hours  ergocalciferol 14298 Unit(s) Oral <User Schedule>  gabapentin 100 milliGRAM(s) Oral every 12 hours  heparin  Injectable 5000 Unit(s) SubCutaneous every 12 hours  hydrALAZINE 75 milliGRAM(s) Oral three times a day  isosorbide   mononitrate ER Tablet (IMDUR) 120 milliGRAM(s) Oral daily  metoprolol succinate  milliGRAM(s) Oral daily  pantoprazole    Tablet 40 milliGRAM(s) Oral before breakfast  sevelamer hydrochloride 800 milliGRAM(s) Oral three times a day  simvastatin 40 milliGRAM(s) Oral at bedtime    MEDICATIONS  (PRN):      Vital Signs Last 24 Hrs  T(C): 36.8 (11 Oct 2018 06:18), Max: 36.8 (11 Oct 2018 06:18)  T(F): 98.3 (11 Oct 2018 06:18), Max: 98.3 (11 Oct 2018 06:18)  HR: 67 (11 Oct 2018 06:18) (67 - 81)  BP: 155/58 (11 Oct 2018 06:18) (150/59 - 172/72)  BP(mean): --  RR: 18 (11 Oct 2018 06:18) (18 - 18)  SpO2: 98% (11 Oct 2018 06:18) (98% - 99%)  nc/at  s1s2  cta  soft, nt, nd no guarding or rebound  no c/c/e    CBC Full  -  ( 11 Oct 2018 06:45 )  WBC Count : 7.09 K/uL  Hemoglobin : 8.1 g/dL  Hematocrit : 26.1 %  Platelet Count - Automated : 309 K/uL  Mean Cell Volume : 87.9 fL  Mean Cell Hemoglobin : 27.3 pg  Mean Cell Hemoglobin Concentration : 31.0 %  Auto Neutrophil # : x  Auto Lymphocyte # : x  Auto Monocyte # : x  Auto Eosinophil # : x  Auto Basophil # : x  Auto Neutrophil % : x  Auto Lymphocyte % : x  Auto Monocyte % : x  Auto Eosinophil % : x  Auto Basophil % : x    10-11    133<L>  |  93<L>  |  42<H>  ----------------------------<  89  3.9   |  26  |  3.41<H>    Ca    8.8      11 Oct 2018 06:45  Phos  3.7     10-11  Mg     1.6     10-11

## 2018-10-11 NOTE — PROGRESS NOTE ADULT - SUBJECTIVE AND OBJECTIVE BOX
[facility] NEPHROLOGY- PROGRESS NOTE    88y Female with history of HTN, DM, CKD-5 presents with increasing LE edema. Nephrology consulted for elevated Scr.    REVIEW OF SYSTEMS:  Gen: no changes in weight  Cards: no chest pain  Resp: no dyspnea  GI: no nausea or vomiting or diarrhea  Vascular: + LE edema improving    No Known Allergies      Hospital Medications: Medications reviewed      VITALS:  T(F): 97.6 (10-11-18 @ 13:19), Max: 98.3 (10-11-18 @ 06:18)  HR: 70 (10-11-18 @ 13:19)  BP: 150/61 (10-11-18 @ 13:19)  RR: 18 (10-11-18 @ 13:19)  SpO2: 97% (10-11-18 @ 13:19)  Wt(kg): --    10-10 @ 07:01  -  10-11 @ 07:00  --------------------------------------------------------  IN: 0 mL / OUT: 1900 mL / NET: -1900 mL    10-11 @ 07:01  -  10-11 @ 15:03  --------------------------------------------------------  IN: 300 mL / OUT: 250 mL / NET: 50 mL      PHYSICAL EXAM:    Gen: NAD, calm  Cards: RRR, +S1/S2, no M/G/R  Resp: CTA B/L  GI: soft, NT/ND, NABS  Vascular: 2+ RLE edema only, 1+ LLE edema      LABS:  10-11    133<L>  |  93<L>  |  42<H>  ----------------------------<  89  3.9   |  26  |  3.41<H>    Ca    8.8      11 Oct 2018 06:45  Phos  3.7     10-11  Mg     1.6     10-11      Creatinine Trend: 3.41 <--, 3.30 <--, 3.53 <--, 3.56 <--, 3.37 <--, 3.48 <--, 3.46 <--                        8.1    7.09  )-----------( 309      ( 11 Oct 2018 06:45 )             26.1     Urine Studies:  Urinalysis Basic - ( 05 Oct 2018 09:40 )    Color: COLORLESS / Appearance: CLEAR / S.007 / pH: 7.0  Gluc: NEGATIVE / Ketone: NEGATIVE  / Bili: NEGATIVE / Urobili: NORMAL   Blood: NEGATIVE / Protein: 70 / Nitrite: NEGATIVE   Leuk Esterase: NEGATIVE / RBC:  / WBC    Sq Epi:  / Non Sq Epi:  / Bacteria:         < from: CT Abdomen and Pelvis w/ Oral Cont (10.09.18 @ 22:19) >  IMPRESSION:     Moderate bilateral pleural effusions. Anasarca.    No CT evidence of acute intra-abdominal pathology.      < end of copied text >      < from: US Duplex Venous Lower Ext Complete, Bilateral (10.05.18 @ 11:07) >  IMPRESSION:     No evidence of bilateral lower extremity deep venous thrombosis.    < end of copied text >

## 2018-10-11 NOTE — PROGRESS NOTE ADULT - PROBLEM SELECTOR PLAN 7
Resolved. Monitor off of sodium bicarbonate tablets.
Start sodium bicarbonate 650 mg TID on discharge. Monitor serum CO2.
Resolved. Discontinue sodium bicarbonate talbets. Monitor serum CO2.
Resolved. Monitor off of sodium bicarbonate tablets.
Start sodium bicarbonate 650 mg TID on discharge. Monitor serum CO2.
Start sodium bicarbonate 650 mg TID on discharge. Monitor serum CO2.

## 2018-10-11 NOTE — PROGRESS NOTE ADULT - SUBJECTIVE AND OBJECTIVE BOX
Cardiovascular Disease Progress Note    Overnight events: No acute events overnight. Ms. Villagomez denies chest pain or SOB.   Otherwise review of systems negative    Objective Findings:  T(C): 36.8 (10-11-18 @ 06:18), Max: 36.8 (10-11-18 @ 06:18)  HR: 67 (10-11-18 @ 06:18) (67 - 81)  BP: 155/58 (10-11-18 @ 06:18) (150/59 - 172/72)  RR: 18 (10-11-18 @ 06:18) (18 - 18)  SpO2: 98% (10-11-18 @ 06:18) (98% - 99%)  Wt(kg): --  Daily     Daily Weight in k (11 Oct 2018 06:59)      Physical Exam:  Gen: NAD  HEENT: EOMI  CV: RRR, normal S1 + S2, no m/r/g  Lungs: CTAB  Abd: soft, non-tender  Ext: Trace pedal edema    Telemetry: Sinus    Laboratory Data:                        8.1    7.09  )-----------( 309      ( 11 Oct 2018 06:45 )             26.1     10-11    133<L>  |  93<L>  |  42<H>  ----------------------------<  89  3.9   |  26  |  3.41<H>    Ca    8.8      11 Oct 2018 06:45  Phos  3.7     10-11  Mg     1.6     10-11                Inpatient Medications:  MEDICATIONS  (STANDING):  buDESOnide  80 MICROgram(s)/formoterol 4.5 MICROgram(s) Inhaler 2 Puff(s) Inhalation two times a day  buMETAnide Injectable 2 milliGRAM(s) IV Push every 12 hours  ergocalciferol 24947 Unit(s) Oral <User Schedule>  gabapentin 100 milliGRAM(s) Oral every 12 hours  heparin  Injectable 5000 Unit(s) SubCutaneous every 12 hours  hydrALAZINE 75 milliGRAM(s) Oral three times a day  isosorbide   mononitrate ER Tablet (IMDUR) 120 milliGRAM(s) Oral daily  metoprolol succinate  milliGRAM(s) Oral daily  pantoprazole    Tablet 40 milliGRAM(s) Oral before breakfast  sevelamer hydrochloride 800 milliGRAM(s) Oral three times a day  simvastatin 40 milliGRAM(s) Oral at bedtime      Assessment: 88 year old woman with CKD V, HTN, and HLD presents with acute diastolic CHF.    Plan of Care:    #Acute diastolic CHF-  Likely secondary to outpatient steroids and progressive CKD.  Pedal edema is improving.   Consider transitioning to oral Bumex today.   Creatinine appears to be fluctuating around the baseline.   TTE from 4 months ago reviewed.    #HTN-  BP acceptable on current regimen.    Over 25 minutes spent on total encounter; more than 50% of the visit was spent counseling and/or coordinating care by the attending physician.      Justin Vick MD [facility]  Cardiovascular Disease  [phone]

## 2018-10-12 LAB
BUN SERPL-MCNC: 41 MG/DL — HIGH (ref 7–23)
CALCIUM SERPL-MCNC: 8.9 MG/DL — SIGNIFICANT CHANGE UP (ref 8.4–10.5)
CHLORIDE SERPL-SCNC: 93 MMOL/L — LOW (ref 98–107)
CO2 SERPL-SCNC: 25 MMOL/L — SIGNIFICANT CHANGE UP (ref 22–31)
CREAT SERPL-MCNC: 3.44 MG/DL — HIGH (ref 0.5–1.3)
GLUCOSE SERPL-MCNC: 84 MG/DL — SIGNIFICANT CHANGE UP (ref 70–99)
HCT VFR BLD CALC: 25.4 % — LOW (ref 34.5–45)
HGB BLD-MCNC: 8 G/DL — LOW (ref 11.5–15.5)
MAGNESIUM SERPL-MCNC: 1.6 MG/DL — SIGNIFICANT CHANGE UP (ref 1.6–2.6)
MCHC RBC-ENTMCNC: 27.7 PG — SIGNIFICANT CHANGE UP (ref 27–34)
MCHC RBC-ENTMCNC: 31.5 % — LOW (ref 32–36)
MCV RBC AUTO: 87.9 FL — SIGNIFICANT CHANGE UP (ref 80–100)
NRBC # FLD: 0 — SIGNIFICANT CHANGE UP
PHOSPHATE SERPL-MCNC: 3.8 MG/DL — SIGNIFICANT CHANGE UP (ref 2.5–4.5)
PLATELET # BLD AUTO: 301 K/UL — SIGNIFICANT CHANGE UP (ref 150–400)
PMV BLD: 10.1 FL — SIGNIFICANT CHANGE UP (ref 7–13)
POTASSIUM SERPL-MCNC: 3.9 MMOL/L — SIGNIFICANT CHANGE UP (ref 3.5–5.3)
POTASSIUM SERPL-SCNC: 3.9 MMOL/L — SIGNIFICANT CHANGE UP (ref 3.5–5.3)
RBC # BLD: 2.89 M/UL — LOW (ref 3.8–5.2)
RBC # FLD: 14.3 % — SIGNIFICANT CHANGE UP (ref 10.3–14.5)
SODIUM SERPL-SCNC: 133 MMOL/L — LOW (ref 135–145)
WBC # BLD: 7.24 K/UL — SIGNIFICANT CHANGE UP (ref 3.8–10.5)
WBC # FLD AUTO: 7.24 K/UL — SIGNIFICANT CHANGE UP (ref 3.8–10.5)

## 2018-10-12 RX ORDER — ERYTHROPOIETIN 10000 [IU]/ML
6000 INJECTION, SOLUTION INTRAVENOUS; SUBCUTANEOUS ONCE
Qty: 0 | Refills: 0 | Status: COMPLETED | OUTPATIENT
Start: 2018-10-12 | End: 2018-10-12

## 2018-10-12 RX ORDER — BUMETANIDE 0.25 MG/ML
2 INJECTION INTRAMUSCULAR; INTRAVENOUS EVERY 12 HOURS
Qty: 0 | Refills: 0 | Status: DISCONTINUED | OUTPATIENT
Start: 2018-10-12 | End: 2018-10-13

## 2018-10-12 RX ORDER — BUMETANIDE 0.25 MG/ML
2 INJECTION INTRAMUSCULAR; INTRAVENOUS ONCE
Qty: 0 | Refills: 0 | Status: COMPLETED | OUTPATIENT
Start: 2018-10-12 | End: 2018-10-12

## 2018-10-12 RX ADMIN — BUDESONIDE AND FORMOTEROL FUMARATE DIHYDRATE 2 PUFF(S): 160; 4.5 AEROSOL RESPIRATORY (INHALATION) at 21:41

## 2018-10-12 RX ADMIN — ERYTHROPOIETIN 6000 UNIT(S): 10000 INJECTION, SOLUTION INTRAVENOUS; SUBCUTANEOUS at 13:40

## 2018-10-12 RX ADMIN — BUMETANIDE 2 MILLIGRAM(S): 0.25 INJECTION INTRAMUSCULAR; INTRAVENOUS at 18:18

## 2018-10-12 RX ADMIN — ERGOCALCIFEROL 50000 UNIT(S): 1.25 CAPSULE ORAL at 13:39

## 2018-10-12 RX ADMIN — HEPARIN SODIUM 5000 UNIT(S): 5000 INJECTION INTRAVENOUS; SUBCUTANEOUS at 06:23

## 2018-10-12 RX ADMIN — Medication 100 MILLIGRAM(S): at 06:26

## 2018-10-12 RX ADMIN — SEVELAMER CARBONATE 800 MILLIGRAM(S): 2400 POWDER, FOR SUSPENSION ORAL at 09:30

## 2018-10-12 RX ADMIN — ISOSORBIDE MONONITRATE 120 MILLIGRAM(S): 60 TABLET, EXTENDED RELEASE ORAL at 13:40

## 2018-10-12 RX ADMIN — GABAPENTIN 100 MILLIGRAM(S): 400 CAPSULE ORAL at 17:18

## 2018-10-12 RX ADMIN — SIMVASTATIN 40 MILLIGRAM(S): 20 TABLET, FILM COATED ORAL at 21:41

## 2018-10-12 RX ADMIN — BUMETANIDE 2 MILLIGRAM(S): 0.25 INJECTION INTRAMUSCULAR; INTRAVENOUS at 00:14

## 2018-10-12 RX ADMIN — BUDESONIDE AND FORMOTEROL FUMARATE DIHYDRATE 2 PUFF(S): 160; 4.5 AEROSOL RESPIRATORY (INHALATION) at 09:30

## 2018-10-12 RX ADMIN — BUMETANIDE 2 MILLIGRAM(S): 0.25 INJECTION INTRAMUSCULAR; INTRAVENOUS at 06:23

## 2018-10-12 RX ADMIN — GABAPENTIN 100 MILLIGRAM(S): 400 CAPSULE ORAL at 06:23

## 2018-10-12 RX ADMIN — Medication 100 MILLIGRAM(S): at 13:40

## 2018-10-12 RX ADMIN — Medication 100 MILLIGRAM(S): at 06:23

## 2018-10-12 RX ADMIN — HEPARIN SODIUM 5000 UNIT(S): 5000 INJECTION INTRAVENOUS; SUBCUTANEOUS at 17:18

## 2018-10-12 RX ADMIN — PANTOPRAZOLE SODIUM 40 MILLIGRAM(S): 20 TABLET, DELAYED RELEASE ORAL at 06:23

## 2018-10-12 RX ADMIN — SEVELAMER CARBONATE 800 MILLIGRAM(S): 2400 POWDER, FOR SUSPENSION ORAL at 21:48

## 2018-10-12 RX ADMIN — SEVELAMER CARBONATE 800 MILLIGRAM(S): 2400 POWDER, FOR SUSPENSION ORAL at 13:39

## 2018-10-12 RX ADMIN — Medication 100 MILLIGRAM(S): at 21:41

## 2018-10-12 NOTE — PROGRESS NOTE ADULT - SUBJECTIVE AND OBJECTIVE BOX
FRANCIA MARTEL:[id number],   88yFemale followed for:  No Known Allergies    PAST MEDICAL & SURGICAL HISTORY:  CHF (congestive heart failure): diastolic heart failure  Asthma  CKD (chronic kidney disease)  High cholesterol  Hypertension  History of hysterectomy    FAMILY HISTORY:  No pertinent family history in first degree relatives    MEDICATIONS  (STANDING):  buDESOnide  80 MICROgram(s)/formoterol 4.5 MICROgram(s) Inhaler 2 Puff(s) Inhalation two times a day  buMETAnide Injectable 2 milliGRAM(s) IV Push every 12 hours  epoetin cathy Injectable 6000 Unit(s) SubCutaneous once  ergocalciferol 01186 Unit(s) Oral <User Schedule>  gabapentin 100 milliGRAM(s) Oral every 12 hours  heparin  Injectable 5000 Unit(s) SubCutaneous every 12 hours  hydrALAZINE 100 milliGRAM(s) Oral three times a day  isosorbide   mononitrate ER Tablet (IMDUR) 120 milliGRAM(s) Oral daily  metolazone 5 milliGRAM(s) Oral once  metoprolol succinate  milliGRAM(s) Oral daily  pantoprazole    Tablet 40 milliGRAM(s) Oral before breakfast  sevelamer hydrochloride 800 milliGRAM(s) Oral three times a day  simvastatin 40 milliGRAM(s) Oral at bedtime    MEDICATIONS  (PRN):      Vital Signs Last 24 Hrs  T(C): 36.6 (12 Oct 2018 06:18), Max: 36.6 (12 Oct 2018 06:18)  T(F): 97.9 (12 Oct 2018 06:18), Max: 97.9 (12 Oct 2018 06:18)  HR: 73 (12 Oct 2018 06:18) (70 - 73)  BP: 172/67 (12 Oct 2018 06:18) (144/58 - 172/67)  BP(mean): --  RR: 18 (12 Oct 2018 06:18) (18 - 18)  SpO2: 98% (12 Oct 2018 06:18) (95% - 99%)  nc/at  s1s2  cta  soft, nt, nd no guarding or rebound  no c/c/e    CBC Full  -  ( 12 Oct 2018 07:00 )  WBC Count : 7.24 K/uL  Hemoglobin : 8.0 g/dL  Hematocrit : 25.4 %  Platelet Count - Automated : 301 K/uL  Mean Cell Volume : 87.9 fL  Mean Cell Hemoglobin : 27.7 pg  Mean Cell Hemoglobin Concentration : 31.5 %  Auto Neutrophil # : x  Auto Lymphocyte # : x  Auto Monocyte # : x  Auto Eosinophil # : x  Auto Basophil # : x  Auto Neutrophil % : x  Auto Lymphocyte % : x  Auto Monocyte % : x  Auto Eosinophil % : x  Auto Basophil % : x    10-12    133<L>  |  93<L>  |  41<H>  ----------------------------<  84  3.9   |  25  |  3.44<H>    Ca    8.9      12 Oct 2018 07:00  Phos  3.8     10-12  Mg     1.6     10-12

## 2018-10-12 NOTE — CHART NOTE - NSCHARTNOTEFT_GEN_A_CORE
RN notified that patient was complaining of SOB. Patient was seen at bedside. Translation done via Rommel  ID#[id number] [Patient speaks both Rommel and [place]jarati]. Patient is complaining of SOB while lying flat in bed and it started less than 1 hour prior. She states its difficulty for her to lie flat. She also feels SOB even she sits up. ALso endorses LE edema. She states her symptoms have improved since arrived to the hospital, then it just restarted.  Denies chest pain.     T(C): 36.5 (10-11-18 @ 21:58), Max: 36.8 (10-11-18 @ 06:18)  HR: 70 (10-11-18 @ 23:35) (67 - 73)  BP: 144/58 (10-11-18 @ 23:35) (144/58 - 159/63)  RR: 18 (10-11-18 @ 23:35) (18 - 18)  SpO2: 95% (10-11-18 @ 23:35) (95% - 99%)  Wt(kg): --    General: Alert oriented. appears comfortable. No distress  Lungs: Decreased breath sounds in bases  Heart: +S1, +S2  Abdomen: soft, non tender  LE: 1+ edema b/l    patient was switched from IV bumex to PO this afternoon. Patient did not receive any bumex this after noon. Will give 1 dose of Bumex IVP 2mg now. Will continue to monitor.   Discussed with Dr. Moreno

## 2018-10-12 NOTE — PROGRESS NOTE ADULT - SUBJECTIVE AND OBJECTIVE BOX
Cardiovascular Disease Progress Note    Overnight events: Ms. Villagomez is more short of breath this morning. She says that she could not sleep.   Otherwise review of systems negative    Objective Findings:  T(C): 36.6 (10-12-18 @ 06:18), Max: 36.6 (10-12-18 @ 06:18)  HR: 73 (10-12-18 @ 06:18) (70 - 73)  BP: 172/67 (10-12-18 @ 06:18) (144/58 - 172/67)  RR: 18 (10-12-18 @ 06:18) (18 - 18)  SpO2: 98% (10-12-18 @ 06:18) (95% - 99%)  Wt(kg): --  Daily     Daily       Physical Exam:  Gen: NAD  HEENT: EOMI  CV: RRR, normal S1 + S2, no m/r/g  Lungs: Mild crackles b/l  Abd: soft, non-tender  Ext: No edema    Telemetry: Sinus    Laboratory Data:                        8.0    7.24  )-----------( 301      ( 12 Oct 2018 07:00 )             25.4     10-12    133<L>  |  93<L>  |  41<H>  ----------------------------<  84  3.9   |  25  |  3.44<H>    Ca    8.9      12 Oct 2018 07:00  Phos  3.8     10-12  Mg     1.6     10-12                Inpatient Medications:  MEDICATIONS  (STANDING):  buDESOnide  80 MICROgram(s)/formoterol 4.5 MICROgram(s) Inhaler 2 Puff(s) Inhalation two times a day  buMETAnide Injectable 2 milliGRAM(s) IV Push every 12 hours  ergocalciferol 59837 Unit(s) Oral <User Schedule>  gabapentin 100 milliGRAM(s) Oral every 12 hours  heparin  Injectable 5000 Unit(s) SubCutaneous every 12 hours  hydrALAZINE 100 milliGRAM(s) Oral three times a day  isosorbide   mononitrate ER Tablet (IMDUR) 120 milliGRAM(s) Oral daily  metoprolol succinate  milliGRAM(s) Oral daily  pantoprazole    Tablet 40 milliGRAM(s) Oral before breakfast  sevelamer hydrochloride 800 milliGRAM(s) Oral three times a day  simvastatin 40 milliGRAM(s) Oral at bedtime      Assessment: 88 year old woman with CKD V, HTN, and HLD presents with acute diastolic CHF.    Plan of Care:    #Acute diastolic CHF-  Likely secondary to outpatient steroids and progressive CKD.  Patient more dyspneic this morning.  Restart IV diuresis.  Care discussed with nephrology- will give one dose of metolazone as well.   Creatinine appears to be fluctuating around the baseline.   TTE from 4 months ago reviewed.    #HTN-  BP acceptable on current regimen.    Over 25 minutes spent on total encounter; more than 50% of the visit was spent counseling and/or coordinating care by the attending physician.      Justin Vick MD [facility]  Cardiovascular Disease  [phone] Cardiovascular Disease Progress Note    Overnight events: Ms. Villagomez is more short of breath this morning. She says that she could not sleep.   Otherwise review of systems negative    Objective Findings:  T(C): 36.6 (10-12-18 @ 06:18), Max: 36.6 (10-12-18 @ 06:18)  HR: 73 (10-12-18 @ 06:18) (70 - 73)  BP: 172/67 (10-12-18 @ 06:18) (144/58 - 172/67)  RR: 18 (10-12-18 @ 06:18) (18 - 18)  SpO2: 98% (10-12-18 @ 06:18) (95% - 99%)  Wt(kg): --  Daily     Daily       Physical Exam:  Gen: NAD  HEENT: EOMI  CV: RRR, normal S1 + S2, no m/r/g  Lungs: Mild crackles b/l  Abd: soft, non-tender  Ext: No edema    Telemetry: Sinus    Laboratory Data:                        8.0    7.24  )-----------( 301      ( 12 Oct 2018 07:00 )             25.4     10-12    133<L>  |  93<L>  |  41<H>  ----------------------------<  84  3.9   |  25  |  3.44<H>    Ca    8.9      12 Oct 2018 07:00  Phos  3.8     10-12  Mg     1.6     10-12                Inpatient Medications:  MEDICATIONS  (STANDING):  buDESOnide  80 MICROgram(s)/formoterol 4.5 MICROgram(s) Inhaler 2 Puff(s) Inhalation two times a day  buMETAnide Injectable 2 milliGRAM(s) IV Push every 12 hours  ergocalciferol 46165 Unit(s) Oral <User Schedule>  gabapentin 100 milliGRAM(s) Oral every 12 hours  heparin  Injectable 5000 Unit(s) SubCutaneous every 12 hours  hydrALAZINE 100 milliGRAM(s) Oral three times a day  isosorbide   mononitrate ER Tablet (IMDUR) 120 milliGRAM(s) Oral daily  metoprolol succinate  milliGRAM(s) Oral daily  pantoprazole    Tablet 40 milliGRAM(s) Oral before breakfast  sevelamer hydrochloride 800 milliGRAM(s) Oral three times a day  simvastatin 40 milliGRAM(s) Oral at bedtime      Assessment: 88 year old woman with CKD V, HTN, and HLD presents with acute diastolic CHF.    Plan of Care:    #Acute diastolic CHF-  Likely secondary to outpatient steroids and progressive CKD.  Patient more dyspneic this morning.  Restart IV diuresis.  Care discussed with nephrology- will give one dose of metolazone as well.   Creatinine appears to be fluctuating around the baseline.   TTE from 4 months ago reviewed.    #HTN-  BP elevated this morning.  Observe after IV diuresis today.     Over 25 minutes spent on total encounter; more than 50% of the visit was spent counseling and/or coordinating care by the attending physician.      Justin Vick MD [facility]  Cardiovascular Disease  [phone]

## 2018-10-12 NOTE — PROGRESS NOTE ADULT - SUBJECTIVE AND OBJECTIVE BOX
SUBJECTIVE / OVERNIGHT EVENTS: pt denies chest pain,, sob+    MEDICATIONS  (STANDING):  buDESOnide  80 MICROgram(s)/formoterol 4.5 MICROgram(s) Inhaler 2 Puff(s) Inhalation two times a day  buMETAnide Injectable 2 milliGRAM(s) IV Push every 12 hours  ergocalciferol 32536 Unit(s) Oral <User Schedule>  gabapentin 100 milliGRAM(s) Oral every 12 hours  heparin  Injectable 5000 Unit(s) SubCutaneous every 12 hours  hydrALAZINE 100 milliGRAM(s) Oral three times a day  isosorbide   mononitrate ER Tablet (IMDUR) 120 milliGRAM(s) Oral daily  metoprolol succinate  milliGRAM(s) Oral daily  pantoprazole    Tablet 40 milliGRAM(s) Oral before breakfast  sevelamer hydrochloride 800 milliGRAM(s) Oral three times a day  simvastatin 40 milliGRAM(s) Oral at bedtime    MEDICATIONS  (PRN):    Vital Signs Last 24 Hrs  T(C): 36.3 (12 Oct 2018 21:41), Max: 36.7 (12 Oct 2018 17:34)  T(F): 97.4 (12 Oct 2018 21:41), Max: 98 (12 Oct 2018 17:34)  HR: 92 (12 Oct 2018 21:41) (70 - 92)  BP: 138/57 (12 Oct 2018 21:41) (138/57 - 172/67)  BP(mean): --  RR: 18 (12 Oct 2018 21:41) (18 - 18)  SpO2: 100% (12 Oct 2018 21:41) (95% - 100%)    Constitutional: No fever, fatigue  Skin: No rash.  Eyes: No recent vision problems or eye pain.  ENT: No congestion, ear pain, or sore throat.  Cardiovascular: No chest pain or palpation.  Respiratory: sob+  Gastrointestinal: No abdominal pain, nausea, vomiting, or diarrhea.  Genitourinary: No dysuria.  Musculoskeletal: No joint swelling.  Neurologic: No headache.    PHYSICAL EXAM:  GENERAL: NAD  EYES: EOMI, PERRLA  NECK: Supple, No JVD  CHEST/LUNG: dec breath sounds at bases   HEART:  S1 , S2 +  ABDOMEN: sof,t bs+  EXTREMITIES:  trace edema  NEUROLOGY:alert awake oriented     LABS:  10-12    133<L>  |  93<L>  |  41<H>  ----------------------------<  84  3.9   |  25  |  3.44<H>    Ca    8.9      12 Oct 2018 07:00  Phos  3.8     10-12  Mg     1.6     10-12      Creatinine Trend: 3.44 <--, 3.41 <--, 3.30 <--, 3.53 <--, 3.56 <--, 3.37 <--, 3.48 <--                        8.0    7.24  )-----------( 301      ( 12 Oct 2018 07:00 )             25.4     Urine Studies:

## 2018-10-12 NOTE — PROGRESS NOTE ADULT - SUBJECTIVE AND OBJECTIVE BOX
[facility] NEPHROLOGY- PROGRESS NOTE    88y Female with history of HTN, DM, CKD-5 presents with increasing LE edema. Nephrology consulted for elevated Scr.    Patient with dyspnea overnight for which given bumex 2 mg IV X 1 dose with mild improvement in symptoms.    REVIEW OF SYSTEMS:  Gen: no changes in weight  Cards: no chest pain  Resp: + dyspnea  GI: no nausea or vomiting or diarrhea  Vascular: + LE edema improving    No Known Allergies      Hospital Medications: Medications reviewed      VITALS:  T(F): 97.9 (10-12-18 @ 06:18), Max: 97.9 (10-12-18 @ 06:18)  HR: 73 (10-12-18 @ 06:18)  BP: 172/67 (10-12-18 @ 06:18)  RR: 18 (10-12-18 @ 06:18)  SpO2: 98% (10-12-18 @ :18)  Wt(kg): --    10-11 @ 07:01  -  10-12 @ 07:00  --------------------------------------------------------  IN: 840 mL / OUT: 1350 mL / NET: -510 mL      PHYSICAL EXAM:    Gen: NAD, calm  Cards: RRR, +S1/S2, no M/G/R  Resp: rales in LLL  GI: soft, NT/ND, NABS  Vascular: 2+ RLE edema only, 1+ LLE edema      LABS:  10-12    133<L>  |  93<L>  |  41<H>  ----------------------------<  84  3.9   |  25  |  3.44<H>    Ca    8.9      12 Oct 2018 07:00  Phos  3.8     10-12  Mg     1.6     10-12      Creatinine Trend: 3.44 <--, 3.41 <--, 3.30 <--, 3.53 <--, 3.56 <--, 3.37 <--, 3.48 <--                        8.0    7.24  )-----------( 301      ( 12 Oct 2018 07:00 )             25.4     Urine Studies:  Urinalysis Basic - ( 05 Oct 2018 09:40 )    Color: COLORLESS / Appearance: CLEAR / S.007 / pH: 7.0  Gluc: NEGATIVE / Ketone: NEGATIVE  / Bili: NEGATIVE / Urobili: NORMAL   Blood: NEGATIVE / Protein: 70 / Nitrite: NEGATIVE   Leuk Esterase: NEGATIVE / RBC:  / WBC    Sq Epi:  / Non Sq Epi:  / Bacteria

## 2018-10-13 LAB
BASOPHILS # BLD AUTO: 0.07 K/UL — SIGNIFICANT CHANGE UP (ref 0–0.2)
BASOPHILS NFR BLD AUTO: 1 % — SIGNIFICANT CHANGE UP (ref 0–2)
BUN SERPL-MCNC: 44 MG/DL — HIGH (ref 7–23)
BUN SERPL-MCNC: 44 MG/DL — HIGH (ref 7–23)
CALCIUM SERPL-MCNC: 8.9 MG/DL — SIGNIFICANT CHANGE UP (ref 8.4–10.5)
CALCIUM SERPL-MCNC: 8.9 MG/DL — SIGNIFICANT CHANGE UP (ref 8.4–10.5)
CHLORIDE SERPL-SCNC: 92 MMOL/L — LOW (ref 98–107)
CHLORIDE SERPL-SCNC: 93 MMOL/L — LOW (ref 98–107)
CO2 SERPL-SCNC: 28 MMOL/L — SIGNIFICANT CHANGE UP (ref 22–31)
CO2 SERPL-SCNC: 28 MMOL/L — SIGNIFICANT CHANGE UP (ref 22–31)
CREAT SERPL-MCNC: 3.72 MG/DL — HIGH (ref 0.5–1.3)
CREAT SERPL-MCNC: 3.74 MG/DL — HIGH (ref 0.5–1.3)
EOSINOPHIL # BLD AUTO: 0.15 K/UL — SIGNIFICANT CHANGE UP (ref 0–0.5)
EOSINOPHIL NFR BLD AUTO: 2.1 % — SIGNIFICANT CHANGE UP (ref 0–6)
GLUCOSE SERPL-MCNC: 155 MG/DL — HIGH (ref 70–99)
GLUCOSE SERPL-MCNC: 92 MG/DL — SIGNIFICANT CHANGE UP (ref 70–99)
HCT VFR BLD CALC: 26.1 % — LOW (ref 34.5–45)
HGB BLD-MCNC: 8.1 G/DL — LOW (ref 11.5–15.5)
IMM GRANULOCYTES # BLD AUTO: 0.02 # — SIGNIFICANT CHANGE UP
IMM GRANULOCYTES NFR BLD AUTO: 0.3 % — SIGNIFICANT CHANGE UP (ref 0–1.5)
LYMPHOCYTES # BLD AUTO: 1.38 K/UL — SIGNIFICANT CHANGE UP (ref 1–3.3)
LYMPHOCYTES # BLD AUTO: 19.2 % — SIGNIFICANT CHANGE UP (ref 13–44)
MAGNESIUM SERPL-MCNC: 1.7 MG/DL — SIGNIFICANT CHANGE UP (ref 1.6–2.6)
MCHC RBC-ENTMCNC: 27.7 PG — SIGNIFICANT CHANGE UP (ref 27–34)
MCHC RBC-ENTMCNC: 31 % — LOW (ref 32–36)
MCV RBC AUTO: 89.4 FL — SIGNIFICANT CHANGE UP (ref 80–100)
MONOCYTES # BLD AUTO: 1.13 K/UL — HIGH (ref 0–0.9)
MONOCYTES NFR BLD AUTO: 15.7 % — HIGH (ref 2–14)
NEUTROPHILS # BLD AUTO: 4.45 K/UL — SIGNIFICANT CHANGE UP (ref 1.8–7.4)
NEUTROPHILS NFR BLD AUTO: 61.7 % — SIGNIFICANT CHANGE UP (ref 43–77)
NRBC # FLD: 0 — SIGNIFICANT CHANGE UP
PLATELET # BLD AUTO: 319 K/UL — SIGNIFICANT CHANGE UP (ref 150–400)
PMV BLD: 9.9 FL — SIGNIFICANT CHANGE UP (ref 7–13)
POTASSIUM SERPL-MCNC: 3.9 MMOL/L — SIGNIFICANT CHANGE UP (ref 3.5–5.3)
POTASSIUM SERPL-MCNC: 4.2 MMOL/L — SIGNIFICANT CHANGE UP (ref 3.5–5.3)
POTASSIUM SERPL-SCNC: 3.9 MMOL/L — SIGNIFICANT CHANGE UP (ref 3.5–5.3)
POTASSIUM SERPL-SCNC: 4.2 MMOL/L — SIGNIFICANT CHANGE UP (ref 3.5–5.3)
RBC # BLD: 2.92 M/UL — LOW (ref 3.8–5.2)
RBC # FLD: 14.4 % — SIGNIFICANT CHANGE UP (ref 10.3–14.5)
SODIUM SERPL-SCNC: 132 MMOL/L — LOW (ref 135–145)
SODIUM SERPL-SCNC: 133 MMOL/L — LOW (ref 135–145)
WBC # BLD: 7.2 K/UL — SIGNIFICANT CHANGE UP (ref 3.8–10.5)
WBC # FLD AUTO: 7.2 K/UL — SIGNIFICANT CHANGE UP (ref 3.8–10.5)

## 2018-10-13 RX ORDER — POTASSIUM CHLORIDE 20 MEQ
20 PACKET (EA) ORAL ONCE
Qty: 0 | Refills: 0 | Status: COMPLETED | OUTPATIENT
Start: 2018-10-13 | End: 2018-10-13

## 2018-10-13 RX ORDER — BUMETANIDE 0.25 MG/ML
2 INJECTION INTRAMUSCULAR; INTRAVENOUS ONCE
Qty: 0 | Refills: 0 | Status: COMPLETED | OUTPATIENT
Start: 2018-10-13 | End: 2018-10-13

## 2018-10-13 RX ORDER — BUMETANIDE 0.25 MG/ML
1.5 INJECTION INTRAMUSCULAR; INTRAVENOUS
Qty: 20 | Refills: 0 | Status: DISCONTINUED | OUTPATIENT
Start: 2018-10-13 | End: 2018-10-17

## 2018-10-13 RX ADMIN — HEPARIN SODIUM 5000 UNIT(S): 5000 INJECTION INTRAVENOUS; SUBCUTANEOUS at 17:31

## 2018-10-13 RX ADMIN — SEVELAMER CARBONATE 800 MILLIGRAM(S): 2400 POWDER, FOR SUSPENSION ORAL at 10:21

## 2018-10-13 RX ADMIN — Medication 100 MILLIGRAM(S): at 13:03

## 2018-10-13 RX ADMIN — BUDESONIDE AND FORMOTEROL FUMARATE DIHYDRATE 2 PUFF(S): 160; 4.5 AEROSOL RESPIRATORY (INHALATION) at 22:37

## 2018-10-13 RX ADMIN — SIMVASTATIN 40 MILLIGRAM(S): 20 TABLET, FILM COATED ORAL at 22:36

## 2018-10-13 RX ADMIN — Medication 20 MILLIEQUIVALENT(S): at 12:44

## 2018-10-13 RX ADMIN — Medication 100 MILLIGRAM(S): at 22:37

## 2018-10-13 RX ADMIN — SEVELAMER CARBONATE 800 MILLIGRAM(S): 2400 POWDER, FOR SUSPENSION ORAL at 13:03

## 2018-10-13 RX ADMIN — HEPARIN SODIUM 5000 UNIT(S): 5000 INJECTION INTRAVENOUS; SUBCUTANEOUS at 05:55

## 2018-10-13 RX ADMIN — BUMETANIDE 5 MG/HR: 0.25 INJECTION INTRAMUSCULAR; INTRAVENOUS at 12:44

## 2018-10-13 RX ADMIN — SEVELAMER CARBONATE 800 MILLIGRAM(S): 2400 POWDER, FOR SUSPENSION ORAL at 22:36

## 2018-10-13 RX ADMIN — BUMETANIDE 2 MILLIGRAM(S): 0.25 INJECTION INTRAMUSCULAR; INTRAVENOUS at 12:44

## 2018-10-13 RX ADMIN — GABAPENTIN 100 MILLIGRAM(S): 400 CAPSULE ORAL at 17:32

## 2018-10-13 RX ADMIN — Medication 100 MILLIGRAM(S): at 05:56

## 2018-10-13 RX ADMIN — PANTOPRAZOLE SODIUM 40 MILLIGRAM(S): 20 TABLET, DELAYED RELEASE ORAL at 05:57

## 2018-10-13 RX ADMIN — BUDESONIDE AND FORMOTEROL FUMARATE DIHYDRATE 2 PUFF(S): 160; 4.5 AEROSOL RESPIRATORY (INHALATION) at 10:22

## 2018-10-13 RX ADMIN — ISOSORBIDE MONONITRATE 120 MILLIGRAM(S): 60 TABLET, EXTENDED RELEASE ORAL at 13:03

## 2018-10-13 RX ADMIN — BUMETANIDE 2 MILLIGRAM(S): 0.25 INJECTION INTRAMUSCULAR; INTRAVENOUS at 05:56

## 2018-10-13 RX ADMIN — GABAPENTIN 100 MILLIGRAM(S): 400 CAPSULE ORAL at 05:58

## 2018-10-13 NOTE — PROGRESS NOTE ADULT - SUBJECTIVE AND OBJECTIVE BOX
[facility] NEPHROLOGY- PROGRESS NOTE    88y Female with history of HTN, DM, CKD-5 presents with increasing LE edema. Nephrology consulted for elevated Scr.    Patient denies any increase in UO on 10/12 after given bumex with metolazone.    REVIEW OF SYSTEMS:  Gen: no changes in weight  Cards: no chest pain  Resp: + dyspnea  GI: no nausea or vomiting or diarrhea  Vascular: + LE edema    No Known Allergies      Hospital Medications: Medications reviewed      VITALS:  T(F): 97.6 (10-13-18 @ 05:54), Max: 98 (10-12-18 @ 17:34)  HR: 73 (10-13-18 @ 05:54)  BP: 160/55 (10-13-18 @ 05:54)  RR: 18 (10-13-18 @ 05:54)  SpO2: 100% (10-13-18 @ 05:54)  Wt(kg): --    10-12 @ 07:01  -  10-13 @ 07:00  --------------------------------------------------------  IN: 180 mL / OUT: 800 mL / NET: -620 mL    10-13 @ 07:01  -  10-13 @ 12:08  --------------------------------------------------------  IN: 240 mL / OUT: 200 mL / NET: 40 mL      PHYSICAL EXAM:    Gen: NAD, calm  Cards: RRR, +S1/S2, no M/G/R  Resp: rales in LLL  GI: soft, NT/ND, NABS  Vascular: 2+ RLE edema only, 1+ LLE edema      LABS:  10-13    133<L>  |  93<L>  |  44<H>  ----------------------------<  92  3.9   |  28  |  3.74<H>    Ca    8.9      13 Oct 2018 07:05  Phos  3.8     10-12  Mg     1.7     10-13      Creatinine Trend: 3.74 <--, 3.44 <--, 3.41 <--, 3.30 <--, 3.53 <--, 3.56 <--, 3.37 <--                        8.1    7.20  )-----------( 319      ( 13 Oct 2018 07:05 )             26.1

## 2018-10-13 NOTE — PROGRESS NOTE ADULT - SUBJECTIVE AND OBJECTIVE BOX
SUBJECTIVE / OVERNIGHT EVENTS: pt denies chest pain,, sob+    MEDICATIONS  (STANDING):  buDESOnide  80 MICROgram(s)/formoterol 4.5 MICROgram(s) Inhaler 2 Puff(s) Inhalation two times a day  buMETAnide Infusion 1 mG/Hr (5 mL/Hr) IV Continuous <Continuous>  ergocalciferol 78676 Unit(s) Oral <User Schedule>  gabapentin 100 milliGRAM(s) Oral every 12 hours  heparin  Injectable 5000 Unit(s) SubCutaneous every 12 hours  hydrALAZINE 100 milliGRAM(s) Oral three times a day  isosorbide   mononitrate ER Tablet (IMDUR) 120 milliGRAM(s) Oral daily  metoprolol succinate  milliGRAM(s) Oral daily  pantoprazole    Tablet 40 milliGRAM(s) Oral before breakfast  sevelamer hydrochloride 800 milliGRAM(s) Oral three times a day  simvastatin 40 milliGRAM(s) Oral at bedtime    MEDICATIONS  (PRN):    Vital Signs Last 24 Hrs  T(C): 36.4 (13 Oct 2018 05:54), Max: 36.4 (13 Oct 2018 00:13)  T(F): 97.6 (13 Oct 2018 05:54), Max: 97.6 (13 Oct 2018 05:54)  HR: 72 (13 Oct 2018 13:00) (72 - 92)  BP: 138/56 (13 Oct 2018 13:00) (138/56 - 160/55)  BP(mean): --  RR: 18 (13 Oct 2018 13:00) (18 - 18)  SpO2: 99% (13 Oct 2018 13:00) (99% - 100%)    Constitutional: No fever, fatigue  Skin: No rash.  Eyes: No recent vision problems or eye pain.  ENT: No congestion, ear pain, or sore throat.  Cardiovascular: No chest pain or palpation.  Respiratory: sob+  Gastrointestinal: No abdominal pain, nausea, vomiting, or diarrhea.  Genitourinary: No dysuria.  Musculoskeletal: No joint swelling.  Neurologic: No headache.    PHYSICAL EXAM:  GENERAL: NAD  EYES: EOMI, PERRLA  NECK: Supple, No JVD  CHEST/LUNG: dec breath sounds at bases   HEART:  S1 , S2 +  ABDOMEN: sof,t bs+  EXTREMITIES:  trace edema  NEUROLOGY:alert awake oriented     LABS:  10-13    133<L>  |  93<L>  |  44<H>  ----------------------------<  92  3.9   |  28  |  3.74<H>    Ca    8.9      13 Oct 2018 07:05  Phos  3.8     10-12  Mg     1.7     10-13      Creatinine Trend: 3.74 <--, 3.44 <--, 3.41 <--, 3.30 <--, 3.53 <--, 3.56 <--, 3.37 <--                        8.1    7.20  )-----------( 319      ( 13 Oct 2018 07:05 )             26.1     Urine Studies:

## 2018-10-13 NOTE — PROGRESS NOTE ADULT - SUBJECTIVE AND OBJECTIVE BOX
FRANCIA MARTEL:[id number],   88yFemale followed for:  No Known Allergies    PAST MEDICAL & SURGICAL HISTORY:  CHF (congestive heart failure): diastolic heart failure  Asthma  CKD (chronic kidney disease)  High cholesterol  Hypertension  History of hysterectomy    FAMILY HISTORY:  No pertinent family history in first degree relatives    MEDICATIONS  (STANDING):  buDESOnide  80 MICROgram(s)/formoterol 4.5 MICROgram(s) Inhaler 2 Puff(s) Inhalation two times a day  buMETAnide Injectable 2 milliGRAM(s) IV Push every 12 hours  ergocalciferol 53856 Unit(s) Oral <User Schedule>  gabapentin 100 milliGRAM(s) Oral every 12 hours  heparin  Injectable 5000 Unit(s) SubCutaneous every 12 hours  hydrALAZINE 100 milliGRAM(s) Oral three times a day  isosorbide   mononitrate ER Tablet (IMDUR) 120 milliGRAM(s) Oral daily  metoprolol succinate  milliGRAM(s) Oral daily  pantoprazole    Tablet 40 milliGRAM(s) Oral before breakfast  sevelamer hydrochloride 800 milliGRAM(s) Oral three times a day  simvastatin 40 milliGRAM(s) Oral at bedtime    MEDICATIONS  (PRN):      Vital Signs Last 24 Hrs  T(C): 36.4 (13 Oct 2018 05:54), Max: 36.7 (12 Oct 2018 17:34)  T(F): 97.6 (13 Oct 2018 05:54), Max: 98 (12 Oct 2018 17:34)  HR: 73 (13 Oct 2018 05:54) (72 - 92)  BP: 160/55 (13 Oct 2018 05:54) (138/57 - 168/72)  BP(mean): --  RR: 18 (13 Oct 2018 05:54) (18 - 18)  SpO2: 100% (13 Oct 2018 05:54) (100% - 100%)  nc/at  s1s2  cta  soft, nt, nd no guarding or rebound  no c/c/e    CBC Full  -  ( 13 Oct 2018 07:05 )  WBC Count : 7.20 K/uL  Hemoglobin : 8.1 g/dL  Hematocrit : 26.1 %  Platelet Count - Automated : 319 K/uL  Mean Cell Volume : 89.4 fL  Mean Cell Hemoglobin : 27.7 pg  Mean Cell Hemoglobin Concentration : 31.0 %  Auto Neutrophil # : 4.45 K/uL  Auto Lymphocyte # : 1.38 K/uL  Auto Monocyte # : 1.13 K/uL  Auto Eosinophil # : 0.15 K/uL  Auto Basophil # : 0.07 K/uL  Auto Neutrophil % : 61.7 %  Auto Lymphocyte % : 19.2 %  Auto Monocyte % : 15.7 %  Auto Eosinophil % : 2.1 %  Auto Basophil % : 1.0 %    10-13    133<L>  |  93<L>  |  44<H>  ----------------------------<  92  3.9   |  28  |  3.74<H>    Ca    8.9      13 Oct 2018 07:05  Phos  3.8     10-12  Mg     1.7     10-13

## 2018-10-13 NOTE — PROGRESS NOTE ADULT - SUBJECTIVE AND OBJECTIVE BOX
Cardiovascular Disease Progress Note    Overnight events: No acute events overnight. Ms. Villagomez says she is still SOB. No chest pain.     Otherwise review of systems negative    Objective Findings:  T(C): 36.4 (10-13-18 @ 05:54), Max: 36.7 (10-12-18 @ 17:34)  HR: 73 (10-13-18 @ 05:54) (72 - 92)  BP: 160/55 (10-13-18 @ 05:54) (138/57 - 168/72)  RR: 18 (10-13-18 @ 05:54) (18 - 18)  SpO2: 100% (10-13-18 @ 05:54) (100% - 100%)  Wt(kg): --  Daily     Daily Weight in k.5 (13 Oct 2018 04:14)      Physical Exam:  Gen: NAD  HEENT: EOMI  CV: RRR, normal S1 + S2, no m/r/g  Lungs: CTAB  Abd: soft, non-tender  Ext: Trace edema    Telemetry: Sinus; no ectopy    Laboratory Data:                        8.1    7.20  )-----------( 319      ( 13 Oct 2018 07:05 )             26.1     10-13    133<L>  |  93<L>  |  44<H>  ----------------------------<  92  3.9   |  28  |  3.74<H>    Ca    8.9      13 Oct 2018 07:05  Phos  3.8     10-12  Mg     1.7     10-13                Inpatient Medications:  MEDICATIONS  (STANDING):  buDESOnide  80 MICROgram(s)/formoterol 4.5 MICROgram(s) Inhaler 2 Puff(s) Inhalation two times a day  buMETAnide Injectable 2 milliGRAM(s) IV Push every 12 hours  ergocalciferol 74371 Unit(s) Oral <User Schedule>  gabapentin 100 milliGRAM(s) Oral every 12 hours  heparin  Injectable 5000 Unit(s) SubCutaneous every 12 hours  hydrALAZINE 100 milliGRAM(s) Oral three times a day  isosorbide   mononitrate ER Tablet (IMDUR) 120 milliGRAM(s) Oral daily  metoprolol succinate  milliGRAM(s) Oral daily  pantoprazole    Tablet 40 milliGRAM(s) Oral before breakfast  sevelamer hydrochloride 800 milliGRAM(s) Oral three times a day  simvastatin 40 milliGRAM(s) Oral at bedtime      Assessment: 88 year old woman with CKD V, HTN, and HLD presents with acute diastolic CHF.    Plan of Care:    #Acute diastolic CHF-  Likely secondary to outpatient steroids and progressive CKD.  Patient persistently dyspneic despite IV diuresis.  Consider low dose bumex gtt, if no objection from nephrology.  Creatinine appears to be fluctuating around the baseline.   TTE from 4 months ago reviewed.    Over 25 minutes spent on total encounter; more than 50% of the visit was spent counseling and/or coordinating care by the attending physician.      Justin Vick MD [facility]  Cardiovascular Disease  [phone]

## 2018-10-14 LAB
BASOPHILS # BLD AUTO: 0.08 K/UL — SIGNIFICANT CHANGE UP (ref 0–0.2)
BASOPHILS NFR BLD AUTO: 1.1 % — SIGNIFICANT CHANGE UP (ref 0–2)
BUN SERPL-MCNC: 45 MG/DL — HIGH (ref 7–23)
CALCIUM SERPL-MCNC: 8.6 MG/DL — SIGNIFICANT CHANGE UP (ref 8.4–10.5)
CHLORIDE SERPL-SCNC: 92 MMOL/L — LOW (ref 98–107)
CO2 SERPL-SCNC: 29 MMOL/L — SIGNIFICANT CHANGE UP (ref 22–31)
CREAT SERPL-MCNC: 3.7 MG/DL — HIGH (ref 0.5–1.3)
EOSINOPHIL # BLD AUTO: 0.15 K/UL — SIGNIFICANT CHANGE UP (ref 0–0.5)
EOSINOPHIL NFR BLD AUTO: 2 % — SIGNIFICANT CHANGE UP (ref 0–6)
GLUCOSE SERPL-MCNC: 86 MG/DL — SIGNIFICANT CHANGE UP (ref 70–99)
HCT VFR BLD CALC: 25.4 % — LOW (ref 34.5–45)
HGB BLD-MCNC: 8 G/DL — LOW (ref 11.5–15.5)
IMM GRANULOCYTES # BLD AUTO: 0.03 # — SIGNIFICANT CHANGE UP
IMM GRANULOCYTES NFR BLD AUTO: 0.4 % — SIGNIFICANT CHANGE UP (ref 0–1.5)
LYMPHOCYTES # BLD AUTO: 1.61 K/UL — SIGNIFICANT CHANGE UP (ref 1–3.3)
LYMPHOCYTES # BLD AUTO: 21.6 % — SIGNIFICANT CHANGE UP (ref 13–44)
MAGNESIUM SERPL-MCNC: 1.6 MG/DL — SIGNIFICANT CHANGE UP (ref 1.6–2.6)
MCHC RBC-ENTMCNC: 27.6 PG — SIGNIFICANT CHANGE UP (ref 27–34)
MCHC RBC-ENTMCNC: 31.5 % — LOW (ref 32–36)
MCV RBC AUTO: 87.6 FL — SIGNIFICANT CHANGE UP (ref 80–100)
MONOCYTES # BLD AUTO: 1.27 K/UL — HIGH (ref 0–0.9)
MONOCYTES NFR BLD AUTO: 17 % — HIGH (ref 2–14)
NEUTROPHILS # BLD AUTO: 4.31 K/UL — SIGNIFICANT CHANGE UP (ref 1.8–7.4)
NEUTROPHILS NFR BLD AUTO: 57.9 % — SIGNIFICANT CHANGE UP (ref 43–77)
NRBC # FLD: 0 — SIGNIFICANT CHANGE UP
PLATELET # BLD AUTO: 318 K/UL — SIGNIFICANT CHANGE UP (ref 150–400)
PMV BLD: 9.6 FL — SIGNIFICANT CHANGE UP (ref 7–13)
POTASSIUM SERPL-MCNC: 4 MMOL/L — SIGNIFICANT CHANGE UP (ref 3.5–5.3)
POTASSIUM SERPL-SCNC: 4 MMOL/L — SIGNIFICANT CHANGE UP (ref 3.5–5.3)
RBC # BLD: 2.9 M/UL — LOW (ref 3.8–5.2)
RBC # FLD: 14.3 % — SIGNIFICANT CHANGE UP (ref 10.3–14.5)
SODIUM SERPL-SCNC: 134 MMOL/L — LOW (ref 135–145)
WBC # BLD: 7.45 K/UL — SIGNIFICANT CHANGE UP (ref 3.8–10.5)
WBC # FLD AUTO: 7.45 K/UL — SIGNIFICANT CHANGE UP (ref 3.8–10.5)

## 2018-10-14 RX ORDER — POTASSIUM CHLORIDE 20 MEQ
20 PACKET (EA) ORAL ONCE
Qty: 0 | Refills: 0 | Status: COMPLETED | OUTPATIENT
Start: 2018-10-14 | End: 2018-10-14

## 2018-10-14 RX ORDER — BUMETANIDE 0.25 MG/ML
1 INJECTION INTRAMUSCULAR; INTRAVENOUS ONCE
Qty: 0 | Refills: 0 | Status: COMPLETED | OUTPATIENT
Start: 2018-10-14 | End: 2018-10-14

## 2018-10-14 RX ADMIN — GABAPENTIN 100 MILLIGRAM(S): 400 CAPSULE ORAL at 06:11

## 2018-10-14 RX ADMIN — BUDESONIDE AND FORMOTEROL FUMARATE DIHYDRATE 2 PUFF(S): 160; 4.5 AEROSOL RESPIRATORY (INHALATION) at 10:00

## 2018-10-14 RX ADMIN — BUDESONIDE AND FORMOTEROL FUMARATE DIHYDRATE 2 PUFF(S): 160; 4.5 AEROSOL RESPIRATORY (INHALATION) at 22:04

## 2018-10-14 RX ADMIN — SEVELAMER CARBONATE 800 MILLIGRAM(S): 2400 POWDER, FOR SUSPENSION ORAL at 13:10

## 2018-10-14 RX ADMIN — Medication 100 MILLIGRAM(S): at 06:11

## 2018-10-14 RX ADMIN — BUMETANIDE 5 MG/HR: 0.25 INJECTION INTRAMUSCULAR; INTRAVENOUS at 10:06

## 2018-10-14 RX ADMIN — BUMETANIDE 1 MILLIGRAM(S): 0.25 INJECTION INTRAMUSCULAR; INTRAVENOUS at 13:09

## 2018-10-14 RX ADMIN — BUMETANIDE 7.5 MG/HR: 0.25 INJECTION INTRAMUSCULAR; INTRAVENOUS at 13:09

## 2018-10-14 RX ADMIN — HEPARIN SODIUM 5000 UNIT(S): 5000 INJECTION INTRAVENOUS; SUBCUTANEOUS at 17:14

## 2018-10-14 RX ADMIN — SEVELAMER CARBONATE 800 MILLIGRAM(S): 2400 POWDER, FOR SUSPENSION ORAL at 06:11

## 2018-10-14 RX ADMIN — GABAPENTIN 100 MILLIGRAM(S): 400 CAPSULE ORAL at 17:14

## 2018-10-14 RX ADMIN — SIMVASTATIN 40 MILLIGRAM(S): 20 TABLET, FILM COATED ORAL at 22:04

## 2018-10-14 RX ADMIN — PANTOPRAZOLE SODIUM 40 MILLIGRAM(S): 20 TABLET, DELAYED RELEASE ORAL at 06:11

## 2018-10-14 RX ADMIN — Medication 20 MILLIEQUIVALENT(S): at 13:10

## 2018-10-14 RX ADMIN — SEVELAMER CARBONATE 800 MILLIGRAM(S): 2400 POWDER, FOR SUSPENSION ORAL at 22:04

## 2018-10-14 RX ADMIN — ISOSORBIDE MONONITRATE 120 MILLIGRAM(S): 60 TABLET, EXTENDED RELEASE ORAL at 13:10

## 2018-10-14 RX ADMIN — Medication 1 DROP(S): at 06:11

## 2018-10-14 RX ADMIN — Medication 100 MILLIGRAM(S): at 22:04

## 2018-10-14 RX ADMIN — Medication 100 MILLIGRAM(S): at 13:10

## 2018-10-14 RX ADMIN — HEPARIN SODIUM 5000 UNIT(S): 5000 INJECTION INTRAVENOUS; SUBCUTANEOUS at 06:10

## 2018-10-14 NOTE — PROGRESS NOTE ADULT - SUBJECTIVE AND OBJECTIVE BOX
SUBJECTIVE / OVERNIGHT EVENTS: pt denies chest pain,, sob+    MEDICATIONS  (STANDING):  buDESOnide  80 MICROgram(s)/formoterol 4.5 MICROgram(s) Inhaler 2 Puff(s) Inhalation two times a day  buMETAnide Infusion 1.5 mG/Hr (7.5 mL/Hr) IV Continuous <Continuous>  ergocalciferol 59989 Unit(s) Oral <User Schedule>  gabapentin 100 milliGRAM(s) Oral every 12 hours  heparin  Injectable 5000 Unit(s) SubCutaneous every 12 hours  hydrALAZINE 100 milliGRAM(s) Oral three times a day  isosorbide   mononitrate ER Tablet (IMDUR) 120 milliGRAM(s) Oral daily  metoprolol succinate  milliGRAM(s) Oral daily  pantoprazole    Tablet 40 milliGRAM(s) Oral before breakfast  sevelamer hydrochloride 800 milliGRAM(s) Oral three times a day  simvastatin 40 milliGRAM(s) Oral at bedtime    MEDICATIONS  (PRN):  artificial  tears Solution 1 Drop(s) Both EYES two times a day PRN Dry Eyes    Vital Signs Last 24 Hrs  T(C): 36.8 (14 Oct 2018 21:40), Max: 36.8 (14 Oct 2018 12:55)  T(F): 98.2 (14 Oct 2018 21:40), Max: 98.2 (14 Oct 2018 12:55)  HR: 75 (14 Oct 2018 21:40) (70 - 84)  BP: 169/80 (14 Oct 2018 21:40) (151/53 - 169/80)  BP(mean): --  RR: 16 (14 Oct 2018 21:40) (16 - 18)  SpO2: 97% (14 Oct 2018 21:40) (96% - 97%)    Constitutional: No fever, fatigue  Skin: No rash.  Eyes: No recent vision problems or eye pain.  ENT: No congestion, ear pain, or sore throat.  Cardiovascular: No chest pain or palpation.  Respiratory: sob+  Gastrointestinal: No abdominal pain, nausea, vomiting, or diarrhea.  Genitourinary: No dysuria.  Musculoskeletal: No joint swelling.  Neurologic: No headache.    PHYSICAL EXAM:  GENERAL: NAD  EYES: EOMI, PERRLA  NECK: Supple, No JVD  CHEST/LUNG: dec breath sounds at bases   HEART:  S1 , S2 +  ABDOMEN: sof,t bs+  EXTREMITIES:  trace edema  NEUROLOGY:alert awake oriented     LABS:  10-14    134<L>  |  92<L>  |  45<H>  ----------------------------<  86  4.0   |  29  |  3.70<H>    Ca    8.6      14 Oct 2018 05:59  Mg     1.6     10-14      Creatinine Trend: 3.70 <--, 3.72 <--, 3.74 <--, 3.44 <--, 3.41 <--, 3.30 <--, 3.53 <--, 3.56 <--                        8.0    7.45  )-----------( 318      ( 14 Oct 2018 05:59 )             25.4     Urine Studies:

## 2018-10-14 NOTE — PROGRESS NOTE ADULT - SUBJECTIVE AND OBJECTIVE BOX
Cardiovascular Disease Progress Note    Overnight events: No acute events overnight. Ms. Villagomez says her breathing is the same. She has pain in the legs.   Otherwise review of systems negative    Objective Findings:  T(C): 36.6 (10-14-18 @ 05:55), Max: 36.6 (10-13-18 @ 22:33)  HR: 81 (10-14-18 @ 05:55) (72 - 84)  BP: 164/51 (10-14-18 @ 05:55) (138/56 - 164/51)  RR: 18 (10-14-18 @ 05:55) (18 - 18)  SpO2: 96% (10-14-18 @ 05:55) (96% - 99%)  Wt(kg): --  Daily     Daily Weight in k.5 (14 Oct 2018 03:56)      Physical Exam:  Gen: NAD  HEENT: EOMI  CV: RRR, normal S1 + S2, no m/r/g  Lungs: CTAB  Abd: soft, non-tender  Ext: 1+ pedal edema    Telemetry: Sinus    Laboratory Data:                        8.0    7.45  )-----------( 318      ( 14 Oct 2018 05:59 )             25.4     10-14    134<L>  |  92<L>  |  45<H>  ----------------------------<  86  4.0   |  29  |  3.70<H>    Ca    8.6      14 Oct 2018 05:59  Mg     1.6     10-14                Inpatient Medications:  MEDICATIONS  (STANDING):  buDESOnide  80 MICROgram(s)/formoterol 4.5 MICROgram(s) Inhaler 2 Puff(s) Inhalation two times a day  buMETAnide Infusion 1 mG/Hr (5 mL/Hr) IV Continuous <Continuous>  ergocalciferol 33224 Unit(s) Oral <User Schedule>  gabapentin 100 milliGRAM(s) Oral every 12 hours  heparin  Injectable 5000 Unit(s) SubCutaneous every 12 hours  hydrALAZINE 100 milliGRAM(s) Oral three times a day  isosorbide   mononitrate ER Tablet (IMDUR) 120 milliGRAM(s) Oral daily  metoprolol succinate  milliGRAM(s) Oral daily  pantoprazole    Tablet 40 milliGRAM(s) Oral before breakfast  sevelamer hydrochloride 800 milliGRAM(s) Oral three times a day  simvastatin 40 milliGRAM(s) Oral at bedtime      Assessment: 88 year old woman with CKD V, HTN, and HLD presents with acute diastolic CHF.    Plan of Care:    #Acute diastolic CHF-  Likely secondary to outpatient steroids and progressive CKD.  Patient with little symptomatic improvement with BID Bumex.   Trial of bumex gtt.  Electrolytes stable this morning.   TTE from 4 months ago reviewed.    Over 25 minutes spent on total encounter; more than 50% of the visit was spent counseling and/or coordinating care by the attending physician.      Justin Vick MD [facility]  Cardiovascular Disease  [phone]

## 2018-10-14 NOTE — PROGRESS NOTE ADULT - SUBJECTIVE AND OBJECTIVE BOX
FRANCIA MARTEL:[id number],   88yFemale followed for:  No Known Allergies    PAST MEDICAL & SURGICAL HISTORY:  CHF (congestive heart failure): diastolic heart failure  Asthma  CKD (chronic kidney disease)  High cholesterol  Hypertension  History of hysterectomy    FAMILY HISTORY:  No pertinent family history in first degree relatives    MEDICATIONS  (STANDING):  buDESOnide  80 MICROgram(s)/formoterol 4.5 MICROgram(s) Inhaler 2 Puff(s) Inhalation two times a day  buMETAnide Infusion 1 mG/Hr (5 mL/Hr) IV Continuous <Continuous>  ergocalciferol 88729 Unit(s) Oral <User Schedule>  gabapentin 100 milliGRAM(s) Oral every 12 hours  heparin  Injectable 5000 Unit(s) SubCutaneous every 12 hours  hydrALAZINE 100 milliGRAM(s) Oral three times a day  isosorbide   mononitrate ER Tablet (IMDUR) 120 milliGRAM(s) Oral daily  metoprolol succinate  milliGRAM(s) Oral daily  pantoprazole    Tablet 40 milliGRAM(s) Oral before breakfast  sevelamer hydrochloride 800 milliGRAM(s) Oral three times a day  simvastatin 40 milliGRAM(s) Oral at bedtime    MEDICATIONS  (PRN):  artificial  tears Solution 1 Drop(s) Both EYES two times a day PRN Dry Eyes      Vital Signs Last 24 Hrs  T(C): 36.6 (14 Oct 2018 05:55), Max: 36.6 (13 Oct 2018 22:33)  T(F): 97.8 (14 Oct 2018 05:55), Max: 97.8 (13 Oct 2018 22:33)  HR: 81 (14 Oct 2018 05:55) (72 - 84)  BP: 164/51 (14 Oct 2018 05:55) (138/56 - 164/51)  BP(mean): --  RR: 18 (14 Oct 2018 05:55) (18 - 18)  SpO2: 96% (14 Oct 2018 05:55) (96% - 99%)  nc/at  s1s2  cta  soft, nt, nd no guarding or rebound  no c/c/e    CBC Full  -  ( 14 Oct 2018 05:59 )  WBC Count : 7.45 K/uL  Hemoglobin : 8.0 g/dL  Hematocrit : 25.4 %  Platelet Count - Automated : 318 K/uL  Mean Cell Volume : 87.6 fL  Mean Cell Hemoglobin : 27.6 pg  Mean Cell Hemoglobin Concentration : 31.5 %  Auto Neutrophil # : 4.31 K/uL  Auto Lymphocyte # : 1.61 K/uL  Auto Monocyte # : 1.27 K/uL  Auto Eosinophil # : 0.15 K/uL  Auto Basophil # : 0.08 K/uL  Auto Neutrophil % : 57.9 %  Auto Lymphocyte % : 21.6 %  Auto Monocyte % : 17.0 %  Auto Eosinophil % : 2.0 %  Auto Basophil % : 1.1 %    10-14    134<L>  |  92<L>  |  45<H>  ----------------------------<  86  4.0   |  29  |  3.70<H>    Ca    8.6      14 Oct 2018 05:59  Mg     1.6     10-14

## 2018-10-14 NOTE — PROGRESS NOTE ADULT - SUBJECTIVE AND OBJECTIVE BOX
[facility] NEPHROLOGY- PROGRESS NOTE    88y Female with history of HTN, DM, CKD-5 presents with increasing LE edema. Nephrology consulted for elevated Scr.    Patient denies any increase in UO on bumex gtt.    REVIEW OF SYSTEMS:  Gen: no changes in weight  Cards: no chest pain  Resp: + dyspnea  GI: no nausea or vomiting or diarrhea  Vascular: + LE edema    No Known Allergies      Hospital Medications: Medications reviewed      VITALS:  T(F): 97.8 (10-14-18 @ 05:55), Max: 97.8 (10-13-18 @ 22:33)  HR: 81 (10-14-18 @ 05:55)  BP: 164/51 (10-14-18 @ 05:55)  RR: 18 (10-14-18 @ 05:55)  SpO2: 96% (10-14-18 @ 05:55)  Wt(kg): --    10-13 @ 07:01  -  10-14 @ 07:00  --------------------------------------------------------  IN: 830 mL / OUT: 1600 mL / NET: -770 mL    10-14 @ 07:01  -  10-14 @ 11:49  --------------------------------------------------------  IN: 0 mL / OUT: 800 mL / NET: -800 mL      PHYSICAL EXAM:    Gen: NAD, calm  Cards: RRR, +S1/S2, no M/G/R  Resp: CTA B/L  GI: soft, NT/ND, NABS  Vascular: 2+ LE edema B/L      LABS:  10-14    134<L>  |  92<L>  |  45<H>  ----------------------------<  86  4.0   |  29  |  3.70<H>    Ca    8.6      14 Oct 2018 05:59  Mg     1.6     10-14      Creatinine Trend: 3.70 <--, 3.72 <--, 3.74 <--, 3.44 <--, 3.41 <--, 3.30 <--, 3.53 <--, 3.56 <--                        8.0    7.45  )-----------( 318      ( 14 Oct 2018 05:59 )             25.4

## 2018-10-15 LAB
BASOPHILS # BLD AUTO: 0.07 K/UL — SIGNIFICANT CHANGE UP (ref 0–0.2)
BASOPHILS NFR BLD AUTO: 0.8 % — SIGNIFICANT CHANGE UP (ref 0–2)
BUN SERPL-MCNC: 44 MG/DL — HIGH (ref 7–23)
CALCIUM SERPL-MCNC: 9.1 MG/DL — SIGNIFICANT CHANGE UP (ref 8.4–10.5)
CHLORIDE SERPL-SCNC: 92 MMOL/L — LOW (ref 98–107)
CO2 SERPL-SCNC: 26 MMOL/L — SIGNIFICANT CHANGE UP (ref 22–31)
CREAT SERPL-MCNC: 3.64 MG/DL — HIGH (ref 0.5–1.3)
EOSINOPHIL # BLD AUTO: 0.15 K/UL — SIGNIFICANT CHANGE UP (ref 0–0.5)
EOSINOPHIL NFR BLD AUTO: 1.8 % — SIGNIFICANT CHANGE UP (ref 0–6)
GLUCOSE SERPL-MCNC: 101 MG/DL — HIGH (ref 70–99)
HCT VFR BLD CALC: 28.3 % — LOW (ref 34.5–45)
HGB BLD-MCNC: 8.9 G/DL — LOW (ref 11.5–15.5)
IMM GRANULOCYTES # BLD AUTO: 0.04 # — SIGNIFICANT CHANGE UP
IMM GRANULOCYTES NFR BLD AUTO: 0.5 % — SIGNIFICANT CHANGE UP (ref 0–1.5)
LYMPHOCYTES # BLD AUTO: 1.56 K/UL — SIGNIFICANT CHANGE UP (ref 1–3.3)
LYMPHOCYTES # BLD AUTO: 18.7 % — SIGNIFICANT CHANGE UP (ref 13–44)
MAGNESIUM SERPL-MCNC: 1.6 MG/DL — SIGNIFICANT CHANGE UP (ref 1.6–2.6)
MCHC RBC-ENTMCNC: 27.3 PG — SIGNIFICANT CHANGE UP (ref 27–34)
MCHC RBC-ENTMCNC: 31.4 % — LOW (ref 32–36)
MCV RBC AUTO: 86.8 FL — SIGNIFICANT CHANGE UP (ref 80–100)
MONOCYTES # BLD AUTO: 1.15 K/UL — HIGH (ref 0–0.9)
MONOCYTES NFR BLD AUTO: 13.8 % — SIGNIFICANT CHANGE UP (ref 2–14)
NEUTROPHILS # BLD AUTO: 5.36 K/UL — SIGNIFICANT CHANGE UP (ref 1.8–7.4)
NEUTROPHILS NFR BLD AUTO: 64.4 % — SIGNIFICANT CHANGE UP (ref 43–77)
NRBC # FLD: 0 — SIGNIFICANT CHANGE UP
PLATELET # BLD AUTO: 332 K/UL — SIGNIFICANT CHANGE UP (ref 150–400)
PMV BLD: 9.5 FL — SIGNIFICANT CHANGE UP (ref 7–13)
POTASSIUM SERPL-MCNC: 4.2 MMOL/L — SIGNIFICANT CHANGE UP (ref 3.5–5.3)
POTASSIUM SERPL-SCNC: 4.2 MMOL/L — SIGNIFICANT CHANGE UP (ref 3.5–5.3)
RBC # BLD: 3.26 M/UL — LOW (ref 3.8–5.2)
RBC # FLD: 14.5 % — SIGNIFICANT CHANGE UP (ref 10.3–14.5)
SODIUM SERPL-SCNC: 132 MMOL/L — LOW (ref 135–145)
WBC # BLD: 8.33 K/UL — SIGNIFICANT CHANGE UP (ref 3.8–10.5)
WBC # FLD AUTO: 8.33 K/UL — SIGNIFICANT CHANGE UP (ref 3.8–10.5)

## 2018-10-15 RX ORDER — TRAMADOL HYDROCHLORIDE 50 MG/1
50 TABLET ORAL ONCE
Qty: 0 | Refills: 0 | Status: DISCONTINUED | OUTPATIENT
Start: 2018-10-15 | End: 2018-10-15

## 2018-10-15 RX ORDER — AMLODIPINE BESYLATE 2.5 MG/1
5 TABLET ORAL DAILY
Qty: 0 | Refills: 0 | Status: DISCONTINUED | OUTPATIENT
Start: 2018-10-15 | End: 2018-10-22

## 2018-10-15 RX ORDER — ERYTHROPOIETIN 10000 [IU]/ML
6000 INJECTION, SOLUTION INTRAVENOUS; SUBCUTANEOUS ONCE
Qty: 0 | Refills: 0 | Status: COMPLETED | OUTPATIENT
Start: 2018-10-15 | End: 2018-10-16

## 2018-10-15 RX ORDER — AMLODIPINE BESYLATE 2.5 MG/1
3 TABLET ORAL DAILY
Qty: 0 | Refills: 0 | Status: DISCONTINUED | OUTPATIENT
Start: 2018-10-15 | End: 2018-10-15

## 2018-10-15 RX ADMIN — Medication 100 MILLIGRAM(S): at 06:07

## 2018-10-15 RX ADMIN — SEVELAMER CARBONATE 800 MILLIGRAM(S): 2400 POWDER, FOR SUSPENSION ORAL at 06:07

## 2018-10-15 RX ADMIN — PANTOPRAZOLE SODIUM 40 MILLIGRAM(S): 20 TABLET, DELAYED RELEASE ORAL at 06:07

## 2018-10-15 RX ADMIN — Medication 100 MILLIGRAM(S): at 21:21

## 2018-10-15 RX ADMIN — HEPARIN SODIUM 5000 UNIT(S): 5000 INJECTION INTRAVENOUS; SUBCUTANEOUS at 06:07

## 2018-10-15 RX ADMIN — GABAPENTIN 100 MILLIGRAM(S): 400 CAPSULE ORAL at 06:07

## 2018-10-15 RX ADMIN — ISOSORBIDE MONONITRATE 120 MILLIGRAM(S): 60 TABLET, EXTENDED RELEASE ORAL at 12:53

## 2018-10-15 RX ADMIN — SIMVASTATIN 40 MILLIGRAM(S): 20 TABLET, FILM COATED ORAL at 21:21

## 2018-10-15 RX ADMIN — BUDESONIDE AND FORMOTEROL FUMARATE DIHYDRATE 2 PUFF(S): 160; 4.5 AEROSOL RESPIRATORY (INHALATION) at 21:21

## 2018-10-15 RX ADMIN — Medication 100 MILLIGRAM(S): at 12:54

## 2018-10-15 RX ADMIN — TRAMADOL HYDROCHLORIDE 50 MILLIGRAM(S): 50 TABLET ORAL at 20:06

## 2018-10-15 RX ADMIN — TRAMADOL HYDROCHLORIDE 50 MILLIGRAM(S): 50 TABLET ORAL at 21:06

## 2018-10-15 RX ADMIN — BUDESONIDE AND FORMOTEROL FUMARATE DIHYDRATE 2 PUFF(S): 160; 4.5 AEROSOL RESPIRATORY (INHALATION) at 09:25

## 2018-10-15 RX ADMIN — SEVELAMER CARBONATE 800 MILLIGRAM(S): 2400 POWDER, FOR SUSPENSION ORAL at 12:53

## 2018-10-15 RX ADMIN — AMLODIPINE BESYLATE 5 MILLIGRAM(S): 2.5 TABLET ORAL at 17:25

## 2018-10-15 RX ADMIN — GABAPENTIN 100 MILLIGRAM(S): 400 CAPSULE ORAL at 17:26

## 2018-10-15 NOTE — PROGRESS NOTE ADULT - SUBJECTIVE AND OBJECTIVE BOX
FRANCIA MARTEL:[id number],   88yFemale followed for:  No Known Allergies    PAST MEDICAL & SURGICAL HISTORY:  CHF (congestive heart failure): diastolic heart failure  Asthma  CKD (chronic kidney disease)  High cholesterol  Hypertension  History of hysterectomy    FAMILY HISTORY:  No pertinent family history in first degree relatives    MEDICATIONS  (STANDING):  buDESOnide  80 MICROgram(s)/formoterol 4.5 MICROgram(s) Inhaler 2 Puff(s) Inhalation two times a day  buMETAnide Infusion 1.5 mG/Hr (7.5 mL/Hr) IV Continuous <Continuous>  ergocalciferol 45740 Unit(s) Oral <User Schedule>  gabapentin 100 milliGRAM(s) Oral every 12 hours  heparin  Injectable 5000 Unit(s) SubCutaneous every 12 hours  hydrALAZINE 100 milliGRAM(s) Oral three times a day  isosorbide   mononitrate ER Tablet (IMDUR) 120 milliGRAM(s) Oral daily  metoprolol succinate  milliGRAM(s) Oral daily  pantoprazole    Tablet 40 milliGRAM(s) Oral before breakfast  sevelamer hydrochloride 800 milliGRAM(s) Oral three times a day  simvastatin 40 milliGRAM(s) Oral at bedtime    MEDICATIONS  (PRN):  artificial  tears Solution 1 Drop(s) Both EYES two times a day PRN Dry Eyes      Vital Signs Last 24 Hrs  T(C): 36.9 (15 Oct 2018 05:45), Max: 36.9 (15 Oct 2018 05:45)  T(F): 98.4 (15 Oct 2018 05:45), Max: 98.4 (15 Oct 2018 05:45)  HR: 72 (15 Oct 2018 05:45) (70 - 75)  BP: 172/67 (15 Oct 2018 05:45) (151/53 - 172/67)  BP(mean): --  RR: 18 (15 Oct 2018 05:45) (16 - 18)  SpO2: 96% (15 Oct 2018 05:45) (96% - 97%)  nc/at  s1s2  cta  soft, nt, nd no guarding or rebound  no c/c/e    CBC Full  -  ( 15 Oct 2018 06:28 )  WBC Count : 8.33 K/uL  Hemoglobin : 8.9 g/dL  Hematocrit : 28.3 %  Platelet Count - Automated : 332 K/uL  Mean Cell Volume : 86.8 fL  Mean Cell Hemoglobin : 27.3 pg  Mean Cell Hemoglobin Concentration : 31.4 %  Auto Neutrophil # : 5.36 K/uL  Auto Lymphocyte # : 1.56 K/uL  Auto Monocyte # : 1.15 K/uL  Auto Eosinophil # : 0.15 K/uL  Auto Basophil # : 0.07 K/uL  Auto Neutrophil % : 64.4 %  Auto Lymphocyte % : 18.7 %  Auto Monocyte % : 13.8 %  Auto Eosinophil % : 1.8 %  Auto Basophil % : 0.8 %    10-15    132<L>  |  92<L>  |  44<H>  ----------------------------<  101<H>  4.2   |  26  |  3.64<H>    Ca    9.1      15 Oct 2018 06:28  Mg     1.6     10-15

## 2018-10-15 NOTE — PROGRESS NOTE ADULT - SUBJECTIVE AND OBJECTIVE BOX
Cardiovascular Disease Progress Note    Overnight events: Desaturations noted overnight. No chest pain. SOB at baseline.   Otherwise review of systems negative    Objective Findings:  T(C): 36.9 (10-15-18 @ 05:45), Max: 36.9 (10-15-18 @ 05:45)  HR: 72 (10-15-18 @ 05:45) (70 - 75)  BP: 172/67 (10-15-18 @ 05:45) (151/53 - 172/67)  RR: 18 (10-15-18 @ 05:45) (16 - 18)  SpO2: 96% (10-15-18 @ 05:45) (96% - 97%)  Wt(kg): --  Daily     Daily Weight in k (15 Oct 2018 06:44)      Physical Exam:  Gen: NAD  HEENT: EOMI  CV: RRR, normal S1 + S2, no m/r/g  Lungs: CTAB  Abd: soft, non-tender  Ext: 1+ pedal edema    Telemetry: Sinus; rare PVDs    Laboratory Data:                        8.9    8.33  )-----------( 332      ( 15 Oct 2018 06:28 )             28.3     10-15    132<L>  |  92<L>  |  44<H>  ----------------------------<  101<H>  4.2   |  26  |  3.64<H>    Ca    9.1      15 Oct 2018 06:28  Mg     1.6     10-15                Inpatient Medications:  MEDICATIONS  (STANDING):  buDESOnide  80 MICROgram(s)/formoterol 4.5 MICROgram(s) Inhaler 2 Puff(s) Inhalation two times a day  buMETAnide Infusion 1.5 mG/Hr (7.5 mL/Hr) IV Continuous <Continuous>  ergocalciferol 45646 Unit(s) Oral <User Schedule>  gabapentin 100 milliGRAM(s) Oral every 12 hours  heparin  Injectable 5000 Unit(s) SubCutaneous every 12 hours  hydrALAZINE 100 milliGRAM(s) Oral three times a day  isosorbide   mononitrate ER Tablet (IMDUR) 120 milliGRAM(s) Oral daily  metoprolol succinate  milliGRAM(s) Oral daily  pantoprazole    Tablet 40 milliGRAM(s) Oral before breakfast  sevelamer hydrochloride 800 milliGRAM(s) Oral three times a day  simvastatin 40 milliGRAM(s) Oral at bedtime      Assessment: 88 year old woman with CKD V, HTN, and HLD presents with acute diastolic CHF.    Plan of Care:    #Acute diastolic CHF-  Bumex gtt uptitrated by nephrology with increase in UOP as per I/Os.  Daily weights are in accurated.  Continue bumex gt as tolerated.   Electrolytes stable this morning.   TTE from 4 months ago reviewed.    Over 25 minutes spent on total encounter; more than 50% of the visit was spent counseling and/or coordinating care by the attending physician.      Justin Vick MD [facility]  Cardiovascular Disease  [phone]

## 2018-10-15 NOTE — PROGRESS NOTE ADULT - SUBJECTIVE AND OBJECTIVE BOX
[facility] NEPHROLOGY- PROGRESS NOTE    88y Female with history of HTN, DM, CKD-5 presents with increasing LE edema. Nephrology consulted for elevated Scr.    REVIEW OF SYSTEMS:  Gen: no changes in weight  Cards: no chest pain  Resp: no dyspnea  GI: no nausea or vomiting or diarrhea  Vascular: + LE edema improving    No Known Allergies      Hospital Medications: Medications reviewed      VITALS:  T(F): 98.6 (10-15-18 @ 12:54), Max: 98.6 (10-15-18 @ 12:54)  HR: 76 (10-15-18 @ 12:54)  BP: 168/52 (10-15-18 @ 12:54)  RR: 18 (10-15-18 @ 12:54)  SpO2: 96% (10-15-18 @ 12:54)  Wt(kg): --    10-14 @ 07:01  -  10-15 @ 07:00  --------------------------------------------------------  IN: 830 mL / OUT: 2450 mL / NET: -1620 mL    10-15 @ 07:01  -  10-15 @ 15:15  --------------------------------------------------------  IN: 200 mL / OUT: 1000 mL / NET: -800 mL      PHYSICAL EXAM:    Gen: NAD, calm  Cards: RRR, +S1/S2, no M/G/R  Resp: CTA B/L  GI: soft, NT/ND, NABS  Vascular: 2+ RLE edema, 1+ LLE edema, + cramps in LE      LABS:  10-15    132<L>  |  92<L>  |  44<H>  ----------------------------<  101<H>  4.2   |  26  |  3.64<H>    Ca    9.1      15 Oct 2018 06:28  Mg     1.6     10-15      Creatinine Trend: 3.64 <--, 3.70 <--, 3.72 <--, 3.74 <--, 3.44 <--, 3.41 <--, 3.30 <--, 3.53 <--                        8.9    8.33  )-----------( 332      ( 15 Oct 2018 06:28 )             28.3

## 2018-10-15 NOTE — PROGRESS NOTE ADULT - SUBJECTIVE AND OBJECTIVE BOX
SUBJECTIVE / OVERNIGHT EVENTS: pt denies chest pain,, sob+    MEDICATIONS  (STANDING):  amLODIPine   Tablet 5 milliGRAM(s) Oral daily  buDESOnide  80 MICROgram(s)/formoterol 4.5 MICROgram(s) Inhaler 2 Puff(s) Inhalation two times a day  buMETAnide Infusion 1.5 mG/Hr (7.5 mL/Hr) IV Continuous <Continuous>  epoetin cathy Injectable 6000 Unit(s) SubCutaneous once  ergocalciferol 68331 Unit(s) Oral <User Schedule>  gabapentin 100 milliGRAM(s) Oral every 12 hours  heparin  Injectable 5000 Unit(s) SubCutaneous every 12 hours  hydrALAZINE 100 milliGRAM(s) Oral three times a day  isosorbide   mononitrate ER Tablet (IMDUR) 120 milliGRAM(s) Oral daily  metoprolol succinate  milliGRAM(s) Oral daily  pantoprazole    Tablet 40 milliGRAM(s) Oral before breakfast  sevelamer hydrochloride 800 milliGRAM(s) Oral three times a day  simvastatin 40 milliGRAM(s) Oral at bedtime    MEDICATIONS  (PRN):  artificial  tears Solution 1 Drop(s) Both EYES two times a day PRN Dry Eyes    Vital Signs Last 24 Hrs  T(C): 36.3 (15 Oct 2018 20:50), Max: 37 (15 Oct 2018 12:54)  T(F): 97.4 (15 Oct 2018 20:50), Max: 98.6 (15 Oct 2018 12:54)  HR: 71 (15 Oct 2018 20:50) (71 - 76)  BP: 143/78 (15 Oct 2018 20:50) (143/78 - 172/67)  BP(mean): --  RR: 18 (15 Oct 2018 20:50) (18 - 18)  SpO2: 100% (15 Oct 2018 20:50) (96% - 100%)    Constitutional: No fever, fatigue  Skin: No rash.  Eyes: No recent vision problems or eye pain.  ENT: No congestion, ear pain, or sore throat.  Cardiovascular: No chest pain or palpation.  Respiratory: sob+  Gastrointestinal: No abdominal pain, nausea, vomiting, or diarrhea.  Genitourinary: No dysuria.  Musculoskeletal: No joint swelling.  Neurologic: No headache.    PHYSICAL EXAM:  GENERAL: NAD  EYES: EOMI, PERRLA  NECK: Supple, No JVD  CHEST/LUNG: dec breath sounds at bases   HEART:  S1 , S2 +  ABDOMEN: sof,t bs+  EXTREMITIES:  trace edema  NEUROLOGY:alert awake oriented     LABS:  10-15    132<L>  |  92<L>  |  44<H>  ----------------------------<  101<H>  4.2   |  26  |  3.64<H>    Ca    9.1      15 Oct 2018 06:28  Mg     1.6     10-15      Creatinine Trend: 3.64 <--, 3.70 <--, 3.72 <--, 3.74 <--, 3.44 <--, 3.41 <--, 3.30 <--, 3.53 <--                        8.9    8.33  )-----------( 332      ( 15 Oct 2018 06:28 )             28.3     Urine Studies:

## 2018-10-16 LAB
APPEARANCE UR: CLEAR — SIGNIFICANT CHANGE UP
BILIRUB UR-MCNC: NEGATIVE — SIGNIFICANT CHANGE UP
BLOOD UR QL VISUAL: NEGATIVE — SIGNIFICANT CHANGE UP
BUN SERPL-MCNC: 45 MG/DL — HIGH (ref 7–23)
CALCIUM SERPL-MCNC: 9.3 MG/DL — SIGNIFICANT CHANGE UP (ref 8.4–10.5)
CHLORIDE SERPL-SCNC: 90 MMOL/L — LOW (ref 98–107)
CO2 SERPL-SCNC: 30 MMOL/L — SIGNIFICANT CHANGE UP (ref 22–31)
COLOR SPEC: COLORLESS — SIGNIFICANT CHANGE UP
CREAT SERPL-MCNC: 3.72 MG/DL — HIGH (ref 0.5–1.3)
GLUCOSE SERPL-MCNC: 91 MG/DL — SIGNIFICANT CHANGE UP (ref 70–99)
GLUCOSE UR-MCNC: NEGATIVE — SIGNIFICANT CHANGE UP
HCT VFR BLD CALC: 28 % — LOW (ref 34.5–45)
HGB BLD-MCNC: 9 G/DL — LOW (ref 11.5–15.5)
KETONES UR-MCNC: NEGATIVE — SIGNIFICANT CHANGE UP
LEUKOCYTE ESTERASE UR-ACNC: NEGATIVE — SIGNIFICANT CHANGE UP
MAGNESIUM SERPL-MCNC: 1.6 MG/DL — SIGNIFICANT CHANGE UP (ref 1.6–2.6)
MCHC RBC-ENTMCNC: 28.1 PG — SIGNIFICANT CHANGE UP (ref 27–34)
MCHC RBC-ENTMCNC: 32.1 % — SIGNIFICANT CHANGE UP (ref 32–36)
MCV RBC AUTO: 87.5 FL — SIGNIFICANT CHANGE UP (ref 80–100)
NITRITE UR-MCNC: NEGATIVE — SIGNIFICANT CHANGE UP
NRBC # FLD: 0 — SIGNIFICANT CHANGE UP
PH UR: 7.5 — SIGNIFICANT CHANGE UP (ref 5–8)
PLATELET # BLD AUTO: 324 K/UL — SIGNIFICANT CHANGE UP (ref 150–400)
PMV BLD: 9.3 FL — SIGNIFICANT CHANGE UP (ref 7–13)
POTASSIUM SERPL-MCNC: 4 MMOL/L — SIGNIFICANT CHANGE UP (ref 3.5–5.3)
POTASSIUM SERPL-SCNC: 4 MMOL/L — SIGNIFICANT CHANGE UP (ref 3.5–5.3)
PROT UR-MCNC: 70 — SIGNIFICANT CHANGE UP
RBC # BLD: 3.2 M/UL — LOW (ref 3.8–5.2)
RBC # FLD: 14.5 % — SIGNIFICANT CHANGE UP (ref 10.3–14.5)
RBC CASTS # UR COMP ASSIST: SIGNIFICANT CHANGE UP (ref 0–?)
SODIUM SERPL-SCNC: 133 MMOL/L — LOW (ref 135–145)
SP GR SPEC: 1.01 — SIGNIFICANT CHANGE UP (ref 1–1.04)
UROBILINOGEN FLD QL: NORMAL — SIGNIFICANT CHANGE UP
WBC # BLD: 8.74 K/UL — SIGNIFICANT CHANGE UP (ref 3.8–10.5)
WBC # FLD AUTO: 8.74 K/UL — SIGNIFICANT CHANGE UP (ref 3.8–10.5)
WBC UR QL: SIGNIFICANT CHANGE UP (ref 0–?)

## 2018-10-16 RX ADMIN — Medication 100 MILLIGRAM(S): at 06:24

## 2018-10-16 RX ADMIN — Medication 100 MILLIGRAM(S): at 21:57

## 2018-10-16 RX ADMIN — PANTOPRAZOLE SODIUM 40 MILLIGRAM(S): 20 TABLET, DELAYED RELEASE ORAL at 06:24

## 2018-10-16 RX ADMIN — SIMVASTATIN 40 MILLIGRAM(S): 20 TABLET, FILM COATED ORAL at 21:57

## 2018-10-16 RX ADMIN — ISOSORBIDE MONONITRATE 120 MILLIGRAM(S): 60 TABLET, EXTENDED RELEASE ORAL at 12:31

## 2018-10-16 RX ADMIN — BUMETANIDE 7.5 MG/HR: 0.25 INJECTION INTRAMUSCULAR; INTRAVENOUS at 00:58

## 2018-10-16 RX ADMIN — SEVELAMER CARBONATE 800 MILLIGRAM(S): 2400 POWDER, FOR SUSPENSION ORAL at 09:56

## 2018-10-16 RX ADMIN — GABAPENTIN 100 MILLIGRAM(S): 400 CAPSULE ORAL at 06:30

## 2018-10-16 RX ADMIN — BUDESONIDE AND FORMOTEROL FUMARATE DIHYDRATE 2 PUFF(S): 160; 4.5 AEROSOL RESPIRATORY (INHALATION) at 21:57

## 2018-10-16 RX ADMIN — GABAPENTIN 100 MILLIGRAM(S): 400 CAPSULE ORAL at 17:27

## 2018-10-16 RX ADMIN — Medication 100 MILLIGRAM(S): at 12:31

## 2018-10-16 RX ADMIN — AMLODIPINE BESYLATE 5 MILLIGRAM(S): 2.5 TABLET ORAL at 06:24

## 2018-10-16 RX ADMIN — ERYTHROPOIETIN 6000 UNIT(S): 10000 INJECTION, SOLUTION INTRAVENOUS; SUBCUTANEOUS at 13:26

## 2018-10-16 RX ADMIN — SEVELAMER CARBONATE 800 MILLIGRAM(S): 2400 POWDER, FOR SUSPENSION ORAL at 12:31

## 2018-10-16 NOTE — PROGRESS NOTE ADULT - SUBJECTIVE AND OBJECTIVE BOX
Cardiovascular Disease Progress Note    Overnight events: No acute events overnight. Ms. Villagomez is resting comfortably. SOB is at baseline.   Otherwise review of systems negative    Objective Findings:  T(C): 36 (10-16-18 @ 13:22), Max: 36.3 (10-15-18 @ 20:50)  HR: 72 (10-16-18 @ 13:22) (71 - 76)  BP: 136/52 (10-16-18 @ 13:22) (136/52 - 154/57)  RR: 18 (10-16-18 @ 13:22) (18 - 18)  SpO2: 97% (10-16-18 @ 13:22) (97% - 100%)  Wt(kg): --  Daily     Daily Weight in k.8 (16 Oct 2018 06:43)      Physical Exam:  Gen: NAD  HEENT: EOMI  CV: RRR, normal S1 + S2, no m/r/g  Lungs: CTAB  Abd: soft, non-tender  Ext: 1+ pedal edema    Telemetry: Sinus    Laboratory Data:                        9.0    8.74  )-----------( 324      ( 16 Oct 2018 06:15 )             28.0     10-16    133<L>  |  90<L>  |  45<H>  ----------------------------<  91  4.0   |  30  |  3.72<H>    Ca    9.3      16 Oct 2018 06:15  Mg     1.6     10-16                Inpatient Medications:  MEDICATIONS  (STANDING):  amLODIPine   Tablet 5 milliGRAM(s) Oral daily  buDESOnide  80 MICROgram(s)/formoterol 4.5 MICROgram(s) Inhaler 2 Puff(s) Inhalation two times a day  buMETAnide Infusion 1.5 mG/Hr (7.5 mL/Hr) IV Continuous <Continuous>  ergocalciferol 84021 Unit(s) Oral <User Schedule>  gabapentin 100 milliGRAM(s) Oral every 12 hours  heparin  Injectable 5000 Unit(s) SubCutaneous every 12 hours  hydrALAZINE 100 milliGRAM(s) Oral three times a day  isosorbide   mononitrate ER Tablet (IMDUR) 120 milliGRAM(s) Oral daily  metoprolol succinate  milliGRAM(s) Oral daily  pantoprazole    Tablet 40 milliGRAM(s) Oral before breakfast  sevelamer hydrochloride 800 milliGRAM(s) Oral three times a day  simvastatin 40 milliGRAM(s) Oral at bedtime      Assessment: 88 year old woman with CKD V, HTN, and HLD presents with acute diastolic CHF.    Plan of Care:    #Acute diastolic CHF-  Bumex gtt with increased in UOP as per I/Os and daily weights.  Electrolytes stable this morning.   Continue current management.   TTE from 4 months ago reviewed.    Over 25 minutes spent on total encounter; more than 50% of the visit was spent counseling and/or coordinating care by the attending physician.      Justin Vick MD [facility]  Cardiovascular Disease  [phone]

## 2018-10-16 NOTE — PROGRESS NOTE ADULT - SUBJECTIVE AND OBJECTIVE BOX
[place] [facility] NEPHROLOGY- PROGRESS NOTE    88y Female with history of HTN, DM, CKD-5 presents with increasing LE edema. Nephrology consulted for elevated Scr.    REVIEW OF SYSTEMS:  Gen: no changes in weight  Cards: no chest pain  Resp: no dyspnea  GI: no nausea or vomiting or diarrhea  Vascular: + LE edema improving, + hand and feet pain    No Known Allergies      Hospital Medications: Medications reviewed      VITALS:  T(F): 97.4 (10-16-18 @ 06:21), Max: 97.4 (10-15-18 @ 20:50)  HR: 76 (10-16-18 @ 06:21)  BP: 154/57 (10-16-18 @ 06:21)  RR: 18 (10-16-18 @ 06:21)  SpO2: 100% (10-16-18 @ 06:21)  Wt(kg): --    10-15 @ 07:01  -  10-16 @ 07:00  --------------------------------------------------------  IN: 1000 mL / OUT: 4400 mL / NET: -3400 mL    10-16 @ 07:01  -  10-16 @ 13:10  --------------------------------------------------------  IN: 200 mL / OUT: 400 mL / NET: -200 mL      PHYSICAL EXAM:    Gen: NAD, calm  Cards: RRR, +S1/S2, no M/G/R  Resp: CTA B/L  GI: soft, NT/ND, NABS  Vascular: 1+ LE edema greatly improved      LABS:  10-16    133<L>  |  90<L>  |  45<H>  ----------------------------<  91  4.0   |  30  |  3.72<H>    Ca    9.3      16 Oct 2018 06:15  Mg     1.6     10-16      Creatinine Trend: 3.72 <--, 3.64 <--, 3.70 <--, 3.72 <--, 3.74 <--, 3.44 <--, 3.41 <--, 3.30 <--                        9.0    8.74  )-----------( 324      ( 16 Oct 2018 06:15 )             28.0

## 2018-10-16 NOTE — PROGRESS NOTE ADULT - SUBJECTIVE AND OBJECTIVE BOX
FRANCIA MARTEL:[id number],   88yFemale followed for:  No Known Allergies    PAST MEDICAL & SURGICAL HISTORY:  CHF (congestive heart failure): diastolic heart failure  Asthma  CKD (chronic kidney disease)  High cholesterol  Hypertension  History of hysterectomy    FAMILY HISTORY:  No pertinent family history in first degree relatives    MEDICATIONS  (STANDING):  amLODIPine   Tablet 5 milliGRAM(s) Oral daily  buDESOnide  80 MICROgram(s)/formoterol 4.5 MICROgram(s) Inhaler 2 Puff(s) Inhalation two times a day  buMETAnide Infusion 1.5 mG/Hr (7.5 mL/Hr) IV Continuous <Continuous>  epoetin cathy Injectable 6000 Unit(s) SubCutaneous once  ergocalciferol 36720 Unit(s) Oral <User Schedule>  gabapentin 100 milliGRAM(s) Oral every 12 hours  heparin  Injectable 5000 Unit(s) SubCutaneous every 12 hours  hydrALAZINE 100 milliGRAM(s) Oral three times a day  isosorbide   mononitrate ER Tablet (IMDUR) 120 milliGRAM(s) Oral daily  metoprolol succinate  milliGRAM(s) Oral daily  pantoprazole    Tablet 40 milliGRAM(s) Oral before breakfast  sevelamer hydrochloride 800 milliGRAM(s) Oral three times a day  simvastatin 40 milliGRAM(s) Oral at bedtime    MEDICATIONS  (PRN):  artificial  tears Solution 1 Drop(s) Both EYES two times a day PRN Dry Eyes      Vital Signs Last 24 Hrs  T(C): 36.3 (16 Oct 2018 06:21), Max: 37 (15 Oct 2018 12:54)  T(F): 97.4 (16 Oct 2018 06:21), Max: 98.6 (15 Oct 2018 12:54)  HR: 76 (16 Oct 2018 06:21) (71 - 76)  BP: 154/57 (16 Oct 2018 06:21) (143/78 - 168/52)  BP(mean): --  RR: 18 (16 Oct 2018 06:21) (18 - 18)  SpO2: 100% (16 Oct 2018 06:21) (96% - 100%)  nc/at  s1s2  cta  soft, nt, nd no guarding or rebound  no c/c/e    CBC Full  -  ( 16 Oct 2018 06:15 )  WBC Count : 8.74 K/uL  Hemoglobin : 9.0 g/dL  Hematocrit : 28.0 %  Platelet Count - Automated : 324 K/uL  Mean Cell Volume : 87.5 fL  Mean Cell Hemoglobin : 28.1 pg  Mean Cell Hemoglobin Concentration : 32.1 %  Auto Neutrophil # : x  Auto Lymphocyte # : x  Auto Monocyte # : x  Auto Eosinophil # : x  Auto Basophil # : x  Auto Neutrophil % : x  Auto Lymphocyte % : x  Auto Monocyte % : x  Auto Eosinophil % : x  Auto Basophil % : x    10-16    133<L>  |  90<L>  |  45<H>  ----------------------------<  91  4.0   |  30  |  3.72<H>    Ca    9.3      16 Oct 2018 06:15  Mg     1.6     10-16

## 2018-10-16 NOTE — PROGRESS NOTE ADULT - SUBJECTIVE AND OBJECTIVE BOX
SUBJECTIVE / OVERNIGHT EVENTS: pt denies chest pain,, sob+    MEDICATIONS  (STANDING):  amLODIPine   Tablet 5 milliGRAM(s) Oral daily  buDESOnide  80 MICROgram(s)/formoterol 4.5 MICROgram(s) Inhaler 2 Puff(s) Inhalation two times a day  buMETAnide Infusion 1.5 mG/Hr (7.5 mL/Hr) IV Continuous <Continuous>  ergocalciferol 47326 Unit(s) Oral <User Schedule>  gabapentin 100 milliGRAM(s) Oral every 12 hours  heparin  Injectable 5000 Unit(s) SubCutaneous every 12 hours  hydrALAZINE 100 milliGRAM(s) Oral three times a day  isosorbide   mononitrate ER Tablet (IMDUR) 120 milliGRAM(s) Oral daily  metoprolol succinate  milliGRAM(s) Oral daily  pantoprazole    Tablet 40 milliGRAM(s) Oral before breakfast  sevelamer hydrochloride 800 milliGRAM(s) Oral three times a day  simvastatin 40 milliGRAM(s) Oral at bedtime    MEDICATIONS  (PRN):  artificial  tears Solution 1 Drop(s) Both EYES two times a day PRN Dry Eyes    Vital Signs Last 24 Hrs  T(C): 36 (16 Oct 2018 13:22), Max: 36.3 (15 Oct 2018 20:50)  T(F): 96.8 (16 Oct 2018 13:22), Max: 97.4 (15 Oct 2018 20:50)  HR: 72 (16 Oct 2018 13:22) (71 - 76)  BP: 136/52 (16 Oct 2018 13:22) (136/52 - 154/57)  BP(mean): --  RR: 18 (16 Oct 2018 13:22) (18 - 18)  SpO2: 97% (16 Oct 2018 13:22) (97% - 100%)    Constitutional: No fever, fatigue  Skin: No rash.  Eyes: No recent vision problems or eye pain.  ENT: No congestion, ear pain, or sore throat.  Cardiovascular: No chest pain or palpation.  Respiratory: sob+  Gastrointestinal: No abdominal pain, nausea, vomiting, or diarrhea.  Genitourinary: No dysuria.  Musculoskeletal: No joint swelling.  Neurologic: No headache.    PHYSICAL EXAM:  GENERAL: NAD  EYES: EOMI, PERRLA  NECK: Supple, No JVD  CHEST/LUNG: dec breath sounds at bases   HEART:  S1 , S2 +  ABDOMEN: sof,t bs+  EXTREMITIES:  trace edema  NEUROLOGY:alert awake oriented     LABS:  10-16    133<L>  |  90<L>  |  45<H>  ----------------------------<  91  4.0   |  30  |  3.72<H>    Ca    9.3      16 Oct 2018 06:15  Mg     1.6     10-16      Creatinine Trend: 3.72 <--, 3.64 <--, 3.70 <--, 3.72 <--, 3.74 <--, 3.44 <--, 3.41 <--, 3.30 <--                        9.0    8.74  )-----------( 324      ( 16 Oct 2018 06:15 )             28.0     Urine Studies:  Urinalysis Basic - ( 16 Oct 2018 03:17 )    Color: COLORLESS / Appearance: CLEAR / S.009 / pH: 7.5  Gluc: NEGATIVE / Ketone: NEGATIVE  / Bili: NEGATIVE / Urobili: NORMAL   Blood: NEGATIVE / Protein: 70 / Nitrite: NEGATIVE   Leuk Esterase: NEGATIVE / RBC: 0-2 / WBC 0-2   Sq Epi:  / Non Sq Epi:  / Bacteria:

## 2018-10-17 LAB
BUN SERPL-MCNC: 47 MG/DL — HIGH (ref 7–23)
CALCIUM SERPL-MCNC: 9.1 MG/DL — SIGNIFICANT CHANGE UP (ref 8.4–10.5)
CHLORIDE SERPL-SCNC: 89 MMOL/L — LOW (ref 98–107)
CO2 SERPL-SCNC: 29 MMOL/L — SIGNIFICANT CHANGE UP (ref 22–31)
CREAT SERPL-MCNC: 3.81 MG/DL — HIGH (ref 0.5–1.3)
GLUCOSE SERPL-MCNC: 89 MG/DL — SIGNIFICANT CHANGE UP (ref 70–99)
HCT VFR BLD CALC: 27.1 % — LOW (ref 34.5–45)
HGB BLD-MCNC: 8.5 G/DL — LOW (ref 11.5–15.5)
MCHC RBC-ENTMCNC: 27.1 PG — SIGNIFICANT CHANGE UP (ref 27–34)
MCHC RBC-ENTMCNC: 31.4 % — LOW (ref 32–36)
MCV RBC AUTO: 86.3 FL — SIGNIFICANT CHANGE UP (ref 80–100)
NRBC # FLD: 0 — SIGNIFICANT CHANGE UP
PLATELET # BLD AUTO: 315 K/UL — SIGNIFICANT CHANGE UP (ref 150–400)
PMV BLD: 9.5 FL — SIGNIFICANT CHANGE UP (ref 7–13)
POTASSIUM SERPL-MCNC: 3.7 MMOL/L — SIGNIFICANT CHANGE UP (ref 3.5–5.3)
POTASSIUM SERPL-SCNC: 3.7 MMOL/L — SIGNIFICANT CHANGE UP (ref 3.5–5.3)
RBC # BLD: 3.14 M/UL — LOW (ref 3.8–5.2)
RBC # FLD: 14.8 % — HIGH (ref 10.3–14.5)
SODIUM SERPL-SCNC: 133 MMOL/L — LOW (ref 135–145)
WBC # BLD: 8.65 K/UL — SIGNIFICANT CHANGE UP (ref 3.8–10.5)
WBC # FLD AUTO: 8.65 K/UL — SIGNIFICANT CHANGE UP (ref 3.8–10.5)

## 2018-10-17 RX ORDER — BUMETANIDE 0.25 MG/ML
2 INJECTION INTRAMUSCULAR; INTRAVENOUS EVERY 12 HOURS
Qty: 0 | Refills: 0 | Status: COMPLETED | OUTPATIENT
Start: 2018-10-17 | End: 2018-10-18

## 2018-10-17 RX ORDER — POTASSIUM CHLORIDE 20 MEQ
20 PACKET (EA) ORAL ONCE
Qty: 0 | Refills: 0 | Status: COMPLETED | OUTPATIENT
Start: 2018-10-17 | End: 2018-10-17

## 2018-10-17 RX ADMIN — PANTOPRAZOLE SODIUM 40 MILLIGRAM(S): 20 TABLET, DELAYED RELEASE ORAL at 06:09

## 2018-10-17 RX ADMIN — GABAPENTIN 100 MILLIGRAM(S): 400 CAPSULE ORAL at 18:27

## 2018-10-17 RX ADMIN — Medication 100 MILLIGRAM(S): at 21:32

## 2018-10-17 RX ADMIN — Medication 100 MILLIGRAM(S): at 06:08

## 2018-10-17 RX ADMIN — BUDESONIDE AND FORMOTEROL FUMARATE DIHYDRATE 2 PUFF(S): 160; 4.5 AEROSOL RESPIRATORY (INHALATION) at 21:32

## 2018-10-17 RX ADMIN — Medication 20 MILLIEQUIVALENT(S): at 12:20

## 2018-10-17 RX ADMIN — SEVELAMER CARBONATE 800 MILLIGRAM(S): 2400 POWDER, FOR SUSPENSION ORAL at 10:17

## 2018-10-17 RX ADMIN — BUMETANIDE 2 MILLIGRAM(S): 0.25 INJECTION INTRAMUSCULAR; INTRAVENOUS at 12:16

## 2018-10-17 RX ADMIN — AMLODIPINE BESYLATE 5 MILLIGRAM(S): 2.5 TABLET ORAL at 06:08

## 2018-10-17 RX ADMIN — SIMVASTATIN 40 MILLIGRAM(S): 20 TABLET, FILM COATED ORAL at 21:32

## 2018-10-17 RX ADMIN — SEVELAMER CARBONATE 800 MILLIGRAM(S): 2400 POWDER, FOR SUSPENSION ORAL at 13:36

## 2018-10-17 RX ADMIN — GABAPENTIN 100 MILLIGRAM(S): 400 CAPSULE ORAL at 06:09

## 2018-10-17 RX ADMIN — ISOSORBIDE MONONITRATE 120 MILLIGRAM(S): 60 TABLET, EXTENDED RELEASE ORAL at 12:16

## 2018-10-17 RX ADMIN — Medication 100 MILLIGRAM(S): at 13:37

## 2018-10-17 NOTE — PROGRESS NOTE ADULT - SUBJECTIVE AND OBJECTIVE BOX
SUBJECTIVE / OVERNIGHT EVENTS: pt denies chest pain,, sob+    MEDICATIONS  (STANDING):  amLODIPine   Tablet 5 milliGRAM(s) Oral daily  buDESOnide  80 MICROgram(s)/formoterol 4.5 MICROgram(s) Inhaler 2 Puff(s) Inhalation two times a day  buMETAnide Injectable 2 milliGRAM(s) IV Push every 12 hours  ergocalciferol 77483 Unit(s) Oral <User Schedule>  gabapentin 100 milliGRAM(s) Oral every 12 hours  heparin  Injectable 5000 Unit(s) SubCutaneous every 12 hours  hydrALAZINE 100 milliGRAM(s) Oral three times a day  isosorbide   mononitrate ER Tablet (IMDUR) 120 milliGRAM(s) Oral daily  metoprolol succinate  milliGRAM(s) Oral daily  pantoprazole    Tablet 40 milliGRAM(s) Oral before breakfast  sevelamer hydrochloride 800 milliGRAM(s) Oral three times a day  simvastatin 40 milliGRAM(s) Oral at bedtime    MEDICATIONS  (PRN):  artificial  tears Solution 1 Drop(s) Both EYES two times a day PRN Dry Eyes    Vital Signs Last 24 Hrs  T(C): 36.5 (17 Oct 2018 12:14), Max: 36.5 (17 Oct 2018 12:14)  T(F): 97.7 (17 Oct 2018 12:14), Max: 97.7 (17 Oct 2018 12:14)  HR: 75 (17 Oct 2018 13:35) (72 - 77)  BP: 126/40 (17 Oct 2018 13:35) (116/46 - 140/62)  BP(mean): --  RR: 18 (17 Oct 2018 12:14) (18 - 18)  SpO2: 100% (17 Oct 2018 12:14) (97% - 100%)    Constitutional: No fever, fatigue  Skin: No rash.  Eyes: No recent vision problems or eye pain.  ENT: No congestion, ear pain, or sore throat.  Cardiovascular: No chest pain or palpation.  Respiratory: sob+  Gastrointestinal: No abdominal pain, nausea, vomiting, or diarrhea.  Genitourinary: No dysuria.  Musculoskeletal: No joint swelling.  Neurologic: No headache.    PHYSICAL EXAM:  GENERAL: NAD  EYES: EOMI, PERRLA  NECK: Supple, No JVD  CHEST/LUNG: dec breath sounds at bases   HEART:  S1 , S2 +  ABDOMEN: sof,t bs+  EXTREMITIES:  trace edema  NEUROLOGY:alert awake oriented     LABS:  10-17    133<L>  |  89<L>  |  47<H>  ----------------------------<  89  3.7   |  29  |  3.81<H>    Ca    9.1      17 Oct 2018 06:53  Mg     1.6     10-      Creatinine Trend: 3.81 <--, 3.72 <--, 3.64 <--, 3.70 <--, 3.72 <--, 3.74 <--, 3.44 <--, 3.41 <--                        8.5    8.65  )-----------( 315      ( 17 Oct 2018 06:53 )             27.1     Urine Studies:  Urinalysis Basic - ( 16 Oct 2018 03:17 )    Color: COLORLESS / Appearance: CLEAR / S.009 / pH: 7.5  Gluc: NEGATIVE / Ketone: NEGATIVE  / Bili: NEGATIVE / Urobili: NORMAL   Blood: NEGATIVE / Protein: 70 / Nitrite: NEGATIVE   Leuk Esterase: NEGATIVE / RBC: 0-2 / WBC 0-2   Sq Epi:  / Non Sq Epi:  / Bacteria:

## 2018-10-17 NOTE — PROGRESS NOTE ADULT - SUBJECTIVE AND OBJECTIVE BOX
FRANCIA MARTEL:[id number],   88yFemale followed for:  No Known Allergies    PAST MEDICAL & SURGICAL HISTORY:  CHF (congestive heart failure): diastolic heart failure  Asthma  CKD (chronic kidney disease)  High cholesterol  Hypertension  History of hysterectomy    FAMILY HISTORY:  No pertinent family history in first degree relatives    MEDICATIONS  (STANDING):  amLODIPine   Tablet 5 milliGRAM(s) Oral daily  buDESOnide  80 MICROgram(s)/formoterol 4.5 MICROgram(s) Inhaler 2 Puff(s) Inhalation two times a day  buMETAnide Infusion 1.5 mG/Hr (7.5 mL/Hr) IV Continuous <Continuous>  ergocalciferol 80200 Unit(s) Oral <User Schedule>  gabapentin 100 milliGRAM(s) Oral every 12 hours  heparin  Injectable 5000 Unit(s) SubCutaneous every 12 hours  hydrALAZINE 100 milliGRAM(s) Oral three times a day  isosorbide   mononitrate ER Tablet (IMDUR) 120 milliGRAM(s) Oral daily  metoprolol succinate  milliGRAM(s) Oral daily  pantoprazole    Tablet 40 milliGRAM(s) Oral before breakfast  sevelamer hydrochloride 800 milliGRAM(s) Oral three times a day  simvastatin 40 milliGRAM(s) Oral at bedtime    MEDICATIONS  (PRN):  artificial  tears Solution 1 Drop(s) Both EYES two times a day PRN Dry Eyes      Vital Signs Last 24 Hrs  T(C): 35.8 (17 Oct 2018 06:08), Max: 36 (16 Oct 2018 13:22)  T(F): 96.5 (17 Oct 2018 06:08), Max: 96.8 (16 Oct 2018 13:22)  HR: 72 (17 Oct 2018 06:08) (72 - 77)  BP: 140/62 (17 Oct 2018 06:08) (120/50 - 140/62)  BP(mean): --  RR: 18 (17 Oct 2018 06:08) (18 - 18)  SpO2: 98% (17 Oct 2018 06:08) (97% - 98%)  nc/at  s1s2  cta  soft, nt, nd no guarding or rebound  no c/c/e    CBC Full  -  ( 17 Oct 2018 06:53 )  WBC Count : 8.65 K/uL  Hemoglobin : 8.5 g/dL  Hematocrit : 27.1 %  Platelet Count - Automated : 315 K/uL  Mean Cell Volume : 86.3 fL  Mean Cell Hemoglobin : 27.1 pg  Mean Cell Hemoglobin Concentration : 31.4 %  Auto Neutrophil # : x  Auto Lymphocyte # : x  Auto Monocyte # : x  Auto Eosinophil # : x  Auto Basophil # : x  Auto Neutrophil % : x  Auto Lymphocyte % : x  Auto Monocyte % : x  Auto Eosinophil % : x  Auto Basophil % : x    10-17    133<L>  |  89<L>  |  47<H>  ----------------------------<  89  3.7   |  29  |  3.81<H>    Ca    9.1      17 Oct 2018 06:53  Mg     1.6     10-16

## 2018-10-17 NOTE — PROGRESS NOTE ADULT - SUBJECTIVE AND OBJECTIVE BOX
Cardiovascular Disease Progress Note    Overnight events: No acute events overnight. Ms. Villagomez denies chest pain or SOB.    Otherwise review of systems negative    Objective Findings:  T(C): 35.8 (10-17-18 @ 06:08), Max: 36 (10-16-18 @ 13:22)  HR: 72 (10-17-18 @ 06:08) (72 - 77)  BP: 140/62 (10-17-18 @ 06:08) (120/50 - 140/62)  RR: 18 (10-17-18 @ 06:08) (18 - 18)  SpO2: 98% (10-17-18 @ 06:08) (97% - 98%)  Wt(kg): --  Daily     Daily Weight in k (17 Oct 2018 07:51)      Physical Exam:  Gen: NAD  HEENT: EOMI  CV: RRR, normal S1 + S2, no m/r/g  Lungs: CTAB  Abd: soft, non-tender  Ext: 1+ pedal edema    Telemetry: Sinus    Laboratory Data:                        8.5    8.65  )-----------( 315      ( 17 Oct 2018 06:53 )             27.1     10-    133<L>  |  89<L>  |  47<H>  ----------------------------<  89  3.7   |  29  |  3.81<H>    Ca    9.1      17 Oct 2018 06:53  Mg     1.6     10-16                Inpatient Medications:  MEDICATIONS  (STANDING):  amLODIPine   Tablet 5 milliGRAM(s) Oral daily  buDESOnide  80 MICROgram(s)/formoterol 4.5 MICROgram(s) Inhaler 2 Puff(s) Inhalation two times a day  buMETAnide Infusion 1.5 mG/Hr (7.5 mL/Hr) IV Continuous <Continuous>  ergocalciferol 63634 Unit(s) Oral <User Schedule>  gabapentin 100 milliGRAM(s) Oral every 12 hours  heparin  Injectable 5000 Unit(s) SubCutaneous every 12 hours  hydrALAZINE 100 milliGRAM(s) Oral three times a day  isosorbide   mononitrate ER Tablet (IMDUR) 120 milliGRAM(s) Oral daily  metoprolol succinate  milliGRAM(s) Oral daily  pantoprazole    Tablet 40 milliGRAM(s) Oral before breakfast  sevelamer hydrochloride 800 milliGRAM(s) Oral three times a day  simvastatin 40 milliGRAM(s) Oral at bedtime      Assessment: 88 year old woman with CKD V, HTN, and HLD presents with acute diastolic CHF.    Plan of Care:    #Acute diastolic CHF-  Patient with significant weight loss since starting Bumex gtt.  Creatinine starting to rise.  Agree with transitioning to oral Bumex.   TTE from 4 months ago reviewed.      Care discussed with nephrology attending.     Over 25 minutes spent on total encounter; more than 50% of the visit was spent counseling and/or coordinating care by the attending physician.      Justin Vick MD [facility]  Cardiovascular Disease  [phone]

## 2018-10-17 NOTE — PROGRESS NOTE ADULT - SUBJECTIVE AND OBJECTIVE BOX
[facility] NEPHROLOGY- PROGRESS NOTE    88y Female with history of HTN, DM, CKD-5 presents with increasing LE edema. Nephrology consulted for elevated Scr.    REVIEW OF SYSTEMS:  Gen: no changes in weight  Cards: no chest pain  Resp: no dyspnea  GI: no nausea or vomiting or diarrhea  Vascular: + LE edema improving, + hand and feet pain    No Known Allergies      Hospital Medications: Medications reviewed      VITALS:  T(F): 96.5 (10-17-18 @ 06:08), Max: 96.8 (10-16-18 @ 13:22)  HR: 72 (10-17-18 @ 06:08)  BP: 140/62 (10-17-18 @ 06:08)  RR: 18 (10-17-18 @ 06:08)  SpO2: 98% (10-17-18 @ 06:08)  Wt(kg): --    10-16 @ 07:01  -  10-17 @ 07:00  --------------------------------------------------------  IN: 800 mL / OUT: 1600 mL / NET: -800 mL    10-17 @ 07:01  -  10-17 @ 11:24  --------------------------------------------------------  IN: 0 mL / OUT: 2200 mL / NET: -2200 mL      PHYSICAL EXAM:    Gen: NAD, calm  Cards: RRR, +S1/S2, no M/G/R  Resp: CTA B/L  GI: soft, NT/ND, NABS  Vascular: trace LE edema greatly improved      LABS:  10-17    133<L>  |  89<L>  |  47<H>  ----------------------------<  89  3.7   |  29  |  3.81<H>    Ca    9.1      17 Oct 2018 06:53  Mg     1.6     10-16      Creatinine Trend: 3.81 <--, 3.72 <--, 3.64 <--, 3.70 <--, 3.72 <--, 3.74 <--, 3.44 <--, 3.41 <--                        8.5    8.65  )-----------( 315      ( 17 Oct 2018 06:53 )             27.1

## 2018-10-18 LAB
BUN SERPL-MCNC: SIGNIFICANT CHANGE UP MG/DL (ref 7–23)
CALCIUM SERPL-MCNC: SIGNIFICANT CHANGE UP MG/DL (ref 8.4–10.5)
CHLORIDE SERPL-SCNC: 89 MMOL/L — LOW (ref 98–107)
CO2 SERPL-SCNC: SIGNIFICANT CHANGE UP MMOL/L (ref 22–31)
CREAT SERPL-MCNC: 3.82 MG/DL — HIGH (ref 0.5–1.3)
GLUCOSE SERPL-MCNC: SIGNIFICANT CHANGE UP MG/DL (ref 70–99)
PHOSPHATE SERPL-MCNC: SIGNIFICANT CHANGE UP MG/DL (ref 2.5–4.5)
POTASSIUM SERPL-MCNC: 4.8 MMOL/L — SIGNIFICANT CHANGE UP (ref 3.5–5.3)
POTASSIUM SERPL-SCNC: 4.8 MMOL/L — SIGNIFICANT CHANGE UP (ref 3.5–5.3)
SODIUM SERPL-SCNC: 133 MMOL/L — LOW (ref 135–145)

## 2018-10-18 PROCEDURE — 93971 EXTREMITY STUDY: CPT | Mod: 26,LT

## 2018-10-18 PROCEDURE — 74176 CT ABD & PELVIS W/O CONTRAST: CPT | Mod: 26

## 2018-10-18 PROCEDURE — 99223 1ST HOSP IP/OBS HIGH 75: CPT | Mod: GC

## 2018-10-18 RX ORDER — BUMETANIDE 0.25 MG/ML
2 INJECTION INTRAMUSCULAR; INTRAVENOUS EVERY 12 HOURS
Qty: 0 | Refills: 0 | Status: DISCONTINUED | OUTPATIENT
Start: 2018-10-19 | End: 2018-10-19

## 2018-10-18 RX ORDER — ACETAMINOPHEN 500 MG
650 TABLET ORAL EVERY 6 HOURS
Qty: 0 | Refills: 0 | Status: DISCONTINUED | OUTPATIENT
Start: 2018-10-18 | End: 2018-10-22

## 2018-10-18 RX ADMIN — Medication 100 MILLIGRAM(S): at 21:06

## 2018-10-18 RX ADMIN — GABAPENTIN 100 MILLIGRAM(S): 400 CAPSULE ORAL at 05:29

## 2018-10-18 RX ADMIN — BUDESONIDE AND FORMOTEROL FUMARATE DIHYDRATE 2 PUFF(S): 160; 4.5 AEROSOL RESPIRATORY (INHALATION) at 21:05

## 2018-10-18 RX ADMIN — Medication 650 MILLIGRAM(S): at 16:58

## 2018-10-18 RX ADMIN — SIMVASTATIN 40 MILLIGRAM(S): 20 TABLET, FILM COATED ORAL at 21:06

## 2018-10-18 RX ADMIN — Medication 100 MILLIGRAM(S): at 05:29

## 2018-10-18 RX ADMIN — BUMETANIDE 2 MILLIGRAM(S): 0.25 INJECTION INTRAMUSCULAR; INTRAVENOUS at 00:15

## 2018-10-18 RX ADMIN — GABAPENTIN 100 MILLIGRAM(S): 400 CAPSULE ORAL at 17:09

## 2018-10-18 RX ADMIN — AMLODIPINE BESYLATE 5 MILLIGRAM(S): 2.5 TABLET ORAL at 05:29

## 2018-10-18 RX ADMIN — ISOSORBIDE MONONITRATE 120 MILLIGRAM(S): 60 TABLET, EXTENDED RELEASE ORAL at 12:01

## 2018-10-18 RX ADMIN — PANTOPRAZOLE SODIUM 40 MILLIGRAM(S): 20 TABLET, DELAYED RELEASE ORAL at 05:28

## 2018-10-18 RX ADMIN — SEVELAMER CARBONATE 800 MILLIGRAM(S): 2400 POWDER, FOR SUSPENSION ORAL at 09:28

## 2018-10-18 RX ADMIN — Medication 650 MILLIGRAM(S): at 14:52

## 2018-10-18 RX ADMIN — SEVELAMER CARBONATE 800 MILLIGRAM(S): 2400 POWDER, FOR SUSPENSION ORAL at 21:05

## 2018-10-18 RX ADMIN — Medication 100 MILLIGRAM(S): at 13:36

## 2018-10-18 RX ADMIN — BUMETANIDE 2 MILLIGRAM(S): 0.25 INJECTION INTRAMUSCULAR; INTRAVENOUS at 12:01

## 2018-10-18 NOTE — PROGRESS NOTE ADULT - SUBJECTIVE AND OBJECTIVE BOX
FRANCIA MARTEL:[id number],   88yFemale followed for:  No Known Allergies    PAST MEDICAL & SURGICAL HISTORY:  CHF (congestive heart failure): diastolic heart failure  Asthma  CKD (chronic kidney disease)  High cholesterol  Hypertension  History of hysterectomy    FAMILY HISTORY:  No pertinent family history in first degree relatives    MEDICATIONS  (STANDING):  amLODIPine   Tablet 5 milliGRAM(s) Oral daily  buDESOnide  80 MICROgram(s)/formoterol 4.5 MICROgram(s) Inhaler 2 Puff(s) Inhalation two times a day  buMETAnide Injectable 2 milliGRAM(s) IV Push every 12 hours  ergocalciferol 92672 Unit(s) Oral <User Schedule>  gabapentin 100 milliGRAM(s) Oral every 12 hours  heparin  Injectable 5000 Unit(s) SubCutaneous every 12 hours  hydrALAZINE 100 milliGRAM(s) Oral three times a day  isosorbide   mononitrate ER Tablet (IMDUR) 120 milliGRAM(s) Oral daily  metoprolol succinate  milliGRAM(s) Oral daily  pantoprazole    Tablet 40 milliGRAM(s) Oral before breakfast  sevelamer hydrochloride 800 milliGRAM(s) Oral three times a day  simvastatin 40 milliGRAM(s) Oral at bedtime    MEDICATIONS  (PRN):  artificial  tears Solution 1 Drop(s) Both EYES two times a day PRN Dry Eyes      Vital Signs Last 24 Hrs  T(C): 36.4 (18 Oct 2018 05:27), Max: 36.6 (17 Oct 2018 21:31)  T(F): 97.5 (18 Oct 2018 05:27), Max: 97.8 (17 Oct 2018 21:31)  HR: 78 (18 Oct 2018 05:27) (69 - 78)  BP: 140/65 (18 Oct 2018 05:27) (116/46 - 147/77)  BP(mean): --  RR: 18 (18 Oct 2018 05:27) (17 - 18)  SpO2: 100% (18 Oct 2018 05:27) (99% - 100%)  nc/at  s1s2  cta  soft, nt, nd no guarding or rebound  no c/c/e    CBC Full  -  ( 17 Oct 2018 06:53 )  WBC Count : 8.65 K/uL  Hemoglobin : 8.5 g/dL  Hematocrit : 27.1 %  Platelet Count - Automated : 315 K/uL  Mean Cell Volume : 86.3 fL  Mean Cell Hemoglobin : 27.1 pg  Mean Cell Hemoglobin Concentration : 31.4 %  Auto Neutrophil # : x  Auto Lymphocyte # : x  Auto Monocyte # : x  Auto Eosinophil # : x  Auto Basophil # : x  Auto Neutrophil % : x  Auto Lymphocyte % : x  Auto Monocyte % : x  Auto Eosinophil % : x  Auto Basophil % : x    10-18    133<L>  |  89<L>  |  Insufficient quant  ----------------------------<  Insufficient quant  4.8   |  Insufficient quant  |  3.82<H>    Ca    Insufficient quant      18 Oct 2018 07:05  Phos  Insufficient quant     10-18

## 2018-10-18 NOTE — PROGRESS NOTE ADULT - SUBJECTIVE AND OBJECTIVE BOX
SUBJECTIVE / OVERNIGHT EVENTS: pt denies chest pain,, c/o left groin pain     MEDICATIONS  (STANDING):  amLODIPine   Tablet 5 milliGRAM(s) Oral daily  buDESOnide  80 MICROgram(s)/formoterol 4.5 MICROgram(s) Inhaler 2 Puff(s) Inhalation two times a day  ergocalciferol 52162 Unit(s) Oral <User Schedule>  gabapentin 100 milliGRAM(s) Oral every 12 hours  heparin  Injectable 5000 Unit(s) SubCutaneous every 12 hours  hydrALAZINE 100 milliGRAM(s) Oral three times a day  isosorbide   mononitrate ER Tablet (IMDUR) 120 milliGRAM(s) Oral daily  metoprolol succinate  milliGRAM(s) Oral daily  pantoprazole    Tablet 40 milliGRAM(s) Oral before breakfast  sevelamer hydrochloride 800 milliGRAM(s) Oral three times a day  simvastatin 40 milliGRAM(s) Oral at bedtime    MEDICATIONS  (PRN):  acetaminophen   Tablet .. 650 milliGRAM(s) Oral every 6 hours PRN Temp greater or equal to 38C (100.4F), Mild Pain (1 - 3), Moderate Pain (4 - 6)  artificial  tears Solution 1 Drop(s) Both EYES two times a day PRN Dry Eyes    Vital Signs Last 24 Hrs  T(C): 37 (18 Oct 2018 20:55), Max: 38.1 (18 Oct 2018 14:52)  T(F): 98.6 (18 Oct 2018 20:55), Max: 100.5 (18 Oct 2018 14:52)  HR: 72 (18 Oct 2018 20:55) (69 - 78)  BP: 123/52 (18 Oct 2018 20:55) (120/45 - 140/65)  BP(mean): --  RR: 18 (18 Oct 2018 20:55) (18 - 18)  SpO2: 97% (18 Oct 2018 20:55) (97% - 100%)    Constitutional: No fever, fatigue  Skin: No rash.  Eyes: No recent vision problems or eye pain.  ENT: No congestion, ear pain, or sore throat.  Cardiovascular: No chest pain or palpation.  Respiratory: sob+  Gastrointestinal: No abdominal pain, nausea, vomiting, or diarrhea.  Genitourinary: No dysuria.  Musculoskeletal: No joint swelling. left groin pain   Neurologic: No headache.    PHYSICAL EXAM:  GENERAL: NAD  EYES: EOMI, PERRLA  NECK: Supple, No JVD  CHEST/LUNG: dec breath sounds at bases   HEART:  S1 , S2 +  ABDOMEN: sof,t bs+, mild tenderness left groin are   LABS:  10-18    133<L>  |  89<L>  |  Insufficient quant  ----------------------------<  Insufficient quant  4.8   |  Insufficient quant  |  3.82<H>    Ca    Insufficient quant      18 Oct 2018 07:05  Phos  Insufficient quant     10-18      Creatinine Trend: 3.82 <--, 3.81 <--, 3.72 <--, 3.64 <--, 3.70 <--, 3.72 <--, 3.74 <--, 3.44 <--                        8.5    8.65  )-----------( 315      ( 17 Oct 2018 06:53 )             27.1     Urine Studies:  Urinalysis Basic - ( 16 Oct 2018 03:17 )    Color: COLORLESS / Appearance: CLEAR / S.009 / pH: 7.5  Gluc: NEGATIVE / Ketone: NEGATIVE  / Bili: NEGATIVE / Urobili: NORMAL   Blood: NEGATIVE / Protein: 70 / Nitrite: NEGATIVE   Leuk Esterase: NEGATIVE / RBC: 0-2 / WBC 0-2   Sq Epi:  / Non Sq Epi:  / Bacteria:                   EXTREMITIES:  trace edema  NEUROLOGY:alert awake oriented

## 2018-10-18 NOTE — PROGRESS NOTE ADULT - SUBJECTIVE AND OBJECTIVE BOX
[facility] NEPHROLOGY- PROGRESS NOTE    88y Female with history of HTN, DM, CKD-5 presents with increasing LE edema. Nephrology consulted for elevated Scr.    REVIEW OF SYSTEMS:  Gen: no changes in weight  Cards: no chest pain  Resp: no dyspnea  GI: no nausea or vomiting or diarrhea  Vascular: + LE edema improving, + L leg pain with weakness    No Known Allergies      Hospital Medications: Medications reviewed      VITALS:  T(F): 97.5 (10-18-18 @ 05:27), Max: 97.8 (10-17-18 @ 21:31)  HR: 78 (10-18-18 @ 05:27)  BP: 140/65 (10-18-18 @ 05:27)  RR: 18 (10-18-18 @ 05:27)  SpO2: 100% (10-18-18 @ 05:27)  Wt(kg): --    10-17 @ 07:01  -  10-18 @ 07:00  --------------------------------------------------------  IN: 0 mL / OUT: 3100 mL / NET: -3100 mL    10-18 @ 07:01  -  10-18 @ 08:35  --------------------------------------------------------  IN: 0 mL / OUT: 300 mL / NET: -300 mL      PHYSICAL EXAM:    Gen: NAD, calm  Cards: RRR, +S1/S2, no M/G/R  Resp: CTA B/L  GI: soft, NT/ND, NABS  Vascular: no LE edema B/L      LABS:  10-17    133<L>  |  89<L>  |  47<H>  ----------------------------<  89  3.7   |  29  |  3.81<H>    Ca    9.1      17 Oct 2018 06:53  Mg     1.6     10-16      Creatinine Trend: 3.81 <--, 3.72 <--, 3.64 <--, 3.70 <--, 3.72 <--, 3.74 <--, 3.44 <--, 3.41 <--                        8.5    8.65  )-----------( 315      ( 17 Oct 2018 06:53 )             27.1

## 2018-10-18 NOTE — CONSULT NOTE ADULT - ASSESSMENT
88 year old female PMH CKD (not currently on dialysis), HLD, HTN who presented to [facility] on 10/5/18 with bilateral worsening LE edema (likely acute on chronic HF exacerbation). On admission afebrile, not tachycardic but hypertensive to > 210. WBC on admission of 7.32 with 63.5%. U/A on admission with negative leukocyte esterase. Given anemia and positive occult blood patient underwent CT Abdomen/Pelvis noncontrast on 10/9/18 as a cancer screening tool with No CT evidence of acute intra-abdominal pathology. On the afternoon of the consult patient developed severe lower abdominal pain. Patient also febrile to 100.5. U/A from 10/16 with negative leukocyte esterase. 88 year old female PMH CKD (not currently on dialysis), HLD, HTN who presented to [facility] on 10/5/18 with bilateral worsening LE edema (likely acute on chronic HF exacerbation). On admission afebrile, not tachycardic but hypertensive to > 210. WBC on admission of 7.32 with 63.5%. U/A on admission with negative leukocyte esterase. Given anemia and positive occult blood patient underwent CT Abdomen/Pelvis noncontrast on 10/9/18 as a cancer screening tool with No CT evidence of acute intra-abdominal pathology. On the afternoon of the consult patient developed severe lower abdominal pain. Patient also febrile to 100.5. U/A from 10/16 with negative leukocyte esterase.     Overall, acute LLQ pain with associated fever; uncertain etiology at this point. Pain resolved by time of evaluation. Would obtain two sets of blood cultures and would monitor off of antibiotics. Would followup on the results of the CT Abdomen/pelvis. If patient develops signs/symptoms of sepsis or persistent fever would initiate Zosyn 3.375 mg IV Q12H.    --Recommend 2 sets of blood cultures  --Recommend monitoring off of antibiotics  --F/U CT Abdomen/Pelvis  --If patient develops signs/symptoms of sepsis or persistent fever would initiate Zosyn 3.375 mg IV Q12H. 88 year old female PMH CKD (not currently on dialysis), HLD, HTN who presented to [facility] on 10/5/18 with bilateral worsening LE edema (likely acute on chronic HF exacerbation). On admission afebrile, not tachycardic but hypertensive to > 210. WBC on admission of 7.32 with 63.5%. U/A on admission with negative leukocyte esterase. Given anemia and positive occult blood patient underwent CT Abdomen/Pelvis noncontrast on 10/9/18 as a cancer screening tool with No CT evidence of acute intra-abdominal pathology. On the afternoon of the consult patient developed severe lower abdominal pain. Patient also febrile to 100.5. U/A from 10/16 with negative leukocyte esterase.     acute LLQ/L inguinal pain with associated fever of 100.5 ; uncertain etiology at this point, r/o nephrolithiasis or diverticulitises     --Recommend 2 sets of blood cultures  --Recommend monitoring off of antibiotics  --F/U CT Abdomen/Pelvis  --If patient develops signs/symptoms of sepsis or persistent fever would initiate Zosyn 3.375 mg IV Q12H.

## 2018-10-18 NOTE — CONSULT NOTE ADULT - REASON FOR ADMISSION
"b/l worsening LE edema x 1 day"
LE edema

## 2018-10-18 NOTE — CHART NOTE - NSCHARTNOTEFT_GEN_A_CORE
Called to evaluate patient for abdominal pain . Patient sitting up in bed hunched over groaning . spoke to patient via  phone . complains of severe lower abdominal pain since last night . The pain is exacerbated with movement of the legs. No N/v/d + flatus and BM . Reports difficulty with urination but no dysuria .     on exam : + Guarding of lower abdomen , + pain on palpation of suprapubic region although patient pointing to left groin area. No pain on palpitation to left groin. + bowel sounds all 4 quadrants no rebound tenderness . + pulses in extremities     Plan : bladder scan with 200 cc urine   will sent UA , Urine culture, no leukocytosis no fever   - d/w attending STAT CT abdomen and pelvis ordered to r/o obstruction   - LE dopplers ordered  vitals stable at present

## 2018-10-18 NOTE — CONSULT NOTE ADULT - SUBJECTIVE AND OBJECTIVE BOX
HPI:    88 year old female PMH CKD (not currently on dialysis), HLD, HTN who presented to [facility] on 10/5/18 with bilateral worsening LE edema. Recent admission 8/21/18 - 8/27/18 for left arm pain and was found to be Parvovirus IgM and IgG positive; placed on Prednisone 20mg x 5 days, then 15mg x 5 days, then 10mg x 5 days, then 5 mg x 5 days then stop. On admission afebrile, not tachycardic but hypertensive to > 210. WBC on admission of 7.32 with 63.5%. U/A on admission with negative leukocyte esterase. Suspected of having acute on chronic heart failure possibly precipitated by corticosteroids. Patient underwent gentle diuresis. Given anemia and positive occult blood patient underwent CT Abdomen/Pelvis noncontrast on 10/9/18 as a cancer screening tool with No CT evidence of acute intra-abdominal pathology.        PAST MEDICAL & SURGICAL HISTORY:  CHF (congestive heart failure): diastolic heart failure  Asthma  CKD (chronic kidney disease)  High cholesterol  Hypertension  History of hysterectomy      Allergies  No Known Allergies        ANTIMICROBIALS:      OTHER MEDS: MEDICATIONS  (STANDING):  acetaminophen   Tablet .. 650 every 6 hours PRN  amLODIPine   Tablet 5 daily  buDESOnide  80 MICROgram(s)/formoterol 4.5 MICROgram(s) Inhaler 2 two times a day  gabapentin 100 every 12 hours  heparin  Injectable 5000 every 12 hours  hydrALAZINE 100 three times a day  isosorbide   mononitrate ER Tablet (IMDUR) 120 daily  metoprolol succinate  daily  pantoprazole    Tablet 40 before breakfast  simvastatin 40 at bedtime      SOCIAL HISTORY:  [ ] etoh [ ] tobacco [ ] former smoker [ ] IVDU    FAMILY HISTORY:  No pertinent family history in first degree relatives      REVIEW OF SYSTEMS  [  ] ROS unobtainable because:    [  ] All other systems negative except as noted below:	    Constitutional:  [ ] fever [ ] chills  [ ] weight loss  [ ] weakness  Skin:  [ ] rash [ ] phlebitis	  Eyes: [ ] icterus [ ] pain  [ ] discharge	  ENMT: [ ] sore throat  [ ] thrush [ ] ulcers [ ] exudates  Respiratory: [ ] dyspnea [ ] hemoptysis [ ] cough [ ] sputum	  Cardiovascular:  [ ] chest pain [ ] palpitations [ ] edema	  Gastrointestinal:  [ ] nausea [ ] vomiting [ ] diarrhea [ ] constipation [ ] pain	  Genitourinary:  [ ] dysuria [ ] frequency [ ] hematuria [ ] discharge [ ] flank pain  [ ] incontinence  Musculoskeletal:  [ ] myalgias [ ] arthralgias [ ] arthritis  [ ] back pain  Neurological:  [ ] headache [ ] seizures  [ ] confusion/altered mental status  Psychiatric:  [ ] anxiety [ ] depression	  Hematology/Lymphatics:  [ ] lymphadenopathy  Endocrine:  [ ] adrenal [ ] thyroid  Allergic/Immunologic:	 [ ] transplant [ ] seasonal    Vital Signs Last 24 Hrs  T(F): 100.5 (10-18-18 @ 14:52), Max: 100.5 (10-18-18 @ 14:52)    Vital Signs Last 24 Hrs  HR: 78 (10-18-18 @ 13:35) (69 - 78)  BP: 136/52 (10-18-18 @ 13:35) (120/45 - 147/77)  RR: 18 (10-18-18 @ 11:57)  SpO2: 99% (10-18-18 @ 11:57) (99% - 100%)  Wt(kg): --    PHYSICAL EXAM:  General: non-toxic  HEAD/EYES: anicteric, PERRL  ENT:  supple  Cardiovascular:   S1, S2  Respiratory:  clear bilaterally  GI:  soft, non-tender, normal bowel sounds  :  no CVA tenderness   Musculoskeletal:  no synovitis  Neurologic:  grossly non-focal  Skin:  no rash  Lymph: no lymphadenopathy  Psychiatric:  appropriate affect  Vascular:  no phlebitis                                8.5    8.65  )-----------( 315      ( 17 Oct 2018 06:53 )             27.1       10-18    133<L>  |  89<L>  |  Insufficient quant  ----------------------------<  Insufficient quant  4.8   |  Insufficient quant  |  3.82<H>    Ca    Insufficient quant      18 Oct 2018 07:05  Phos  Insufficient quant     10-18            MICROBIOLOGY:  URINE MIDSTREAM  10-05-18 --  --  --    RADIOLOGY:    EXAM:  US DPLX LWR EXT VEINS COMPL BI    PROCEDURE DATE:  Oct  5 2018   No evidence of bilateral lower extremity deep venous thrombosis.     EXAM:  CT ABDOMEN AND PELVIS OC    PROCEDURE DATE:  Oct  9 2018   Moderate bilateral pleural effusions. Anasarca.  No CT evidence of acute intra-abdominal pathology. HPI:    88 year old female PMH CKD (not currently on dialysis), HLD, HTN who presented to [facility] on 10/5/18 with bilateral worsening LE edema. Recent admission 8/21/18 - 8/27/18 for left arm pain and was found to be Parvovirus IgM and IgG positive; placed on Prednisone 20mg x 5 days, then 15mg x 5 days, then 10mg x 5 days, then 5 mg x 5 days then stop. On admission afebrile, not tachycardic but hypertensive to > 210. WBC on admission of 7.32 with 63.5%. U/A on admission with negative leukocyte esterase. Suspected of having acute on chronic heart failure possibly precipitated by corticosteroids. Patient underwent gentle diuresis. Given anemia and positive occult blood patient underwent CT Abdomen/Pelvis noncontrast on 10/9/18 as a cancer screening tool with No CT evidence of acute intra-abdominal pathology. On the afternoon of the consult patient developed severe lower abdominal pain. Patient also febrile to 100.5.         PAST MEDICAL & SURGICAL HISTORY:  CHF (congestive heart failure): diastolic heart failure  Asthma  CKD (chronic kidney disease)  High cholesterol  Hypertension  History of hysterectomy      Allergies  No Known Allergies        ANTIMICROBIALS:      OTHER MEDS: MEDICATIONS  (STANDING):  acetaminophen   Tablet .. 650 every 6 hours PRN  amLODIPine   Tablet 5 daily  buDESOnide  80 MICROgram(s)/formoterol 4.5 MICROgram(s) Inhaler 2 two times a day  gabapentin 100 every 12 hours  heparin  Injectable 5000 every 12 hours  hydrALAZINE 100 three times a day  isosorbide   mononitrate ER Tablet (IMDUR) 120 daily  metoprolol succinate  daily  pantoprazole    Tablet 40 before breakfast  simvastatin 40 at bedtime      SOCIAL HISTORY:  [ ] etoh [ ] tobacco [ ] former smoker [ ] IVDU    FAMILY HISTORY:  No pertinent family history in first degree relatives      REVIEW OF SYSTEMS  [  ] ROS unobtainable because:    [  ] All other systems negative except as noted below:	    Constitutional:  [ ] fever [ ] chills  [ ] weight loss  [ ] weakness  Skin:  [ ] rash [ ] phlebitis	  Eyes: [ ] icterus [ ] pain  [ ] discharge	  ENMT: [ ] sore throat  [ ] thrush [ ] ulcers [ ] exudates  Respiratory: [ ] dyspnea [ ] hemoptysis [ ] cough [ ] sputum	  Cardiovascular:  [ ] chest pain [ ] palpitations [ ] edema	  Gastrointestinal:  [ ] nausea [ ] vomiting [ ] diarrhea [ ] constipation [ ] pain	  Genitourinary:  [ ] dysuria [ ] frequency [ ] hematuria [ ] discharge [ ] flank pain  [ ] incontinence  Musculoskeletal:  [ ] myalgias [ ] arthralgias [ ] arthritis  [ ] back pain  Neurological:  [ ] headache [ ] seizures  [ ] confusion/altered mental status  Psychiatric:  [ ] anxiety [ ] depression	  Hematology/Lymphatics:  [ ] lymphadenopathy  Endocrine:  [ ] adrenal [ ] thyroid  Allergic/Immunologic:	 [ ] transplant [ ] seasonal    Vital Signs Last 24 Hrs  T(F): 100.5 (10-18-18 @ 14:52), Max: 100.5 (10-18-18 @ 14:52)    Vital Signs Last 24 Hrs  HR: 78 (10-18-18 @ 13:35) (69 - 78)  BP: 136/52 (10-18-18 @ 13:35) (120/45 - 147/77)  RR: 18 (10-18-18 @ 11:57)  SpO2: 99% (10-18-18 @ 11:57) (99% - 100%)  Wt(kg): --    PHYSICAL EXAM:  General: non-toxic  HEAD/EYES: anicteric, PERRL  ENT:  supple  Cardiovascular:   S1, S2  Respiratory:  clear bilaterally  GI:  soft, non-tender, normal bowel sounds  :  no CVA tenderness   Musculoskeletal:  no synovitis  Neurologic:  grossly non-focal  Skin:  no rash  Lymph: no lymphadenopathy  Psychiatric:  appropriate affect  Vascular:  no phlebitis                                8.5    8.65  )-----------( 315      ( 17 Oct 2018 06:53 )             27.1       10-18    133<L>  |  89<L>  |  Insufficient quant  ----------------------------<  Insufficient quant  4.8   |  Insufficient quant  |  3.82<H>    Ca    Insufficient quant      18 Oct 2018 07:05  Phos  Insufficient quant     10-18            MICROBIOLOGY:  URINE MIDSTREAM  10-05-18 --  --  --    RADIOLOGY:    EXAM:  US DPLX LWR EXT VEINS COMPL BI    PROCEDURE DATE:  Oct  5 2018   No evidence of bilateral lower extremity deep venous thrombosis.     EXAM:  CT ABDOMEN AND PELVIS OC    PROCEDURE DATE:  Oct  9 2018   Moderate bilateral pleural effusions. Anasarca.  No CT evidence of acute intra-abdominal pathology. HPI:    88 year old female PMH CKD (not currently on dialysis), HLD, HTN who presented to [facility] on 10/5/18 with bilateral worsening LE edema. Recent admission 8/21/18 - 8/27/18 for left arm pain and was found to be Parvovirus IgM and IgG positive; placed on Prednisone 20mg x 5 days, then 15mg x 5 days, then 10mg x 5 days, then 5 mg x 5 days then stop. On admission afebrile, not tachycardic but hypertensive to > 210. WBC on admission of 7.32 with 63.5%. U/A on admission with negative leukocyte esterase. Suspected of having acute on chronic heart failure possibly precipitated by corticosteroids. Patient underwent gentle diuresis. Given anemia and positive occult blood patient underwent CT Abdomen/Pelvis noncontrast on 10/9/18 as a cancer screening tool with No CT evidence of acute intra-abdominal pathology. On the afternoon of the consult patient developed severe lower abdominal pain. Patient also febrile to 100.5.     PAST MEDICAL & SURGICAL HISTORY:  CHF (congestive heart failure): diastolic heart failure  Asthma  CKD (chronic kidney disease)  High cholesterol  Hypertension  History of hysterectomy      Allergies  No Known Allergies        ANTIMICROBIALS:      OTHER MEDS: MEDICATIONS  (STANDING):  acetaminophen   Tablet .. 650 every 6 hours PRN  amLODIPine   Tablet 5 daily  buDESOnide  80 MICROgram(s)/formoterol 4.5 MICROgram(s) Inhaler 2 two times a day  gabapentin 100 every 12 hours  heparin  Injectable 5000 every 12 hours  hydrALAZINE 100 three times a day  isosorbide   mononitrate ER Tablet (IMDUR) 120 daily  metoprolol succinate  daily  pantoprazole    Tablet 40 before breakfast  simvastatin 40 at bedtime      SOCIAL HISTORY:  [ ] etoh [ ] tobacco [ ] former smoker [ ] IVDU    FAMILY HISTORY:  No pertinent family history in first degree relatives      REVIEW OF SYSTEMS  [  ] ROS unobtainable because:    [  ] All other systems negative except as noted below:	    Constitutional:  [ ] fever [ ] chills  [ ] weight loss  [ ] weakness  Skin:  [ ] rash [ ] phlebitis	  Eyes: [ ] icterus [ ] pain  [ ] discharge	  ENMT: [ ] sore throat  [ ] thrush [ ] ulcers [ ] exudates  Respiratory: [ ] dyspnea [ ] hemoptysis [ ] cough [ ] sputum	  Cardiovascular:  [ ] chest pain [ ] palpitations [ ] edema	  Gastrointestinal:  [ ] nausea [ ] vomiting [ ] diarrhea [ ] constipation [ ] pain	  Genitourinary:  [ ] dysuria [ ] frequency [ ] hematuria [ ] discharge [ ] flank pain  [ ] incontinence  Musculoskeletal:  [ ] myalgias [ ] arthralgias [ ] arthritis  [ ] back pain  Neurological:  [ ] headache [ ] seizures  [ ] confusion/altered mental status  Psychiatric:  [ ] anxiety [ ] depression	  Hematology/Lymphatics:  [ ] lymphadenopathy  Endocrine:  [ ] adrenal [ ] thyroid  Allergic/Immunologic:	 [ ] transplant [ ] seasonal    Vital Signs Last 24 Hrs  T(F): 100.5 (10-18-18 @ 14:52), Max: 100.5 (10-18-18 @ 14:52)    Vital Signs Last 24 Hrs  HR: 78 (10-18-18 @ 13:35) (69 - 78)  BP: 136/52 (10-18-18 @ 13:35) (120/45 - 147/77)  RR: 18 (10-18-18 @ 11:57)  SpO2: 99% (10-18-18 @ 11:57) (99% - 100%)  Wt(kg): --    PHYSICAL EXAM:  General: non-toxic  HEAD/EYES: anicteric, PERRL  ENT:  supple  Cardiovascular:   S1, S2  Respiratory:  clear bilaterally  GI:  soft, non-tender, normal bowel sounds  :  no CVA tenderness   Musculoskeletal:  no synovitis  Neurologic:  grossly non-focal  Skin:  no rash  Lymph: no lymphadenopathy  Psychiatric:  appropriate affect  Vascular:  no phlebitis                                8.5    8.65  )-----------( 315      ( 17 Oct 2018 06:53 )             27.1       10-18    133<L>  |  89<L>  |  Insufficient quant  ----------------------------<  Insufficient quant  4.8   |  Insufficient quant  |  3.82<H>    Ca    Insufficient quant      18 Oct 2018 07:05  Phos  Insufficient quant     10-18            MICROBIOLOGY:  URINE MIDSTREAM  10-05-18 --  --  --    RADIOLOGY:    EXAM:  US DPLX LWR EXT VEINS LTD LT    PROCEDURE DATE:  Oct 18 2018   No evidence of deep venous thrombosis in the visualized veins of the left lower extremity.    EXAM:  US DPLX LWR EXT VEINS COMPL BI    PROCEDURE DATE:  Oct  5 2018   No evidence of bilateral lower extremity deep venous thrombosis.     EXAM:  CT ABDOMEN AND PELVIS OC    PROCEDURE DATE:  Oct  9 2018   Moderate bilateral pleural effusions. Anasarca.  No CT evidence of acute intra-abdominal pathology. HPI:    88 year old female PMH CKD (not currently on dialysis), HLD, HTN who presented to [facility] on 10/5/18 with bilateral worsening LE edema. Recent admission 8/21/18 - 8/27/18 for left arm pain and was found to be Parvovirus IgM and IgG positive; placed on Prednisone 20mg x 5 days, then 15mg x 5 days, then 10mg x 5 days, then 5 mg x 5 days then stop. On admission afebrile, not tachycardic but hypertensive to > 210. WBC on admission of 7.32 with 63.5%. U/A on admission with negative leukocyte esterase. Suspected of having acute on chronic heart failure possibly precipitated by corticosteroids. Patient underwent gentle diuresis. Given anemia and positive occult blood patient underwent CT Abdomen/Pelvis noncontrast on 10/9/18 as a cancer screening tool with No CT evidence of acute intra-abdominal pathology. On the afternoon of the consult patient developed severe lower abdominal pain. Patient also febrile to 100.5. Patient notes pain in her LLQ/L Inguinal area. Denies N/V/D. Denies cough or shortness of breath. Denies dysuria. Notes some pain in her bilateral calves    PAST MEDICAL & SURGICAL HISTORY:  CHF (congestive heart failure): diastolic heart failure  Asthma  CKD (chronic kidney disease)  High cholesterol  Hypertension  History of hysterectomy      Allergies  No Known Allergies        ANTIMICROBIALS:      OTHER MEDS: MEDICATIONS  (STANDING):  acetaminophen   Tablet .. 650 every 6 hours PRN  amLODIPine   Tablet 5 daily  buDESOnide  80 MICROgram(s)/formoterol 4.5 MICROgram(s) Inhaler 2 two times a day  gabapentin 100 every 12 hours  heparin  Injectable 5000 every 12 hours  hydrALAZINE 100 three times a day  isosorbide   mononitrate ER Tablet (IMDUR) 120 daily  metoprolol succinate  daily  pantoprazole    Tablet 40 before breakfast  simvastatin 40 at bedtime      SOCIAL HISTORY:  denies smoking history or EtOH use    FAMILY HISTORY:  No pertinent family history in first degree relatives      REVIEW OF SYSTEMS  [  ] ROS unobtainable because:    [x  ] All other systems negative except as noted below:	    Constitutional:  [ ] fever [ ] chills  [ ] weight loss  [ ] weakness  Skin:  [ ] rash [ ] phlebitis	  Eyes: [ ] icterus [ ] pain  [ ] discharge	  ENMT: [ ] sore throat  [ ] thrush [ ] ulcers [ ] exudates  Respiratory: [ ] dyspnea [ ] hemoptysis [ ] cough [ ] sputum	  Cardiovascular:  [ ] chest pain [ ] palpitations [ ] edema	  Gastrointestinal:  [ ] nausea [ ] vomiting [ ] diarrhea [ ] constipation [ x] pain	  Genitourinary:  [ ] dysuria [ ] frequency [ ] hematuria [ ] discharge [ ] flank pain  [ ] incontinence  Musculoskeletal:  [ ] myalgias [ ] arthralgias [ ] arthritis  [ ] back pain +Bilateral calf pain  Neurological:  [ ] headache [ ] seizures  [ ] confusion/altered mental status  Psychiatric:  [ ] anxiety [ ] depression	  Hematology/Lymphatics:  [ ] lymphadenopathy  Endocrine:  [ ] adrenal [ ] thyroid  Allergic/Immunologic:	 [ ] transplant [ ] seasonal    Vital Signs Last 24 Hrs  T(F): 100.5 (10-18-18 @ 14:52), Max: 100.5 (10-18-18 @ 14:52)    Vital Signs Last 24 Hrs  HR: 78 (10-18-18 @ 13:35) (69 - 78)  BP: 136/52 (10-18-18 @ 13:35) (120/45 - 147/77)  RR: 18 (10-18-18 @ 11:57)  SpO2: 99% (10-18-18 @ 11:57) (99% - 100%)  Wt(kg): --    PHYSICAL EXAMINATION:  General: Alert and Awake, NAD  HEENT: PERRL, EOMI, No subconjunctival hemorrhages, Oropharynx Clear, MMM  Neck: Supple, No GABBI  Cardiac: RRR, No M/R/G  Resp: CTAB, No Wh/Rh/Ra  Abdomen: NBS, NT/ND, No HSM, No rigidity or guarding  MSK: No LE edema. Mild bilateral calf tenderness. No stigmata of IE. No evidence of phlebitis. No evidence of synovitis.  Back: No CVA Tenderness  : No lynn  Skin: No rashes or lesions. Skin is warm and dry to the touch.   Neuro: Alert and Awake. CN 2-12 Grossly intact. Moves all four extremities spontaneously.  Psych: Calm, Pleasant, Cooperative                          8.5    8.65  )-----------( 315      ( 17 Oct 2018 06:53 )             27.1       10-18    133<L>  |  89<L>  |  Insufficient quant  ----------------------------<  Insufficient quant  4.8   |  Insufficient quant  |  3.82<H>    Ca    Insufficient quant      18 Oct 2018 07:05  Phos  Insufficient quant     10-18            MICROBIOLOGY:  URINE MIDSTREAM  10-05-18 --  --  --    RADIOLOGY:    EXAM:  US DPLX LWR EXT VEINS LTD LT    PROCEDURE DATE:  Oct 18 2018   No evidence of deep venous thrombosis in the visualized veins of the left lower extremity.    EXAM:  US DPLX LWR EXT VEINS COMPL BI    PROCEDURE DATE:  Oct  5 2018   No evidence of bilateral lower extremity deep venous thrombosis.     EXAM:  CT ABDOMEN AND PELVIS OC    PROCEDURE DATE:  Oct  9 2018   Moderate bilateral pleural effusions. Anasarca.  No CT evidence of acute intra-abdominal pathology. HPI:    88 year old female PMH CKD (not currently on dialysis), HLD, HTN who presented to [facility] on 10/5/18 with bilateral worsening LE edema. Recent admission 8/21/18 - 8/27/18 for left arm pain and was found to be Parvovirus IgM and IgG positive; placed on Prednisone 20mg x 5 days, then 15mg x 5 days, then 10mg x 5 days, then 5 mg x 5 days then stop. On admission afebrile, not tachycardic but hypertensive to > 210. WBC on admission of 7.32 with 63.5%. U/A on admission with negative leukocyte esterase. Suspected of having acute on chronic heart failure possibly precipitated by corticosteroids. Patient underwent gentle diuresis. Given anemia and positive occult blood patient underwent CT Abdomen/Pelvis noncontrast on 10/9/18 as a cancer screening tool with No CT evidence of acute intra-abdominal pathology. On the afternoon of the consult patient developed severe lower abdominal pain. Patient also febrile to 100.5. Patient notes pain in her LLQ/L Inguinal area. Denies N/V/D. Denies cough or shortness of breath. Denies dysuria. Notes some pain in her bilateral calves    PAST MEDICAL & SURGICAL HISTORY:  CHF (congestive heart failure): diastolic heart failure  Asthma  CKD (chronic kidney disease)  High cholesterol  Hypertension  History of hysterectomy      Allergies  No Known Allergies        ANTIMICROBIALS:      OTHER MEDS: MEDICATIONS  (STANDING):  acetaminophen   Tablet .. 650 every 6 hours PRN  amLODIPine   Tablet 5 daily  buDESOnide  80 MICROgram(s)/formoterol 4.5 MICROgram(s) Inhaler 2 two times a day  gabapentin 100 every 12 hours  heparin  Injectable 5000 every 12 hours  hydrALAZINE 100 three times a day  isosorbide   mononitrate ER Tablet (IMDUR) 120 daily  metoprolol succinate  daily  pantoprazole    Tablet 40 before breakfast  simvastatin 40 at bedtime      SOCIAL HISTORY:  lives with son and daughter in law, denies smoking history or EtOH use    FAMILY HISTORY:  reviewed, No pertinent family history in first degree relatives      REVIEW OF SYSTEMS  [  ] ROS unobtainable because:    [x  ] All other systems negative except as noted below:	    Constitutional:  [ ] fever [ ] chills  [ ] weight loss  [ ] weakness  Skin:  [ ] rash [ ] phlebitis	  Eyes: [ ] icterus [ ] pain  [ ] discharge	  ENMT: [ ] sore throat  [ ] thrush [ ] ulcers [ ] exudates  Respiratory: [ ] dyspnea [ ] hemoptysis [ ] cough [ ] sputum	  Cardiovascular:  [ ] chest pain [ ] palpitations [ ] edema	  Gastrointestinal:  [ ] nausea [ ] vomiting [ ] diarrhea [ ] constipation [ x] pain	  Genitourinary:  [ ] dysuria [ ] frequency [ ] hematuria [ ] discharge [ ] flank pain  [ ] incontinence  Musculoskeletal:  [ ] myalgias [ ] arthralgias [ ] arthritis  [ ] back pain +Bilateral calf pain  Neurological:  [ ] headache [ ] seizures  [ ] confusion/altered mental status  Psychiatric:  [ ] anxiety [ ] depression	  Hematology/Lymphatics:  [ ] lymphadenopathy  Endocrine:  [ ] adrenal [ ] thyroid  Allergic/Immunologic:	 [ ] transplant [ ] seasonal    Vital Signs Last 24 Hrs  T(F): 100.5 (10-18-18 @ 14:52), Max: 100.5 (10-18-18 @ 14:52)    Vital Signs Last 24 Hrs  HR: 78 (10-18-18 @ 13:35) (69 - 78)  BP: 136/52 (10-18-18 @ 13:35) (120/45 - 147/77)  RR: 18 (10-18-18 @ 11:57)  SpO2: 99% (10-18-18 @ 11:57) (99% - 100%)  Wt(kg): --    PHYSICAL EXAMINATION:  General: Alert and Awake, NAD  HEENT: PERRL, EOMI, No subconjunctival hemorrhages, Oropharynx Clear, MMM  Neck: Supple, No GABBI  Cardiac: RRR, No M/R/G  Resp: CTAB, No Wh/Rh/Ra  Abdomen: NBS, NT/ND, No HSM, No rigidity or guarding  MSK: No LE edema. Mild bilateral calf tenderness. No stigmata of IE. No evidence of phlebitis. No evidence of synovitis.  Back: No CVA Tenderness  : No lynn  Skin: No rashes or lesions. Skin is warm and dry to the touch.   Neuro: Alert and Awake. CN 2-12 Grossly intact. Moves all four extremities spontaneously.  Psych: Calm, Pleasant, Cooperative                          8.5    8.65  )-----------( 315      ( 17 Oct 2018 06:53 )             27.1       10-18    133<L>  |  89<L>  |  Insufficient quant  ----------------------------<  Insufficient quant  4.8   |  Insufficient quant  |  3.82<H>    Ca    Insufficient quant      18 Oct 2018 07:05  Phos  Insufficient quant     10-18            MICROBIOLOGY:  URINE MIDSTREAM  10-05-18 --  --  --    RADIOLOGY:    EXAM:  US DPLX LWR EXT VEINS LTD LT    PROCEDURE DATE:  Oct 18 2018   No evidence of deep venous thrombosis in the visualized veins of the left lower extremity.    EXAM:  US DPLX LWR EXT VEINS COMPL BI    PROCEDURE DATE:  Oct  5 2018   No evidence of bilateral lower extremity deep venous thrombosis.     EXAM:  CT ABDOMEN AND PELVIS OC    PROCEDURE DATE:  Oct  9 2018   Moderate bilateral pleural effusions. Anasarca.  No CT evidence of acute intra-abdominal pathology.

## 2018-10-19 LAB
BUN SERPL-MCNC: 55 MG/DL — HIGH (ref 7–23)
CALCIUM SERPL-MCNC: 8.9 MG/DL — SIGNIFICANT CHANGE UP (ref 8.4–10.5)
CHLORIDE SERPL-SCNC: 89 MMOL/L — LOW (ref 98–107)
CO2 SERPL-SCNC: 29 MMOL/L — SIGNIFICANT CHANGE UP (ref 22–31)
CREAT SERPL-MCNC: 4.3 MG/DL — HIGH (ref 0.5–1.3)
GLUCOSE SERPL-MCNC: 95 MG/DL — SIGNIFICANT CHANGE UP (ref 70–99)
HCT VFR BLD CALC: 26 % — LOW (ref 34.5–45)
HGB BLD-MCNC: 8.2 G/DL — LOW (ref 11.5–15.5)
MCHC RBC-ENTMCNC: 27.7 PG — SIGNIFICANT CHANGE UP (ref 27–34)
MCHC RBC-ENTMCNC: 31.5 % — LOW (ref 32–36)
MCV RBC AUTO: 87.8 FL — SIGNIFICANT CHANGE UP (ref 80–100)
NRBC # FLD: 0 — SIGNIFICANT CHANGE UP
PLATELET # BLD AUTO: 291 K/UL — SIGNIFICANT CHANGE UP (ref 150–400)
PMV BLD: 10.2 FL — SIGNIFICANT CHANGE UP (ref 7–13)
POTASSIUM SERPL-MCNC: 3.9 MMOL/L — SIGNIFICANT CHANGE UP (ref 3.5–5.3)
POTASSIUM SERPL-SCNC: 3.9 MMOL/L — SIGNIFICANT CHANGE UP (ref 3.5–5.3)
RBC # BLD: 2.96 M/UL — LOW (ref 3.8–5.2)
RBC # FLD: 14.7 % — HIGH (ref 10.3–14.5)
SODIUM SERPL-SCNC: 130 MMOL/L — LOW (ref 135–145)
SPECIMEN SOURCE: SIGNIFICANT CHANGE UP
WBC # BLD: 9.86 K/UL — SIGNIFICANT CHANGE UP (ref 3.8–10.5)
WBC # FLD AUTO: 9.86 K/UL — SIGNIFICANT CHANGE UP (ref 3.8–10.5)

## 2018-10-19 PROCEDURE — 99232 SBSQ HOSP IP/OBS MODERATE 35: CPT | Mod: GC

## 2018-10-19 RX ORDER — ERYTHROPOIETIN 10000 [IU]/ML
6000 INJECTION, SOLUTION INTRAVENOUS; SUBCUTANEOUS ONCE
Qty: 0 | Refills: 0 | Status: COMPLETED | OUTPATIENT
Start: 2018-10-19 | End: 2018-10-19

## 2018-10-19 RX ORDER — BUMETANIDE 0.25 MG/ML
2 INJECTION INTRAMUSCULAR; INTRAVENOUS DAILY
Qty: 0 | Refills: 0 | Status: DISCONTINUED | OUTPATIENT
Start: 2018-10-20 | End: 2018-10-22

## 2018-10-19 RX ADMIN — GABAPENTIN 100 MILLIGRAM(S): 400 CAPSULE ORAL at 05:33

## 2018-10-19 RX ADMIN — Medication 100 MILLIGRAM(S): at 05:33

## 2018-10-19 RX ADMIN — HEPARIN SODIUM 5000 UNIT(S): 5000 INJECTION INTRAVENOUS; SUBCUTANEOUS at 05:35

## 2018-10-19 RX ADMIN — Medication 100 MILLIGRAM(S): at 13:08

## 2018-10-19 RX ADMIN — HEPARIN SODIUM 5000 UNIT(S): 5000 INJECTION INTRAVENOUS; SUBCUTANEOUS at 17:50

## 2018-10-19 RX ADMIN — SEVELAMER CARBONATE 800 MILLIGRAM(S): 2400 POWDER, FOR SUSPENSION ORAL at 21:09

## 2018-10-19 RX ADMIN — PANTOPRAZOLE SODIUM 40 MILLIGRAM(S): 20 TABLET, DELAYED RELEASE ORAL at 05:33

## 2018-10-19 RX ADMIN — GABAPENTIN 100 MILLIGRAM(S): 400 CAPSULE ORAL at 17:50

## 2018-10-19 RX ADMIN — SEVELAMER CARBONATE 800 MILLIGRAM(S): 2400 POWDER, FOR SUSPENSION ORAL at 05:34

## 2018-10-19 RX ADMIN — ISOSORBIDE MONONITRATE 120 MILLIGRAM(S): 60 TABLET, EXTENDED RELEASE ORAL at 13:07

## 2018-10-19 RX ADMIN — ERYTHROPOIETIN 6000 UNIT(S): 10000 INJECTION, SOLUTION INTRAVENOUS; SUBCUTANEOUS at 17:50

## 2018-10-19 RX ADMIN — AMLODIPINE BESYLATE 5 MILLIGRAM(S): 2.5 TABLET ORAL at 05:34

## 2018-10-19 RX ADMIN — Medication 100 MILLIGRAM(S): at 21:09

## 2018-10-19 RX ADMIN — BUMETANIDE 2 MILLIGRAM(S): 0.25 INJECTION INTRAMUSCULAR; INTRAVENOUS at 05:34

## 2018-10-19 RX ADMIN — ERGOCALCIFEROL 50000 UNIT(S): 1.25 CAPSULE ORAL at 13:08

## 2018-10-19 RX ADMIN — SEVELAMER CARBONATE 800 MILLIGRAM(S): 2400 POWDER, FOR SUSPENSION ORAL at 13:07

## 2018-10-19 RX ADMIN — BUDESONIDE AND FORMOTEROL FUMARATE DIHYDRATE 2 PUFF(S): 160; 4.5 AEROSOL RESPIRATORY (INHALATION) at 21:09

## 2018-10-19 RX ADMIN — Medication 100 MILLIGRAM(S): at 05:34

## 2018-10-19 RX ADMIN — SIMVASTATIN 40 MILLIGRAM(S): 20 TABLET, FILM COATED ORAL at 21:09

## 2018-10-19 RX ADMIN — BUDESONIDE AND FORMOTEROL FUMARATE DIHYDRATE 2 PUFF(S): 160; 4.5 AEROSOL RESPIRATORY (INHALATION) at 09:56

## 2018-10-19 NOTE — PROGRESS NOTE ADULT - SUBJECTIVE AND OBJECTIVE BOX
SUBJECTIVE / OVERNIGHT EVENTS: pt denies chest pain,, left groin pain , pt spiked fever yesterday     MEDICATIONS  (STANDING):  amLODIPine   Tablet 5 milliGRAM(s) Oral daily  buDESOnide  80 MICROgram(s)/formoterol 4.5 MICROgram(s) Inhaler 2 Puff(s) Inhalation two times a day  ergocalciferol 28726 Unit(s) Oral <User Schedule>  gabapentin 100 milliGRAM(s) Oral every 12 hours  heparin  Injectable 5000 Unit(s) SubCutaneous every 12 hours  hydrALAZINE 100 milliGRAM(s) Oral three times a day  isosorbide   mononitrate ER Tablet (IMDUR) 120 milliGRAM(s) Oral daily  metoprolol succinate  milliGRAM(s) Oral daily  pantoprazole    Tablet 40 milliGRAM(s) Oral before breakfast  sevelamer hydrochloride 800 milliGRAM(s) Oral three times a day  simvastatin 40 milliGRAM(s) Oral at bedtime    MEDICATIONS  (PRN):  acetaminophen   Tablet .. 650 milliGRAM(s) Oral every 6 hours PRN Temp greater or equal to 38C (100.4F), Mild Pain (1 - 3), Moderate Pain (4 - 6)  artificial  tears Solution 1 Drop(s) Both EYES two times a day PRN Dry Eyes    Vital Signs Last 24 Hrs  T(C): 37.2 (19 Oct 2018 13:05), Max: 37.4 (19 Oct 2018 05:19)  T(F): 99 (19 Oct 2018 13:05), Max: 99.3 (19 Oct 2018 05:19)  HR: 75 (19 Oct 2018 13:05) (71 - 75)  BP: 125/60 (19 Oct 2018 13:05) (123/52 - 136/49)  BP(mean): --  RR: 18 (19 Oct 2018 13:05) (17 - 18)  SpO2: 98% (19 Oct 2018 13:05) (97% - 98%)    Constitutional: No fever, fatigue  Skin: No rash.  Eyes: No recent vision problems or eye pain.  ENT: No congestion, ear pain, or sore throat.  Cardiovascular: No chest pain or palpation.  Respiratory: sob+  Gastrointestinal: No abdominal pain, nausea, vomiting, or diarrhea.  Genitourinary: No dysuria.  Musculoskeletal: No joint swelling.  Neurologic: No headache.    PHYSICAL EXAM:  GENERAL: NAD  EYES: EOMI, PERRLA  NECK: Supple, No JVD  CHEST/LUNG: dec breath sounds at bases   HEART:  S1 , S2 +  ABDOMEN: sof,t bs+,   NEUROLOGY:alert awake oriented     LABS:  10-19    130<L>  |  89<L>  |  55<H>  ----------------------------<  95  3.9   |  29  |  4.30<H>    Ca    8.9      19 Oct 2018 06:12  Phos  Insufficient quant     10-18      Creatinine Trend: 4.30 <--, 3.82 <--, 3.81 <--, 3.72 <--, 3.64 <--, 3.70 <--, 3.72 <--, 3.74 <--                        8.2    9.86  )-----------( 291      ( 19 Oct 2018 06:12 )             26.0     Urine Studies:  Urinalysis Basic - ( 16 Oct 2018 03:17 )    Color: COLORLESS / Appearance: CLEAR / S.009 / pH: 7.5  Gluc: NEGATIVE / Ketone: NEGATIVE  / Bili: NEGATIVE / Urobili: NORMAL   Blood: NEGATIVE / Protein: 70 / Nitrite: NEGATIVE   Leuk Esterase: NEGATIVE / RBC: 0-2 / WBC 0-2   Sq Epi:  / Non Sq Epi:  / Bacteria:

## 2018-10-19 NOTE — CHART NOTE - NSCHARTNOTEFT_GEN_A_CORE
Patient seen for Nutrition follow up. Per chart : Patient is a 88 year old woman with CKD V, HTN, and HLD presents with acute diastolic CHF. Patient not receptive to diet education at this time, will follow up at a later date if patient is amenable.     Source: Patient [x]    Family [ ]     other [ ]: Patient is Gujarati speaking,  phone used ID: [phone]     Current Diet : DASH, low fat, Renal, Low sodium, Vegan    Reported: Patient denies any nausea/vomiting/diarrhea/constipation or difficulty chewing and swallowing. Patient reports no food allergies or intolerances.     PO intake:  < 50% [ ] 50-75% [ ]   % [x]  other : Patient reported having good appetite, consuming 75% of meals. Stated she consumes milk      Current Weight: Previous RD note (10/7) 132 pounds, current weight (10/19) 120.1 pounds. Patient noted with +2 edema left and right ankle. Weight change possible due to fluid shifts.    __________________ Pertinent Medications__________________   MEDICATIONS  (STANDING):  amLODIPine   Tablet 5 milliGRAM(s) Oral daily  buDESOnide  80 MICROgram(s)/formoterol 4.5 MICROgram(s) Inhaler 2 Puff(s) Inhalation two times a day  epoetin cathy Injectable 6000 Unit(s) SubCutaneous once  ergocalciferol 56336 Unit(s) Oral <User Schedule>  gabapentin 100 milliGRAM(s) Oral every 12 hours  heparin  Injectable 5000 Unit(s) SubCutaneous every 12 hours  hydrALAZINE 100 milliGRAM(s) Oral three times a day  isosorbide   mononitrate ER Tablet (IMDUR) 120 milliGRAM(s) Oral daily  metoprolol succinate  milliGRAM(s) Oral daily  pantoprazole    Tablet 40 milliGRAM(s) Oral before breakfast  sevelamer hydrochloride 800 milliGRAM(s) Oral three times a day  simvastatin 40 milliGRAM(s) Oral at bedtime    MEDICATIONS  (PRN):  acetaminophen   Tablet .. 650 milliGRAM(s) Oral every 6 hours PRN Temp greater or equal to 38C (100.4F), Mild Pain (1 - 3), Moderate Pain (4 - 6)  artificial  tears Solution 1 Drop(s) Both EYES two times a day PRN Dry Eyes      __________________ Pertinent Labs__________________   10-19 Na130 mmol/L<L> Glu 95 mg/dL K+ 3.9 mmol/L Cr  4.30 mg/dL<H> BUN 55 mg/dL<H> 10-18 Phos Insufficient quant mg/dL 10-06 [id number] 5.8 %<H> 10-06 Chol 121 mg/dL LDL 61 mg/dL HDL 40 mg/dL<L> Trig 148 mg/dL        Skin: intact no pressure injuries noted    Estimated Needs: [x] no change since previous assessment      Previous Nutrition Diagnosis: [x]  Altered nutrition - related laboratory values     Nutrition Diagnosis is [x] ongoing      New Nutrition Diagnosis: [x] not applicable      Nutrition Recommendations:  1. Liberalized diet to Renal + Lacto-Vegetarian   2. Recommend Nepro 1x daily (425 kcals, 19.1g protein) to help with caloric and protein intake.   3. Provide education at a later date as long as patient is amenable.   4. Monitor weights, labs, BM's, skin integrity, p.o. intake.  5. RD to remain available. Abby Carvajal RDN

## 2018-10-19 NOTE — PROGRESS NOTE ADULT - SUBJECTIVE AND OBJECTIVE BOX
[facility] NEPHROLOGY- PROGRESS NOTE    88y Female with history of HTN, DM, CKD-5 presents with increasing LE edema. Nephrology consulted for elevated Scr.    REVIEW OF SYSTEMS:  Gen: no changes in weight  Cards: no chest pain  Resp: no dyspnea  GI: no nausea or vomiting or diarrhea  Vascular: + LE edema resolved, + L leg pain with weakness    No Known Allergies      Hospital Medications: Medications reviewed      VITALS:  T(F): 99.3 (10-19-18 @ 05:19), Max: 100.5 (10-18-18 @ 14:52)  HR: 71 (10-19-18 @ 05:19)  BP: 136/49 (10-19-18 @ 05:19)  RR: 17 (10-19-18 @ 05:19)  SpO2: 98% (10-19-18 @ 05:19)  Wt(kg): --    10-18 @ 07:01  -  10-19 @ 07:00  --------------------------------------------------------  IN: 200 mL / OUT: 1250 mL / NET: -1050 mL      PHYSICAL EXAM:    Gen: NAD, calm  Cards: RRR, +S1/S2, no M/G/R  Resp: CTA B/L  GI: soft, NT/ND, NABS  Vascular: no LE edema B/L      LABS:  10-19    130<L>  |  89<L>  |  55<H>  ----------------------------<  95  3.9   |  29  |  4.30<H>    Ca    8.9      19 Oct 2018 06:12  Phos  Insufficient quant     10-18      Creatinine Trend: 4.30 <--, 3.82 <--, 3.81 <--, 3.72 <--, 3.64 <--, 3.70 <--, 3.72 <--, 3.74 <--                        8.2    9.86  )-----------( 291      ( 19 Oct 2018 06:12 )             26.0        < from: CT Abdomen and Pelvis No Cont (10.18.18 @ 16:30) >  IMPRESSION: No acuteabnormality.    < end of copied text >        < from: US Duplex Venous Lower Ext Ltd, Left (10.18.18 @ 15:41) >  IMPRESSION:     No evidence of deep venous thrombosis in the visualized veins of the left   lower extremity.      < end of copied text >

## 2018-10-19 NOTE — PROGRESS NOTE ADULT - SUBJECTIVE AND OBJECTIVE BOX
FRANCIA MARTEL:[id number],   88yFemale followed for:  No Known Allergies    PAST MEDICAL & SURGICAL HISTORY:  CHF (congestive heart failure): diastolic heart failure  Asthma  CKD (chronic kidney disease)  High cholesterol  Hypertension  History of hysterectomy    FAMILY HISTORY:  No pertinent family history in first degree relatives    MEDICATIONS  (STANDING):  amLODIPine   Tablet 5 milliGRAM(s) Oral daily  buDESOnide  80 MICROgram(s)/formoterol 4.5 MICROgram(s) Inhaler 2 Puff(s) Inhalation two times a day  buMETAnide 2 milliGRAM(s) Oral every 12 hours  ergocalciferol 67317 Unit(s) Oral <User Schedule>  gabapentin 100 milliGRAM(s) Oral every 12 hours  heparin  Injectable 5000 Unit(s) SubCutaneous every 12 hours  hydrALAZINE 100 milliGRAM(s) Oral three times a day  isosorbide   mononitrate ER Tablet (IMDUR) 120 milliGRAM(s) Oral daily  metoprolol succinate  milliGRAM(s) Oral daily  pantoprazole    Tablet 40 milliGRAM(s) Oral before breakfast  sevelamer hydrochloride 800 milliGRAM(s) Oral three times a day  simvastatin 40 milliGRAM(s) Oral at bedtime    MEDICATIONS  (PRN):  acetaminophen   Tablet .. 650 milliGRAM(s) Oral every 6 hours PRN Temp greater or equal to 38C (100.4F), Mild Pain (1 - 3), Moderate Pain (4 - 6)  artificial  tears Solution 1 Drop(s) Both EYES two times a day PRN Dry Eyes      Vital Signs Last 24 Hrs  T(C): 37.4 (19 Oct 2018 05:19), Max: 38.1 (18 Oct 2018 14:52)  T(F): 99.3 (19 Oct 2018 05:19), Max: 100.5 (18 Oct 2018 14:52)  HR: 71 (19 Oct 2018 05:19) (71 - 78)  BP: 136/49 (19 Oct 2018 05:19) (123/52 - 136/52)  BP(mean): --  RR: 17 (19 Oct 2018 05:19) (17 - 18)  SpO2: 98% (19 Oct 2018 05:19) (97% - 99%)  nc/at  s1s2  cta  soft, nt, nd no guarding or rebound  no c/c/e    CBC Full  -  ( 19 Oct 2018 06:12 )  WBC Count : 9.86 K/uL  Hemoglobin : 8.2 g/dL  Hematocrit : 26.0 %  Platelet Count - Automated : 291 K/uL  Mean Cell Volume : 87.8 fL  Mean Cell Hemoglobin : 27.7 pg  Mean Cell Hemoglobin Concentration : 31.5 %  Auto Neutrophil # : x  Auto Lymphocyte # : x  Auto Monocyte # : x  Auto Eosinophil # : x  Auto Basophil # : x  Auto Neutrophil % : x  Auto Lymphocyte % : x  Auto Monocyte % : x  Auto Eosinophil % : x  Auto Basophil % : x    10-19    130<L>  |  89<L>  |  55<H>  ----------------------------<  95  3.9   |  29  |  4.30<H>    Ca    8.9      19 Oct 2018 06:12  Phos  Insufficient quant     10-18

## 2018-10-19 NOTE — PROGRESS NOTE ADULT - SUBJECTIVE AND OBJECTIVE BOX
Cardiovascular Disease Progress Note    Overnight events: No acute events overnight. Ms. Villagomez says her pain is improved. She denies chest pain, abdominal pain, or SOB.    Otherwise review of systems negative    Objective Findings:  T(C): 37.4 (10-19-18 @ 05:19), Max: 38.1 (10-18-18 @ 14:52)  HR: 71 (10-19-18 @ 05:19) (71 - 78)  BP: 136/49 (10-19-18 @ 05:19) (123/52 - 136/52)  RR: 17 (10-19-18 @ 05:19) (17 - 18)  SpO2: 98% (10-19-18 @ 05:19) (97% - 99%)  Wt(kg): --  Daily     Daily Weight in k.5 (19 Oct 2018 07:28)      Physical Exam:  Gen: NAD  HEENT: EOMI  CV: RRR, normal S1 + S2, no m/r/g  Lungs: CTAB  Abd: soft, non-tender  Ext: No edema    Telemetry: Sinus    Laboratory Data:                        8.2    9.86  )-----------( 291      ( 19 Oct 2018 06:12 )             26.0     10-18    133<L>  |  89<L>  |  Insufficient quant  ----------------------------<  Insufficient quant  4.8   |  Insufficient quant  |  3.82<H>    Ca    Insufficient quant      18 Oct 2018 07:05  Phos  Insufficient quant     10-18                Inpatient Medications:  MEDICATIONS  (STANDING):  amLODIPine   Tablet 5 milliGRAM(s) Oral daily  buDESOnide  80 MICROgram(s)/formoterol 4.5 MICROgram(s) Inhaler 2 Puff(s) Inhalation two times a day  buMETAnide 2 milliGRAM(s) Oral every 12 hours  ergocalciferol 64271 Unit(s) Oral <User Schedule>  gabapentin 100 milliGRAM(s) Oral every 12 hours  heparin  Injectable 5000 Unit(s) SubCutaneous every 12 hours  hydrALAZINE 100 milliGRAM(s) Oral three times a day  isosorbide   mononitrate ER Tablet (IMDUR) 120 milliGRAM(s) Oral daily  metoprolol succinate  milliGRAM(s) Oral daily  pantoprazole    Tablet 40 milliGRAM(s) Oral before breakfast  sevelamer hydrochloride 800 milliGRAM(s) Oral three times a day  simvastatin 40 milliGRAM(s) Oral at bedtime      Assessment:  88 year old woman with CKD V, HTN, and HLD presents with acute diastolic CHF.    Plan of Care:    #Acute diastolic CHF-  Clinically improved  Agree with transitioning to oral Bumex.   Patient states pain in hands and feet are improved.  CT abdomen without acute pathology.   TTE from 4 months ago reviewed.    Over 25 minutes spent on total encounter; more than 50% of the visit was spent counseling and/or coordinating care by the attending physician.      Justin Vick MD [facility]  Cardiovascular Disease  [phone]

## 2018-10-19 NOTE — PROGRESS NOTE ADULT - SUBJECTIVE AND OBJECTIVE BOX
Follow Up:  fever abd pain    Interval History: pt afebrile, no complains, abd CT normal    ROS:      All other systems negative    Constitutional: no fever, no chills  Head: no trauma  Eyes: no vision changes, no eye pain  ENT:  no sore throat, no rhinorrhea  Cardiovascular:  no chest pain, no palpitation  Respiratory:  no SOB, no cough  GI:  no abd pain, no vomiting, no diarrhea  urinary: no dysuria, no hematuria, no flank pain  musculoskeletal:  no joint pain, no joint swelling  skin:  no rash  neurology:  no headache, no seizure, no change in mental status  psych: no anxiety, no depression         Allergies  No Known Allergies        ANTIMICROBIALS:      OTHER MEDS:  acetaminophen   Tablet .. 650 milliGRAM(s) Oral every 6 hours PRN  amLODIPine   Tablet 5 milliGRAM(s) Oral daily  artificial  tears Solution 1 Drop(s) Both EYES two times a day PRN  buDESOnide  80 MICROgram(s)/formoterol 4.5 MICROgram(s) Inhaler 2 Puff(s) Inhalation two times a day  epoetin cathy Injectable 6000 Unit(s) SubCutaneous once  ergocalciferol 05966 Unit(s) Oral <User Schedule>  gabapentin 100 milliGRAM(s) Oral every 12 hours  heparin  Injectable 5000 Unit(s) SubCutaneous every 12 hours  hydrALAZINE 100 milliGRAM(s) Oral three times a day  isosorbide   mononitrate ER Tablet (IMDUR) 120 milliGRAM(s) Oral daily  metoprolol succinate  milliGRAM(s) Oral daily  pantoprazole    Tablet 40 milliGRAM(s) Oral before breakfast  sevelamer hydrochloride 800 milliGRAM(s) Oral three times a day  simvastatin 40 milliGRAM(s) Oral at bedtime      Vital Signs Last 24 Hrs  T(C): 37.2 (19 Oct 2018 13:05), Max: 37.4 (19 Oct 2018 05:19)  T(F): 99 (19 Oct 2018 13:05), Max: 99.3 (19 Oct 2018 05:19)  HR: 75 (19 Oct 2018 13:05) (71 - 75)  BP: 125/60 (19 Oct 2018 13:05) (123/52 - 136/49)  BP(mean): --  RR: 18 (19 Oct 2018 13:05) (17 - 18)  SpO2: 98% (19 Oct 2018 13:05) (97% - 98%)    Physical Exam:  General:    NAD,  non toxic, A&O x 3  Head: atraumatic, normocephalic  Eye: normal sclera and conjunctiva  ENT:    no oropharyngeal lesions,   no LAD,   neck supple  Cardio:     regular S1, S2,  no murmur  Respiratory:    clear b/l,    no wheezing  abd:     soft,   BS +,   no tenderness,    no organomegaly  :   no CVAT,  no suprapubic tenderness,   no  lynn  Musculoskeletal:   no joint swelling,   no edema  vascular: no lines, normal pulses  Skin:    no rash  Neurologic:     no focal deficit  psych: normal affect, no suicidal ideation                          8.2    9.86  )-----------( 291      ( 19 Oct 2018 06:12 )             26.0       10-19    130<L>  |  89<L>  |  55<H>  ----------------------------<  95  3.9   |  29  |  4.30<H>    Ca    8.9      19 Oct 2018 06:12  Phos  Insufficient quant     10-18            MICROBIOLOGY:  v  URINE MIDSTREAM  10-05-18 --  --  --                RADIOLOGY:  Images below reviewed personally  < from: CT Abdomen and Pelvis No Cont (10.18.18 @ 16:30) >    IMPRESSION: No acuteabnormality.

## 2018-10-20 LAB
BUN SERPL-MCNC: 57 MG/DL — HIGH (ref 7–23)
CALCIUM SERPL-MCNC: 9 MG/DL — SIGNIFICANT CHANGE UP (ref 8.4–10.5)
CHLORIDE SERPL-SCNC: 87 MMOL/L — LOW (ref 98–107)
CO2 SERPL-SCNC: 28 MMOL/L — SIGNIFICANT CHANGE UP (ref 22–31)
CREAT SERPL-MCNC: 4.37 MG/DL — HIGH (ref 0.5–1.3)
GLUCOSE SERPL-MCNC: 82 MG/DL — SIGNIFICANT CHANGE UP (ref 70–99)
HCT VFR BLD CALC: 26.4 % — LOW (ref 34.5–45)
HGB BLD-MCNC: 8.4 G/DL — LOW (ref 11.5–15.5)
MAGNESIUM SERPL-MCNC: 1.8 MG/DL — SIGNIFICANT CHANGE UP (ref 1.6–2.6)
MCHC RBC-ENTMCNC: 27.9 PG — SIGNIFICANT CHANGE UP (ref 27–34)
MCHC RBC-ENTMCNC: 31.8 % — LOW (ref 32–36)
MCV RBC AUTO: 87.7 FL — SIGNIFICANT CHANGE UP (ref 80–100)
NRBC # FLD: 0 — SIGNIFICANT CHANGE UP
PLATELET # BLD AUTO: 305 K/UL — SIGNIFICANT CHANGE UP (ref 150–400)
PMV BLD: 9.9 FL — SIGNIFICANT CHANGE UP (ref 7–13)
POTASSIUM SERPL-MCNC: 3.7 MMOL/L — SIGNIFICANT CHANGE UP (ref 3.5–5.3)
POTASSIUM SERPL-SCNC: 3.7 MMOL/L — SIGNIFICANT CHANGE UP (ref 3.5–5.3)
RBC # BLD: 3.01 M/UL — LOW (ref 3.8–5.2)
RBC # FLD: 14.6 % — HIGH (ref 10.3–14.5)
SODIUM SERPL-SCNC: 130 MMOL/L — LOW (ref 135–145)
WBC # BLD: 7.61 K/UL — SIGNIFICANT CHANGE UP (ref 3.8–10.5)
WBC # FLD AUTO: 7.61 K/UL — SIGNIFICANT CHANGE UP (ref 3.8–10.5)

## 2018-10-20 RX ADMIN — Medication 650 MILLIGRAM(S): at 20:47

## 2018-10-20 RX ADMIN — PANTOPRAZOLE SODIUM 40 MILLIGRAM(S): 20 TABLET, DELAYED RELEASE ORAL at 05:24

## 2018-10-20 RX ADMIN — GABAPENTIN 100 MILLIGRAM(S): 400 CAPSULE ORAL at 05:23

## 2018-10-20 RX ADMIN — Medication 100 MILLIGRAM(S): at 13:52

## 2018-10-20 RX ADMIN — Medication 100 MILLIGRAM(S): at 05:24

## 2018-10-20 RX ADMIN — HEPARIN SODIUM 5000 UNIT(S): 5000 INJECTION INTRAVENOUS; SUBCUTANEOUS at 18:20

## 2018-10-20 RX ADMIN — Medication 650 MILLIGRAM(S): at 19:47

## 2018-10-20 RX ADMIN — AMLODIPINE BESYLATE 5 MILLIGRAM(S): 2.5 TABLET ORAL at 05:23

## 2018-10-20 RX ADMIN — SEVELAMER CARBONATE 800 MILLIGRAM(S): 2400 POWDER, FOR SUSPENSION ORAL at 21:26

## 2018-10-20 RX ADMIN — SEVELAMER CARBONATE 800 MILLIGRAM(S): 2400 POWDER, FOR SUSPENSION ORAL at 05:23

## 2018-10-20 RX ADMIN — ISOSORBIDE MONONITRATE 120 MILLIGRAM(S): 60 TABLET, EXTENDED RELEASE ORAL at 13:52

## 2018-10-20 RX ADMIN — SIMVASTATIN 40 MILLIGRAM(S): 20 TABLET, FILM COATED ORAL at 21:26

## 2018-10-20 RX ADMIN — HEPARIN SODIUM 5000 UNIT(S): 5000 INJECTION INTRAVENOUS; SUBCUTANEOUS at 05:25

## 2018-10-20 RX ADMIN — BUDESONIDE AND FORMOTEROL FUMARATE DIHYDRATE 2 PUFF(S): 160; 4.5 AEROSOL RESPIRATORY (INHALATION) at 21:26

## 2018-10-20 RX ADMIN — GABAPENTIN 100 MILLIGRAM(S): 400 CAPSULE ORAL at 18:20

## 2018-10-20 RX ADMIN — Medication 100 MILLIGRAM(S): at 05:23

## 2018-10-20 RX ADMIN — Medication 100 MILLIGRAM(S): at 21:26

## 2018-10-20 RX ADMIN — BUMETANIDE 2 MILLIGRAM(S): 0.25 INJECTION INTRAMUSCULAR; INTRAVENOUS at 05:23

## 2018-10-20 RX ADMIN — SEVELAMER CARBONATE 800 MILLIGRAM(S): 2400 POWDER, FOR SUSPENSION ORAL at 13:52

## 2018-10-20 NOTE — PROGRESS NOTE ADULT - SUBJECTIVE AND OBJECTIVE BOX
SUBJECTIVE / OVERNIGHT EVENTS: pt denies chest pain,, left groin pain , pt spiked fever yesterday     MEDICATIONS  (STANDING):  amLODIPine   Tablet 5 milliGRAM(s) Oral daily  buDESOnide  80 MICROgram(s)/formoterol 4.5 MICROgram(s) Inhaler 2 Puff(s) Inhalation two times a day  buMETAnide 2 milliGRAM(s) Oral daily  ergocalciferol 24243 Unit(s) Oral <User Schedule>  gabapentin 100 milliGRAM(s) Oral every 12 hours  heparin  Injectable 5000 Unit(s) SubCutaneous every 12 hours  hydrALAZINE 100 milliGRAM(s) Oral three times a day  isosorbide   mononitrate ER Tablet (IMDUR) 120 milliGRAM(s) Oral daily  metoprolol succinate  milliGRAM(s) Oral daily  pantoprazole    Tablet 40 milliGRAM(s) Oral before breakfast  sevelamer hydrochloride 800 milliGRAM(s) Oral three times a day  simvastatin 40 milliGRAM(s) Oral at bedtime    MEDICATIONS  (PRN):  acetaminophen   Tablet .. 650 milliGRAM(s) Oral every 6 hours PRN Temp greater or equal to 38C (100.4F), Mild Pain (1 - 3), Moderate Pain (4 - 6)  artificial  tears Solution 1 Drop(s) Both EYES two times a day PRN Dry Eyes    Vital Signs Last 24 Hrs  T(C): 36.7 (20 Oct 2018 21:24), Max: 37.1 (20 Oct 2018 13:50)  T(F): 98.1 (20 Oct 2018 21:24), Max: 98.7 (20 Oct 2018 13:50)  HR: 65 (20 Oct 2018 21:24) (65 - 70)  BP: 104/60 (20 Oct 2018 21:24) (104/42 - 116/56)  BP(mean): --  RR: 18 (20 Oct 2018 21:24) (17 - 18)  SpO2: 98% (20 Oct 2018 21:24) (96% - 98%)    Constitutional: No fever, fatigue  Skin: No rash.  Eyes: No recent vision problems or eye pain.  ENT: No congestion, ear pain, or sore throat.  Cardiovascular: No chest pain or palpation.  Respiratory: sob+  Gastrointestinal: No abdominal pain, nausea, vomiting, or diarrhea.  Genitourinary: No dysuria.  Musculoskeletal: No joint swelling.  Neurologic: No headache.    PHYSICAL EXAM:  GENERAL: NAD  EYES: EOMI, PERRLA  NECK: Supple, No JVD  CHEST/LUNG: dec breath sounds at bases   HEART:  S1 , S2 +  ABDOMEN: sof,t bs+,   NEUROLOGY:alert awake oriented     LABS:  10    130<L>  |  87<L>  |  57<H>  ----------------------------<  82  3.7   |  28  |  4.37<H>    Ca    9.0      20 Oct 2018 07:14  Mg     1.8     10-20      Creatinine Trend: 4.37 <--, 4.30 <--, 3.82 <--, 3.81 <--, 3.72 <--, 3.64 <--, 3.70 <--                        8.4    7.61  )-----------( 305      ( 20 Oct 2018 07:14 )             26.4     Urine Studies:  Urinalysis Basic - ( 16 Oct 2018 03:17 )    Color: COLORLESS / Appearance: CLEAR / S.009 / pH: 7.5  Gluc: NEGATIVE / Ketone: NEGATIVE  / Bili: NEGATIVE / Urobili: NORMAL   Blood: NEGATIVE / Protein: 70 / Nitrite: NEGATIVE   Leuk Esterase: NEGATIVE / RBC: 0-2 / WBC 0-2   Sq Epi:  / Non Sq Epi:  / Bacteria:

## 2018-10-20 NOTE — PROGRESS NOTE ADULT - SUBJECTIVE AND OBJECTIVE BOX
Cardiovascular Disease Progress Note    Overnight events: No acute events overnight. Ms. Villagomez denies chest pain or SOB.   Otherwise review of systems negative    Objective Findings:  T(C): 36.8 (10-20-18 @ 05:09), Max: 37.2 (10-19-18 @ 13:05)  HR: 70 (10-20-18 @ 05:09) (68 - 75)  BP: 116/56 (10-20-18 @ 05:09) (116/56 - 141/89)  RR: 17 (10-20-18 @ 05:09) (17 - 18)  SpO2: 96% (10-20-18 @ 05:09) (96% - 99%)  Wt(kg): --  Daily     Daily Weight in k.2 (20 Oct 2018 06:48)      Physical Exam:  Gen: NAD  HEENT: EOMI  CV: RRR, normal S1 + S2, no m/r/g  Lungs: CTAB  Abd: soft, non-tender  Ext: No edema    Telemetry: Sinus; no ectopy    Laboratory Data:                        8.4    7.61  )-----------( 305      ( 20 Oct 2018 07:14 )             26.4     10-20    130<L>  |  87<L>  |  57<H>  ----------------------------<  82  3.7   |  28  |  4.37<H>    Ca    9.0      20 Oct 2018 07:14  Mg     1.8     10-20                Inpatient Medications:  MEDICATIONS  (STANDING):  amLODIPine   Tablet 5 milliGRAM(s) Oral daily  buDESOnide  80 MICROgram(s)/formoterol 4.5 MICROgram(s) Inhaler 2 Puff(s) Inhalation two times a day  buMETAnide 2 milliGRAM(s) Oral daily  ergocalciferol 81329 Unit(s) Oral <User Schedule>  gabapentin 100 milliGRAM(s) Oral every 12 hours  heparin  Injectable 5000 Unit(s) SubCutaneous every 12 hours  hydrALAZINE 100 milliGRAM(s) Oral three times a day  isosorbide   mononitrate ER Tablet (IMDUR) 120 milliGRAM(s) Oral daily  metoprolol succinate  milliGRAM(s) Oral daily  pantoprazole    Tablet 40 milliGRAM(s) Oral before breakfast  sevelamer hydrochloride 800 milliGRAM(s) Oral three times a day  simvastatin 40 milliGRAM(s) Oral at bedtime      Assessment: 88 year old woman with CKD V, HTN, and HLD presents with acute diastolic CHF.    Plan of Care:    #Acute diastolic CHF-  Clinically improved.  Agree with reducing Bumex for rising creatinine.  Patient states pain in hands and feet are improved.  CT abdomen without acute pathology.   TTE from 4 months ago reviewed.    Over 25 minutes spent on total encounter; more than 50% of the visit was spent counseling and/or coordinating care by the attending physician.      Justin Vick MD [facility]  Cardiovascular Disease  [phone]

## 2018-10-20 NOTE — PROGRESS NOTE ADULT - SUBJECTIVE AND OBJECTIVE BOX
FRANCIA MARTEL:[id number],   88yFemale followed for:  No Known Allergies    PAST MEDICAL & SURGICAL HISTORY:  CHF (congestive heart failure): diastolic heart failure  Asthma  CKD (chronic kidney disease)  High cholesterol  Hypertension  History of hysterectomy    FAMILY HISTORY:  No pertinent family history in first degree relatives    MEDICATIONS  (STANDING):  amLODIPine   Tablet 5 milliGRAM(s) Oral daily  buDESOnide  80 MICROgram(s)/formoterol 4.5 MICROgram(s) Inhaler 2 Puff(s) Inhalation two times a day  buMETAnide 2 milliGRAM(s) Oral daily  ergocalciferol 48323 Unit(s) Oral <User Schedule>  gabapentin 100 milliGRAM(s) Oral every 12 hours  heparin  Injectable 5000 Unit(s) SubCutaneous every 12 hours  hydrALAZINE 100 milliGRAM(s) Oral three times a day  isosorbide   mononitrate ER Tablet (IMDUR) 120 milliGRAM(s) Oral daily  metoprolol succinate  milliGRAM(s) Oral daily  pantoprazole    Tablet 40 milliGRAM(s) Oral before breakfast  sevelamer hydrochloride 800 milliGRAM(s) Oral three times a day  simvastatin 40 milliGRAM(s) Oral at bedtime    MEDICATIONS  (PRN):  acetaminophen   Tablet .. 650 milliGRAM(s) Oral every 6 hours PRN Temp greater or equal to 38C (100.4F), Mild Pain (1 - 3), Moderate Pain (4 - 6)  artificial  tears Solution 1 Drop(s) Both EYES two times a day PRN Dry Eyes      Vital Signs Last 24 Hrs  T(C): 36.8 (20 Oct 2018 05:09), Max: 37.2 (19 Oct 2018 13:05)  T(F): 98.3 (20 Oct 2018 05:09), Max: 99 (19 Oct 2018 13:05)  HR: 70 (20 Oct 2018 05:09) (68 - 75)  BP: 116/56 (20 Oct 2018 05:09) (116/56 - 141/89)  BP(mean): --  RR: 17 (20 Oct 2018 05:09) (17 - 18)  SpO2: 96% (20 Oct 2018 05:09) (96% - 99%)  nc/at  s1s2  cta  soft, nt, nd no guarding or rebound  no c/c/e    CBC Full  -  ( 20 Oct 2018 07:14 )  WBC Count : 7.61 K/uL  Hemoglobin : 8.4 g/dL  Hematocrit : 26.4 %  Platelet Count - Automated : 305 K/uL  Mean Cell Volume : 87.7 fL  Mean Cell Hemoglobin : 27.9 pg  Mean Cell Hemoglobin Concentration : 31.8 %  Auto Neutrophil # : x  Auto Lymphocyte # : x  Auto Monocyte # : x  Auto Eosinophil # : x  Auto Basophil # : x  Auto Neutrophil % : x  Auto Lymphocyte % : x  Auto Monocyte % : x  Auto Eosinophil % : x  Auto Basophil % : x    10-20    130<L>  |  87<L>  |  57<H>  ----------------------------<  82  3.7   |  28  |  4.37<H>    Ca    9.0      20 Oct 2018 07:14  Mg     1.8     10-20

## 2018-10-20 NOTE — PROGRESS NOTE ADULT - SUBJECTIVE AND OBJECTIVE BOX
[facility] NEPHROLOGY- PROGRESS NOTE    88y Female with history of HTN, DM, CKD-5 presents with increasing LE edema. Nephrology consulted for elevated Scr.    Pt feels well today.    REVIEW OF SYSTEMS:  Gen: no changes in weight  Cards: no chest pain  Resp: no dyspnea  GI: no nausea or vomiting or diarrhea  Vascular: + LE edema resolved, + L leg pain with weakness    No Known Allergies      Hospital Medications: Medications reviewed      VITALS:  T(F): 98.7 (10-20-18 @ 13:50), Max: 98.7 (10-20-18 @ 13:50)  HR: 70 (10-20-18 @ 13:50)  BP: 104/42 (10-20-18 @ 13:50)  RR: 18 (10-20-18 @ 13:50)  SpO2: 97% (10-20-18 @ 13:50)  Wt(kg): --    10-19 @ 07:01  -  10-20 @ 07:00  --------------------------------------------------------  IN: 180 mL / OUT: 550 mL / NET: -370 mL    10-20 @ 07:01  -  10-20 @ 16:39  --------------------------------------------------------  IN: 0 mL / OUT: 300 mL / NET: -300 mL      PHYSICAL EXAM:    Gen: NAD, calm  Cards: RRR, +S1/S2, no M/G/R  Resp: CTA B/L  GI: soft, NT/ND, NABS  Vascular: no LE edema B/L      LABS:  10-20    130<L>  |  87<L>  |  57<H>  ----------------------------<  82  3.7   |  28  |  4.37<H>    Ca    9.0      20 Oct 2018 07:14  Mg     1.8     10-20      Creatinine Trend: 4.37 <--, 4.30 <--, 3.82 <--, 3.81 <--, 3.72 <--, 3.64 <--, 3.70 <--, 3.72 <--                        8.4    7.61  )-----------( 305      ( 20 Oct 2018 07:14 )             26.4     Urine Studies:  Urinalysis Basic - ( 16 Oct 2018 03:17 )    Color: COLORLESS / Appearance: CLEAR / S.009 / pH: 7.5  Gluc: NEGATIVE / Ketone: NEGATIVE  / Bili: NEGATIVE / Urobili: NORMAL   Blood: NEGATIVE / Protein: 70 / Nitrite: NEGATIVE   Leuk Esterase: NEGATIVE / RBC: 0-2 / WBC 0-2   Sq Epi:  / Non Sq Epi:  / Bacteria:

## 2018-10-21 LAB
BUN SERPL-MCNC: 58 MG/DL — HIGH (ref 7–23)
CALCIUM SERPL-MCNC: 9.2 MG/DL — SIGNIFICANT CHANGE UP (ref 8.4–10.5)
CHLORIDE SERPL-SCNC: 89 MMOL/L — LOW (ref 98–107)
CO2 SERPL-SCNC: 29 MMOL/L — SIGNIFICANT CHANGE UP (ref 22–31)
CREAT SERPL-MCNC: 4.23 MG/DL — HIGH (ref 0.5–1.3)
GLUCOSE SERPL-MCNC: 94 MG/DL — SIGNIFICANT CHANGE UP (ref 70–99)
HCT VFR BLD CALC: 27.1 % — LOW (ref 34.5–45)
HGB BLD-MCNC: 8.7 G/DL — LOW (ref 11.5–15.5)
MAGNESIUM SERPL-MCNC: 1.9 MG/DL — SIGNIFICANT CHANGE UP (ref 1.6–2.6)
MCHC RBC-ENTMCNC: 27.8 PG — SIGNIFICANT CHANGE UP (ref 27–34)
MCHC RBC-ENTMCNC: 32.1 % — SIGNIFICANT CHANGE UP (ref 32–36)
MCV RBC AUTO: 86.6 FL — SIGNIFICANT CHANGE UP (ref 80–100)
NRBC # FLD: 0 — SIGNIFICANT CHANGE UP
PLATELET # BLD AUTO: 330 K/UL — SIGNIFICANT CHANGE UP (ref 150–400)
PMV BLD: 10.2 FL — SIGNIFICANT CHANGE UP (ref 7–13)
POTASSIUM SERPL-MCNC: 3.8 MMOL/L — SIGNIFICANT CHANGE UP (ref 3.5–5.3)
POTASSIUM SERPL-SCNC: 3.8 MMOL/L — SIGNIFICANT CHANGE UP (ref 3.5–5.3)
RBC # BLD: 3.13 M/UL — LOW (ref 3.8–5.2)
RBC # FLD: 14.2 % — SIGNIFICANT CHANGE UP (ref 10.3–14.5)
SODIUM SERPL-SCNC: 131 MMOL/L — LOW (ref 135–145)
WBC # BLD: 6.95 K/UL — SIGNIFICANT CHANGE UP (ref 3.8–10.5)
WBC # FLD AUTO: 6.95 K/UL — SIGNIFICANT CHANGE UP (ref 3.8–10.5)

## 2018-10-21 RX ADMIN — SEVELAMER CARBONATE 800 MILLIGRAM(S): 2400 POWDER, FOR SUSPENSION ORAL at 13:38

## 2018-10-21 RX ADMIN — HEPARIN SODIUM 5000 UNIT(S): 5000 INJECTION INTRAVENOUS; SUBCUTANEOUS at 17:26

## 2018-10-21 RX ADMIN — PANTOPRAZOLE SODIUM 40 MILLIGRAM(S): 20 TABLET, DELAYED RELEASE ORAL at 05:58

## 2018-10-21 RX ADMIN — SIMVASTATIN 40 MILLIGRAM(S): 20 TABLET, FILM COATED ORAL at 21:28

## 2018-10-21 RX ADMIN — BUMETANIDE 2 MILLIGRAM(S): 0.25 INJECTION INTRAMUSCULAR; INTRAVENOUS at 05:58

## 2018-10-21 RX ADMIN — Medication 100 MILLIGRAM(S): at 05:58

## 2018-10-21 RX ADMIN — ISOSORBIDE MONONITRATE 120 MILLIGRAM(S): 60 TABLET, EXTENDED RELEASE ORAL at 11:41

## 2018-10-21 RX ADMIN — BUDESONIDE AND FORMOTEROL FUMARATE DIHYDRATE 2 PUFF(S): 160; 4.5 AEROSOL RESPIRATORY (INHALATION) at 21:29

## 2018-10-21 RX ADMIN — HEPARIN SODIUM 5000 UNIT(S): 5000 INJECTION INTRAVENOUS; SUBCUTANEOUS at 05:59

## 2018-10-21 RX ADMIN — SEVELAMER CARBONATE 800 MILLIGRAM(S): 2400 POWDER, FOR SUSPENSION ORAL at 21:28

## 2018-10-21 RX ADMIN — SEVELAMER CARBONATE 800 MILLIGRAM(S): 2400 POWDER, FOR SUSPENSION ORAL at 05:58

## 2018-10-21 RX ADMIN — GABAPENTIN 100 MILLIGRAM(S): 400 CAPSULE ORAL at 05:58

## 2018-10-21 RX ADMIN — AMLODIPINE BESYLATE 5 MILLIGRAM(S): 2.5 TABLET ORAL at 05:58

## 2018-10-21 RX ADMIN — Medication 100 MILLIGRAM(S): at 21:29

## 2018-10-21 RX ADMIN — Medication 100 MILLIGRAM(S): at 13:38

## 2018-10-21 RX ADMIN — BUDESONIDE AND FORMOTEROL FUMARATE DIHYDRATE 2 PUFF(S): 160; 4.5 AEROSOL RESPIRATORY (INHALATION) at 10:23

## 2018-10-21 RX ADMIN — GABAPENTIN 100 MILLIGRAM(S): 400 CAPSULE ORAL at 17:26

## 2018-10-21 NOTE — PROGRESS NOTE ADULT - SUBJECTIVE AND OBJECTIVE BOX
[facility] NEPHROLOGY- PROGRESS NOTE    88y Female with history of HTN, DM, CKD-5 presents with increasing LE edema. Nephrology consulted for elevated Scr.    Pt feels well today.    REVIEW OF SYSTEMS:  Gen: no changes in weight  Cards: no chest pain  Resp: no dyspnea  GI: no nausea or vomiting or diarrhea  Vascular: + LE edema resolved, + L leg pain with weakness    No Known Allergies      Hospital Medications: Medications reviewed    VITALS:  T(F): 97.3 (10-21-18 @ 11:36), Max: 98.1 (10-20-18 @ 21:24)  HR: 73 (10-21-18 @ 13:38)  BP: 156/131 (10-21-18 @ 13:38)  RR: 18 (10-21-18 @ 11:36)  SpO2: 97% (10-21-18 @ 11:36)  Wt(kg): --    10-20 @ 07:01  -  10-21 @ 07:00  --------------------------------------------------------  IN: 200 mL / OUT: 600 mL / NET: -400 mL        PHYSICAL EXAM:  Gen: NAD, calm  Cards: RRR, +S1/S2, no M/G/R  Resp: CTA B/L  GI: soft, NT/ND, NABS  Vascular: no LE edema B/L        LABS:  10-21    131<L>  |  89<L>  |  58<H>  ----------------------------<  94  3.8   |  29  |  4.23<H>    Ca    9.2      21 Oct 2018 05:49  Mg     1.9     10-21      Creatinine Trend: 4.23 <--, 4.37 <--, 4.30 <--, 3.82 <--, 3.81 <--, 3.72 <--, 3.64 <--                        8.7    6.95  )-----------( 330      ( 21 Oct 2018 05:49 )             27.1     Urine Studies:  Urinalysis Basic - ( 16 Oct 2018 03:17 )    Color: COLORLESS / Appearance: CLEAR / S.009 / pH: 7.5  Gluc: NEGATIVE / Ketone: NEGATIVE  / Bili: NEGATIVE / Urobili: NORMAL   Blood: NEGATIVE / Protein: 70 / Nitrite: NEGATIVE   Leuk Esterase: NEGATIVE / RBC: 0-2 / WBC 0-2   Sq Epi:  / Non Sq Epi:  / Bacteria:

## 2018-10-21 NOTE — PROGRESS NOTE ADULT - SUBJECTIVE AND OBJECTIVE BOX
SUBJECTIVE / OVERNIGHT EVENTS: pt denies chest pain,, left groin pain , pt spiked fever yesterday     MEDICATIONS  (STANDING):  amLODIPine   Tablet 5 milliGRAM(s) Oral daily  buDESOnide  80 MICROgram(s)/formoterol 4.5 MICROgram(s) Inhaler 2 Puff(s) Inhalation two times a day  buMETAnide 2 milliGRAM(s) Oral daily  ergocalciferol 57508 Unit(s) Oral <User Schedule>  gabapentin 100 milliGRAM(s) Oral every 12 hours  heparin  Injectable 5000 Unit(s) SubCutaneous every 12 hours  hydrALAZINE 100 milliGRAM(s) Oral three times a day  isosorbide   mononitrate ER Tablet (IMDUR) 120 milliGRAM(s) Oral daily  metoprolol succinate  milliGRAM(s) Oral daily  pantoprazole    Tablet 40 milliGRAM(s) Oral before breakfast  sevelamer hydrochloride 800 milliGRAM(s) Oral three times a day  simvastatin 40 milliGRAM(s) Oral at bedtime    MEDICATIONS  (PRN):  acetaminophen   Tablet .. 650 milliGRAM(s) Oral every 6 hours PRN Temp greater or equal to 38C (100.4F), Mild Pain (1 - 3), Moderate Pain (4 - 6)  artificial  tears Solution 1 Drop(s) Both EYES two times a day PRN Dry Eyes    Vital Signs Last 24 Hrs  T(C): 36.8 (21 Oct 2018 20:46), Max: 36.8 (21 Oct 2018 20:46)  T(F): 98.3 (21 Oct 2018 20:46), Max: 98.3 (21 Oct 2018 20:46)  HR: 72 (21 Oct 2018 20:46) (60 - 73)  BP: 125/49 (21 Oct 2018 20:46) (125/49 - 156/131)  BP(mean): --  RR: 18 (21 Oct 2018 20:46) (18 - 18)  SpO2: 100% (21 Oct 2018 20:46) (97% - 100%)    Constitutional: No fever, fatigue  Skin: No rash.  Eyes: No recent vision problems or eye pain.  ENT: No congestion, ear pain, or sore throat.  Cardiovascular: No chest pain or palpation.  Respiratory: sob+  Gastrointestinal: No abdominal pain, nausea, vomiting, or diarrhea.  Genitourinary: No dysuria.  Musculoskeletal: No joint swelling.  Neurologic: No headache.    PHYSICAL EXAM:  GENERAL: NAD  EYES: EOMI, PERRLA  NECK: Supple, No JVD  CHEST/LUNG: dec breath sounds at bases   HEART:  S1 , S2 +  ABDOMEN: sof,t bs+,   NEUROLOGY:alert awake oriented     LABS:  10-21    131<L>  |  89<L>  |  58<H>  ----------------------------<  94  3.8   |  29  |  4.23<H>    Ca    9.2      21 Oct 2018 05:49  Mg     1.9     10-21      Creatinine Trend: 4.23 <--, 4.37 <--, 4.30 <--, 3.82 <--, 3.81 <--, 3.72 <--, 3.64 <--                        8.7    6.95  )-----------( 330      ( 21 Oct 2018 05:49 )             27.1     Urine Studies:  Urinalysis Basic - ( 16 Oct 2018 03:17 )    Color: COLORLESS / Appearance: CLEAR / S.009 / pH: 7.5  Gluc: NEGATIVE / Ketone: NEGATIVE  / Bili: NEGATIVE / Urobili: NORMAL   Blood: NEGATIVE / Protein: 70 / Nitrite: NEGATIVE   Leuk Esterase: NEGATIVE / RBC: 0-2 / WBC 0-2   Sq Epi:  / Non Sq Epi:  / Bacteria:

## 2018-10-21 NOTE — PROGRESS NOTE ADULT - SUBJECTIVE AND OBJECTIVE BOX
Cardiovascular Disease Progress Note    Overnight events: No acute events overnight. Ms. Villagomez denies chest pain or SOB.   Otherwise review of systems negative    Objective Findings:  T(C): 36.4 (10-21-18 @ 05:10), Max: 37.1 (10-20-18 @ 13:50)  HR: 60 (10-21-18 @ 05:10) (60 - 70)  BP: 128/64 (10-21-18 @ 05:10) (104/42 - 128/64)  RR: 18 (10-21-18 @ 05:10) (18 - 18)  SpO2: 100% (10-21-18 @ 05:10) (97% - 100%)  Wt(kg): --  Daily     Daily Weight in k.1 (21 Oct 2018 08:10)      Physical Exam:  Gen: NAD  HEENT: EOMI  CV: RRR, normal S1 + S2, no m/r/g  Lungs: CTAB  Abd: soft, non-tender  Ext: No edema    Telemetry: Sinus; no ectopy    Laboratory Data:                        8.7    6.95  )-----------( 330      ( 21 Oct 2018 05:49 )             27.1     10-    131<L>  |  89<L>  |  58<H>  ----------------------------<  94  3.8   |  29  |  4.23<H>    Ca    9.2      21 Oct 2018 05:49  Mg     1.9     10-                Inpatient Medications:  MEDICATIONS  (STANDING):  amLODIPine   Tablet 5 milliGRAM(s) Oral daily  buDESOnide  80 MICROgram(s)/formoterol 4.5 MICROgram(s) Inhaler 2 Puff(s) Inhalation two times a day  buMETAnide 2 milliGRAM(s) Oral daily  ergocalciferol 52582 Unit(s) Oral <User Schedule>  gabapentin 100 milliGRAM(s) Oral every 12 hours  heparin  Injectable 5000 Unit(s) SubCutaneous every 12 hours  hydrALAZINE 100 milliGRAM(s) Oral three times a day  isosorbide   mononitrate ER Tablet (IMDUR) 120 milliGRAM(s) Oral daily  metoprolol succinate  milliGRAM(s) Oral daily  pantoprazole    Tablet 40 milliGRAM(s) Oral before breakfast  sevelamer hydrochloride 800 milliGRAM(s) Oral three times a day  simvastatin 40 milliGRAM(s) Oral at bedtime      Assessment: 88 year old woman with CKD V, HTN, and HLD presents with acute diastolic CHF.    Plan of Care:    #Acute diastolic CHF-  Clinically improved.  Agree with reducing Bumex for rising creatinine.  Patient states pain in hands and feet are improved.  CT abdomen without acute pathology.   TTE from 4 months ago reviewed.    Over 25 minutes spent on total encounter; more than 50% of the visit was spent counseling and/or coordinating care by the attending physician.      Justin Vick MD [facility]  Cardiovascular Disease  [phone]

## 2018-10-21 NOTE — PROGRESS NOTE ADULT - SUBJECTIVE AND OBJECTIVE BOX
FRANCIA MARTEL:[id number],   88yFemale followed for:  No Known Allergies    PAST MEDICAL & SURGICAL HISTORY:  CHF (congestive heart failure): diastolic heart failure  Asthma  CKD (chronic kidney disease)  High cholesterol  Hypertension  History of hysterectomy    FAMILY HISTORY:  No pertinent family history in first degree relatives    MEDICATIONS  (STANDING):  amLODIPine   Tablet 5 milliGRAM(s) Oral daily  buDESOnide  80 MICROgram(s)/formoterol 4.5 MICROgram(s) Inhaler 2 Puff(s) Inhalation two times a day  buMETAnide 2 milliGRAM(s) Oral daily  ergocalciferol 64130 Unit(s) Oral <User Schedule>  gabapentin 100 milliGRAM(s) Oral every 12 hours  heparin  Injectable 5000 Unit(s) SubCutaneous every 12 hours  hydrALAZINE 100 milliGRAM(s) Oral three times a day  isosorbide   mononitrate ER Tablet (IMDUR) 120 milliGRAM(s) Oral daily  metoprolol succinate  milliGRAM(s) Oral daily  pantoprazole    Tablet 40 milliGRAM(s) Oral before breakfast  sevelamer hydrochloride 800 milliGRAM(s) Oral three times a day  simvastatin 40 milliGRAM(s) Oral at bedtime    MEDICATIONS  (PRN):  acetaminophen   Tablet .. 650 milliGRAM(s) Oral every 6 hours PRN Temp greater or equal to 38C (100.4F), Mild Pain (1 - 3), Moderate Pain (4 - 6)  artificial  tears Solution 1 Drop(s) Both EYES two times a day PRN Dry Eyes      Vital Signs Last 24 Hrs  T(C): 36.4 (21 Oct 2018 05:10), Max: 37.1 (20 Oct 2018 13:50)  T(F): 97.5 (21 Oct 2018 05:10), Max: 98.7 (20 Oct 2018 13:50)  HR: 60 (21 Oct 2018 05:10) (60 - 70)  BP: 128/64 (21 Oct 2018 05:10) (104/42 - 128/64)  BP(mean): --  RR: 18 (21 Oct 2018 05:10) (18 - 18)  SpO2: 100% (21 Oct 2018 05:10) (97% - 100%)  nc/at  s1s2  cta  soft, nt, nd no guarding or rebound  no c/c/e    CBC Full  -  ( 21 Oct 2018 05:49 )  WBC Count : 6.95 K/uL  Hemoglobin : 8.7 g/dL  Hematocrit : 27.1 %  Platelet Count - Automated : 330 K/uL  Mean Cell Volume : 86.6 fL  Mean Cell Hemoglobin : 27.8 pg  Mean Cell Hemoglobin Concentration : 32.1 %  Auto Neutrophil # : x  Auto Lymphocyte # : x  Auto Monocyte # : x  Auto Eosinophil # : x  Auto Basophil # : x  Auto Neutrophil % : x  Auto Lymphocyte % : x  Auto Monocyte % : x  Auto Eosinophil % : x  Auto Basophil % : x    10-21    131<L>  |  89<L>  |  58<H>  ----------------------------<  94  3.8   |  29  |  4.23<H>    Ca    9.2      21 Oct 2018 05:49  Mg     1.9     10-21

## 2018-10-22 ENCOUNTER — TRANSCRIPTION ENCOUNTER (OUTPATIENT)
Age: 83
End: 2018-10-22

## 2018-10-22 VITALS
SYSTOLIC BLOOD PRESSURE: 155 MMHG | HEART RATE: 74 BPM | RESPIRATION RATE: 18 BRPM | OXYGEN SATURATION: 98 % | DIASTOLIC BLOOD PRESSURE: 56 MMHG

## 2018-10-22 LAB
BUN SERPL-MCNC: 57 MG/DL — HIGH (ref 7–23)
CALCIUM SERPL-MCNC: 9.3 MG/DL — SIGNIFICANT CHANGE UP (ref 8.4–10.5)
CHLORIDE SERPL-SCNC: 89 MMOL/L — LOW (ref 98–107)
CO2 SERPL-SCNC: 27 MMOL/L — SIGNIFICANT CHANGE UP (ref 22–31)
CREAT SERPL-MCNC: 4.06 MG/DL — HIGH (ref 0.5–1.3)
GLUCOSE SERPL-MCNC: 122 MG/DL — HIGH (ref 70–99)
HCT VFR BLD CALC: 26.3 % — LOW (ref 34.5–45)
HGB BLD-MCNC: 8.3 G/DL — LOW (ref 11.5–15.5)
MAGNESIUM SERPL-MCNC: 1.9 MG/DL — SIGNIFICANT CHANGE UP (ref 1.6–2.6)
MCHC RBC-ENTMCNC: 27.6 PG — SIGNIFICANT CHANGE UP (ref 27–34)
MCHC RBC-ENTMCNC: 31.6 % — LOW (ref 32–36)
MCV RBC AUTO: 87.4 FL — SIGNIFICANT CHANGE UP (ref 80–100)
NRBC # FLD: 0 — SIGNIFICANT CHANGE UP
PLATELET # BLD AUTO: 295 K/UL — SIGNIFICANT CHANGE UP (ref 150–400)
PMV BLD: 10.6 FL — SIGNIFICANT CHANGE UP (ref 7–13)
POTASSIUM SERPL-MCNC: 3.8 MMOL/L — SIGNIFICANT CHANGE UP (ref 3.5–5.3)
POTASSIUM SERPL-SCNC: 3.8 MMOL/L — SIGNIFICANT CHANGE UP (ref 3.5–5.3)
RBC # BLD: 3.01 M/UL — LOW (ref 3.8–5.2)
RBC # FLD: 14.1 % — SIGNIFICANT CHANGE UP (ref 10.3–14.5)
SODIUM SERPL-SCNC: 129 MMOL/L — LOW (ref 135–145)
WBC # BLD: 7.03 K/UL — SIGNIFICANT CHANGE UP (ref 3.8–10.5)
WBC # FLD AUTO: 7.03 K/UL — SIGNIFICANT CHANGE UP (ref 3.8–10.5)

## 2018-10-22 RX ORDER — AMLODIPINE BESYLATE 2.5 MG/1
1 TABLET ORAL
Qty: 30 | Refills: 0
Start: 2018-10-22 | End: 2018-11-20

## 2018-10-22 RX ORDER — HYDRALAZINE HCL 50 MG
1 TABLET ORAL
Qty: 90 | Refills: 0
Start: 2018-10-22 | End: 2018-11-20

## 2018-10-22 RX ADMIN — Medication 100 MILLIGRAM(S): at 05:54

## 2018-10-22 RX ADMIN — GABAPENTIN 100 MILLIGRAM(S): 400 CAPSULE ORAL at 05:54

## 2018-10-22 RX ADMIN — GABAPENTIN 100 MILLIGRAM(S): 400 CAPSULE ORAL at 17:22

## 2018-10-22 RX ADMIN — AMLODIPINE BESYLATE 5 MILLIGRAM(S): 2.5 TABLET ORAL at 05:54

## 2018-10-22 RX ADMIN — SEVELAMER CARBONATE 800 MILLIGRAM(S): 2400 POWDER, FOR SUSPENSION ORAL at 13:21

## 2018-10-22 RX ADMIN — SEVELAMER CARBONATE 800 MILLIGRAM(S): 2400 POWDER, FOR SUSPENSION ORAL at 05:54

## 2018-10-22 RX ADMIN — BUMETANIDE 2 MILLIGRAM(S): 0.25 INJECTION INTRAMUSCULAR; INTRAVENOUS at 05:54

## 2018-10-22 RX ADMIN — PANTOPRAZOLE SODIUM 40 MILLIGRAM(S): 20 TABLET, DELAYED RELEASE ORAL at 05:54

## 2018-10-22 RX ADMIN — HEPARIN SODIUM 5000 UNIT(S): 5000 INJECTION INTRAVENOUS; SUBCUTANEOUS at 17:22

## 2018-10-22 RX ADMIN — HEPARIN SODIUM 5000 UNIT(S): 5000 INJECTION INTRAVENOUS; SUBCUTANEOUS at 05:54

## 2018-10-22 RX ADMIN — ISOSORBIDE MONONITRATE 120 MILLIGRAM(S): 60 TABLET, EXTENDED RELEASE ORAL at 13:21

## 2018-10-22 RX ADMIN — Medication 100 MILLIGRAM(S): at 13:21

## 2018-10-22 RX ADMIN — BUDESONIDE AND FORMOTEROL FUMARATE DIHYDRATE 2 PUFF(S): 160; 4.5 AEROSOL RESPIRATORY (INHALATION) at 08:47

## 2018-10-22 NOTE — DISCHARGE NOTE ADULT - PATIENT PORTAL LINK FT
You can access the [facility] Patient Portal, offered by [facility], by registering with the following website: [email or web address]

## 2018-10-22 NOTE — PROGRESS NOTE ADULT - PROBLEM SELECTOR PROBLEM 2
CKD (chronic kidney disease), stage V
CKD (chronic kidney disease), stage V
Stage 5 chronic kidney disease not on chronic dialysis
CKD (chronic kidney disease), stage V
Hypertensive urgency
Stage 5 chronic kidney disease not on chronic dialysis
CKD (chronic kidney disease), stage V

## 2018-10-22 NOTE — PROGRESS NOTE ADULT - PROBLEM SELECTOR PROBLEM 1
Acute kidney injury
Hematochezia
Acute kidney injury
Hematochezia
Stage 5 chronic kidney disease not on chronic dialysis
Acute kidney injury
Acute kidney injury

## 2018-10-22 NOTE — PROGRESS NOTE ADULT - ASSESSMENT
88y Female with history of CKD-5 with proteinuria in setting of paraproteinemia presents with L arm pain. Nephrology consulted for elevated Scr.
89 y/o F with PMHx of CKD (not currently on dialysis), HLD, HTN presents to the ED c/o b/l worsening LE edema x 1 day found to have pulmonary edema on CXR, elevated BNP, and elevated Cr admitted to telemetry for monitoring
87 y/o F with PMHx of CKD (not currently on dialysis), HLD, HTN presents to the ED c/o b/l worsening LE edema x 1 day found to have pulmonary edema on CXR, elevated BNP, and elevated Cr admitted to telemetry for monitoring
87 y/o F with PMHx of CKD (not currently on dialysis), HLD, HTN presents to the ED c/o b/l worsening LE edema x 1 day found to have pulmonary edema on CXR, elevated BNP, and elevated Cr admitted to telemetry for monitoring     EKG: NSR @79bpm  H/H: 8.5/27.2  Cr: 3.46 (2.93 baseline)  BNP: 16, 477  UA: neg  b/l LE US duplex: No evidence of bilateral lower extremity deep venous thrombosis.  CXR: Interstitial pulmonary edema  TTE 06/01/18: Preserved LVEF with no significant valvulopathy
88 f with CKD (not currently on dialysis), HLD, HTN who presented to [facility] on 10/5/18 with bilateral worsening LE edema (likely acute on chronic HF exacerbation). On admission afebrile, not tachycardic but hypertensive to > 210. WBC on admission of 7.32 with 63.5%. U/A on admission with negative leukocyte esterase. Given anemia and positive occult blood patient underwent CT Abdomen/Pelvis noncontrast on 10/9/18 as a cancer screening tool with No CT evidence of acute intra-abdominal pathology. On the afternoon of the consult patient developed severe lower abdominal pain. Patient also febrile to 100.5. U/A from 10/16 with negative leukocyte esterase.     acute LLQ/L inguinal pain with one fever of 100.5 which resolved with no antibiotics  abd/pelvis CT negative, doppler negative, u/a negative    * f/u the blood cultures  * monitor off of antibiotics  *If patient develops signs/symptoms of sepsis or persistent fever repeat blood and urine cx, CXR and initiate Zosyn 3.375 mg IV Q12H.
88y Female with history of CKD-5 with proteinuria in setting of paraproteinemia presents with L arm pain. Nephrology consulted for elevated Scr.
88y Female with history of CKD-5 with proteinuria in setting of paraproteinemia presents with L arm pain. Nephrology consulted for elevated Scr.
88y Female with history of HTN, DM, CKD-5 presents with increasing LE edema. Nephrology consulted for elevated Scr.
88y Female with history of HTN, DM, CKD-5 presents with increasing LE edema. Nephrology consulted for elevated Scr.    Renal function stabilized.   Continue bumex once daily dosing for now.
89 y/o F with PMHx of CKD (not currently on dialysis), HLD, HTN presents to the ED c/o b/l worsening LE edema x 1 day found to have pulmonary edema on CXR, elevated BNP, and elevated Cr admitted to telemetry for monitoring
await ct, conservative gi manamgnet.
conservative gi amangment. no further bleeding.  defer intervention based on age and comorbidities.
conservative managment. ppi therapy.  pt without acute gi complaints.
continue current rx. h/h stable.  no endosocpic evaluation planned
continue current rx. no further gib.  no colon planned based on age and comorbiditied.
continue current rx. pt stable.  no acute gi complaitns.
ct negatative, no sign of overt pathology.  pt high risk for endosocpic procedures based on age and comorbidities, will defer
h/h stable. no further bleeding continue conservative mangemnt.
high risk for colonosocpy, recommend ct abdomen.  no colon planned as risk>> benefit with comorbidieis
no active gib noted. no endoscopy based on age and comorbidities
no further signs of bleeding, conservative managmetn.
no sign of further gib. conservative gi managment.
no sign of gib. continuc current rx.
no sign of overt bleed, continue conservative managmetn.
88y Female with history of HTN, DM, CKD-5 presents with increasing LE edema. Nephrology consulted for elevated Scr.    Renal function stabilized.   Continue bumex once daily dosing for now.
88y Female with history of HTN, DM, CKD-5 presents with increasing LE edema. Nephrology consulted for elevated Scr.

## 2018-10-22 NOTE — PROGRESS NOTE ADULT - PROBLEM SELECTOR PROBLEM 3
Hypertensive kidney disease with chronic kidney disease, stage 1 through stage 4 or unspecified 
Hypertensive kidney disease with chronic kidney disease, stage 1 through stage 4 or unspecified 
Hypertensive urgency
Acute on chronic diastolic congestive heart failure
Hypertensive kidney disease with chronic kidney disease, stage 1 through stage 4 or unspecified 
Hypertensive urgency
Hypertensive kidney disease with chronic kidney disease, stage 1 through stage 4 or unspecified 
Hypertensive kidney disease with chronic kidney disease, stage 1 through stage 4 or unspecified

## 2018-10-22 NOTE — PROGRESS NOTE ADULT - SUBJECTIVE AND OBJECTIVE BOX
Cardiovascular Disease Progress Note    Overnight events: No acute events overnight. Ms. Villagomez denies pain. She is laying in bed comfortably.   Otherwise review of systems negative    Objective Findings:  T(C): 36.7 (10-22-18 @ 05:53), Max: 36.8 (10-21-18 @ 20:46)  HR: 67 (10-22-18 @ 05:53) (67 - 73)  BP: 146/62 (10-22-18 @ 05:53) (125/49 - 156/131)  RR: 16 (10-22-18 @ 05:53) (16 - 18)  SpO2: 100% (10-22-18 @ 05:53) (100% - 100%)  Wt(kg): --  Daily     Daily Weight in k.1 (22 Oct 2018 11:07)      Physical Exam:  Gen: NAD  HEENT: EOMI  CV: RRR, normal S1 + S2, no m/r/g  Lungs: CTAB  Abd: soft, non-tender  Ext: No edema    Telemetry: Sinus; rare PVDs    Laboratory Data:                        8.3    7.03  )-----------( 295      ( 22 Oct 2018 07:23 )             26.3     10-22    129<L>  |  89<L>  |  57<H>  ----------------------------<  122<H>  3.8   |  27  |  4.06<H>    Ca    9.3      22 Oct 2018 07:23  Mg     1.9     10-22                Inpatient Medications:  MEDICATIONS  (STANDING):  amLODIPine   Tablet 5 milliGRAM(s) Oral daily  buDESOnide  80 MICROgram(s)/formoterol 4.5 MICROgram(s) Inhaler 2 Puff(s) Inhalation two times a day  buMETAnide 2 milliGRAM(s) Oral daily  ergocalciferol 54285 Unit(s) Oral <User Schedule>  gabapentin 100 milliGRAM(s) Oral every 12 hours  heparin  Injectable 5000 Unit(s) SubCutaneous every 12 hours  hydrALAZINE 100 milliGRAM(s) Oral three times a day  isosorbide   mononitrate ER Tablet (IMDUR) 120 milliGRAM(s) Oral daily  metoprolol succinate  milliGRAM(s) Oral daily  pantoprazole    Tablet 40 milliGRAM(s) Oral before breakfast  sevelamer hydrochloride 800 milliGRAM(s) Oral three times a day  simvastatin 40 milliGRAM(s) Oral at bedtime      Assessment:  88 year old woman with CKD V, HTN, and HLD presents with acute diastolic CHF.    Plan of Care:    #Acute diastolic CHF-  Clinically improved.  Agree with current dose Bumex.  Patient states pain in hands and feet are improved.  CT abdomen without acute pathology.   TTE from 4 months ago reviewed.    No further inpatient cardiac work up warranted at this time.     Over 25 minutes spent on total encounter; more than 50% of the visit was spent counseling and/or coordinating care by the attending physician.      Justin Vick MD [facility]  Cardiovascular Disease  [phone]

## 2018-10-22 NOTE — PROGRESS NOTE ADULT - ATTENDING COMMENTS
[facility] NEPHROLOGY  Bubba Mercado M.D.  Reagan Pierre D.O.  Kristin Martinez M.D.  Paola Monique, MSN, ANP-C    Telephone: [phone]  Facsimile: [phone]    [address] [place] [address]
[facility] NEPHROLOGY  Bubba Mercado M.D.  Reagan Pierre D.O.  Kristin Martinez M.D.  Paola Monique, MSN, ANP-C    Telephone: [phone]  Facsimile: [phone]    [address] [place] [address]
[facility] NEPHROLOGY  Bubba Mercado M.D.  Reagan Pierre D.O.  Kristin Martinez M.D.  Paola Monique, MSN, ANP-C    Telephone: [phone]  Facsimile: [phone]    [address] [place] [address]
[facility] NEPHROLOGY  Bubba Mercado M.D.  Reagan Pierre D.O.  Kristin Martinez M.D.  Paola Monique, MSN, ANP-C    Telephone: [phone]  Facsimile: [phone]    [address] [place] [address]
[facility] NEPHROLOGY  Bubba Mercado M.D.  Reagan Pierre D.O.  Kristin Martinez M.D.  Paola Monique, MSN, ANP-C    Telephone: [phone]  Facsimile: [phone]    [address] [place] [address]
[facility] NEPHROLOGY  Bubba Mercado M.D.  Reagan Pierre D.O.  Kristin Martinez M.D.  Paola Monique, MSN, ANP-C    Telephone: [phone]  Facsimile: [phone]    [address] [place] [address]
[facility] NEPHROLOGY  Bubba Mercado M.D.  Reagan Pierre D.O.  Kristin Martinez M.D.  Paola Monique, MSN, ANP-C    Telephone: [phone]  Facsimile: [phone]    [address] [place] [address]
[facility] NEPHROLOGY  Bubba Mercado M.D.  Reagan Pierre D.O.  Kristin Martinez M.D.  Paola Monique, MSN, ANP-C    Telephone: [phone]  Facsimile: [phone]    [address] [place] [address]
[facility] NEPHROLOGY  Bubba Mercado M.D.  Reagan Pierre D.O.  Kristin Martinez M.D.  Paola Monique, MSN, ANP-C    Telephone: [phone]  Facsimile: [phone]    [address] [place] [address]
[facility] NEPHROLOGY  Bubba Mercado M.D.  Reagan Pierre D.O.  Kristin Martinez M.D.  Paola Monique, MSN, ANP-C    Telephone: [phone]  Facsimile: [phone]    [address] [place] [address]
[facility] NEPHROLOGY  Bubba Mercado M.D.  Reagan Pierre D.O.  Kristin Martinez M.D.  Paola Monique, MSN, ANP-C    Telephone: [phone]  Facsimile: [phone]    [address] [place] [address]
[facility] NEPHROLOGY  Bubba Mercado M.D.  Reagan Pierre D.O.  Kristin Martinez M.D.  Paola Monique, MSN, ANP-C    Telephone: [phone]  Facsimile: [phone]    [address] [place] [address]
[facility] NEPHROLOGY  Bubba Mercado M.D.  Reagan Pierre D.O.  Kristin Martinez M.D.  Paola Monique, MSN, ANP-C    Telephone: [phone]  Facsimile: [phone]    [address] [place] [address]
[facility] NEPHROLOGY  Bubba Mercado M.D.  Reagan Pierre D.O.  Kristin Martinez M.D.  Paola Monique, MSN, ANP-C    Telephone: [phone]  Facsimile: [phone]    [address] [place] [address]
[facility] NEPHROLOGY  Bubba Mercado M.D.  Reagan Pierre D.O.  Kristin Martinez M.D.  Paola Monique, MSN, ANP-C    Telephone: [phone]  Facsimile: [phone]    [address] [place] [address]
[facility] NEPHROLOGY  Bubba Mercado M.D.  Reagan Pierre D.O.  Kristin Martinez M.D.  Paola Monique, MSN, ANP-C    Telephone: [phone]  Facsimile: [phone]    [address] [place] [address]
[facility] NEPHROLOGY  Bubba Mercado M.D.  Reagan Pierre D.O.  Kirstin Martinez M.D.  Paola Monique, MSN, ANP-C    Telephone: [phone]  Facsimile: [phone]    [address] [place] [address]

## 2018-10-22 NOTE — PROGRESS NOTE ADULT - PROBLEM SELECTOR PLAN 3
BP improving. Continue with current medications and low sodium diet. Monitor BP.
BP uncontrolled. Increase hydralazine to 75 mg TID. Monitor BP.
BP uncontrolled likely due to steroids. Continue with current medications and low sodium diet. Will titrate amlodipine as an outpatient as needed. Monitor BP.
improving with current htn med regimen
BP acceptable. Continue with current medications and low sodium diet. Monitor BP.
BP borderline. Continue with current medications and low sodium diet. Monitor BP.
BP improving. Continue with current medications and low sodium diet. Monitor BP.
BP labile but expect to improve with diuresis. Continue with current medications and low sodium diet. Monitor BP.
BP uncontrolled for which hydralazine increased to 100 mg TID. Monitor BP.
BP uncontrolled likely due to steroids. Continue with current medications and low sodium diet. Will titrate amlodipine as an outpatient as needed. Monitor BP.
BP uncontrolled. Increase hydralazine to 100 mg TID. Monitor BP.
BP uncontrolled. Start amlodipine 5 mg daily. Monitor BP.
cont diuresis
improving with current htn med regimen
BP uncontrolled likely due to steroids. Continue with current medications and low sodium diet. Will titrate amlodipine as an outpatient as needed. Monitor BP.

## 2018-10-22 NOTE — DISCHARGE NOTE ADULT - MEDICATION SUMMARY - MEDICATIONS TO STOP TAKING
I will STOP taking the medications listed below when I get home from the hospital:    sodium bicarbonate 650 mg oral tablet  -- 1 tab(s) by mouth 3 times a day    metOLazone 2.5 mg oral tablet  -- 1 tab(s) by mouth once a day

## 2018-10-22 NOTE — DISCHARGE NOTE ADULT - HOME CARE AGENCY
[facility] MAX aide reinstated as per prior to admission , 5H x 4 D; d/c note faxed

## 2018-10-22 NOTE — PROGRESS NOTE ADULT - PROVIDER SPECIALTY LIST ADULT
Cardiology
Gastroenterology
Infectious Disease
Internal Medicine
Nephrology
Internal Medicine
Nephrology

## 2018-10-22 NOTE — PROGRESS NOTE ADULT - PROBLEM SELECTOR PROBLEM 4
Edema, lower extremity
Edema, lower extremity
Acute on chronic diastolic congestive heart failure
Edema, lower extremity
Acute on chronic diastolic congestive heart failure
Edema, lower extremity
High cholesterol
Edema, lower extremity

## 2018-10-22 NOTE — PROGRESS NOTE ADULT - PROBLEM SELECTOR PROBLEM 5
Secondary hyperparathyroidism of renal origin
Secondary hyperparathyroidism of renal origin
High cholesterol
Secondary hyperparathyroidism of renal origin
High cholesterol
Preventive measure
Secondary hyperparathyroidism of renal origin

## 2018-10-22 NOTE — DISCHARGE NOTE ADULT - MEDICATION SUMMARY - MEDICATIONS TO TAKE
I will START or STAY ON the medications listed below when I get home from the hospital:    isosorbide mononitrate 120 mg oral tablet, extended release  -- 1 tab(s) by mouth once a day  -- Indication: For HF    gabapentin 100 mg oral capsule  -- 1 cap(s) by mouth every 12 hours  -- Indication: For Pain    simvastatin 40 mg oral tablet  -- 1 tab(s) by mouth once a day (at bedtime)  -- Indication: For Hld    metoprolol succinate 100 mg oral tablet, extended release  -- 1 tab(s) by mouth once a day  -- Indication: For HF    Dulera 100 mcg-5 mcg/inh inhalation aerosol  -- 2 puff(s) inhaled 2 times a day, As Needed  -- Indication: For Asthma    amLODIPine 5 mg oral tablet  -- 1 tab(s) by mouth once a day  -- Indication: For HTN    Bumex 2 mg oral tablet  -- 1 tab(s) by mouth once a day  -- Indication: For HF    ocular lubricant ophthalmic solution  -- 1 drop(s) to each affected eye 2 times a day, As needed, Dry Eyes  -- Indication: For eye drops    Auryxia 210 mg oral tablet  -- 1 tab(s) by mouth once a day  -- Indication: For CKD (chronic kidney disease)    Protonix 40 mg oral delayed release tablet  -- 1 tab(s) by mouth once a day  -- Indication: For GERD    hydrALAZINE 100 mg oral tablet  -- 1 tab(s) by mouth 3 times a day  -- Indication: For HTN    Rayaldee 30 mcg oral capsule, extended release  -- 1 cap(s) by mouth once a day (at bedtime)  -- Indication: For Supplement I will START or STAY ON the medications listed below when I get home from the hospital:    physical therapy  -- Outpatient Physical therapy    3 times a week     DX: unsteady gait  -- Indication: For Physical therapy    isosorbide mononitrate 120 mg oral tablet, extended release  -- 1 tab(s) by mouth once a day  -- Indication: For HF    gabapentin 100 mg oral capsule  -- 1 cap(s) by mouth every 12 hours  -- Indication: For Pain    simvastatin 40 mg oral tablet  -- 1 tab(s) by mouth once a day (at bedtime)  -- Indication: For Hld    metoprolol succinate 100 mg oral tablet, extended release  -- 1 tab(s) by mouth once a day  -- Indication: For HF    Dulera 100 mcg-5 mcg/inh inhalation aerosol  -- 2 puff(s) inhaled 2 times a day, As Needed  -- Indication: For Asthma    amLODIPine 5 mg oral tablet  -- 1 tab(s) by mouth once a day  -- Indication: For HTN    Bumex 2 mg oral tablet  -- 1 tab(s) by mouth once a day  -- Indication: For HF    ocular lubricant ophthalmic solution  -- 1 drop(s) to each affected eye 2 times a day, As needed, Dry Eyes  -- Indication: For eye drops    Auryxia 210 mg oral tablet  -- 1 tab(s) by mouth once a day  -- Indication: For CKD (chronic kidney disease)    Protonix 40 mg oral delayed release tablet  -- 1 tab(s) by mouth once a day  -- Indication: For GERD    hydrALAZINE 100 mg oral tablet  -- 1 tab(s) by mouth 3 times a day  -- Indication: For HTN    Rayaldee 30 mcg oral capsule, extended release  -- 1 cap(s) by mouth once a day (at bedtime)  -- Indication: For Supplement

## 2018-10-22 NOTE — DISCHARGE NOTE ADULT - MEDICATION SUMMARY - MEDICATIONS TO CHANGE
I will SWITCH the dose or number of times a day I take the medications listed below when I get home from the hospital:    hydrALAZINE 50 mg oral tablet  -- 1 tab(s) by mouth every 8 hours

## 2018-10-22 NOTE — PROGRESS NOTE ADULT - PROBLEM SELECTOR PLAN 1
Patient with SHY with low FeNa suggestive of hypovolemia which has stabilized with IVF. Keep patient off of bumex for now. Avoid nephrotoxins.
gi f/u reviewed - no acute intervention sec to pts comorbidities  hb/ hct stable
Patient with SHY likely due to cardiorenal syndrome and diuretic use. Scr improving. Avoid nephrotoxins.
Patient with SHY likely due to cardiorenal syndrome. Continue optimizing volume status. Avoid nephrotoxins.
Patient with SHY likely due to cardiorenal syndrome. Scr increased today for which bumex decreased to daily. Avoid nephrotoxins.
Patient with SHY likely due to cardiorenal syndrome. Scr relatively stable. Continue optimizing volume status. Avoid nephrotoxins.
Patient with SHY likely due to cardiorenal syndrome. Scr relatively stable. F/U BMP today (P). Continue optimizing volume status. Avoid nephrotoxins.
Patient with SHY likely due to cardiorenal syndrome. Scr stabilized today.  Continue bumex once daily dosing for now.  Avoid nephrotoxins.
Patient with SHY likely due to cardiorenal syndrome. Scr stable. Continue optimizing volume status. Avoid nephrotoxins.
Patient with SHY with low FeNa suggestive of hypovolemia which has stabilized with IVF. Bumex restarted.  Monitor renal function. Avoid nephrotoxins.
Patient with SHY with low FeNa suggestive of hypovolemia which has stabilized with IVF. Bumex restarted.  Monitor renal function. Avoid nephrotoxins.
Patient with mild SHY on admission now stable likely due to cardiorenal syndrome. Avoid nephrotoxins.
Patient with mild SHY on admission now stable likely due to cardiorenal syndrome. Renal function near baseline. Avoid nephrotoxins.
Patient with mild SHY on admission which has remained stable during current hospitalization likely due to cardiorenal syndrome. Avoid nephrotoxins.
cont diuresis , close monitor of renal fx   avoid nephrotoxic agents
gi f/u reviewed   ct abd to r/o occult malignnacy
gi f/u reviewed   hb/ hct stable
gi f/u reviewed - no acute intervention sec to pts comorbidities  hb/ hct stable
gi f/u reviewed - no acute intervention sec to pts comorbidities  hb/ hct stable  ct abd neg for acute pathology   ID f/u , cx
gi f/u reviewed - no acute intervention sec to pts comorbidities  hb/ hct stable  ct abd neg for acute pathology   ID f/u , cx
gi f/u reviewed - no acute intervention sec to pts comorbidities  hb/ hct stable  ct abd neg for acute pathology   ID f/u , cx  poss dc tomorrow
gi f/u reviewed - no acute intervention sec to pts comorbidities  hb/ hct stable  cyt abd to r/o abd pathology
Patient with SHY likely due to cardiorenal syndrome. Scr stabilized today.  Continue bumex once daily dosing for now.  Avoid nephrotoxins.
Patient with mild SHY on admission now improving likely due to cardiorenal syndrome. Avoid nephrotoxins.

## 2018-10-22 NOTE — PROGRESS NOTE ADULT - REASON FOR ADMISSION
"b/l worsening LE edema x 1 day"

## 2018-10-22 NOTE — DISCHARGE NOTE ADULT - HOSPITAL COURSE
89 y/o F with PMHx of CKD (not currently on dialysis), HLD, HTN presents to the ED c/o b/l worsening LE edema x 1 day. Since starting steroids after last hospitalization for cervical radiculopathy in 8/2018, pt admits to edema from the foot up to her shin. Pt stopped the steroids 2 wks ago and edema persisted, has mild relief with diuretics and elevation. Dr. Villagomez start pt on Metolazone last week. This morning, noticed that the LE edema is up to her thigh and granddaughter brought her to the ER. Has VIRGEN when she walks too but is usually at home and ambulation is limited due to LE edema. No recent episodes of VIRGEN or SOB. Pt also admits to a dry cough x 1 wk, takes OTC Delsym. Pt denies palpitations, chest pain, SOB, diaphoresis, orthopnea, PND wheezing, dizziness, syncope, abdominal pain, n/v/d, fevers.     Hospital course:  H/H: 8.5/27.2    cr=3.46   CQB=59524   UA=neg CXR:  Interstitial pulmonary edema.  LE US: no DVT  CT abdomen/pelvis:  Moderate bilateral pleural effusions. Anasarca. No CT evidence of acute intra-abdominal pathology.  10/18 -CT abdomen - no acute abnormality  10/18 - LE dopplers- negative for DVT   10/18 Blood Cx: no organism at 72hr     Pt presented with LE swelling in setting of acute on chronic diastolic HF started on IV bumex with improvement, later transitioned to PO. Pt had SHY on CKD likely due to overdiuresis, bumex dose decreased. Hyponatremia on fluid restriction. Pt also with + melena, GI consulted EGD/colonoscopy deferred due to comorbidity recommended CT A/P IV contrast to assess for malignancy CT A/P with no signs of malignancy. Hospital course also noted for episode of fever, repeat CT A/P with no acute process, blood culture negative to date. Pt monitored off abx per ID. PT recommended outpt PT. As per Dr Moreno pt cleared for discharge on 10/22

## 2018-10-22 NOTE — DISCHARGE NOTE ADULT - PLAN OF CARE
To relieve and prevent worsening symptoms associated with congestive heart failure, to improve quality of life, and to treat underlying conditions such as coronary heart disease, high blood pressure, or diabetes, and to maintain euvolemia. continue bumex as directed, your metolazone is on hold. please follow up with your cardiologist or Dr Vick in 1-2 weeks To prevent shortness of breath, fluid overload, and electrolytes imbalance, and to slow down worsening kidney disease. Your sodium bicarbonate is on hold, your sodium is also low with elevated kidney function test please have lab recheck in 1 week and follow up with Dr Awad on 11/1 10:15am. Discuss with your nephrologist when to resume sodium bicarb and metolazone To prevent long-term (chronic) symptoms that interfere with daily living, such as coughing, wheezing or shortness of breath during the night or after exercise.  To be able to participate in all activities of daily living, including work, school, and exercise.  To maintain near normal pulmonary function test and prevent asthma exacerbation. Continue current medications as prescribed.  Avoid exposures to environmental allergens such as carpets, pets and both first-hand and second-hand smoking.  During seasonal allergy period, take a shower as soon as you get home and change your clothes immediately.  Follow up your routine medical appointments. To maintain a normal blood pressure to prevent heart attack, stroke and renal failure. Low sodium and fat diet, continue anti-hypertensive medications, and follow up with primary care physician.

## 2018-10-22 NOTE — PROGRESS NOTE ADULT - SUBJECTIVE AND OBJECTIVE BOX
[facility] NEPHROLOGY- PROGRESS NOTE    88y Female with history of HTN, DM, CKD-5 presents with increasing LE edema. Nephrology consulted for elevated Scr.    REVIEW OF SYSTEMS:  Gen: no changes in weight  Cards: no chest pain  Resp: no dyspnea  GI: no nausea or vomiting or diarrhea  Vascular: + LE edema resolved    No Known Allergies      Hospital Medications: Medications reviewed      VITALS:  T(F): 98 (10-22-18 @ 05:53), Max: 98.3 (10-21-18 @ 20:46)  HR: 74 (10-22-18 @ 13:20)  BP: 155/56 (10-22-18 @ 13:20)  RR: 18 (10-22-18 @ 13:20)  SpO2: 98% (10-22-18 @ 13:20)  Wt(kg): --    10-21 @ 07:01  -  10-22 @ 07:00  --------------------------------------------------------  IN: 0 mL / OUT: 600 mL / NET: -600 mL      PHYSICAL EXAM:    Gen: NAD, calm  Cards: RRR, +S1/S2, no M/G/R  Resp: CTA B/L  GI: soft, NT/ND, NABS  Vascular: no LE edema B/L      LABS:  10-22    129<L>  |  89<L>  |  57<H>  ----------------------------<  122<H>  3.8   |  27  |  4.06<H>    Ca    9.3      22 Oct 2018 07:23  Mg     1.9     10-22      Creatinine Trend: 4.06 <--, 4.23 <--, 4.37 <--, 4.30 <--, 3.82 <--, 3.81 <--, 3.72 <--                        8.3    7.03  )-----------( 295      ( 22 Oct 2018 07:23 )             26.3     Urine Studies:  Urinalysis Basic - ( 16 Oct 2018 03:17 )    Color: COLORLESS / Appearance: CLEAR / S.009 / pH: 7.5  Gluc: NEGATIVE / Ketone: NEGATIVE  / Bili: NEGATIVE / Urobili: NORMAL   Blood: NEGATIVE / Protein: 70 / Nitrite: NEGATIVE   Leuk Esterase: NEGATIVE / RBC: 0-2 / WBC 0-2   Sq Epi:  / Non Sq Epi:  / Bacteria

## 2018-10-22 NOTE — PROGRESS NOTE ADULT - PROBLEM SELECTOR PLAN 6
In setting of CKD with prior history of positive serum NHUNG as well as occult blood positive. S/P Epo on 10/16 and s/p venofer on 10/10. Will give another dose of Epo today. Monitor Hb.
In setting of CKD with prior history of positive serum NHUNG as well as occult blood positive. F/U GI and CT results. S/P Epo on 10/9 and s/p venofer on 10/10. Monitor Hb.
In setting of CKD with prior history of positive serum NHUNG s/p venofor for iron deficiency s/p aranesp 25 mcg X 1 on 8/24. Monitor Hb.
Start Heparin 5000U SQ tid
In setting of CKD with prior history of positive serum NHUNG as well as occult blood positive. F/U GI and CT results. Plan for Epo 6K X 1 dose today followed by venofer 200 mg IV X 1 dose on 10/10 given low TSAT. Monitor Hb.
In setting of CKD with prior history of positive serum NHUNG as well as occult blood positive. S/P Epo on 10/12 and s/p venofer on 10/10. Monitor Hb.
In setting of CKD with prior history of positive serum NHUNG as well as occult blood positive. S/P Epo on 10/12 and s/p venofer on 10/10. Monitor Hb.
In setting of CKD with prior history of positive serum NHUNG as well as occult blood positive. S/P Epo on 10/12 and s/p venofer on 10/10. Plan for additional Epo today. Monitor Hb.
In setting of CKD with prior history of positive serum NHUNG as well as occult blood positive. S/P Epo on 10/12 and s/p venofer on 10/10. Plan for additional Epo today. Monitor Hb.
In setting of CKD with prior history of positive serum NHUNG as well as occult blood positive. S/P Epo on 10/16 and s/p venofer on 10/10. Monitor Hb.
In setting of CKD with prior history of positive serum NHUNG as well as occult blood positive. S/P Epo on 10/16 and s/p venofer on 10/10. Monitor Hb.
In setting of CKD with prior history of positive serum NHUNG as well as occult blood positive. S/P Epo on 10/16 and s/p venofer on 10/10. Will give another dose of Epo today. Monitor Hb.
In setting of CKD with prior history of positive serum NHUNG as well as occult blood positive. S/P Epo on 10/16 and s/p venofer on 10/10. Will give another dose of Epo today. Monitor Hb.
In setting of CKD with prior history of positive serum NHUNG as well as occult blood positive. S/P Epo on 10/19 and s/p venofer on 10/10. Monitor Hb.
In setting of CKD with prior history of positive serum NHUNG as well as occult blood positive. S/P Epo on 10/9 and s/p venofer on 10/10. Monitor Hb.
In setting of CKD with prior history of positive serum NHUNG as well as occult blood positive. S/P Epo on 10/9 and s/p venofer on 10/10. Plan for additional Epo today. Monitor Hb.
In setting of CKD with prior history of positive serum NHUNG s/p venofor for iron deficiency s/p aranesp 25 mcg X 1 on 8/24. Monitor Hb.
Start Heparin 5000U SQ tid
In setting of CKD with prior history of positive serum NHUNG s/p venofor for iron deficiency s/p aranesp 25 mcg X 1 on 8/24. Monitor Hb.

## 2018-10-22 NOTE — PROGRESS NOTE ADULT - PROBLEM SELECTOR PLAN 5
Phosphorus controlled. Patient on rayaldee as an outpatient for which changed to ergocalciferol as rayaldee not on formulary. Patient on auryxia as an outpatient for which changed to renagel with meals as auryxia not on formulary. Continue with low phosphorus diet. Monitor serum calcium and phosphorus.
Phosphorus controlled. Patient on rayaldee as an outpatient for which changed to ergocalciferol as rayaldee not on formulary. Patient on auryxia as an outpatient for which changed to renagel with meals as auryxia not on formulary. Continue with low phosphorus diet. Monitor serum calcium and phosphorus.
Continue home med Simvastatin
Patient on rayaldee as an outpatient which has been changed to ergocalciferol as rayaldee not on formulary. Continue with low phosphorus diet. Monitor serum calcium and phosphorus.
Continue home med Simvastatin
Patient on rayaldee as an outpatient for which changed to ergocalciferol as rayaldee not on formulary. Patient on auryxia as an outpatient for which changed to renagel with meals as auryxia not on formulary. Continue with low phosphorus diet. Monitor serum calcium and phosphorus.
Patient on rayaldee as an outpatient which has been changed to ergocalciferol as rayaldee not on formulary. Continue with low phosphorus diet. Monitor serum calcium and phosphorus.
Phosphorus controlled. Patient on rayaldee as an outpatient for which changed to ergocalciferol as rayaldee not on formulary. Patient on auryxia as an outpatient for which changed to renagel with meals as auryxia not on formulary. Continue with low phosphorus diet. Monitor serum calcium and phosphorus.
Start Heparin 5000U SQ tid
Patient on rayaldee as an outpatient which has been changed to ergocalciferol as rayaldee not on formulary. Continue with low phosphorus diet. Monitor serum calcium and phosphorus.

## 2018-10-22 NOTE — PROGRESS NOTE ADULT - PROBLEM SELECTOR PLAN 4
Resolved. Decrease bumex from 2 mg twice daily to 2 mg daily. Continue with 1L FR. Monitor UO.
Improving on bumex 2 mg IV twice daily with good UO. Would continue for now. Continue holding metolazone for now given h/o hyponatremia in the past. Monitor UO.
Resolved. Holding bumex 2 mg daily given SHY. Monitor UO.
cont diuresis,
Continue home med Simvastatin
Improving on bumex 2 mg IV twice daily for which would continue for now. Continue holding metolazone for now given h/o hyponatremia in the past. Monitor UO.
Improving on bumex 2 mg IV twice daily with good UO. Can change to oral on 10/12. Continue holding metolazone for now given h/o hyponatremia in the past. Monitor UO.
Improving on bumex gtt to 1.5 mg/hour significant weight loss since admission (10 kg). Change bumex gtt to bumex 2 mg IV Q12 hours. Anticipate changing to oral in the next 24-48 hours. Continue with 1L FR. Monitor UO.
Improving on bumex gtt to 1.5 mg/hour with excellent UO. Anticipate changing to intermittent on 10/17/18. Continue with 1L FR. Monitor UO.
Improving on bumex gtt to 1.5 mg/hour. Continue with 1L FR. Monitor UO.
Resolved. Change bumex from IV to PO on 10/19. Continue with 1L FR. Monitor UO.
Resolved. Continue with bumex 2 mg daily. Continue with 1L FR. Monitor UO.
Resolved. Decrease bumex from 2 mg twice daily to 2 mg daily. Continue with 1L FR. Monitor UO.
Resolved. Decrease bumex from 2 mg twice daily to 2 mg daily. Continue with 1L FR. Monitor UO.
Resolved. Holding bumex 2 mg daily given SHY. Monitor UO.
WIth worsening respiratory status today likely due to non-compliance with fluid restriction. Change bumex back to 2 mg IV twice daily. Plan for metolazone 5 mg PO X 1 dose this afternoon. Monitor UO.
With no increase in UO on bumex 2 mg and metolazone 5 mg on 10/12 and identical weight as compared to admission. Will give bumex 2 mg IV X 1 dose and then start bumex gtt @ 1 mg/hour (may need to titrate up). Continue with 1L FR. Replete potassium and check BMP this evening to monitor serum potassium on bumex gtt. Monitor UO.
With no increase in UO on bumex gtt @ 1 mg/hour. Increase bumex gtt to 1.5 mg/hour. Continue with 1L FR. Monitor UO.
cont diuresis
cont diuresis
cont diuresis,
cont diuresis, pt dyspnea improving
cont diuresis, pt dyspnea improving
Resolved. Holding bumex 2 mg daily given SHY. Monitor UO.

## 2018-10-22 NOTE — PROGRESS NOTE ADULT - SUBJECTIVE AND OBJECTIVE BOX
FRANCIA MARTEL:[id number],   88yFemale followed for:  No Known Allergies    PAST MEDICAL & SURGICAL HISTORY:  CHF (congestive heart failure): diastolic heart failure  Asthma  CKD (chronic kidney disease)  High cholesterol  Hypertension  History of hysterectomy    FAMILY HISTORY:  No pertinent family history in first degree relatives    MEDICATIONS  (STANDING):  amLODIPine   Tablet 5 milliGRAM(s) Oral daily  buDESOnide  80 MICROgram(s)/formoterol 4.5 MICROgram(s) Inhaler 2 Puff(s) Inhalation two times a day  buMETAnide 2 milliGRAM(s) Oral daily  ergocalciferol 14631 Unit(s) Oral <User Schedule>  gabapentin 100 milliGRAM(s) Oral every 12 hours  heparin  Injectable 5000 Unit(s) SubCutaneous every 12 hours  hydrALAZINE 100 milliGRAM(s) Oral three times a day  isosorbide   mononitrate ER Tablet (IMDUR) 120 milliGRAM(s) Oral daily  metoprolol succinate  milliGRAM(s) Oral daily  pantoprazole    Tablet 40 milliGRAM(s) Oral before breakfast  sevelamer hydrochloride 800 milliGRAM(s) Oral three times a day  simvastatin 40 milliGRAM(s) Oral at bedtime    MEDICATIONS  (PRN):  acetaminophen   Tablet .. 650 milliGRAM(s) Oral every 6 hours PRN Temp greater or equal to 38C (100.4F), Mild Pain (1 - 3), Moderate Pain (4 - 6)  artificial  tears Solution 1 Drop(s) Both EYES two times a day PRN Dry Eyes      Vital Signs Last 24 Hrs  T(C): 36.7 (22 Oct 2018 05:53), Max: 36.8 (21 Oct 2018 20:46)  T(F): 98 (22 Oct 2018 05:53), Max: 98.3 (21 Oct 2018 20:46)  HR: 67 (22 Oct 2018 05:53) (67 - 73)  BP: 146/62 (22 Oct 2018 05:53) (125/49 - 156/131)  BP(mean): --  RR: 16 (22 Oct 2018 05:53) (16 - 18)  SpO2: 100% (22 Oct 2018 05:53) (97% - 100%)  nc/at  s1s2  cta  soft, nt, nd no guarding or rebound  no c/c/e    CBC Full  -  ( 22 Oct 2018 07:23 )  WBC Count : 7.03 K/uL  Hemoglobin : 8.3 g/dL  Hematocrit : 26.3 %  Platelet Count - Automated : 295 K/uL  Mean Cell Volume : 87.4 fL  Mean Cell Hemoglobin : 27.6 pg  Mean Cell Hemoglobin Concentration : 31.6 %  Auto Neutrophil # : x  Auto Lymphocyte # : x  Auto Monocyte # : x  Auto Eosinophil # : x  Auto Basophil # : x  Auto Neutrophil % : x  Auto Lymphocyte % : x  Auto Monocyte % : x  Auto Eosinophil % : x  Auto Basophil % : x    10-22    129<L>  |  89<L>  |  57<H>  ----------------------------<  122<H>  3.8   |  27  |  4.06<H>    Ca    9.3      22 Oct 2018 07:23  Mg     1.9     10-22

## 2018-10-22 NOTE — DISCHARGE NOTE ADULT - CARE PLAN
Principal Discharge DX:	Acute on chronic diastolic congestive heart failure  Goal:	To relieve and prevent worsening symptoms associated with congestive heart failure, to improve quality of life, and to treat underlying conditions such as coronary heart disease, high blood pressure, or diabetes, and to maintain euvolemia.  Assessment and plan of treatment:	continue bumex as directed, your metolazone is on hold. please follow up with your cardiologist or Dr Vick in 1-2 weeks  Secondary Diagnosis:	CKD (chronic kidney disease)  Goal:	To prevent shortness of breath, fluid overload, and electrolytes imbalance, and to slow down worsening kidney disease.  Assessment and plan of treatment:	Your sodium bicarbonate is on hold, your sodium is also low with elevated kidney function test please have lab recheck in 1 week and follow up with Dr Awad on 11/1 10:15am. Discuss with your nephrologist when to resume sodium bicarb and metolazone  Secondary Diagnosis:	Asthma  Goal:	To prevent long-term (chronic) symptoms that interfere with daily living, such as coughing, wheezing or shortness of breath during the night or after exercise.  To be able to participate in all activities of daily living, including work, school, and exercise.  To maintain near normal pulmonary function test and prevent asthma exacerbation.  Assessment and plan of treatment:	Continue current medications as prescribed.  Avoid exposures to environmental allergens such as carpets, pets and both first-hand and second-hand smoking.  During seasonal allergy period, take a shower as soon as you get home and change your clothes immediately.  Follow up your routine medical appointments.  Secondary Diagnosis:	Hypertension  Goal:	To maintain a normal blood pressure to prevent heart attack, stroke and renal failure.  Assessment and plan of treatment:	Low sodium and fat diet, continue anti-hypertensive medications, and follow up with primary care physician.

## 2018-10-22 NOTE — DISCHARGE NOTE ADULT - CARE PROVIDER_API CALL
Reagan Pierre (), Internal Medicine  [facility] [place] [address]  Phone: [phone]  Fax: [phone]    Justin Vick), Internal Medicine  [address] [place] [address]  Phone: [phone]  Fax: [phone]

## 2018-10-22 NOTE — PROGRESS NOTE ADULT - PROBLEM SELECTOR PROBLEM 6
Anemia of renal disease
Preventive measure
Anemia of renal disease
Preventive measure
Anemia of renal disease

## 2018-10-22 NOTE — PROGRESS NOTE ADULT - PROBLEM SELECTOR PLAN 2
Patient with h/o CKD-5 with nephrotic range proteinuria in setting of paraproteinemia for which patient offered kidney biopsy in the past but refused. Baseline Scr 3.1-3.2 as per most recent office labs. Renal function approaching baseline. Patient does not want HD if needed. Monitor electrolytes.
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 Monitor electrolytes.
cont diuresis , close monitor of renal fx - change iv to po bumex  avoid nephrotoxic agents
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 Monitor electrolytes.
Patient with h/o CKD-5 with nephrotic range proteinuria in setting of paraproteinemia for which patient offered kidney biopsy in the past but refused. Baseline Scr 3.1-3.2 as per most recent office labs. Patient does not want HD if needed. Monitor electrolytes.
Patient with h/o CKD-5 with nephrotic range proteinuria in setting of paraproteinemia for which patient offered kidney biopsy in the past but refused. Baseline Scr 3.1-3.2 as per most recent office labs. Renal function currently near baseline. Patient does not want HD if needed. Monitor electrolytes.
Patient with h/o CKD-5 with nephrotic range proteinuria in setting of paraproteinemia for which patient offered kidney biopsy in the past but refused. Baseline Scr 3.1-3.2 as per most recent office labs. Renal function near baseline. Patient does not want HD if needed. Monitor electrolytes.
cont diuresis , close monitor of renal fx   avoid nephrotoxic agents
cont diuresis , close monitor of renal fx , iv to po  bumex  avoid nephrotoxic agents
cont diuresis , close monitor of renal fx - change iv to po bumex  avoid nephrotoxic agents
cont diuresis , close monitor of renal fx -iv to po  bumex  avoid nephrotoxic agents
cont diuresis , close monitor of renal fx -iv to po  bumex  avoid nephrotoxic agents
cont diuresis , close monitor of renal fx -on iv bumex  avoid nephrotoxic agents
improving with current htn med regimen
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 Monitor electrolytes.
Patient with h/o CKD-5 with nephrotic range proteinuria in setting of paraproteinemia for which patient offered kidney biopsy in the past but refused. Baseline Scr 3.1-3.2 as per most recent office labs. Patient does not want HD if needed. Monitor electrolytes.

## 2018-10-23 LAB — BACTERIA BLD CULT: SIGNIFICANT CHANGE UP

## 2019-04-01 ENCOUNTER — OUTPATIENT (OUTPATIENT)
Dept: OUTPATIENT SERVICES | Facility: HOSPITAL | Age: 84
LOS: 1 days | End: 2019-04-01
Payer: MEDICARE

## 2019-04-01 DIAGNOSIS — Z90.710 ACQUIRED ABSENCE OF BOTH CERVIX AND UTERUS: Chronic | ICD-10-CM

## 2019-04-01 PROCEDURE — G9001: CPT

## 2019-04-03 DIAGNOSIS — Z71.89 OTHER SPECIFIED COUNSELING: ICD-10-CM

## 2019-04-03 PROBLEM — I50.9 HEART FAILURE, UNSPECIFIED: Chronic | Status: ACTIVE | Noted: 2018-10-05

## 2019-05-10 NOTE — H&P ADULT - NSHPPOAPULMEMBOLUS_GEN_A_CORE
Called patient back and patient states he will be traveling to [place] via car on 6/10/19 to 6/28/19. His daughter will be driving him.   I consulted with DR. Cazares and he states that patient would need to be evaluated in clinic with new CTH w/o contrast prior to being cleared to travel. Per Dr. Cazares, patient is ok to F/U in clinic 4wks post-op. Patient would need CTH w/o contrast at 3-4wks post-op.  Writer confirmed with practice manager  that patient can be seen at our [place] office. Appointment secured. Patient verbalized understanding and was agreeable.   Patient will contact [facility] central scheduling to schedule CTH.    
Patient called to schedule wound check and post op appt with Dr. Cazares. He is currently scheduled for his wound check on 5/20 at 11 am at [facility] however, he would like his nurse visit to be at [place] if possible.     Also he has a few questions for the RN. He is told he needs a CT scan and a follow up with the surgeon but he will be out of town from 6/10-6/28. He would like to know when he should schedule with the doctor and when to get his CT. No order in chart yet.  
no

## 2020-06-04 NOTE — ED ADULT NURSE NOTE - NSHISCREENINGQ1_ED_A_ED
Problem: Safety  Goal: Will remain free from injury  Outcome: PROGRESSING AS EXPECTED     Problem: Bowel/Gastric:  Goal: Normal bowel function is maintained or improved  Outcome: PROGRESSING AS EXPECTED     Problem: Skin Integrity  Goal: Risk for impaired skin integrity will decrease  Outcome: PROGRESSING AS EXPECTED     Problem: Pain Management  Goal: Pain level will decrease to patient's comfort goal  Outcome: PROGRESSING AS EXPECTED      No

## 2021-01-16 NOTE — H&P ADULT - ASSESSMENT
Dental
87 year old female pmh of CKD stage 4, HTN, HLD, shingles 4/2018,  presents with lower extremity swelling and shortness of breath admitted to tele to r/o ACS vs CHF vs progression of CKD.

## 2021-03-31 NOTE — ED ADULT NURSE NOTE - TEMPLATE
Routing refill request to provider for review/approval because:  Not prescribed by PCP but by hospital    Jensen العراقي RN, BSN           General Cellcept Pregnancy And Lactation Text: This medication is Pregnancy Category D and isn't considered safe during pregnancy. It is unknown if this medication is excreted in breast milk.

## 2021-08-04 NOTE — PROGRESS NOTE ADULT - ASSESSMENT
EKG - NSR no STT changes    a/p     1) Shortness of breath -  likely secondary to worsening renal function, and uncontrolled HTN, 2d echo shows normal LV function, wouldnt work up ischemia at this time as pt has stage 4 CKD, age 87 with no objective signs of ischemia. cont bumex    2) HTN - bradycardic sinus in 40's, with 2.5 sec pause, Toprol decreased to 25 mg daily, cont norvasc 5 mg , no pause today, bp elevated add imdur 30mg daily    3) Anemia - s/p transfusion     4) Acute on chronic renal failure - nephrology on case, improving    5) UTI - growing klebsiella, consider starting abx Cheiloplasty (Complex) Text: A decision was made to reconstruct the defect with a  cheiloplasty.  The defect was undermined extensively.  Additional obicularis oris muscle was excised with a 15 blade scalpel.  The defect was converted into a full thickness wedge to facilite a better cosmetic result.  Small vessels were then tied off with 5-0 monocyrl. The obicularis oris, superficial fascia, adipose and dermis were then reapproximated.  After the deeper layers were approximated the epidermis was reapproximated with particular care given to realign the vermilion border.

## 2021-09-15 NOTE — PATIENT PROFILE ADULT - NSPROSPHOSPCHAPLAINYN_GEN_A_NUR
Routing refill request to provider for review/approval because:  Drug not on the [facility] refill protocol , ACT questionnaire sent to patient in My Chart.     Danya Raymond RN  [facility] Triage    
no

## 2022-04-01 NOTE — ED ADULT TRIAGE NOTE - LOCATION:
Right arm; Finasteride Counseling:  I discussed with the patient the risks of use of finasteride including but not limited to decreased libido, decreased ejaculate volume, gynecomastia, and depression. Women should not handle medication.  All of the patient's questions and concerns were addressed. Finasteride Male Counseling: Finasteride Counseling:  I discussed with the patient the risks of use of finasteride including but not limited to decreased libido, decreased ejaculate volume, gynecomastia, and depression. Women should not handle medication.  All of the patient's questions and concerns were addressed.

## 2022-05-22 ENCOUNTER — INPATIENT (INPATIENT)
Facility: HOSPITAL | Age: 87
LOS: 10 days | Discharge: HOME HEALTH | End: 2022-06-02
Attending: INTERNAL MEDICINE | Admitting: INTERNAL MEDICINE
Payer: MEDICARE

## 2022-05-22 VITALS
HEART RATE: 89 BPM | OXYGEN SATURATION: 99 % | RESPIRATION RATE: 30 BRPM | SYSTOLIC BLOOD PRESSURE: 218 MMHG | HEIGHT: 64 IN | DIASTOLIC BLOOD PRESSURE: 97 MMHG

## 2022-05-22 DIAGNOSIS — N18.5 CHRONIC KIDNEY DISEASE, STAGE 5: ICD-10-CM

## 2022-05-22 DIAGNOSIS — N17.9 ACUTE KIDNEY FAILURE, UNSPECIFIED: ICD-10-CM

## 2022-05-22 DIAGNOSIS — I50.33 ACUTE ON CHRONIC DIASTOLIC (CONGESTIVE) HEART FAILURE: ICD-10-CM

## 2022-05-22 DIAGNOSIS — Z90.710 ACQUIRED ABSENCE OF BOTH CERVIX AND UTERUS: Chronic | ICD-10-CM

## 2022-05-22 DIAGNOSIS — J45.909 UNSPECIFIED ASTHMA, UNCOMPLICATED: ICD-10-CM

## 2022-05-22 DIAGNOSIS — G93.41 METABOLIC ENCEPHALOPATHY: ICD-10-CM

## 2022-05-22 DIAGNOSIS — J81.0 ACUTE PULMONARY EDEMA: ICD-10-CM

## 2022-05-22 DIAGNOSIS — E78.5 HYPERLIPIDEMIA, UNSPECIFIED: ICD-10-CM

## 2022-05-22 LAB
ALBUMIN SERPL ELPH-MCNC: 3.6 G/DL — SIGNIFICANT CHANGE UP (ref 3.3–5)
ALP SERPL-CCNC: 37 U/L — LOW (ref 40–120)
ALT FLD-CCNC: <5 U/L — SIGNIFICANT CHANGE UP (ref 4–33)
ANION GAP SERPL CALC-SCNC: 18 MMOL/L — HIGH (ref 7–14)
APTT BLD: 31.8 SEC — SIGNIFICANT CHANGE UP (ref 27–36.3)
AST SERPL-CCNC: 13 U/L — SIGNIFICANT CHANGE UP (ref 4–32)
B PERT DNA SPEC QL NAA+PROBE: SIGNIFICANT CHANGE UP
B PERT+PARAPERT DNA PNL SPEC NAA+PROBE: SIGNIFICANT CHANGE UP
BASE EXCESS BLDV CALC-SCNC: -1.8 MMOL/L — SIGNIFICANT CHANGE UP (ref -2–3)
BASOPHILS # BLD AUTO: 0.11 K/UL — SIGNIFICANT CHANGE UP (ref 0–0.2)
BASOPHILS NFR BLD AUTO: 1.1 % — SIGNIFICANT CHANGE UP (ref 0–2)
BILIRUB SERPL-MCNC: 0.3 MG/DL — SIGNIFICANT CHANGE UP (ref 0.2–1.2)
BLOOD GAS VENOUS COMPREHENSIVE RESULT: SIGNIFICANT CHANGE UP
BORDETELLA PARAPERTUSSIS (RAPRVP): SIGNIFICANT CHANGE UP
BUN SERPL-MCNC: 66 MG/DL — HIGH (ref 7–23)
C PNEUM DNA SPEC QL NAA+PROBE: SIGNIFICANT CHANGE UP
CALCIUM SERPL-MCNC: 10.6 MG/DL — HIGH (ref 8.4–10.5)
CHLORIDE BLDV-SCNC: 107 MMOL/L — SIGNIFICANT CHANGE UP (ref 96–108)
CHLORIDE SERPL-SCNC: 104 MMOL/L — SIGNIFICANT CHANGE UP (ref 98–107)
CO2 BLDV-SCNC: 25.6 MMOL/L — SIGNIFICANT CHANGE UP (ref 22–26)
CO2 SERPL-SCNC: 19 MMOL/L — LOW (ref 22–31)
CREAT SERPL-MCNC: 7.54 MG/DL — HIGH (ref 0.5–1.3)
EGFR: 5 ML/MIN/1.73M2 — LOW
EOSINOPHIL # BLD AUTO: 0.93 K/UL — HIGH (ref 0–0.5)
EOSINOPHIL NFR BLD AUTO: 9.6 % — HIGH (ref 0–6)
FLUAV SUBTYP SPEC NAA+PROBE: SIGNIFICANT CHANGE UP
FLUBV RNA SPEC QL NAA+PROBE: SIGNIFICANT CHANGE UP
GAS PNL BLDV: 137 MMOL/L — SIGNIFICANT CHANGE UP (ref 136–145)
GAS PNL BLDV: SIGNIFICANT CHANGE UP
GLUCOSE BLDV-MCNC: 112 MG/DL — HIGH (ref 70–99)
GLUCOSE SERPL-MCNC: 115 MG/DL — HIGH (ref 70–99)
HADV DNA SPEC QL NAA+PROBE: SIGNIFICANT CHANGE UP
HCO3 BLDV-SCNC: 24 MMOL/L — SIGNIFICANT CHANGE UP (ref 22–29)
HCOV 229E RNA SPEC QL NAA+PROBE: SIGNIFICANT CHANGE UP
HCOV HKU1 RNA SPEC QL NAA+PROBE: SIGNIFICANT CHANGE UP
HCOV NL63 RNA SPEC QL NAA+PROBE: SIGNIFICANT CHANGE UP
HCOV OC43 RNA SPEC QL NAA+PROBE: SIGNIFICANT CHANGE UP
HCT VFR BLD CALC: 24.1 % — LOW (ref 34.5–45)
HCT VFR BLDA CALC: 22 % — LOW (ref 34.5–46.5)
HGB BLD CALC-MCNC: 7.2 G/DL — LOW (ref 11.5–15.5)
HGB BLD-MCNC: 7.5 G/DL — LOW (ref 11.5–15.5)
HMPV RNA SPEC QL NAA+PROBE: SIGNIFICANT CHANGE UP
HPIV1 RNA SPEC QL NAA+PROBE: SIGNIFICANT CHANGE UP
HPIV2 RNA SPEC QL NAA+PROBE: SIGNIFICANT CHANGE UP
HPIV3 RNA SPEC QL NAA+PROBE: SIGNIFICANT CHANGE UP
HPIV4 RNA SPEC QL NAA+PROBE: SIGNIFICANT CHANGE UP
IANC: 6.71 K/UL — SIGNIFICANT CHANGE UP (ref 1.8–7.4)
IMM GRANULOCYTES NFR BLD AUTO: 0.3 % — SIGNIFICANT CHANGE UP (ref 0–1.5)
INR BLD: 1.02 RATIO — SIGNIFICANT CHANGE UP (ref 0.88–1.16)
LACTATE BLDV-MCNC: 0.8 MMOL/L — SIGNIFICANT CHANGE UP (ref 0.5–2)
LYMPHOCYTES # BLD AUTO: 0.82 K/UL — LOW (ref 1–3.3)
LYMPHOCYTES # BLD AUTO: 8.5 % — LOW (ref 13–44)
M PNEUMO DNA SPEC QL NAA+PROBE: SIGNIFICANT CHANGE UP
MCHC RBC-ENTMCNC: 29.9 PG — SIGNIFICANT CHANGE UP (ref 27–34)
MCHC RBC-ENTMCNC: 31.1 GM/DL — LOW (ref 32–36)
MCV RBC AUTO: 96 FL — SIGNIFICANT CHANGE UP (ref 80–100)
MONOCYTES # BLD AUTO: 1.06 K/UL — HIGH (ref 0–0.9)
MONOCYTES NFR BLD AUTO: 11 % — SIGNIFICANT CHANGE UP (ref 2–14)
NEUTROPHILS # BLD AUTO: 6.71 K/UL — SIGNIFICANT CHANGE UP (ref 1.8–7.4)
NEUTROPHILS NFR BLD AUTO: 69.5 % — SIGNIFICANT CHANGE UP (ref 43–77)
NRBC # BLD: 0 /100 WBCS — SIGNIFICANT CHANGE UP
NRBC # FLD: 0 K/UL — SIGNIFICANT CHANGE UP
NT-PROBNP SERPL-SCNC: HIGH PG/ML
PCO2 BLDV: 47 MMHG — HIGH (ref 39–42)
PH BLDV: 7.32 — SIGNIFICANT CHANGE UP (ref 7.32–7.43)
PLATELET # BLD AUTO: 327 K/UL — SIGNIFICANT CHANGE UP (ref 150–400)
PO2 BLDV: 49 MMHG — SIGNIFICANT CHANGE UP
POTASSIUM BLDV-SCNC: 4.9 MMOL/L — SIGNIFICANT CHANGE UP (ref 3.5–5.1)
POTASSIUM SERPL-MCNC: 5.2 MMOL/L — SIGNIFICANT CHANGE UP (ref 3.5–5.3)
POTASSIUM SERPL-SCNC: 5.2 MMOL/L — SIGNIFICANT CHANGE UP (ref 3.5–5.3)
PROT SERPL-MCNC: 6.4 G/DL — SIGNIFICANT CHANGE UP (ref 6–8.3)
PROTHROM AB SERPL-ACNC: 11.8 SEC — SIGNIFICANT CHANGE UP (ref 10.5–13.4)
RAPID RVP RESULT: SIGNIFICANT CHANGE UP
RBC # BLD: 2.51 M/UL — LOW (ref 3.8–5.2)
RBC # FLD: 15.6 % — HIGH (ref 10.3–14.5)
RSV RNA SPEC QL NAA+PROBE: SIGNIFICANT CHANGE UP
RV+EV RNA SPEC QL NAA+PROBE: SIGNIFICANT CHANGE UP
SAO2 % BLDV: 79 % — SIGNIFICANT CHANGE UP
SARS-COV-2 RNA SPEC QL NAA+PROBE: SIGNIFICANT CHANGE UP
SODIUM SERPL-SCNC: 141 MMOL/L — SIGNIFICANT CHANGE UP (ref 135–145)
TROPONIN T, HIGH SENSITIVITY RESULT: 73 NG/L — CRITICAL HIGH
TROPONIN T, HIGH SENSITIVITY RESULT: 75 NG/L — CRITICAL HIGH
WBC # BLD: 9.66 K/UL — SIGNIFICANT CHANGE UP (ref 3.8–10.5)
WBC # FLD AUTO: 9.66 K/UL — SIGNIFICANT CHANGE UP (ref 3.8–10.5)

## 2022-05-22 PROCEDURE — 99223 1ST HOSP IP/OBS HIGH 75: CPT

## 2022-05-22 PROCEDURE — 71045 X-RAY EXAM CHEST 1 VIEW: CPT | Mod: 26

## 2022-05-22 PROCEDURE — 99291 CRITICAL CARE FIRST HOUR: CPT | Mod: FT,25,GC

## 2022-05-22 PROCEDURE — 93010 ELECTROCARDIOGRAM REPORT: CPT

## 2022-05-22 RX ORDER — HYDRALAZINE HCL 50 MG
100 TABLET ORAL THREE TIMES A DAY
Refills: 0 | Status: DISCONTINUED | OUTPATIENT
Start: 2022-05-22 | End: 2022-06-02

## 2022-05-22 RX ORDER — BUMETANIDE 0.25 MG/ML
1 INJECTION INTRAMUSCULAR; INTRAVENOUS
Qty: 0 | Refills: 0 | DISCHARGE

## 2022-05-22 RX ORDER — SIMVASTATIN 20 MG/1
40 TABLET, FILM COATED ORAL AT BEDTIME
Refills: 0 | Status: DISCONTINUED | OUTPATIENT
Start: 2022-05-22 | End: 2022-06-02

## 2022-05-22 RX ORDER — IPRATROPIUM/ALBUTEROL SULFATE 18-103MCG
3 AEROSOL WITH ADAPTER (GRAM) INHALATION EVERY 6 HOURS
Refills: 0 | Status: DISCONTINUED | OUTPATIENT
Start: 2022-05-22 | End: 2022-06-02

## 2022-05-22 RX ORDER — FERRIC CITRATE 210 MG/1
1 TABLET, COATED ORAL
Qty: 0 | Refills: 0 | DISCHARGE

## 2022-05-22 RX ORDER — SODIUM BICARBONATE 1 MEQ/ML
650 SYRINGE (ML) INTRAVENOUS THREE TIMES A DAY
Refills: 0 | Status: DISCONTINUED | OUTPATIENT
Start: 2022-05-22 | End: 2022-06-02

## 2022-05-22 RX ORDER — LANOLIN ALCOHOL/MO/W.PET/CERES
3 CREAM (GRAM) TOPICAL AT BEDTIME
Refills: 0 | Status: DISCONTINUED | OUTPATIENT
Start: 2022-05-22 | End: 2022-05-31

## 2022-05-22 RX ORDER — GABAPENTIN 400 MG/1
100 CAPSULE ORAL
Refills: 0 | Status: DISCONTINUED | OUTPATIENT
Start: 2022-05-22 | End: 2022-06-02

## 2022-05-22 RX ORDER — NITROGLYCERIN 6.5 MG
1 CAPSULE, EXTENDED RELEASE ORAL ONCE
Refills: 0 | Status: COMPLETED | OUTPATIENT
Start: 2022-05-22 | End: 2022-05-22

## 2022-05-22 RX ORDER — HYDRALAZINE HCL 50 MG
10 TABLET ORAL ONCE
Refills: 0 | Status: COMPLETED | OUTPATIENT
Start: 2022-05-22 | End: 2022-05-22

## 2022-05-22 RX ORDER — CALCITRIOL 0.5 UG/1
0.5 CAPSULE ORAL
Refills: 0 | Status: DISCONTINUED | OUTPATIENT
Start: 2022-05-22 | End: 2022-05-24

## 2022-05-22 RX ORDER — NIFEDIPINE 30 MG
90 TABLET, EXTENDED RELEASE 24 HR ORAL DAILY
Refills: 0 | Status: DISCONTINUED | OUTPATIENT
Start: 2022-05-22 | End: 2022-06-02

## 2022-05-22 RX ORDER — ACETAMINOPHEN 500 MG
650 TABLET ORAL EVERY 6 HOURS
Refills: 0 | Status: DISCONTINUED | OUTPATIENT
Start: 2022-05-22 | End: 2022-06-02

## 2022-05-22 RX ORDER — CALCIFEDIOL 30 UG/1
1 CAPSULE, EXTENDED RELEASE ORAL
Qty: 0 | Refills: 0 | DISCHARGE

## 2022-05-22 RX ORDER — BUMETANIDE 0.25 MG/ML
2 INJECTION INTRAMUSCULAR; INTRAVENOUS ONCE
Refills: 0 | Status: COMPLETED | OUTPATIENT
Start: 2022-05-22 | End: 2022-05-22

## 2022-05-22 RX ORDER — ONDANSETRON 8 MG/1
4 TABLET, FILM COATED ORAL EVERY 8 HOURS
Refills: 0 | Status: DISCONTINUED | OUTPATIENT
Start: 2022-05-22 | End: 2022-06-02

## 2022-05-22 RX ORDER — ASPIRIN/CALCIUM CARB/MAGNESIUM 324 MG
324 TABLET ORAL ONCE
Refills: 0 | Status: COMPLETED | OUTPATIENT
Start: 2022-05-22 | End: 2022-05-22

## 2022-05-22 RX ORDER — MOMETASONE FUROATE AND FORMOTEROL FUMARATE DIHYDRATE 200; 5 UG/1; UG/1
2 AEROSOL RESPIRATORY (INHALATION)
Qty: 0 | Refills: 0 | DISCHARGE

## 2022-05-22 RX ORDER — METOPROLOL TARTRATE 50 MG
100 TABLET ORAL DAILY
Refills: 0 | Status: DISCONTINUED | OUTPATIENT
Start: 2022-05-22 | End: 2022-06-02

## 2022-05-22 RX ORDER — LABETALOL HCL 100 MG
10 TABLET ORAL ONCE
Refills: 0 | Status: COMPLETED | OUTPATIENT
Start: 2022-05-22 | End: 2022-05-22

## 2022-05-22 RX ORDER — PANTOPRAZOLE SODIUM 20 MG/1
40 TABLET, DELAYED RELEASE ORAL
Refills: 0 | Status: DISCONTINUED | OUTPATIENT
Start: 2022-05-22 | End: 2022-06-02

## 2022-05-22 RX ORDER — BUMETANIDE 0.25 MG/ML
2 INJECTION INTRAMUSCULAR; INTRAVENOUS EVERY 12 HOURS
Refills: 0 | Status: DISCONTINUED | OUTPATIENT
Start: 2022-05-22 | End: 2022-05-28

## 2022-05-22 RX ADMIN — Medication 90 MILLIGRAM(S): at 13:30

## 2022-05-22 RX ADMIN — SIMVASTATIN 40 MILLIGRAM(S): 20 TABLET, FILM COATED ORAL at 22:01

## 2022-05-22 RX ADMIN — Medication 10 MILLIGRAM(S): at 06:27

## 2022-05-22 RX ADMIN — Medication 100 MILLIGRAM(S): at 13:30

## 2022-05-22 RX ADMIN — Medication 3 MILLILITER(S): at 20:42

## 2022-05-22 RX ADMIN — Medication 100 MILLIGRAM(S): at 13:38

## 2022-05-22 RX ADMIN — GABAPENTIN 100 MILLIGRAM(S): 400 CAPSULE ORAL at 18:32

## 2022-05-22 RX ADMIN — Medication 10 MILLIGRAM(S): at 07:37

## 2022-05-22 RX ADMIN — Medication 100 MILLIGRAM(S): at 22:05

## 2022-05-22 RX ADMIN — Medication 1 INCH(S): at 18:42

## 2022-05-22 RX ADMIN — BUMETANIDE 2 MILLIGRAM(S): 0.25 INJECTION INTRAMUSCULAR; INTRAVENOUS at 05:48

## 2022-05-22 RX ADMIN — Medication 324 MILLIGRAM(S): at 07:09

## 2022-05-22 RX ADMIN — BUMETANIDE 2 MILLIGRAM(S): 0.25 INJECTION INTRAMUSCULAR; INTRAVENOUS at 12:41

## 2022-05-22 RX ADMIN — CALCITRIOL 0.5 MICROGRAM(S): 0.5 CAPSULE ORAL at 22:00

## 2022-05-22 RX ADMIN — Medication 650 MILLIGRAM(S): at 22:01

## 2022-05-22 RX ADMIN — Medication 1 INCH(S): at 05:34

## 2022-05-22 NOTE — PATIENT PROFILE ADULT - NSPROPASSIVESMOKEEXPOSURE_GEN_A_NUR
Pt arrived per w/c  at 40 0/7 weeks gestation with concerns of onset of labor since 0400, regular ctx's starting at noon. Pt accompanied by , oriented to room 114 and unit. Pt appears uncomfortable, tolerating ctx's with breathing and coaching.  SVE 7 No

## 2022-05-22 NOTE — H&P ADULT - HISTORY OF PRESENT ILLNESS
89F with PMh of HTN, CKD5, chronic diastolic CHF, asthma, who presents to the hospital with sudden on set of SOB this morning. Collateral primarily from daughter in law who was present at bedside as pt is sleepy/drowsy and unable to provide much information. Pt reportedly in good state of health since last hospitalization @ [facility] a few years ago. Since then, she has not had any major health issues. She reports good follow up. Pt has good adherence to her home meds. Pt went to sleep around 11PM last night and felt fine. Woke up early this morning not feeling well. She told her family, who decided to bring her to the hospital for evaluation. The family reported no recent illnesses, sick contacts, f/c, NV, abd pain, diarrhea, dysuria. Since arrival, pt was placed on BiPAP and given IV lasix which has alleviated her sx. She denied any associated CP. It is unclear if she has made urine since receiving lasix. Remainder of chronic condition is stable - HTN on stable regimen. BP usually 160s-170s. Any lower, she reportedly has dizziness. Asthma reportedly mild, no recent exacerbation. CKD advanced, but per discussion with pt and her family - she does not want to initiate HD.    ED Course: 218/97, 89, 30, 99% 10L NRB. Received IV hydral, labetalol, bumex. Also receive ntg.

## 2022-05-22 NOTE — ED PROVIDER NOTE - OBJECTIVE STATEMENT
Patient is a 89y F PMHx HTN, CKD, CHF, asthma, presenting today with SOB. BIBEMS on NRB. EMS gave ASA, one sublingual nitro. BP on EMS arrival SBP >200. Patient family at bedside reports patient awoke from sleep with difficulty breathing. Patient takes nifedipine, metoprolol, hydralazine for BP, Bumex. Not on dialysis. Denies fevers, n/v/d, recent illness, headaches, vision changes, recent known sick contacts, hx ACS.

## 2022-05-22 NOTE — ED PROVIDER NOTE - ATTENDING CONTRIBUTION TO CARE
89 y/o F with h/o HTN, hyperlipidemia, CHF, CKD not on dialysis BIB EMS for respiratory distress.  Pt with sudden SOB this morning around 3am.  No recent illness, fever, abd pain, n/v.  Pt describes chest tightness.  Ill appearing, resp distress tachypneic sitting in stretcher, awake and alert.  Tachypneic with increasd wob, VSS.  Lungs cta bl decreased at bases.  Cards nl S1/S2, RRR, no MRG.  Abd soft ntnd.  1+ pedal edema.  BIPAP labs ekg cxr, admit

## 2022-05-22 NOTE — ED PROVIDER NOTE - NSICDXPASTMEDICALHX_GEN_ALL_CORE_FT
PAST MEDICAL HISTORY:  Asthma     CHF (congestive heart failure) diastolic heart failure    CKD (chronic kidney disease)     High cholesterol     Hypertension

## 2022-05-22 NOTE — ED ADULT NURSE NOTE - OBJECTIVE STATEMENT
Facilitator RN: 89yo female received in room 8. pt A&Ox4, HTN, asthma, CHF, CKD not on dialysis, BIBEMS for shortness of breath starting today. BP systolic over 200. Aspirin and nitro given by EMS. pt sating 100% on NRB, respiratory at bedside placing patient on BIPAP. NSR on monitor. 18G IV placed in LAC, lab drawn and sent. Received with 20G to RAC, positive blood return no difficulty flushing. MD hensley in progress. Side rails up, bed at lowest position, call bell within reach, patient oriented to the unit, safety maintained. Report endorsed to primary RN.

## 2022-05-22 NOTE — ED PROVIDER NOTE - PHYSICAL EXAMINATION
GENERAL: acute respiratory distress on NRB, non-toxic appearing  HEENT: PERRLA, EOMI, normal conjunctiva  CARDIAC: regular rate and rhythm, normal S1 and S2, no appreciable murmurs  PULM: moving air throughout, crackles bilaterally, no wheezing  GI: abdomen nondistended, soft, nontender  NEURO: alert and oriented x 3, normal speech, no gross neurologic deficit  MSK: no visible deformities, trace bilateral peripheral edema, no calf tenderness/redness/swelling  SKIN: no visible rashes, dry, well-perfused, +diaphoresis present  PSYCH: appropriate mood and affect

## 2022-05-22 NOTE — H&P ADULT - PROBLEM/PLAN-6
DISPLAY PLAN FREE TEXT COPD (chronic obstructive pulmonary disease)    CVA (cerebral vascular accident)    HTN (hypertension)

## 2022-05-22 NOTE — H&P ADULT - NSHPSOCIALHISTORY_GEN_ALL_CORE
Lives with family.   No ETOH/drug/tobacco use.  Family acts as caregiver.  Independent w/ ADLs. Uses walker.

## 2022-05-22 NOTE — ED ADULT NURSE REASSESSMENT NOTE - NS ED NURSE REASSESS COMMENT FT1
Pt A&Ox2, respirations are even and unlabored, sating at 100% on BIPAP, NSR on cardiac monitor, HR in 60's, BP remains elevated. When changing pt diaper was wet with urine and cannot calculate urine output, now pt is connected to primafit for accurate urine outputs, skin intact. Pt passed dysphagia screen so pt was given her PO BP meds.

## 2022-05-22 NOTE — H&P ADULT - PROBLEM SELECTOR PLAN 1
-In setting of HTN emergency/acute CHF  -Initially on NRB, switched to BiPAP w/ good effect. Satting fine. Respiration nonlabored.  -Continue IV bumex for diuresis.  -Closely monitor while on BiPAP. Wean as tolerated.  -Note that pt is DNR/DNI - MOLST done and health care proxy form copied in chart. -In setting of HTN emergency/acute CHF  -Initially on NRB, switched to BiPAP w/ good effect. Satting fine. Respiration nonlabored.  -Continue IV bumex for diuresis.  -Closely monitor while on BiPAP. Wean as tolerated.  -Note that pt is DNR/DNI - MOLST done and health care proxy form copied in chart.  -GOC pending course -In setting of HTN emergency/acute CHF  -Initially on NRB, switched to BiPAP w/ good effect. Satting fine. Respiration nonlabored.  -Continue IV bumex for diuresis.  -Closely monitor while on BiPAP. Wean as tolerated.  -MICU recs ( re: BIPAP) noted.  -Note that pt is DNR/DNI - MOLST done and health care proxy form copied in chart.  -GOC pending course

## 2022-05-22 NOTE — ED PROVIDER NOTE - CLINICAL SUMMARY MEDICAL DECISION MAKING FREE TEXT BOX
Patient is a 89y F PMHx HTN, CKD, CHF, asthma, presenting today with SOB. Most likely flash pulm edema due to hypertensive emergency, possible component of acute on chronic HF. R/o acs. EKG, labs, trop, CXR, reassess. Will give nitro paste, patient on tele. Currently on BiPAP will continue to monitor.

## 2022-05-22 NOTE — ED ADULT NURSE REASSESSMENT NOTE - NS ED NURSE REASSESS COMMENT FT1
Received report from night RN: pt awake and alert, respirations are even and unlabored, sating at 100% on BIPAP, RR 12, NSR on cardiac monitor, BP coming down from systolics in the 200s. Daughter states her BP is normally 160's systolic and if it goes down from there she feels dizzy. Pt in no acute distress at this time.

## 2022-05-22 NOTE — H&P ADULT - ASSESSMENT
89F with PMh of HTN, CKD5, chronic diastolic CHF, asthma presenting w/ abrupt onset of SOB. Found to have acute pulmonary edema i/s/o HTN emergency and advanced CKD. Admit for further management.

## 2022-05-22 NOTE — PATIENT PROFILE ADULT - FALL HARM RISK - HARM RISK INTERVENTIONS

## 2022-05-22 NOTE — PATIENT PROFILE ADULT - NS PRO AD PATIENT TYPE
What Type Of Note Output Would You Prefer (Optional)?: Bullet Format How Severe Are Your Spot(S)?: mild Hpi Title: Evaluation of Skin Lesions Additional History: Last skin exam 4/2017 and exam is due Do Not Resuscitate (DNR)/Medical Orders for Life-Sustaining Treatment (MOLST)

## 2022-05-22 NOTE — ED PROVIDER NOTE - IV ALTEPLASE DOOR HIDDEN
Noted and cancelled
REFERRAL TO NEUROSURGERY-    Referral placed on 2/20/2020. On 02/21/20  1st attempt. Left message to call to schedule appointment. On 02/28/20  2nd attempt. Left message to call to schedule appointment. No more scheduling attempts will be made.     Ok to cancel referral?
show

## 2022-05-22 NOTE — ED ADULT TRIAGE NOTE - CHIEF COMPLAINT QUOTE
Patient arrives via EMS, SOB and CP x 45 minutes. Pt tachypneic and labored breathing in triage. Received on 10LNRB, 20G IV LAC. 324asa and 1 nitro given in the field. PMHx HTN, HLD, CKD (no dialysis.) Charge RN called for room assignment.

## 2022-05-22 NOTE — ED ADULT NURSE NOTE - NSIMPLEMENTINTERV_GEN_ALL_ED
Implemented All Fall Risk Interventions:  [place] to call system. Call bell, personal items and telephone within reach. Instruct patient to call for assistance. Room bathroom lighting operational. Non-slip footwear when patient is off stretcher. Physically safe environment: no spills, clutter or unnecessary equipment. Stretcher in lowest position, wheels locked, appropriate side rails in place. Provide visual cue, wrist band, yellow gown, etc. Monitor gait and stability. Monitor for mental status changes and reorient to person, place, and time. Review medications for side effects contributing to fall risk. Reinforce activity limits and safety measures with patient and family.

## 2022-05-22 NOTE — CONSULT NOTE ADULT - ATTENDING COMMENTS
89F w/ hx of HTN, CKD, CHF, asthma, presenting with acute dyspnea and found to be in flash pulmonary edema with elevated blood pressures to 220's systolic. At time of interview, she had been on BiPAP and was diuresed, resulting in subjective improvement of her respiratory status. She had abdominal discomfort due to urinary retention so lynn was recommended. BP reduction and continued diuresis otherwise and can intermittently remove bipap as tolerated. Currently not a MICU candidate - please reconsult as needed.

## 2022-05-22 NOTE — H&P ADULT - PROBLEM SELECTOR PLAN 3
Likely in setting of respiratory failure/CHF.  -Continue optimize above.  -Dysphagia screen now. NPO until that is done. If fails, needs SLP. Will also convert to IV meds if possible.

## 2022-05-22 NOTE — ED PROVIDER NOTE - PROGRESS NOTE DETAILS
Rashmi Adrian MD PGY1: RT placed patient on BiPAP. EKG completed at bedside. Joseph Frankel PGY3: Patient signed out to me. Patient with volume overload likely to kidney vs cardiac etiology. Given overload, nephro consulted for possible dialysis however per nephro after discussion with patient and family they do not want dialysis, and patient is DNR and DNI. Patient is now improving on BIPAP and IV BP meds. Discussed with Dr. Moreno. Will admit.

## 2022-05-22 NOTE — ED PROVIDER NOTE - CARE PLAN
Principal Discharge DX:	Acute on chronic kidney failure  Secondary Diagnosis:	Acute respiratory failure   1

## 2022-05-22 NOTE — H&P ADULT - NSHPLABSRESULTS_GEN_ALL_CORE
7.5    9.66  )-----------( 327      ( 22 May 2022 05:20 )             24.1     05-22    141  |  104  |  66<H>  ----------------------------<  115<H>  5.2   |  19<L>  |  7.54<H>    Ca    10.6<H>      22 May 2022 05:20    TPro  6.4  /  Alb  3.6  /  TBili  0.3  /  DBili  x   /  AST  13  /  ALT  <5  /  AlkPhos  37<L>  05-22    PT/INR - ( 22 May 2022 05:20 )   PT: 11.8 sec;   INR: 1.02 ratio         PTT - ( 22 May 2022 05:20 )  PTT:31.8 sec    CARDIAC MARKERS ( 22 May 2022 05:20 )  x     / x     / x     / x     / 3.8 ng/mL        SARS-CoV-2: NotDetec (22 May 2022 05:36)    Serum Pro-Brain Natriuretic Peptide: >96388 pg/mL (05-22 @ 05:20)      RADIOLOGY:  CXR personally reviewed interpreted: pulmonary vascular congestion.    EKG personally reviewed interpreted SR no acute ST/T changes.

## 2022-05-22 NOTE — H&P ADULT - PROBLEM SELECTOR PLAN 4
As discussed w/ family - pt does not wish to proceed w/ HD.   -Supportive measures.  -Cont Bicarb.  -Optimize fluid status as above.  -Trend labs.

## 2022-05-22 NOTE — ED ADULT NURSE REASSESSMENT NOTE - NS ED NURSE REASSESS COMMENT FT1
Break RN: MICU at bedside for initial bipap consult, pt tolerating Bipap well. Breathing non-labored at this time. Pending transport

## 2022-05-23 LAB
ANION GAP SERPL CALC-SCNC: 14 MMOL/L — SIGNIFICANT CHANGE UP (ref 7–14)
APPEARANCE UR: CLEAR — SIGNIFICANT CHANGE UP
BACTERIA # UR AUTO: NEGATIVE — SIGNIFICANT CHANGE UP
BILIRUB UR-MCNC: NEGATIVE — SIGNIFICANT CHANGE UP
BUN SERPL-MCNC: 68 MG/DL — HIGH (ref 7–23)
CALCIUM SERPL-MCNC: 10.2 MG/DL — SIGNIFICANT CHANGE UP (ref 8.4–10.5)
CHLORIDE SERPL-SCNC: 101 MMOL/L — SIGNIFICANT CHANGE UP (ref 98–107)
CO2 SERPL-SCNC: 23 MMOL/L — SIGNIFICANT CHANGE UP (ref 22–31)
COLOR SPEC: SIGNIFICANT CHANGE UP
CREAT SERPL-MCNC: 7.77 MG/DL — HIGH (ref 0.5–1.3)
DIFF PNL FLD: ABNORMAL
EGFR: 5 ML/MIN/1.73M2 — LOW
EPI CELLS # UR: 2 /HPF — SIGNIFICANT CHANGE UP (ref 0–5)
GLUCOSE SERPL-MCNC: 78 MG/DL — SIGNIFICANT CHANGE UP (ref 70–99)
GLUCOSE UR QL: ABNORMAL
HCT VFR BLD CALC: 24.1 % — LOW (ref 34.5–45)
HGB BLD-MCNC: 7.5 G/DL — LOW (ref 11.5–15.5)
HYALINE CASTS # UR AUTO: 1 /LPF — SIGNIFICANT CHANGE UP (ref 0–7)
KETONES UR-MCNC: NEGATIVE — SIGNIFICANT CHANGE UP
LEUKOCYTE ESTERASE UR-ACNC: ABNORMAL
MAGNESIUM SERPL-MCNC: 2.3 MG/DL — SIGNIFICANT CHANGE UP (ref 1.6–2.6)
MCHC RBC-ENTMCNC: 29.9 PG — SIGNIFICANT CHANGE UP (ref 27–34)
MCHC RBC-ENTMCNC: 31.1 GM/DL — LOW (ref 32–36)
MCV RBC AUTO: 96 FL — SIGNIFICANT CHANGE UP (ref 80–100)
NITRITE UR-MCNC: NEGATIVE — SIGNIFICANT CHANGE UP
NRBC # BLD: 0 /100 WBCS — SIGNIFICANT CHANGE UP
NRBC # FLD: 0 K/UL — SIGNIFICANT CHANGE UP
OSMOLALITY UR: 356 MOSM/KG — SIGNIFICANT CHANGE UP (ref 50–1200)
PH UR: 7 — SIGNIFICANT CHANGE UP (ref 5–8)
PHOSPHATE SERPL-MCNC: 7.2 MG/DL — HIGH (ref 2.5–4.5)
PLATELET # BLD AUTO: 309 K/UL — SIGNIFICANT CHANGE UP (ref 150–400)
POTASSIUM SERPL-MCNC: 4.9 MMOL/L — SIGNIFICANT CHANGE UP (ref 3.5–5.3)
POTASSIUM SERPL-SCNC: 4.9 MMOL/L — SIGNIFICANT CHANGE UP (ref 3.5–5.3)
PROT UR-MCNC: ABNORMAL
PTH-INTACT FLD-MCNC: 168 PG/ML — HIGH (ref 15–65)
RBC # BLD: 2.51 M/UL — LOW (ref 3.8–5.2)
RBC # FLD: 15.8 % — HIGH (ref 10.3–14.5)
RBC CASTS # UR COMP ASSIST: 23 /HPF — HIGH (ref 0–4)
SODIUM SERPL-SCNC: 138 MMOL/L — SIGNIFICANT CHANGE UP (ref 135–145)
SP GR SPEC: 1.01 — SIGNIFICANT CHANGE UP (ref 1–1.05)
UROBILINOGEN FLD QL: SIGNIFICANT CHANGE UP
WBC # BLD: 8.36 K/UL — SIGNIFICANT CHANGE UP (ref 3.8–10.5)
WBC # FLD AUTO: 8.36 K/UL — SIGNIFICANT CHANGE UP (ref 3.8–10.5)
WBC UR QL: 9 /HPF — HIGH (ref 0–5)

## 2022-05-23 PROCEDURE — 93306 TTE W/DOPPLER COMPLETE: CPT | Mod: 26

## 2022-05-23 PROCEDURE — 76770 US EXAM ABDO BACK WALL COMP: CPT | Mod: 26

## 2022-05-23 RX ORDER — SEVELAMER CARBONATE 2400 MG/1
800 POWDER, FOR SUSPENSION ORAL
Refills: 0 | Status: DISCONTINUED | OUTPATIENT
Start: 2022-05-23 | End: 2022-05-28

## 2022-05-23 RX ORDER — ERYTHROPOIETIN 10000 [IU]/ML
20000 INJECTION, SOLUTION INTRAVENOUS; SUBCUTANEOUS ONCE
Refills: 0 | Status: COMPLETED | OUTPATIENT
Start: 2022-05-23 | End: 2022-05-23

## 2022-05-23 RX ADMIN — Medication 100 MILLIGRAM(S): at 21:23

## 2022-05-23 RX ADMIN — GABAPENTIN 100 MILLIGRAM(S): 400 CAPSULE ORAL at 17:52

## 2022-05-23 RX ADMIN — SEVELAMER CARBONATE 800 MILLIGRAM(S): 2400 POWDER, FOR SUSPENSION ORAL at 17:52

## 2022-05-23 RX ADMIN — Medication 100 MILLIGRAM(S): at 15:27

## 2022-05-23 RX ADMIN — Medication 100 MILLIGRAM(S): at 06:16

## 2022-05-23 RX ADMIN — Medication 650 MILLIGRAM(S): at 15:27

## 2022-05-23 RX ADMIN — BUMETANIDE 2 MILLIGRAM(S): 0.25 INJECTION INTRAMUSCULAR; INTRAVENOUS at 00:59

## 2022-05-23 RX ADMIN — PANTOPRAZOLE SODIUM 40 MILLIGRAM(S): 20 TABLET, DELAYED RELEASE ORAL at 06:16

## 2022-05-23 RX ADMIN — Medication 650 MILLIGRAM(S): at 06:16

## 2022-05-23 RX ADMIN — BUMETANIDE 2 MILLIGRAM(S): 0.25 INJECTION INTRAMUSCULAR; INTRAVENOUS at 12:28

## 2022-05-23 RX ADMIN — SEVELAMER CARBONATE 800 MILLIGRAM(S): 2400 POWDER, FOR SUSPENSION ORAL at 12:27

## 2022-05-23 RX ADMIN — Medication 3 MILLILITER(S): at 21:49

## 2022-05-23 RX ADMIN — Medication 90 MILLIGRAM(S): at 06:16

## 2022-05-23 RX ADMIN — CALCITRIOL 0.5 MICROGRAM(S): 0.5 CAPSULE ORAL at 12:27

## 2022-05-23 RX ADMIN — Medication 650 MILLIGRAM(S): at 21:26

## 2022-05-23 RX ADMIN — Medication 3 MILLILITER(S): at 03:49

## 2022-05-23 RX ADMIN — ERYTHROPOIETIN 20000 UNIT(S): 10000 INJECTION, SOLUTION INTRAVENOUS; SUBCUTANEOUS at 21:26

## 2022-05-23 RX ADMIN — SIMVASTATIN 40 MILLIGRAM(S): 20 TABLET, FILM COATED ORAL at 21:24

## 2022-05-23 RX ADMIN — Medication 3 MILLILITER(S): at 15:36

## 2022-05-23 RX ADMIN — GABAPENTIN 100 MILLIGRAM(S): 400 CAPSULE ORAL at 06:16

## 2022-05-23 RX ADMIN — CALCITRIOL 0.5 MICROGRAM(S): 0.5 CAPSULE ORAL at 21:23

## 2022-05-23 NOTE — DIETITIAN INITIAL EVALUATION ADULT - NS FNS DIET ORDER
Diet, Minced and Moist:   DASH/TLC {Sodium & Cholesterol Restricted} (DASH)  For patients receiving Renal Replacement - No Protein Restr, No Conc K, No Conc Phos, Low Sodium (RENAL)  Vegan {Accepts Vegetable Products Only} (05-22-22 @ 19:58)

## 2022-05-23 NOTE — DIETITIAN INITIAL EVALUATION ADULT - PERTINENT MEDS FT
MEDICATIONS  (STANDING):  albuterol/ipratropium for Nebulization 3 milliLiter(s) Nebulizer every 6 hours  buMETAnide Injectable 2 milliGRAM(s) IV Push every 12 hours  calcitriol   Capsule 0.5 MICROGram(s) Oral two times a day  epoetin cathy-epbx (RETACRIT) Injectable 45255 Unit(s) SubCutaneous once  gabapentin 100 milliGRAM(s) Oral two times a day  hydrALAZINE 100 milliGRAM(s) Oral three times a day  metoprolol succinate  milliGRAM(s) Oral daily  NIFEdipine XL 90 milliGRAM(s) Oral daily  pantoprazole    Tablet 40 milliGRAM(s) Oral before breakfast  sevelamer carbonate 800 milliGRAM(s) Oral three times a day with meals  simvastatin 40 milliGRAM(s) Oral at bedtime  sodium bicarbonate 650 milliGRAM(s) Oral three times a day    MEDICATIONS  (PRN):  acetaminophen     Tablet .. 650 milliGRAM(s) Oral every 6 hours PRN Temp greater or equal to 38C (100.4F), Mild Pain (1 - 3)  aluminum hydroxide/magnesium hydroxide/simethicone Suspension 30 milliLiter(s) Oral every 4 hours PRN Dyspepsia  melatonin 3 milliGRAM(s) Oral at bedtime PRN Insomnia  ondansetron Injectable 4 milliGRAM(s) IV Push every 8 hours PRN Nausea and/or Vomiting

## 2022-05-23 NOTE — PROVIDER CONTACT NOTE (OTHER) - ASSESSMENT
Pt A&O X2/3, Jair speaking and hard of hearing B/L. Denies any pain at this time, on 3L NC, no s/s of discomfort noted. Pt was asleep, resting comfortably in bed

## 2022-05-23 NOTE — DIETITIAN INITIAL EVALUATION ADULT - ADD RECOMMEND
1. Monitor weights, labs, BM's, skin integrity, p.o. intake.  2. Please document % PO intake in nursing flowsheet.   3. Honor food and beverage preferences within diet restriction of patient in an effort to maximize level of nutrient intake.

## 2022-05-23 NOTE — DIETITIAN INITIAL EVALUATION ADULT - OTHER INFO
89 y/o female with HTN, CKD, chronic diastolic CHF, asthma presenting with abrupt onset of SOB. Found to have acute pulmonary edema. Patient with good appetite and PO intake at this time. Currently on Minced and Moist Diet per dysphagia screening as per RN. However, unable to locate information in EMR or chart. Family denies weight changes, last weight 132.9 pounds 10/7/2018, no HIE weight history since and now with weight of 119lbs (5/22). Indicative of 88tgs03% weight loss over 4 years.

## 2022-05-23 NOTE — PROGRESS NOTE ADULT - ASSESSMENT
Patient is a 89y F PMHx HTN, CKD, CHF, asthma, presenting today with SOB. BIBEMS on NRB. EMS gave ASA, one sublingual nitro. BP on EMS arrival SBP >200. Patient family at bedside reports patient awoke from sleep with difficulty breathing. Nephrology consult called for CKD/Fluid overload      Assessment:  1) Altered mental status with hypoxic respiratory failure likely metabolic encephalopathy and fluid overload   2) Acute fluid overload  3) Advanced CKD stage V with fluid overload not on RRT  4) Anemia of chronic renal disease  5) Renal osteodystrophy  6) Acute on chronic systolic/diastolic CHF with fluid overload    Recommend:  Strict I/o  Palafox's catheter  Avoid Nephrotoxic agents  IV Lasix/Bumex 2 mg IV q 12 hrly   Check intact PTH, Vitamin D 1,25 level, PHos, calcium level daily  Iron studies and Ferritin  SQ EPO as per ordered  Low K/Low phos renal diet  Phos binders  Comfort measures  No aggressive measure per family.  DNR/DNI status  GOC discussion with family  Not a candidate for RRT/HD  Optimal medical treatment per primary team  Palliative care consult  Optimal BP control with PO/IV medications with metoprolol/hydralazine  no ACEI/ARB  Will follow

## 2022-05-23 NOTE — PROGRESS NOTE ADULT - SUBJECTIVE AND OBJECTIVE BOX
SUBJECTIVE / OVERNIGHT EVENTS:  22 @ 23:33    MEDICATIONS  (STANDING):  albuterol/ipratropium for Nebulization 3 milliLiter(s) Nebulizer every 6 hours  buMETAnide Injectable 2 milliGRAM(s) IV Push every 12 hours  calcitriol   Capsule 0.5 MICROGram(s) Oral two times a day  gabapentin 100 milliGRAM(s) Oral two times a day  hydrALAZINE 100 milliGRAM(s) Oral three times a day  metoprolol succinate  milliGRAM(s) Oral daily  NIFEdipine XL 90 milliGRAM(s) Oral daily  pantoprazole    Tablet 40 milliGRAM(s) Oral before breakfast  sevelamer carbonate 800 milliGRAM(s) Oral three times a day with meals  simvastatin 40 milliGRAM(s) Oral at bedtime  sodium bicarbonate 650 milliGRAM(s) Oral three times a day    MEDICATIONS  (PRN):  acetaminophen     Tablet .. 650 milliGRAM(s) Oral every 6 hours PRN Temp greater or equal to 38C (100.4F), Mild Pain (1 - 3)  aluminum hydroxide/magnesium hydroxide/simethicone Suspension 30 milliLiter(s) Oral every 4 hours PRN Dyspepsia  melatonin 3 milliGRAM(s) Oral at bedtime PRN Insomnia  ondansetron Injectable 4 milliGRAM(s) IV Push every 8 hours PRN Nausea and/or Vomiting    T(C): 36.4 (22 @ 20:39), Max: 36.4 (22 @ 11:45)  HR: 60 (22 @ 22:29) (55 - 68)  BP: 159/55 (22 @ 20:39) (147/55 - 171/64)  RR: 18 (22 @ 20:39) (18 - 20)  SpO2: 100% (22 @ 22:29) (96% - 100%)    CAPILLARY BLOOD GLUCOSE        I&O's Summary    22 May 2022 07:01  -  23 May 2022 07:00  --------------------------------------------------------  IN: 450 mL / OUT: 750 mL / NET: -300 mL    23 May 2022 07:01  -  23 May 2022 23:33  --------------------------------------------------------  IN: 0 mL / OUT: 450 mL / NET: -450 mL        Constitutional: No fever, fatigue  Skin: No rash.  Eyes: No recent vision problems or eye pain.  ENT: No congestion, ear pain, or sore throat.  Cardiovascular: No chest pain or palpation.  Respiratory: No cough, shortness of breath, congestion, or wheezing.  Gastrointestinal: No abdominal pain, nausea, vomiting, or diarrhea.  Genitourinary: No dysuria.  Musculoskeletal: No joint swelling.  Neurologic: No headache.    PHYSICAL EXAM:  GENERAL: NAD  EYES: EOMI, PERRLA  NECK: Supple, No JVD  CHEST/LUNG: crackles at bases  HEART:  S1 , S2 +  ABDOMEN: soft , bs+  EXTREMITIES:edema+    NEUROLOGY:waxing wanning mental ssttus      LABS:                        7.5    8.36  )-----------( 309      ( 23 May 2022 05:05 )             24.1     05-    138  |  101  |  68<H>  ----------------------------<  78  4.9   |  23  |  7.77<H>    Ca    10.2      23 May 2022 05:05  Phos  7.2       Mg     2.30         TPro  6.4  /  Alb  3.6  /  TBili  0.3  /  DBili  x   /  AST  13  /  ALT  <5  /  AlkPhos  37<L>  05-22    PT/INR - ( 22 May 2022 05:20 )   PT: 11.8 sec;   INR: 1.02 ratio         PTT - ( 22 May 2022 05:20 )  PTT:31.8 sec  CARDIAC MARKERS ( 22 May 2022 05:20 )  x     / x     / x     / x     / 3.8 ng/mL      Urinalysis Basic - ( 23 May 2022 18:26 )    Color: Light Yellow / Appearance: Clear / S.013 / pH: x  Gluc: x / Ketone: Negative  / Bili: Negative / Urobili: <2 mg/dL   Blood: x / Protein: 100 mg/dL / Nitrite: Negative   Leuk Esterase: Small / RBC: 23 /HPF / WBC 9 /HPF   Sq Epi: x / Non Sq Epi: 2 /HPF / Bacteria: Negative        RADIOLOGY & ADDITIONAL TESTS:    Imaging Personally Reviewed:    Consultant(s) Notes Reviewed:      Care Discussed with Consultants/Other Providers:

## 2022-05-23 NOTE — PROGRESS NOTE ADULT - SUBJECTIVE AND OBJECTIVE BOX
Sriram Riley MD (Nephrology progress note)  [address], [facility],  [address],  [facility]  TEl: [phone]  Cell: [phone]    Patient is a 89y Female seen and evaluated at bedside. Vital signs, laboratory data, imaging studies, consult notes reviewed done within past 24 hours. Overnight events noted. Patient alert and awake in no distress on nasal cannula oxygen now off BIPAP. Denies any chest pain, SOB. Interval elevated renal function with non oliguria and improving blood pressure at present.    Allergies    No Known Allergies    Intolerances        ROS:  Limited  ALert and awakt but confused and disoriented  Hard of hearing  Denies any chest pain, SOB    VITALS:    T(C): 36.4 (05-23-22 @ 11:45), Max: 36.4 (05-23-22 @ 11:45)  HR: 66 (05-23-22 @ 11:45) (55 - 68)  BP: 158/67 (05-23-22 @ 11:45) (127/54 - 178/77)  RR: 18 (05-23-22 @ 11:45) (16 - 18)  SpO2: 100% (05-23-22 @ 11:45) (96% - 100%)  CAPILLARY BLOOD GLUCOSE          05-22-22 @ 07:01  -  05-23-22 @ 07:00  --------------------------------------------------------  IN: 450 mL / OUT: 750 mL / NET: -300 mL      MEDICATIONS  (STANDING):  albuterol/ipratropium for Nebulization 3 milliLiter(s) Nebulizer every 6 hours  buMETAnide Injectable 2 milliGRAM(s) IV Push every 12 hours  calcitriol   Capsule 0.5 MICROGram(s) Oral two times a day  epoetin cathy-epbx (RETACRIT) Injectable 45745 Unit(s) SubCutaneous once  gabapentin 100 milliGRAM(s) Oral two times a day  hydrALAZINE 100 milliGRAM(s) Oral three times a day  metoprolol succinate  milliGRAM(s) Oral daily  NIFEdipine XL 90 milliGRAM(s) Oral daily  pantoprazole    Tablet 40 milliGRAM(s) Oral before breakfast  sevelamer carbonate 800 milliGRAM(s) Oral three times a day with meals  simvastatin 40 milliGRAM(s) Oral at bedtime  sodium bicarbonate 650 milliGRAM(s) Oral three times a day    MEDICATIONS  (PRN):  acetaminophen     Tablet .. 650 milliGRAM(s) Oral every 6 hours PRN Temp greater or equal to 38C (100.4F), Mild Pain (1 - 3)  aluminum hydroxide/magnesium hydroxide/simethicone Suspension 30 milliLiter(s) Oral every 4 hours PRN Dyspepsia  melatonin 3 milliGRAM(s) Oral at bedtime PRN Insomnia  ondansetron Injectable 4 milliGRAM(s) IV Push every 8 hours PRN Nausea and/or Vomiting      PHYSICAL EXAM:  GENERAL: Alert, awake and oriented x 2 in no distress  HEENT: ONEIL, EOMI, neck supple, no JVP  CHEST/LUNG: Bilateral decreased breath sounds, bibasilar rales and crepitation, no wheezing  HEART: Regular rate and rhythm, KESHA II/VI at LPSB, no gallops, no rub   ABDOMEN: Soft, nontender, non distended, bowel sounds present, no palpable organomegaly  : No flank or supra pubic tenderness.  EXTREMITIES: Bilateral trace pedal edema noted  Neurology: AAOx2, no focal neurological deficit  SKIN: No rash or skin lesion  Musculoskeletal: No joint deformity    Vascular ACCESS: None    LABS:                        7.5    8.36  )-----------( 309      ( 23 May 2022 05:05 )             24.1     05-23    138  |  101  |  68<H>  ----------------------------<  78  4.9   |  23  |  7.77<H>    Ca    10.2      23 May 2022 05:05  Phos  7.2     05-23  Mg     2.30     05-23    TPro  6.4  /  Alb  3.6  /  TBili  0.3  /  DBili  x   /  AST  13  /  ALT  <5  /  AlkPhos  37<L>  05-22      PT/INR - ( 22 May 2022 05:20 )   PT: 11.8 sec;   INR: 1.02 ratio         PTT - ( 22 May 2022 05:20 )  PTT:31.8 sec          RADIOLOGY & ADDITIONAL STUDIES:  rad< from: Xray Chest 1 View- PORTABLE-Urgent (Xray Chest 1 View- PORTABLE-Urgent .) (05.22.22 @ 06:21) >    ACC: [id number] EXAM:  XR CHEST PORTABLE URGENT 1V                          PROCEDURE DATE:  05/22/2022          INTERPRETATION:  CLINICAL INFORMATION: Shortness of breath    TECHNIQUE: Frontal radiograph of the chest    COMPARISON: None available.    FINDINGS:    Low lung volumes. There are bilateral small pleural effusions. Prominent   vascular markings. The heart is not well evaluated in this projection. No   pneumothorax. No acute osseous findings    IMPRESSION:    Pulmonary edema with bilateral pleural effusions.    --- End of Report ---          KASHIF BARBOSA MD; Resident Radiologist  This document has been electronically signed.  MILLI IRAHETA MD; Attending Radiologist  This document has been electronically signed. May 22 2022  7:24AM    < end of copied text >    Imaging Personally Reviewed:  [x] YES  [ ] NO    Consultant(s) Notes Reviewed:  [x] YES  [ ] NO    Care Discussed with Primary team/ Other Providers [x] YES  [ ] NO

## 2022-05-23 NOTE — DIETITIAN INITIAL EVALUATION ADULT - DIET TYPE
1. Recommend liberalizing diet to Renal, Veg-lacto.   D/C DASH/TLC (cholesterol and sodium restricted) Diet.   2. Continue Minced and Moist per MD discretion./minced and moist/renal replacement pts:no protein restr,no conc K & phos, low sodium

## 2022-05-23 NOTE — DIETITIAN INITIAL EVALUATION ADULT - ORAL INTAKE PTA/DIET HISTORY
Met with patient and son at bedside. Patient w/ AMS. Collateral information obtained from chart review, RN and son at bedside. Per son, patient eats regular diet such as Roti and veggies. Not Vegan- observes Vegetarian Lacto Diet. Patient w/ variable po intake at home. However, eats good at baseline per son. Typically consumes 2 meals per day or small meals throughout the day per patient preference.  Met with patient and son at bedside. Patient w/ AMS. Collateral information obtained from chart review, RN and son at bedside. Per son, patient eats regular diet such as Roti and veggies. Not Vegan- observes Vegetarian-Lacto Diet. Patient w/ variable po intake at home. However, eats good at baseline per son. Typically consumes 2 meals per day or small meals throughout the day per patient preference.

## 2022-05-23 NOTE — DIETITIAN INITIAL EVALUATION ADULT - PERTINENT LABORATORY DATA
05-23    138  |  101  |  68<H>  ----------------------------<  78  4.9   |  23  |  7.77<H>    Ca    10.2      23 May 2022 05:05  Phos  7.2     05-23  Mg     2.30     05-23    TPro  6.4  /  Alb  3.6  /  TBili  0.3  /  DBili  x   /  AST  13  /  ALT  <5  /  AlkPhos  37<L>  05-22

## 2022-05-24 LAB
ANION GAP SERPL CALC-SCNC: 12 MMOL/L — SIGNIFICANT CHANGE UP (ref 7–14)
ANION GAP SERPL CALC-SCNC: 13 MMOL/L — SIGNIFICANT CHANGE UP (ref 7–14)
BLD GP AB SCN SERPL QL: NEGATIVE — SIGNIFICANT CHANGE UP
BUN SERPL-MCNC: 65 MG/DL — HIGH (ref 7–23)
BUN SERPL-MCNC: 65 MG/DL — HIGH (ref 7–23)
CALCIUM SERPL-MCNC: 10.9 MG/DL — HIGH (ref 8.4–10.5)
CALCIUM SERPL-MCNC: 10.9 MG/DL — HIGH (ref 8.4–10.5)
CHLORIDE SERPL-SCNC: 99 MMOL/L — SIGNIFICANT CHANGE UP (ref 98–107)
CHLORIDE SERPL-SCNC: 99 MMOL/L — SIGNIFICANT CHANGE UP (ref 98–107)
CO2 SERPL-SCNC: 23 MMOL/L — SIGNIFICANT CHANGE UP (ref 22–31)
CO2 SERPL-SCNC: 24 MMOL/L — SIGNIFICANT CHANGE UP (ref 22–31)
CREAT SERPL-MCNC: 7.49 MG/DL — HIGH (ref 0.5–1.3)
CREAT SERPL-MCNC: 7.57 MG/DL — HIGH (ref 0.5–1.3)
EGFR: 5 ML/MIN/1.73M2 — LOW
EGFR: 5 ML/MIN/1.73M2 — LOW
FERRITIN SERPL-MCNC: 98 NG/ML — SIGNIFICANT CHANGE UP (ref 15–150)
GLUCOSE SERPL-MCNC: 114 MG/DL — HIGH (ref 70–99)
GLUCOSE SERPL-MCNC: 99 MG/DL — SIGNIFICANT CHANGE UP (ref 70–99)
HCT VFR BLD CALC: 21.3 % — LOW (ref 34.5–45)
HCT VFR BLD CALC: 22.7 % — LOW (ref 34.5–45)
HGB BLD-MCNC: 6.8 G/DL — CRITICAL LOW (ref 11.5–15.5)
HGB BLD-MCNC: 7 G/DL — CRITICAL LOW (ref 11.5–15.5)
IRON SATN MFR SERPL: 18 % — SIGNIFICANT CHANGE UP (ref 14–50)
IRON SATN MFR SERPL: 28 UG/DL — LOW (ref 30–160)
MAGNESIUM SERPL-MCNC: 2.3 MG/DL — SIGNIFICANT CHANGE UP (ref 1.6–2.6)
MAGNESIUM SERPL-MCNC: 2.3 MG/DL — SIGNIFICANT CHANGE UP (ref 1.6–2.6)
MCHC RBC-ENTMCNC: 29.7 PG — SIGNIFICANT CHANGE UP (ref 27–34)
MCHC RBC-ENTMCNC: 29.8 PG — SIGNIFICANT CHANGE UP (ref 27–34)
MCHC RBC-ENTMCNC: 30.8 GM/DL — LOW (ref 32–36)
MCHC RBC-ENTMCNC: 31.9 GM/DL — LOW (ref 32–36)
MCV RBC AUTO: 93 FL — SIGNIFICANT CHANGE UP (ref 80–100)
MCV RBC AUTO: 96.6 FL — SIGNIFICANT CHANGE UP (ref 80–100)
NRBC # BLD: 0 /100 WBCS — SIGNIFICANT CHANGE UP
NRBC # BLD: 0 /100 WBCS — SIGNIFICANT CHANGE UP
NRBC # FLD: 0 K/UL — SIGNIFICANT CHANGE UP
NRBC # FLD: 0 K/UL — SIGNIFICANT CHANGE UP
PHOSPHATE SERPL-MCNC: 6.9 MG/DL — HIGH (ref 2.5–4.5)
PHOSPHATE SERPL-MCNC: 7 MG/DL — HIGH (ref 2.5–4.5)
PLATELET # BLD AUTO: 287 K/UL — SIGNIFICANT CHANGE UP (ref 150–400)
PLATELET # BLD AUTO: 291 K/UL — SIGNIFICANT CHANGE UP (ref 150–400)
POTASSIUM SERPL-MCNC: 5.2 MMOL/L — SIGNIFICANT CHANGE UP (ref 3.5–5.3)
POTASSIUM SERPL-MCNC: 5.3 MMOL/L — SIGNIFICANT CHANGE UP (ref 3.5–5.3)
POTASSIUM SERPL-SCNC: 5.2 MMOL/L — SIGNIFICANT CHANGE UP (ref 3.5–5.3)
POTASSIUM SERPL-SCNC: 5.3 MMOL/L — SIGNIFICANT CHANGE UP (ref 3.5–5.3)
RBC # BLD: 2.29 M/UL — LOW (ref 3.8–5.2)
RBC # BLD: 2.35 M/UL — LOW (ref 3.8–5.2)
RBC # FLD: 15.6 % — HIGH (ref 10.3–14.5)
RBC # FLD: 15.7 % — HIGH (ref 10.3–14.5)
RH IG SCN BLD-IMP: POSITIVE — SIGNIFICANT CHANGE UP
SODIUM SERPL-SCNC: 135 MMOL/L — SIGNIFICANT CHANGE UP (ref 135–145)
SODIUM SERPL-SCNC: 135 MMOL/L — SIGNIFICANT CHANGE UP (ref 135–145)
TIBC SERPL-MCNC: 159 UG/DL — LOW (ref 220–430)
UIBC SERPL-MCNC: 131 UG/DL — SIGNIFICANT CHANGE UP (ref 110–370)
VIT D25+D1,25 OH+D1,25 PNL SERPL-MCNC: 52.9 PG/ML — SIGNIFICANT CHANGE UP (ref 19.9–79.3)
WBC # BLD: 9.03 K/UL — SIGNIFICANT CHANGE UP (ref 3.8–10.5)
WBC # BLD: 9.12 K/UL — SIGNIFICANT CHANGE UP (ref 3.8–10.5)
WBC # FLD AUTO: 9.03 K/UL — SIGNIFICANT CHANGE UP (ref 3.8–10.5)
WBC # FLD AUTO: 9.12 K/UL — SIGNIFICANT CHANGE UP (ref 3.8–10.5)

## 2022-05-24 PROCEDURE — 99223 1ST HOSP IP/OBS HIGH 75: CPT

## 2022-05-24 RX ORDER — BUMETANIDE 0.25 MG/ML
2 INJECTION INTRAMUSCULAR; INTRAVENOUS ONCE
Refills: 0 | Status: COMPLETED | OUTPATIENT
Start: 2022-05-24 | End: 2022-05-24

## 2022-05-24 RX ORDER — FERROUS SULFATE 325(65) MG
300 TABLET ORAL DAILY
Refills: 0 | Status: DISCONTINUED | OUTPATIENT
Start: 2022-05-24 | End: 2022-06-02

## 2022-05-24 RX ORDER — CALCITRIOL 0.5 UG/1
0.5 CAPSULE ORAL DAILY
Refills: 0 | Status: DISCONTINUED | OUTPATIENT
Start: 2022-05-24 | End: 2022-05-25

## 2022-05-24 RX ADMIN — Medication 100 MILLIGRAM(S): at 21:33

## 2022-05-24 RX ADMIN — SEVELAMER CARBONATE 800 MILLIGRAM(S): 2400 POWDER, FOR SUSPENSION ORAL at 08:40

## 2022-05-24 RX ADMIN — BUMETANIDE 2 MILLIGRAM(S): 0.25 INJECTION INTRAMUSCULAR; INTRAVENOUS at 00:55

## 2022-05-24 RX ADMIN — Medication 3 MILLILITER(S): at 22:05

## 2022-05-24 RX ADMIN — GABAPENTIN 100 MILLIGRAM(S): 400 CAPSULE ORAL at 17:47

## 2022-05-24 RX ADMIN — BUMETANIDE 2 MILLIGRAM(S): 0.25 INJECTION INTRAMUSCULAR; INTRAVENOUS at 14:39

## 2022-05-24 RX ADMIN — Medication 3 MILLILITER(S): at 04:08

## 2022-05-24 RX ADMIN — SEVELAMER CARBONATE 800 MILLIGRAM(S): 2400 POWDER, FOR SUSPENSION ORAL at 13:27

## 2022-05-24 RX ADMIN — CALCITRIOL 0.5 MICROGRAM(S): 0.5 CAPSULE ORAL at 13:27

## 2022-05-24 RX ADMIN — BUMETANIDE 2 MILLIGRAM(S): 0.25 INJECTION INTRAMUSCULAR; INTRAVENOUS at 19:12

## 2022-05-24 RX ADMIN — Medication 300 MILLIGRAM(S): at 14:40

## 2022-05-24 RX ADMIN — CALCITRIOL 0.5 MICROGRAM(S): 0.5 CAPSULE ORAL at 06:45

## 2022-05-24 RX ADMIN — Medication 100 MILLIGRAM(S): at 06:44

## 2022-05-24 RX ADMIN — Medication 3 MILLILITER(S): at 10:07

## 2022-05-24 RX ADMIN — Medication 650 MILLIGRAM(S): at 21:25

## 2022-05-24 RX ADMIN — Medication 650 MILLIGRAM(S): at 06:45

## 2022-05-24 RX ADMIN — Medication 650 MILLIGRAM(S): at 13:27

## 2022-05-24 RX ADMIN — Medication 100 MILLIGRAM(S): at 06:43

## 2022-05-24 RX ADMIN — Medication 100 MILLIGRAM(S): at 13:26

## 2022-05-24 RX ADMIN — SEVELAMER CARBONATE 800 MILLIGRAM(S): 2400 POWDER, FOR SUSPENSION ORAL at 17:47

## 2022-05-24 RX ADMIN — SIMVASTATIN 40 MILLIGRAM(S): 20 TABLET, FILM COATED ORAL at 21:33

## 2022-05-24 RX ADMIN — GABAPENTIN 100 MILLIGRAM(S): 400 CAPSULE ORAL at 06:44

## 2022-05-24 RX ADMIN — Medication 90 MILLIGRAM(S): at 06:43

## 2022-05-24 RX ADMIN — PANTOPRAZOLE SODIUM 40 MILLIGRAM(S): 20 TABLET, DELAYED RELEASE ORAL at 06:44

## 2022-05-24 NOTE — PHYSICAL THERAPY INITIAL EVALUATION ADULT - PERTINENT HX OF CURRENT PROBLEM, REHAB EVAL
89 female with PMh of HTN, CKD5, chronic diastolic CHF, asthma presenting with abrupt onset of SOB. Found to have acute pulmonary edema, HTN emergency and advanced CKD. Admitted for further management.

## 2022-05-24 NOTE — PHYSICAL THERAPY INITIAL EVALUATION ADULT - PATIENT PROFILE REVIEW, REHAB EVAL
PT orders received, chart reviewed. ACTIVITY: ambulate with assist RN, [place], endorsed pt to PT initial evaluation. Pt willing and able to participate in therapy session./yes

## 2022-05-24 NOTE — PHYSICAL THERAPY INITIAL EVALUATION ADULT - GENERAL OBSERVATIONS, REHAB EVAL
pt received semi-supine, +lynn, +telemetry monitor, +3.5L Nasal Canula, NAD. Pt's daughter in law @ bedside for part of sesion

## 2022-05-24 NOTE — PROGRESS NOTE ADULT - SUBJECTIVE AND OBJECTIVE BOX
Sriram Riley MD (Nephrology progress note)  [address], [facility]  [address],  [facility]  TEl: [phone]  Cell: [phone]    Patient is a 89y Female seen and evaluated at bedside. Vital signs, laboratory data, imaging studies, consult notes reviewed done within past 24 hours. Overnight events noted.    Allergies    No Known Allergies    Intolerances        ROS:  CONSTITUTIONAL: No fever or chills.  HEENT: No headcahe or visual disturbances.  RESPIRATORY: No shortness of breath, cough, hemoptysis or wheezing  CARDIOVASCULAR: No Chest pain, shortness of breath, palpitations, dizziness, syncope, orthopnea, PND or leg swelling.  GASTROINTESTINAL: No abdominal pain, nausea, vomiting, diarrhea, hematemesis or blood per rectum.  GENITOURINARY: No urinary frequency, urgency, gross hematuria, dysuria, flank or supra pubic pain, difficulty passing urine.  NEUROLOGICAL: No headache, focal limb weakness, tingling or numbness or seizure like activity  SKIN: No skin rash or lesion  MUSCULOSKELETAL: No leg pain, calf pain or swelling    VITALS:    T(C): 36.6 (22 @ 06:30), Max: 36.6 (22 @ 00:50)  HR: 70 (22 @ 06:30) (60 - 70)  BP: 157/57 (22 @ 06:30) (146/60 - 171/64)  RR: 18 (22 @ 06:30) (18 - 20)  SpO2: 100% (22 @ 06:30) (97% - 100%)  CAPILLARY BLOOD GLUCOSE          22 @ 07:01  -  22 @ 07:00  --------------------------------------------------------  IN: 0 mL / OUT: 450 mL / NET: -450 mL      MEDICATIONS  (STANDING):  albuterol/ipratropium for Nebulization 3 milliLiter(s) Nebulizer every 6 hours  buMETAnide Injectable 2 milliGRAM(s) IV Push every 12 hours  calcitriol   Capsule 0.5 MICROGram(s) Oral two times a day  gabapentin 100 milliGRAM(s) Oral two times a day  hydrALAZINE 100 milliGRAM(s) Oral three times a day  metoprolol succinate  milliGRAM(s) Oral daily  NIFEdipine XL 90 milliGRAM(s) Oral daily  pantoprazole    Tablet 40 milliGRAM(s) Oral before breakfast  sevelamer carbonate 800 milliGRAM(s) Oral three times a day with meals  simvastatin 40 milliGRAM(s) Oral at bedtime  sodium bicarbonate 650 milliGRAM(s) Oral three times a day    MEDICATIONS  (PRN):  acetaminophen     Tablet .. 650 milliGRAM(s) Oral every 6 hours PRN Temp greater or equal to 38C (100.4F), Mild Pain (1 - 3)  aluminum hydroxide/magnesium hydroxide/simethicone Suspension 30 milliLiter(s) Oral every 4 hours PRN Dyspepsia  melatonin 3 milliGRAM(s) Oral at bedtime PRN Insomnia  ondansetron Injectable 4 milliGRAM(s) IV Push every 8 hours PRN Nausea and/or Vomiting      PHYSICAL EXAM:  GENERAL: Alert, awake and oriented x3 in no distress  HEENT: ONEIL, EOMI, neck supple, no JVP, no carotid bruit, oral mucosa moist and pink.  CHEST/LUNG: Bilateral clear breath sounds, no rales, no crepitations, no rhonchi, no wheezing  HEART: Regular rate and rhythm, KESHA II/VI at LPSB, no gallops, no rub   ABDOMEN: Soft, nontender, non distended, bowel sounds present, no palpable organomegaly  : No flank or supra pubic tenderness.  EXTREMITIES: Peripheral pulses are palpable, no pedal edema  Neurology: AAOx3, no focal neurological deficit  SKIN: No rash or skin lesion  Musculoskeletal: No joint deformity or spinal tenderness.      Vascular ACCESS:     LABS:                        7.0    9.03  )-----------( 291      ( 24 May 2022 06:41 )             22.7     05-24    135  |  99  |  65<H>  ----------------------------<  99  5.2   |  23  |  7.57<H>    Ca    10.9<H>      24 May 2022 06:41  Phos  7.0     05-24  Mg     2.30     05-24          Urinalysis Basic - ( 23 May 2022 18:26 )    Color: Light Yellow / Appearance: Clear / S.013 / pH: x  Gluc: x / Ketone: Negative  / Bili: Negative / Urobili: <2 mg/dL   Blood: x / Protein: 100 mg/dL / Nitrite: Negative   Leuk Esterase: Small / RBC: 23 /HPF / WBC 9 /HPF   Sq Epi: x / Non Sq Epi: 2 /HPF / Bacteria: Negative      Osmolality, Random Urine: 356 mosm/kg ( @ 18:26)        RADIOLOGY & ADDITIONAL STUDIES:    Imaging Personally Reviewed:  [x] YES  [ ] NO    Consultant(s) Notes Reviewed:  [x] YES  [ ] NO    Care Discussed with Primary team/ Other Providers [x] YES  [ ] NO       Sriram Riley MD (Nephrology progress note)  [address], [facility],  [address],  [facility]  TEl: [phone]  Cell: [phone]    Patient is a 89y Female seen and evaluated at bedside. Vital signs, laboratory data, imaging studies, consult notes reviewed done within past 24 hours. Overnight events noted. Patient lying in bed in no distress feels better. Still remains with SOB and leg edema. Denies any chest pain, palpitations    Allergies    No Known Allergies    Intolerances        ROS:  Limited  Hard of hearing  Mild SOB and Leg edema    VITALS:    T(C): 36.6 (22 @ 06:30), Max: 36.6 (22 @ 00:50)  HR: 70 (22 @ 06:30) (60 - 70)  BP: 157/57 (22 @ 06:30) (146/60 - 171/64)  RR: 18 (22 @ 06:30) (18 - 20)  SpO2: 100% (22 @ 06:30) (97% - 100%)  CAPILLARY BLOOD GLUCOSE          22 @ 07:01  -  22 @ 07:00  --------------------------------------------------------  IN: 0 mL / OUT: 450 mL / NET: -450 mL      MEDICATIONS  (STANDING):  albuterol/ipratropium for Nebulization 3 milliLiter(s) Nebulizer every 6 hours  buMETAnide Injectable 2 milliGRAM(s) IV Push every 12 hours  calcitriol   Capsule 0.5 MICROGram(s) Oral two times a day  gabapentin 100 milliGRAM(s) Oral two times a day  hydrALAZINE 100 milliGRAM(s) Oral three times a day  metoprolol succinate  milliGRAM(s) Oral daily  NIFEdipine XL 90 milliGRAM(s) Oral daily  pantoprazole    Tablet 40 milliGRAM(s) Oral before breakfast  sevelamer carbonate 800 milliGRAM(s) Oral three times a day with meals  simvastatin 40 milliGRAM(s) Oral at bedtime  sodium bicarbonate 650 milliGRAM(s) Oral three times a day    MEDICATIONS  (PRN):  acetaminophen     Tablet .. 650 milliGRAM(s) Oral every 6 hours PRN Temp greater or equal to 38C (100.4F), Mild Pain (1 - 3)  aluminum hydroxide/magnesium hydroxide/simethicone Suspension 30 milliLiter(s) Oral every 4 hours PRN Dyspepsia  melatonin 3 milliGRAM(s) Oral at bedtime PRN Insomnia  ondansetron Injectable 4 milliGRAM(s) IV Push every 8 hours PRN Nausea and/or Vomiting      PHYSICAL EXAM:  GENERAL: Alert, awake and oriented x2-3 in no distress  HEENT: ONEIL, EOMI, neck supple, no JVP, Trace pedal edema  CHEST/LUNG: Bilateral decreased breath sounds, bibasilar rales and crepitations, no wheezing  HEART: Regular rate and rhythm, KESHA II/VI at LPSB, no gallops, no rub   ABDOMEN: Soft, nontender, non distended, bowel sounds presen  : No flank or supra pubic tenderness.  EXTREMITIES: Bilateral trace pedal edema  Neurology: AAOx2-3, no focal neurological deficit  SKIN: No rash or skin lesion  Musculoskeletal: No joint deformity    Vascular ACCESS: NOne    LABS:                        7.0    9.03  )-----------( 291      ( 24 May 2022 06:41 )             22.7         135  |  99  |  65<H>  ----------------------------<  99  5.2   |  23  |  7.57<H>    Ca    10.9<H>      24 May 2022 06:41  Phos  7.0       Mg     2.30               Urinalysis Basic - ( 23 May 2022 18:26 )    Color: Light Yellow / Appearance: Clear / S.013 / pH: x  Gluc: x / Ketone: Negative  / Bili: Negative / Urobili: <2 mg/dL   Blood: x / Protein: 100 mg/dL / Nitrite: Negative   Leuk Esterase: Small / RBC: 23 /HPF / WBC 9 /HPF   Sq Epi: x / Non Sq Epi: 2 /HPF / Bacteria: Negative      Osmolality, Random Urine: 356 mosm/kg ( @ 18:26)        RADIOLOGY & ADDITIONAL STUDIES:  rad< from: US Kidney and Bladder (22 @ 17:08) >    ACC: [id number] EXAM:  US KIDNEYS AND BLADDER                          PROCEDURE DATE:  2022          INTERPRETATION:  CLINICAL INFORMATION: Kidney disease    COMPARISON: CT abdomen and pelvis 10/18/2018, renal ultrasound 2017    TECHNIQUE: Sonography of the kidneys and bladder.    FINDINGS:  Right kidney: 7.2 cm. Atrophic. Increased echogenicity. No hydronephrosis.    Left kidney: 7.3 cm. Atrophic. Increased echogenicity. No hydronephrosis.    Urinary bladder: Collapsed around Palafox catheter.    Partially imaged at least moderate bilateral pleural effusions.    IMPRESSION:  Atrophic and echogenic kidneys, consistent with chronic renal disease. No   hydronephrosis.    Bilateral pleural effusions.        --- End of Report ---            DAVIDE MCCLAIN MD; Attending Radiologist  This document has been electronically signed. May 24 2022  8:16AM    < end of copied text >    Imaging Personally Reviewed:  [x] YES  [ ] NO    Consultant(s) Notes Reviewed:  [x] YES  [ ] NO    Care Discussed with Primary team/ Other Providers [x] YES  [ ] NO

## 2022-05-24 NOTE — PHYSICAL THERAPY INITIAL EVALUATION ADULT - ADDITIONAL COMMENTS
history per pt's daughter in law. Pt lives with her son and daughter in law in a house with 4 stairs to enter. pt resides on the first floor. prior to admission pt required contact guard assist/ minimum assist for stair mobility and ambulated independently with a rolling walker. Pt's daughter in law stated pt needed a new rolling walker because it is broken.     Pt. left comfortable in bed with all tubes/lines intact, +bed alarm, call bell in reach and in NAD. RN present at end of session

## 2022-05-24 NOTE — PROGRESS NOTE ADULT - SUBJECTIVE AND OBJECTIVE BOX
SUBJECTIVE / OVERNIGHT EVENTS:      MEDICATIONS  (STANDING):  albuterol/ipratropium for Nebulization 3 milliLiter(s) Nebulizer every 6 hours  buMETAnide Injectable 2 milliGRAM(s) IV Push every 12 hours  calcitriol   Capsule 0.5 MICROGram(s) Oral two times a day  gabapentin 100 milliGRAM(s) Oral two times a day  hydrALAZINE 100 milliGRAM(s) Oral three times a day  metoprolol succinate  milliGRAM(s) Oral daily  NIFEdipine XL 90 milliGRAM(s) Oral daily  pantoprazole    Tablet 40 milliGRAM(s) Oral before breakfast  sevelamer carbonate 800 milliGRAM(s) Oral three times a day with meals  simvastatin 40 milliGRAM(s) Oral at bedtime  sodium bicarbonate 650 milliGRAM(s) Oral three times a day    MEDICATIONS  (PRN):  acetaminophen     Tablet .. 650 milliGRAM(s) Oral every 6 hours PRN Temp greater or equal to 38C (100.4F), Mild Pain (1 - 3)  aluminum hydroxide/magnesium hydroxide/simethicone Suspension 30 milliLiter(s) Oral every 4 hours PRN Dyspepsia  melatonin 3 milliGRAM(s) Oral at bedtime PRN Insomnia  ondansetron Injectable 4 milliGRAM(s) IV Push every 8 hours PRN Nausea and/or Vomiting    T(C): 36.4 (22 @ 20:39), Max: 36.4 (22 @ 11:45)  HR: 60 (22 @ 22:29) (55 - 68)  BP: 159/55 (22 @ 20:39) (147/55 - 171/64)  RR: 18 (22 @ 20:39) (18 - 20)  SpO2: 100% (22 @ 22:29) (96% - 100%)    CAPILLARY BLOOD GLUCOSE        I&O's Summary    22 May 2022 07:  -  23 May 2022 07:00  --------------------------------------------------------  IN: 450 mL / OUT: 750 mL / NET: -300 mL    23 May 2022 07:01  -  23 May 2022 23:33  --------------------------------------------------------  IN: 0 mL / OUT: 450 mL / NET: -450 mL        Constitutional: No fever, fatigue  Skin: No rash.  Eyes: No recent vision problems or eye pain.  ENT: No congestion, ear pain, or sore throat.  Cardiovascular: No chest pain or palpation.  Respiratory: No cough, shortness of breath, congestion, or wheezing.  Gastrointestinal: No abdominal pain, nausea, vomiting, or diarrhea.  Genitourinary: No dysuria.  Musculoskeletal: No joint swelling.  Neurologic: No headache.    PHYSICAL EXAM:  GENERAL: NAD  EYES: EOMI, PERRLA  NECK: Supple, No JVD  CHEST/LUNG: crackles at bases  HEART:  S1 , S2 +  ABDOMEN: soft , bs+  EXTREMITIES:edema+    NEUROLOGY:waxing wanning mental ssttus      LABS:                        7.5    8.36  )-----------( 309      ( 23 May 2022 05:05 )             24.1     05-    138  |  101  |  68<H>  ----------------------------<  78  4.9   |  23  |  7.77<H>    Ca    10.2      23 May 2022 05:05  Phos  7.2       Mg     2.30         TPro  6.4  /  Alb  3.6  /  TBili  0.3  /  DBili  x   /  AST  13  /  ALT  <5  /  AlkPhos  37<L>      PT/INR - ( 22 May 2022 05:20 )   PT: 11.8 sec;   INR: 1.02 ratio         PTT - ( 22 May 2022 05:20 )  PTT:31.8 sec  CARDIAC MARKERS ( 22 May 2022 05:20 )  x     / x     / x     / x     / 3.8 ng/mL      Urinalysis Basic - ( 23 May 2022 18:26 )    Color: Light Yellow / Appearance: Clear / S.013 / pH: x  Gluc: x / Ketone: Negative  / Bili: Negative / Urobili: <2 mg/dL   Blood: x / Protein: 100 mg/dL / Nitrite: Negative   Leuk Esterase: Small / RBC: 23 /HPF / WBC 9 /HPF   Sq Epi: x / Non Sq Epi: 2 /HPF / Bacteria: Negative        RADIOLOGY & ADDITIONAL TESTS:    Imaging Personally Reviewed:    Consultant(s) Notes Reviewed:      Care Discussed with Consultants/Other Providers:

## 2022-05-25 LAB
ANION GAP SERPL CALC-SCNC: 14 MMOL/L — SIGNIFICANT CHANGE UP (ref 7–14)
BUN SERPL-MCNC: 65 MG/DL — HIGH (ref 7–23)
CALCIUM SERPL-MCNC: 11.8 MG/DL — HIGH (ref 8.4–10.5)
CHLORIDE SERPL-SCNC: 98 MMOL/L — SIGNIFICANT CHANGE UP (ref 98–107)
CO2 SERPL-SCNC: 24 MMOL/L — SIGNIFICANT CHANGE UP (ref 22–31)
CREAT SERPL-MCNC: 7.55 MG/DL — HIGH (ref 0.5–1.3)
EGFR: 5 ML/MIN/1.73M2 — LOW
GLUCOSE SERPL-MCNC: 101 MG/DL — HIGH (ref 70–99)
HCT VFR BLD CALC: 30.8 % — LOW (ref 34.5–45)
HGB BLD-MCNC: 9.7 G/DL — LOW (ref 11.5–15.5)
MAGNESIUM SERPL-MCNC: 2.3 MG/DL — SIGNIFICANT CHANGE UP (ref 1.6–2.6)
MCHC RBC-ENTMCNC: 29.7 PG — SIGNIFICANT CHANGE UP (ref 27–34)
MCHC RBC-ENTMCNC: 31.5 GM/DL — LOW (ref 32–36)
MCV RBC AUTO: 94.2 FL — SIGNIFICANT CHANGE UP (ref 80–100)
NRBC # BLD: 0 /100 WBCS — SIGNIFICANT CHANGE UP
NRBC # FLD: 0 K/UL — SIGNIFICANT CHANGE UP
PHOSPHATE SERPL-MCNC: 6.6 MG/DL — HIGH (ref 2.5–4.5)
PLATELET # BLD AUTO: 305 K/UL — SIGNIFICANT CHANGE UP (ref 150–400)
POTASSIUM SERPL-MCNC: 5.3 MMOL/L — SIGNIFICANT CHANGE UP (ref 3.5–5.3)
POTASSIUM SERPL-SCNC: 5.3 MMOL/L — SIGNIFICANT CHANGE UP (ref 3.5–5.3)
RBC # BLD: 3.27 M/UL — LOW (ref 3.8–5.2)
RBC # FLD: 16.4 % — HIGH (ref 10.3–14.5)
SODIUM SERPL-SCNC: 136 MMOL/L — SIGNIFICANT CHANGE UP (ref 135–145)
WBC # BLD: 11.86 K/UL — HIGH (ref 3.8–10.5)
WBC # FLD AUTO: 11.86 K/UL — HIGH (ref 3.8–10.5)

## 2022-05-25 PROCEDURE — 99232 SBSQ HOSP IP/OBS MODERATE 35: CPT

## 2022-05-25 PROCEDURE — 71045 X-RAY EXAM CHEST 1 VIEW: CPT | Mod: 26

## 2022-05-25 RX ADMIN — Medication 100 MILLIGRAM(S): at 06:04

## 2022-05-25 RX ADMIN — Medication 300 MILLIGRAM(S): at 11:46

## 2022-05-25 RX ADMIN — GABAPENTIN 100 MILLIGRAM(S): 400 CAPSULE ORAL at 17:52

## 2022-05-25 RX ADMIN — Medication 650 MILLIGRAM(S): at 21:24

## 2022-05-25 RX ADMIN — GABAPENTIN 100 MILLIGRAM(S): 400 CAPSULE ORAL at 06:01

## 2022-05-25 RX ADMIN — Medication 3 MILLILITER(S): at 04:02

## 2022-05-25 RX ADMIN — Medication 650 MILLIGRAM(S): at 17:52

## 2022-05-25 RX ADMIN — Medication 100 MILLIGRAM(S): at 21:22

## 2022-05-25 RX ADMIN — Medication 3 MILLILITER(S): at 10:46

## 2022-05-25 RX ADMIN — BUMETANIDE 2 MILLIGRAM(S): 0.25 INJECTION INTRAMUSCULAR; INTRAVENOUS at 00:52

## 2022-05-25 RX ADMIN — SIMVASTATIN 40 MILLIGRAM(S): 20 TABLET, FILM COATED ORAL at 21:23

## 2022-05-25 RX ADMIN — Medication 100 MILLIGRAM(S): at 06:01

## 2022-05-25 RX ADMIN — Medication 3 MILLILITER(S): at 16:30

## 2022-05-25 RX ADMIN — PANTOPRAZOLE SODIUM 40 MILLIGRAM(S): 20 TABLET, DELAYED RELEASE ORAL at 06:05

## 2022-05-25 RX ADMIN — CALCITRIOL 0.5 MICROGRAM(S): 0.5 CAPSULE ORAL at 11:47

## 2022-05-25 RX ADMIN — Medication 90 MILLIGRAM(S): at 06:04

## 2022-05-25 RX ADMIN — BUMETANIDE 2 MILLIGRAM(S): 0.25 INJECTION INTRAMUSCULAR; INTRAVENOUS at 14:00

## 2022-05-25 RX ADMIN — Medication 650 MILLIGRAM(S): at 06:01

## 2022-05-25 RX ADMIN — Medication 3 MILLILITER(S): at 21:06

## 2022-05-25 RX ADMIN — Medication 100 MILLIGRAM(S): at 16:24

## 2022-05-25 NOTE — PROGRESS NOTE ADULT - ASSESSMENT
89F with PMh of HTN, CKD5, chronic diastolic CHF, asthma, who presents to the hospital with sudden on set of SOB     EKG: NSR no acute changes  Tele: NSR    1. CHF  -echo with normal LV systolic function, moderate diastolic dysfunction and new severe AS. no w/u planned as patient with advanced CKD and declining HD  -c/w IV bumex 2mg BID, monitor I&Os   -wean off oxygen as tolerated    2. CKD  -advanced, family currently declining HD. rec GOC   -f/u renal    3. Anemia  -s/p PRBC transfusion  -continue to monitor H/H     4. HTN  -improving  -c/w hydral, metoprolol and nifedipine  -continue to monitor BP

## 2022-05-25 NOTE — PROGRESS NOTE ADULT - SUBJECTIVE AND OBJECTIVE BOX
Sriram Riley MD (Nephrology progress note)  [address] [facility],  [address],  [id number]  TEl: [phone]  Cell: [phone]    Patient is a 89y Female seen and evaluated at bedside. Vital signs, laboratory data, imaging studies, consult notes reviewed done within past 24 hours. Overnight events noted. Patient lying in bed in no distress remains with mild SOB. K level 5.3.     Allergies    No Known Allergies    Intolerances        ROS:  Limited  Mild SOB  Denies any chest pain, palpitations    VITALS:    T(C): 36.4 (22 @ 12:00), Max: 36.8 (22 @ 05:58)  HR: 62 (22 @ 12:00) (62 - 80)  BP: 134/61 (22 @ 12:00) (134/61 - 172/76)  RR: 18 (22 @ 12:00) (17 - 18)  SpO2: 100% (22 @ 12:00) (100% - 100%)  CAPILLARY BLOOD GLUCOSE          22 @ 07:01  -  22 @ 07:00  --------------------------------------------------------  IN: 286 mL / OUT: 1400 mL / NET: -1114 mL    22 @ 07:01  -  22 @ 15:31  --------------------------------------------------------  IN: 0 mL / OUT: 200 mL / NET: -200 mL      MEDICATIONS  (STANDING):  albuterol/ipratropium for Nebulization 3 milliLiter(s) Nebulizer every 6 hours  buMETAnide Injectable 2 milliGRAM(s) IV Push every 12 hours  ferrous    sulfate Liquid 300 milliGRAM(s) Oral daily  gabapentin 100 milliGRAM(s) Oral two times a day  hydrALAZINE 100 milliGRAM(s) Oral three times a day  metolazone 10 milliGRAM(s) Oral daily  metoprolol succinate  milliGRAM(s) Oral daily  NIFEdipine XL 90 milliGRAM(s) Oral daily  pantoprazole    Tablet 40 milliGRAM(s) Oral before breakfast  sevelamer carbonate 800 milliGRAM(s) Oral three times a day with meals  simvastatin 40 milliGRAM(s) Oral at bedtime  sodium bicarbonate 650 milliGRAM(s) Oral three times a day    MEDICATIONS  (PRN):  acetaminophen     Tablet .. 650 milliGRAM(s) Oral every 6 hours PRN Temp greater or equal to 38C (100.4F), Mild Pain (1 - 3)  aluminum hydroxide/magnesium hydroxide/simethicone Suspension 30 milliLiter(s) Oral every 4 hours PRN Dyspepsia  melatonin 3 milliGRAM(s) Oral at bedtime PRN Insomnia  ondansetron Injectable 4 milliGRAM(s) IV Push every 8 hours PRN Nausea and/or Vomiting      PHYSICAL EXAM:  GENERAL: Alert, awake and oriented x 2  HEENT: ONEIL, EOMI, neck supple, no JVP.  CHEST/LUNG: Bilateral decreased breath sounds, bibasilar rales and crepiation  HEART: Regular rate and rhythm, KESHA II/VI at LPSB, no gallops, no rub   ABDOMEN: Soft, nontender, non distended, bowel sounds present  : No flank or supra pubic tenderness.  EXTREMITIES: Peripheral pulses are palpable, no pedal edema  Neurology: AAOx2, no focal neurological deficit  SKIN: No rash or skin lesion  Musculoskeletal: No joint deformity       Vascular ACCESS: None    LABS:                        9.7    11.86 )-----------( 305      ( 25 May 2022 06:19 )             30.8     05-25    136  |  98  |  65<H>  ----------------------------<  101<H>  5.3   |  24  |  7.55<H>    Ca    11.8<H>      25 May 2022 06:19  Phos  6.6     05-25  Mg     2.30     05-25          Urinalysis Basic - ( 23 May 2022 18:26 )    Color: Light Yellow / Appearance: Clear / S.013 / pH: x  Gluc: x / Ketone: Negative  / Bili: Negative / Urobili: <2 mg/dL   Blood: x / Protein: 100 mg/dL / Nitrite: Negative   Leuk Esterase: Small / RBC: 23 /HPF / WBC 9 /HPF   Sq Epi: x / Non Sq Epi: 2 /HPF / Bacteria: Negative      Osmolality, Random Urine: 356 mosm/kg ( @ 18:26)        RADIOLOGY & ADDITIONAL STUDIES:  rad< from: US Kidney and Bladder (22 @ 17:08) >    ACC: [id number] EXAM:  US KIDNEYS AND BLADDER                          PROCEDURE DATE:  2022          INTERPRETATION:  CLINICAL INFORMATION: Kidney disease    COMPARISON: CT abdomen and pelvis 10/18/2018, renal ultrasound 2017    TECHNIQUE: Sonography of the kidneys and bladder.    FINDINGS:  Right kidney: 7.2 cm. Atrophic. Increased echogenicity. No hydronephrosis.    Left kidney: 7.3 cm. Atrophic. Increased echogenicity. No hydronephrosis.    Urinary bladder: Collapsed around Palafox catheter.    Partially imaged at least moderate bilateral pleural effusions.    IMPRESSION:  Atrophic and echogenic kidneys, consistent with chronic renal disease. No   hydronephrosis.    Bilateral pleural effusions.        --- End of Report ---            DAVIDE MCCLAIN MD; Attending Radiologist  This document has been electronically signed. May 24 2022  8:16AM    < end of copied text >  ra< from: Transthoracic Echocardiogram (22 @ 09:38) >    Patient name: Elizabeth Villagomez  YOB: 1930   Age: 89 (F)   MR#: [id number]  Study Date: 2022  [facility] DU[facility]AS  Study quality: Technically good  Referring Physician: Alex Moreno MD  Blood Pressure: 170/65 mmHg  Height: 162 cm  Weight: 54 kg  BSA: 1.6 m2  ------------------------------------------------------------------------  PROCEDURE: Transthoracic echocardiogram with 2-D, M-Mode  and complete spectral and color flow Doppler.  INDICATION: Cardiomyopathy, unspecified (I42.9)  ------------------------------------------------------------------------  DIMENSIONS:  Dimensions:     Normal Values:  LA:     4.4 cm    2.0 - 4.0 cm  Ao:     3.1 cm    2.0 - 3.8 cm  SEPTUM: 0.6 cm    0.6 - 1.2 cm  PWT:    0.9 cm    0.6 - 1.1 cm  LVIDd:  4.5 cm    3.0 - 5.6 cm  LVIDs:  2.5 cm    1.8 - 4.0 cm  Derived Variables:  LVMI: 67 g/m2  RWT: 0.40  Fractional short: 44 %  ------------------------------------------------------------------------  OBSERVATIONS:  Mitral Valve: Mitral annular calcification, otherwise  normal mitral valve. Mild mitral regurgitation.  Aortic Root: Normal aortic root.  Aortic Valve: Calcified trileaflet aortic valve with  decreased opening. Peak transaortic valve gradient equals  33 mm Hg, mean transaortic valve gradient equals 16 mm Hg,  estimated aortic valve area equals 0.6 sqcm (by continuity  equation), consistent with severe aortic stenosis. Mild  aortic regurgitation.  Left Atrium: Severely dilated left atrium.  LA volume index  = 54 cc/m2.  Left Ventricle: Normal left ventricular systolic function.  No segmental wall motion abnormalities. Normal left  ventricular internal dimensions and wall thicknesses.  Moderate diastolic dysfunction (Stage II).  Right Heart: Normal right atrium. Normal right ventricular  size and function. Normal tricuspid valve. Mild tricuspid  regurgitation. Normal pulmonic valve.  Pericardium/PleuraNormal pericardium with no pericardial  effusion. Bilateral pleural effusions.  Hemodynamic: Estimated right ventricular systolic pressure  equals 45 mm Hg, assuming right atrial pressure equals 10  mm Hg, consistent with mild pulmonary hypertension.  ------------------------------------------------------------------------  CONCLUSIONS:  1. Mitral annular calcification, otherwise normal mitral  valve. Mild mitral regurgitation.  2. Calcified trileaflet aortic valve with decreased  opening. Peak transaortic valve gradient equals 33 mm Hg,  mean transaortic valve gradient equals 16 mm Hg, estimated  aortic valve area equals 0.6 sqcm (by continuity equation),  consistent with severe aortic stenosis. Mild aortic  regurgitation.  3. Severely dilated left atrium.  LA volume index = 54  cc/m2.  4. Normal left ventricular systolic function. No segmental  wall motion abnormalities.  5. Moderate diastolic dysfunction (Stage II).  6. Normal right ventricular size and function.  7. Bilateral pleural effusions.  ------------------------------------------------------------------------  Confirmed on  2022 - 12:35:43 by Janice Mcarthur M.D. RPVI  ------------------------------------------------------------------------    < end of copied text >    Imaging Personally Reviewed:  [x] YES  [ ] NO    Consultant(s) Notes Reviewed:  [x] YES  [ ] NO    Care Discussed with Primary team/ Other Providers [x] YES  [ ] NO

## 2022-05-25 NOTE — PROGRESS NOTE ADULT - SUBJECTIVE AND OBJECTIVE BOX
Pulmonary Follow Up Note    FRANCIA MARTEL  MRN-[id number]    Chief Complaint: Patient is a 89y old  Female who presents with a chief complaint of Respiratory failure, acute pulmonary edema. (24 May 2022 10:14)      HPI:  on room air, nc not in place, appears comfortable      MEDICATIONS  (STANDING):  albuterol/ipratropium for Nebulization 3 milliLiter(s) Nebulizer every 6 hours  buMETAnide Injectable 2 milliGRAM(s) IV Push every 12 hours  calcitriol   Capsule 0.5 MICROGram(s) Oral daily  ferrous    sulfate Liquid 300 milliGRAM(s) Oral daily  gabapentin 100 milliGRAM(s) Oral two times a day  hydrALAZINE 100 milliGRAM(s) Oral three times a day  metolazone 10 milliGRAM(s) Oral daily  metoprolol succinate  milliGRAM(s) Oral daily  NIFEdipine XL 90 milliGRAM(s) Oral daily  pantoprazole    Tablet 40 milliGRAM(s) Oral before breakfast  sevelamer carbonate 800 milliGRAM(s) Oral three times a day with meals  simvastatin 40 milliGRAM(s) Oral at bedtime  sodium bicarbonate 650 milliGRAM(s) Oral three times a day    MEDICATIONS  (PRN):  acetaminophen     Tablet .. 650 milliGRAM(s) Oral every 6 hours PRN Temp greater or equal to 38C (100.4F), Mild Pain (1 - 3)  aluminum hydroxide/magnesium hydroxide/simethicone Suspension 30 milliLiter(s) Oral every 4 hours PRN Dyspepsia  melatonin 3 milliGRAM(s) Oral at bedtime PRN Insomnia  ondansetron Injectable 4 milliGRAM(s) IV Push every 8 hours PRN Nausea and/or Vomiting        EXAM:  Vital Signs Last 24 Hrs  T(C): 36.4 (25 May 2022 12:00), Max: 36.8 (25 May 2022 05:58)  T(F): 97.6 (25 May 2022 12:00), Max: 98.2 (25 May 2022 05:58)  HR: 62 (25 May 2022 12:00) (62 - 80)  BP: 134/61 (25 May 2022 12:00) (134/61 - 172/76)  BP(mean): --  RR: 18 (25 May 2022 12:00) (17 - 18)  SpO2: 100% (25 May 2022 12:00) (100% - 100%)  GENERAL: No acute distress  NEURO: Alert   LUNGS: no wheeze      LABS/IMAGING: reviewed                                   9.7    11.86 )-----------( 305      ( 25 May 2022 06:19 )             30.8   05-25    136  |  98  |  65<H>  ----------------------------<  101<H>  5.3   |  24  |  7.55<H>    Ca    11.8<H>      25 May 2022 06:19  Phos  6.6     05-25  Mg     2.30     05-25      < from: Xray Chest 1 View- PORTABLE-Urgent (Xray Chest 1 View- PORTABLE-Urgent .) (05.22.22 @ 06:21) >    ACC: [id number] EXAM:  XR CHEST PORTABLE URGENT 1V                          PROCEDURE DATE:  05/22/2022          INTERPRETATION:  CLINICAL INFORMATION: Shortness of breath    TECHNIQUE: Frontal radiograph of the chest    COMPARISON: None available.    FINDINGS:    Low lung volumes. There are bilateral small pleural effusions. Prominent   vascular markings. The heart is not well evaluated in this projection. No   pneumothorax. No acute osseous findings    IMPRESSION:    Pulmonary edema with bilateral pleural effusions.    --- End of Report ---          KASHIF BARBOSA MD; Resident Radiologist  This document has been electronically signed.    < end of copied text >      PROBLEM LIST:  89yFemale with HEALTH ISSUES - PROBLEM Dx:  Acute pulmonary edema    Stage 5 chronic kidney disease not on chronic dialysis    Asthma    Metabolic encephalopathy    HLD (hyperlipidemia)    Acute on chronic diastolic congestive heart failure      RECS:  -cont diuresis  -wean O2 as tolerated  -incentive danilo  -no wheeze, prn nebs for asthma which does not appear to be in exacerbation     please feel free to call with any questions [phone]  Radha Gonzalez MD

## 2022-05-25 NOTE — PROGRESS NOTE ADULT - SUBJECTIVE AND OBJECTIVE BOX
SUBJECTIVE / OVERNIGHT EVENTS:pt seen and examined      MEDICATIONS  (STANDING):  albuterol/ipratropium for Nebulization 3 milliLiter(s) Nebulizer every 6 hours  buMETAnide Injectable 2 milliGRAM(s) IV Push every 12 hours  ferrous    sulfate Liquid 300 milliGRAM(s) Oral daily  gabapentin 100 milliGRAM(s) Oral two times a day  hydrALAZINE 100 milliGRAM(s) Oral three times a day  metolazone 10 milliGRAM(s) Oral daily  metoprolol succinate  milliGRAM(s) Oral daily  NIFEdipine XL 90 milliGRAM(s) Oral daily  pantoprazole    Tablet 40 milliGRAM(s) Oral before breakfast  sevelamer carbonate 800 milliGRAM(s) Oral three times a day with meals  simvastatin 40 milliGRAM(s) Oral at bedtime  sodium bicarbonate 650 milliGRAM(s) Oral three times a day    MEDICATIONS  (PRN):  acetaminophen     Tablet .. 650 milliGRAM(s) Oral every 6 hours PRN Temp greater or equal to 38C (100.4F), Mild Pain (1 - 3)  aluminum hydroxide/magnesium hydroxide/simethicone Suspension 30 milliLiter(s) Oral every 4 hours PRN Dyspepsia  melatonin 3 milliGRAM(s) Oral at bedtime PRN Insomnia  ondansetron Injectable 4 milliGRAM(s) IV Push every 8 hours PRN Nausea and/or Vomiting    Vital Signs Last 24 Hrs  T(C): 36.8 (22 @ 21:00), Max: 36.8 (22 @ 05:58)  T(F): 98.2 (22 @ 21:00), Max: 98.2 (22 @ 05:58)  HR: 75 (22 @ 21:00) (62 - 80)  BP: 189/75 (22 @ 21:00) (134/61 - 189/75)  BP(mean): --  RR: 18 (22 @ 21:00) (18 - 18)  SpO2: 100% (22 @ 21:00) (100% - 100%)          Constitutional: No fever, fatigue  Skin: No rash.  Eyes: No recent vision problems or eye pain.  ENT: No congestion, ear pain, or sore throat.  Cardiovascular: No chest pain or palpation.  Respiratory: No cough, shortness of breath, congestion, or wheezing.  Gastrointestinal: No abdominal pain, nausea, vomiting, or diarrhea.  Genitourinary: No dysuria.  Musculoskeletal: No joint swelling.  Neurologic: No headache.    PHYSICAL EXAM:  GENERAL: NAD  EYES: EOMI, PERRLA  NECK: Supple, No JVD  CHEST/LUNG: crackles at bases  HEART:  S1 , S2 +  ABDOMEN: soft , bs+  EXTREMITIES:edema+    NEUROLOGY:waxing wanning mental status      LABS:      136  |  98  |  65<H>  ----------------------------<  101<H>  5.3   |  24  |  7.55<H>    Ca    11.8<H>      25 May 2022 06:19  Phos  6.6       Mg     2.30           Creatinine Trend: 7.55 <--, 7.49 <--, 7.57 <--, 7.77 <--, 7.54 <--                        9.7    11.86 )-----------( 305      ( 25 May 2022 06:19 )             30.8     Urine Studies:  Urinalysis Basic - ( 23 May 2022 18:26 )    Color: Light Yellow / Appearance: Clear / S.013 / pH:   Gluc:  / Ketone: Negative  / Bili: Negative / Urobili: <2 mg/dL   Blood:  / Protein: 100 mg/dL / Nitrite: Negative   Leuk Esterase: Small / RBC: 23 /HPF / WBC 9 /HPF   Sq Epi:  / Non Sq Epi: 2 /HPF / Bacteria: Negative      Osmolality, Random Urine: 356 mosm/kg ( @ 18:26)              PTT - ( 22 May 2022 05:20 )  PTT:31.8 sec  CARDIAC MARKERS ( 22 May 2022 05:20 )  x     / x     / x     / x     / 3.8 ng/mL      Urinalysis Basic - ( 23 May 2022 18:26 )    Color: Light Yellow / Appearance: Clear / S.013 / pH: x  Gluc: x / Ketone: Negative  / Bili: Negative / Urobili: <2 mg/dL   Blood: x / Protein: 100 mg/dL / Nitrite: Negative   Leuk Esterase: Small / RBC: 23 /HPF / WBC 9 /HPF   Sq Epi: x / Non Sq Epi: 2 /HPF / Bacteria: Negative        RADIOLOGY & ADDITIONAL TESTS:    Imaging Personally Reviewed:    Consultant(s) Notes Reviewed:      Care Discussed with Consultants/Other Providers:

## 2022-05-25 NOTE — PROGRESS NOTE ADULT - ASSESSMENT
Patient is a 89y F PMHx HTN, CKD, CHF, asthma, presenting today with SOB. BIBEMS on NRB. EMS gave ASA, one sublingual nitro. BP on EMS arrival SBP >200. Patient family at bedside reports patient awoke from sleep with difficulty breathing. 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  2) Anemia of chronic renal disease  3) Renal osteodystrophy  4) Acute on chronic systolic/diastolic CHF with fluid overload  5) Hypercalcemia/Hyperphosphatemia  6) Severe AS    Recommend:  Strict I/o  Palafox's catheter  Avoid Nephrotoxic agents  IV Bumex 2 mg IV q 12 hurly   Continue Metolazone 10 mg po daily  Intact PTH, Vitamin D 1,25 level, PHos, calcium level  reviewed  Hold PO Calcitriol due to hypercalcemia  S/p PRBC tranfusion  Iron studies and Ferritin reviewed  Continue Feosol 300 ml po daily  SQ EPO as per ordered  Low K/Low phos renal diet  Phos binders  No aggressive measure per family and/or HD treatment at present  DNR/DNI status  NO RRT/HD  Optimal medical treatment per primary team  Consider Palliative care consult  Optimal BP control with PO/IV medications with metoprolol ER, Hydralazine, NItrate, Nifedipine xl  no ACEI/ARB  Will follow

## 2022-05-25 NOTE — PROGRESS NOTE ADULT - SUBJECTIVE AND OBJECTIVE BOX
Ryan Perez MD  Interventional Cardiology / Advance Heart Failure and Cardiac Transplant Specialist  [place] Office : [address] [place] [address] [address] [address]  Tel:   [place] Office : [address] [facility] [place] [address]  Tel: [phone]      Subjective/Overnight events: Pt is lying in bed comfortable not in distress      MEDICATIONS:  buMETAnide Injectable 2 milliGRAM(s) IV Push every 12 hours  hydrALAZINE 100 milliGRAM(s) Oral three times a day  metolazone 10 milliGRAM(s) Oral daily  metoprolol succinate  milliGRAM(s) Oral daily  NIFEdipine XL 90 milliGRAM(s) Oral daily      albuterol/ipratropium for Nebulization 3 milliLiter(s) Nebulizer every 6 hours    acetaminophen     Tablet .. 650 milliGRAM(s) Oral every 6 hours PRN  gabapentin 100 milliGRAM(s) Oral two times a day  melatonin 3 milliGRAM(s) Oral at bedtime PRN  ondansetron Injectable 4 milliGRAM(s) IV Push every 8 hours PRN    aluminum hydroxide/magnesium hydroxide/simethicone Suspension 30 milliLiter(s) Oral every 4 hours PRN  pantoprazole    Tablet 40 milliGRAM(s) Oral before breakfast    simvastatin 40 milliGRAM(s) Oral at bedtime    calcitriol   Capsule 0.5 MICROGram(s) Oral daily  ferrous    sulfate Liquid 300 milliGRAM(s) Oral daily  sodium bicarbonate 650 milliGRAM(s) Oral three times a day      PAST MEDICAL/SURGICAL HISTORY  PAST MEDICAL & SURGICAL HISTORY:  Hypertension      High cholesterol      CKD (chronic kidney disease)      Asthma      CHF (congestive heart failure)  diastolic heart failure      History of hysterectomy          SOCIAL HISTORY: Substance Use (street drugs): ( x ) never used  (  ) other:    FAMILY HISTORY:  No pertinent family history in first degree relatives        PHYSICAL EXAM:  T(C): 36.8 (05-25-22 @ 05:58), Max: 36.8 (05-25-22 @ 05:58)  HR: 80 (05-25-22 @ 05:58) (66 - 80)  BP: 162/67 (05-25-22 @ 05:58) (143/62 - 172/76)  RR: 18 (05-25-22 @ 05:58) (17 - 18)  SpO2: 100% (05-25-22 @ 05:58) (100% - 100%)  Wt(kg): --  I&O's Summary    24 May 2022 07:01  -  25 May 2022 07:00  --------------------------------------------------------  IN: 286 mL / OUT: 1400 mL / NET: -1114 mL        GENERAL: NAD  EYES: EOMI, PERRLA, conjunctiva and sclera clear  ENMT: No tonsillar erythema, exudates, or enlargement  Cardiovascular: Normal S1 S2, No JVD, No murmurs, No edema  Respiratory: Lungs crackles to auscultation	  Gastrointestinal:  Soft, Non-tender, + BS	  Extremities: No edema                                    9.7    11.86 )-----------( 305      ( 25 May 2022 06:19 )             30.8     05-25    136  |  98  |  65<H>  ----------------------------<  101<H>  5.3   |  24  |  7.55<H>    Ca    11.8<H>      25 May 2022 06:19  Phos  6.6     05-25  Mg     2.30     05-25      proBNP:   Lipid Profile:   HgA1c:   TSH:     Consultant(s) Notes Reviewed:  [x ] YES  [ ] NO    Care Discussed with Consultants/Other Providers [ x] YES  [ ] NO    Imaging Personally Reviewed independently:  [x] YES  [ ] NO    All labs, radiologic studies, vitals, orders and medications list reviewed. Patient is seen and examined at bedside. Case discussed with medical team.

## 2022-05-26 LAB
ANION GAP SERPL CALC-SCNC: 14 MMOL/L — SIGNIFICANT CHANGE UP (ref 7–14)
BUN SERPL-MCNC: 72 MG/DL — HIGH (ref 7–23)
CALCIUM SERPL-MCNC: 11.8 MG/DL — HIGH (ref 8.4–10.5)
CHLORIDE SERPL-SCNC: 96 MMOL/L — LOW (ref 98–107)
CO2 SERPL-SCNC: 25 MMOL/L — SIGNIFICANT CHANGE UP (ref 22–31)
CREAT SERPL-MCNC: 7.67 MG/DL — HIGH (ref 0.5–1.3)
EGFR: 5 ML/MIN/1.73M2 — LOW
GLUCOSE SERPL-MCNC: 129 MG/DL — HIGH (ref 70–99)
HCT VFR BLD CALC: 27.5 % — LOW (ref 34.5–45)
HGB BLD-MCNC: 8.8 G/DL — LOW (ref 11.5–15.5)
MAGNESIUM SERPL-MCNC: 2.4 MG/DL — SIGNIFICANT CHANGE UP (ref 1.6–2.6)
MCHC RBC-ENTMCNC: 29.4 PG — SIGNIFICANT CHANGE UP (ref 27–34)
MCHC RBC-ENTMCNC: 32 GM/DL — SIGNIFICANT CHANGE UP (ref 32–36)
MCV RBC AUTO: 92 FL — SIGNIFICANT CHANGE UP (ref 80–100)
NRBC # BLD: 0 /100 WBCS — SIGNIFICANT CHANGE UP
NRBC # FLD: 0 K/UL — SIGNIFICANT CHANGE UP
PHOSPHATE SERPL-MCNC: 6.6 MG/DL — HIGH (ref 2.5–4.5)
PLATELET # BLD AUTO: 310 K/UL — SIGNIFICANT CHANGE UP (ref 150–400)
POTASSIUM SERPL-MCNC: 5.1 MMOL/L — SIGNIFICANT CHANGE UP (ref 3.5–5.3)
POTASSIUM SERPL-SCNC: 5.1 MMOL/L — SIGNIFICANT CHANGE UP (ref 3.5–5.3)
RBC # BLD: 2.99 M/UL — LOW (ref 3.8–5.2)
RBC # FLD: 16 % — HIGH (ref 10.3–14.5)
SODIUM SERPL-SCNC: 135 MMOL/L — SIGNIFICANT CHANGE UP (ref 135–145)
WBC # BLD: 11.37 K/UL — HIGH (ref 3.8–10.5)
WBC # FLD AUTO: 11.37 K/UL — HIGH (ref 3.8–10.5)

## 2022-05-26 PROCEDURE — 99232 SBSQ HOSP IP/OBS MODERATE 35: CPT

## 2022-05-26 RX ADMIN — Medication 100 MILLIGRAM(S): at 15:57

## 2022-05-26 RX ADMIN — Medication 90 MILLIGRAM(S): at 04:38

## 2022-05-26 RX ADMIN — Medication 650 MILLIGRAM(S): at 04:39

## 2022-05-26 RX ADMIN — PANTOPRAZOLE SODIUM 40 MILLIGRAM(S): 20 TABLET, DELAYED RELEASE ORAL at 04:36

## 2022-05-26 RX ADMIN — Medication 650 MILLIGRAM(S): at 15:58

## 2022-05-26 RX ADMIN — Medication 100 MILLIGRAM(S): at 04:38

## 2022-05-26 RX ADMIN — BUMETANIDE 2 MILLIGRAM(S): 0.25 INJECTION INTRAMUSCULAR; INTRAVENOUS at 02:30

## 2022-05-26 RX ADMIN — SEVELAMER CARBONATE 800 MILLIGRAM(S): 2400 POWDER, FOR SUSPENSION ORAL at 18:52

## 2022-05-26 RX ADMIN — SEVELAMER CARBONATE 800 MILLIGRAM(S): 2400 POWDER, FOR SUSPENSION ORAL at 15:58

## 2022-05-26 RX ADMIN — SIMVASTATIN 40 MILLIGRAM(S): 20 TABLET, FILM COATED ORAL at 21:23

## 2022-05-26 RX ADMIN — Medication 100 MILLIGRAM(S): at 04:36

## 2022-05-26 RX ADMIN — Medication 300 MILLIGRAM(S): at 15:58

## 2022-05-26 RX ADMIN — GABAPENTIN 100 MILLIGRAM(S): 400 CAPSULE ORAL at 04:38

## 2022-05-26 RX ADMIN — Medication 100 MILLIGRAM(S): at 21:24

## 2022-05-26 RX ADMIN — GABAPENTIN 100 MILLIGRAM(S): 400 CAPSULE ORAL at 18:52

## 2022-05-26 RX ADMIN — Medication 650 MILLIGRAM(S): at 21:23

## 2022-05-26 RX ADMIN — BUMETANIDE 2 MILLIGRAM(S): 0.25 INJECTION INTRAMUSCULAR; INTRAVENOUS at 13:00

## 2022-05-26 NOTE — PROGRESS NOTE ADULT - SUBJECTIVE AND OBJECTIVE BOX
SUBJECTIVE / OVERNIGHT EVENTS:pt seen and examined    MEDICATIONS  (STANDING):  albuterol/ipratropium for Nebulization 3 milliLiter(s) Nebulizer every 6 hours  buMETAnide Injectable 2 milliGRAM(s) IV Push every 12 hours  ferrous    sulfate Liquid 300 milliGRAM(s) Oral daily  gabapentin 100 milliGRAM(s) Oral two times a day  hydrALAZINE 100 milliGRAM(s) Oral three times a day  metolazone 10 milliGRAM(s) Oral daily  metoprolol succinate  milliGRAM(s) Oral daily  NIFEdipine XL 90 milliGRAM(s) Oral daily  pantoprazole    Tablet 40 milliGRAM(s) Oral before breakfast  sevelamer carbonate 800 milliGRAM(s) Oral three times a day with meals  simvastatin 40 milliGRAM(s) Oral at bedtime  sodium bicarbonate 650 milliGRAM(s) Oral three times a day    MEDICATIONS  (PRN):  acetaminophen     Tablet .. 650 milliGRAM(s) Oral every 6 hours PRN Temp greater or equal to 38C (100.4F), Mild Pain (1 - 3)  aluminum hydroxide/magnesium hydroxide/simethicone Suspension 30 milliLiter(s) Oral every 4 hours PRN Dyspepsia  melatonin 3 milliGRAM(s) Oral at bedtime PRN Insomnia  ondansetron Injectable 4 milliGRAM(s) IV Push every 8 hours PRN Nausea and/or Vomiting    Vital Signs Last 24 Hrs  T(C): 36.7 (22 @ 21:21), Max: 36.9 (22 @ 11:42)  T(F): 98 (22 @ 21:21), Max: 98.5 (22 @ 11:42)  HR: 74 (22 @ 23:05) (66 - 84)  BP: 158/77 (22 @ 21:21) (120/88 - 188/60)  BP(mean): --  RR: 18 (22 @ 21:21) (18 - 18)  SpO2: 100% (22 @ 23:05) (90% - 100%)    Constitutional: No fever, fatigue  Skin: No rash.  Eyes: No recent vision problems or eye pain.  ENT: No congestion, ear pain, or sore throat.  Cardiovascular: No chest pain or palpation.  Respiratory: No cough, shortness of breath, congestion, or wheezing.  Gastrointestinal: No abdominal pain, nausea, vomiting, or diarrhea.  Genitourinary: No dysuria.  Musculoskeletal: No joint swelling.  Neurologic: No headache.    PHYSICAL EXAM:  GENERAL: NAD  EYES: EOMI, PERRLA  NECK: Supple, No JVD  CHEST/LUNG: crackles at bases  HEART:  S1 , S2 +  ABDOMEN: soft , bs+  EXTREMITIES:edema+    NEUROLOGY:waxing wanning mental status      LABS:      135  |  96<L>  |  72<H>  ----------------------------<  129<H>  5.1   |  25  |  7.67<H>    Ca    11.8<H>      26 May 2022 06:12  Phos  6.6       Mg     2.40           Creatinine Trend: 7.67 <--, 7.55 <--, 7.49 <--, 7.57 <--, 7.77 <--, 7.54 <--                        8.8    11.37 )-----------( 310      ( 26 May 2022 06:12 )             27.5     Urine Studies:  Urinalysis Basic - ( 23 May 2022 18:26 )    Color: Light Yellow / Appearance: Clear / S.013 / pH:   Gluc:  / Ketone: Negative  / Bili: Negative / Urobili: <2 mg/dL   Blood:  / Protein: 100 mg/dL / Nitrite: Negative   Leuk Esterase: Small / RBC: 23 /HPF / WBC 9 /HPF   Sq Epi:  / Non Sq Epi: 2 /HPF / Bacteria: Negative      Osmolality, Random Urine: 356 mosm/kg ( @ 18:26)              RADIOLOGY & ADDITIONAL TESTS:    Imaging Personally Reviewed:    Consultant(s) Notes Reviewed:      Care Discussed with Consultants/Other Providers:

## 2022-05-26 NOTE — PROGRESS NOTE ADULT - SUBJECTIVE AND OBJECTIVE BOX
Sriram Riley MD (Nephrology progress note)  [address], [facility],  [id number],  [id number]  TEl: [phone]  Cell: [phone]    Patient is a 89y Female seen and evaluated at bedside. Vital signs, laboratory data, imaging studies, consult notes reviewed done within past 24 hours. Overnight events noted. Patient lying in bed in no distress feels better. Interval stable renal function    Allergies    No Known Allergies    Intolerances        ROS:  CONSTITUTIONAL: No fever or chills.  HEENT: No headache or visual disturbances.  RESPIRATORY: No shortness of breath, cough, hemoptysis or wheezing  CARDIOVASCULAR: Mild SOB but denies chest pain, palpitations, dizziness  GASTROINTESTINAL: No abdominal pain, nausea, vomiting, diarrhea, hematemesis or blood per rectum.  GENITOURINARY: No flank or supra pubic pain  NEUROLOGICAL: No headache, focal limb weakness, tingling or numbness   SKIN: No skin rash or lesion  MUSCULOSKELETAL: No leg pain, calf pain or swelling    VITALS:    T(C): 36.9 (05-26-22 @ 11:42), Max: 36.9 (05-26-22 @ 11:42)  HR: 68 (05-26-22 @ 14:53) (66 - 84)  BP: 120/88 (05-26-22 @ 11:42) (120/88 - 189/75)  RR: 18 (05-26-22 @ 11:42) (18 - 18)  SpO2: 100% (05-26-22 @ 14:53) (90% - 100%)  CAPILLARY BLOOD GLUCOSE          05-25-22 @ 07:01  -  05-26-22 @ 07:00  --------------------------------------------------------  IN: 0 mL / OUT: 1150 mL / NET: -1150 mL      MEDICATIONS  (STANDING):  albuterol/ipratropium for Nebulization 3 milliLiter(s) Nebulizer every 6 hours  buMETAnide Injectable 2 milliGRAM(s) IV Push every 12 hours  ferrous    sulfate Liquid 300 milliGRAM(s) Oral daily  gabapentin 100 milliGRAM(s) Oral two times a day  hydrALAZINE 100 milliGRAM(s) Oral three times a day  metolazone 10 milliGRAM(s) Oral daily  metoprolol succinate  milliGRAM(s) Oral daily  NIFEdipine XL 90 milliGRAM(s) Oral daily  pantoprazole    Tablet 40 milliGRAM(s) Oral before breakfast  sevelamer carbonate 800 milliGRAM(s) Oral three times a day with meals  simvastatin 40 milliGRAM(s) Oral at bedtime  sodium bicarbonate 650 milliGRAM(s) Oral three times a day    MEDICATIONS  (PRN):  acetaminophen     Tablet .. 650 milliGRAM(s) Oral every 6 hours PRN Temp greater or equal to 38C (100.4F), Mild Pain (1 - 3)  aluminum hydroxide/magnesium hydroxide/simethicone Suspension 30 milliLiter(s) Oral every 4 hours PRN Dyspepsia  melatonin 3 milliGRAM(s) Oral at bedtime PRN Insomnia  ondansetron Injectable 4 milliGRAM(s) IV Push every 8 hours PRN Nausea and/or Vomiting      PHYSICAL EXAM:  GENERAL: Alert, awake and oriented x3   HEENT: ONEIL, EOMI, neck supple, no JVP, no carotid bruit, oral mucosa moist and pink.  CHEST/LUNG: Bilateral decreased breath sounds, bibasilar rales and rhonchi, no wheezing  HEART: Regular rate and rhythm, KESHA II/VI at LPSB, no gallops, no rub   ABDOMEN: Soft, nontender, non distended, bowel sounds present, no palpable organomegaly  : No flank or supra pubic tenderness.  EXTREMITIES: Peripheral pulses are palpable, no pedal edema  Neurology: AAOx3, no focal neurological deficit  SKIN: No rash or skin lesion  Musculoskeletal: No joint deformity       Vascular ACCESS: None    LABS:                        8.8    11.37 )-----------( 310      ( 26 May 2022 06:12 )             27.5     05-26    135  |  96<L>  |  72<H>  ----------------------------<  129<H>  5.1   |  25  |  7.67<H>    Ca    11.8<H>      26 May 2022 06:12  Phos  6.6     05-26  Mg     2.40     05-26                  RADIOLOGY & ADDITIONAL STUDIES:  rad< from: Xray Chest 1 View- PORTABLE-Urgent (Xray Chest 1 View- PORTABLE-Urgent .) (05.25.22 @ 15:40) >    ACC: [id number] EXAM:  XR CHEST PORTABLE URGENT 1V                          PROCEDURE DATE:  05/25/2022          INTERPRETATION:  TIME OF EXAM: May 25, 2022 at 3:13 PM    CLINICAL INFORMATION: Increased oxygen demand; dyspnea    TECHNIQUE:   Portablechest    INTERPRETATION:    Low lung volumes with bilateral effusions and perihilar haze more on the   right than the left side consistent with pulmonary edema. Upper lung   fields are clear. Heart size is stable.      COMPARISON:  May 22 with similar changes.      IMPRESSION:  Poor lung expansion with bilateral effusions and indistinct   vasculature consistent with edema.    --- End of Report ---            MILLI IRAHETA MD; Attending Radiologist  This document has been electronically signed. May 26 2022 11:52AM    < end of copied text >    Imaging Personally Reviewed:  [x] YES  [ ] NO    Consultant(s) Notes Reviewed:  [x] YES  [ ] NO    Care Discussed with Primary team/ Other Providers [x] YES  [ ] NO

## 2022-05-26 NOTE — PROGRESS NOTE PEDS - SUBJECTIVE AND OBJECTIVE BOX
Ryan Perez MD  Interventional Cardiology / Advance Heart Failure and Cardiac Transplant Specialist  [place] Office : [phone] [place] [address]  Tel:   [place] Office : [phone] [facility] [place] [address]  Tel: [phone]      Subjective/Overnight events: Pt is lying in bed comfortable not in distress  	  MEDICATIONS:  buMETAnide Injectable 2 milliGRAM(s) IV Push every 12 hours  hydrALAZINE 100 milliGRAM(s) Oral three times a day  metolazone 10 milliGRAM(s) Oral daily  metoprolol succinate  milliGRAM(s) Oral daily  NIFEdipine XL 90 milliGRAM(s) Oral daily      albuterol/ipratropium for Nebulization 3 milliLiter(s) Nebulizer every 6 hours    acetaminophen     Tablet .. 650 milliGRAM(s) Oral every 6 hours PRN  gabapentin 100 milliGRAM(s) Oral two times a day  melatonin 3 milliGRAM(s) Oral at bedtime PRN  ondansetron Injectable 4 milliGRAM(s) IV Push every 8 hours PRN    aluminum hydroxide/magnesium hydroxide/simethicone Suspension 30 milliLiter(s) Oral every 4 hours PRN  pantoprazole    Tablet 40 milliGRAM(s) Oral before breakfast    simvastatin 40 milliGRAM(s) Oral at bedtime    ferrous    sulfate Liquid 300 milliGRAM(s) Oral daily  sodium bicarbonate 650 milliGRAM(s) Oral three times a day      PAST MEDICAL/SURGICAL HISTORY  PAST MEDICAL & SURGICAL HISTORY:  Hypertension      High cholesterol      CKD (chronic kidney disease)      Asthma      CHF (congestive heart failure)  diastolic heart failure      History of hysterectomy          SOCIAL HISTORY: Substance Use (street drugs): ( x ) never used  (  ) other:    FAMILY HISTORY:  No pertinent family history in first degree relatives            PHYSICAL EXAM:  T(C): 36.6 (05-26-22 @ 04:30), Max: 36.8 (05-25-22 @ 21:00)  HR: 67 (05-26-22 @ 07:33) (62 - 84)  BP: 168/72 (05-26-22 @ 04:30) (134/61 - 189/75)  RR: 18 (05-26-22 @ 04:30) (18 - 18)  SpO2: 90% (05-26-22 @ 07:33) (90% - 100%)  Wt(kg): --  I&O's Summary    25 May 2022 07:01  -  26 May 2022 07:00  --------------------------------------------------------  IN: 0 mL / OUT: 1150 mL / NET: -1150 mL        GENERAL: NAD  EYES: EOMI, PERRLA, conjunctiva and sclera clear  ENMT: No tonsillar erythema, exudates, or enlargement  Cardiovascular: Normal S1 S2, No JVD, No murmurs, No edema  Respiratory: Lungs crackles to auscultation	  Gastrointestinal:  Soft, Non-tender, + BS	  Extremities: No edema                                8.8    11.37 )-----------( 310      ( 26 May 2022 06:12 )             27.5     05-26    135  |  96<L>  |  72<H>  ----------------------------<  129<H>  5.1   |  25  |  7.67<H>    Ca    11.8<H>      26 May 2022 06:12  Phos  6.6     05-26  Mg     2.40     05-26      proBNP:   Lipid Profile:   HgA1c:   TSH:     Consultant(s) Notes Reviewed:  [x ] YES  [ ] NO    Care Discussed with Consultants/Other Providers [ x] YES  [ ] NO    Imaging Personally Reviewed independently:  [x] YES  [ ] NO    All labs, radiologic studies, vitals, orders and medications list reviewed. Patient is seen and examined at bedside. Case discussed with medical team.

## 2022-05-26 NOTE — PROGRESS NOTE ADULT - SUBJECTIVE AND OBJECTIVE BOX
Pulmonary Follow Up Note    FRANCIA MARTEL  MRN-[id number]    Chief Complaint: Patient is a 89y old  Female who presents with a chief complaint of Respiratory failure, acute pulmonary edema. (24 May 2022 10:14)      HPI:  resting on nc      MEDICATIONS  (STANDING):  albuterol/ipratropium for Nebulization 3 milliLiter(s) Nebulizer every 6 hours  buMETAnide Injectable 2 milliGRAM(s) IV Push every 12 hours  ferrous    sulfate Liquid 300 milliGRAM(s) Oral daily  gabapentin 100 milliGRAM(s) Oral two times a day  hydrALAZINE 100 milliGRAM(s) Oral three times a day  metolazone 10 milliGRAM(s) Oral daily  metoprolol succinate  milliGRAM(s) Oral daily  NIFEdipine XL 90 milliGRAM(s) Oral daily  pantoprazole    Tablet 40 milliGRAM(s) Oral before breakfast  sevelamer carbonate 800 milliGRAM(s) Oral three times a day with meals  simvastatin 40 milliGRAM(s) Oral at bedtime  sodium bicarbonate 650 milliGRAM(s) Oral three times a day    MEDICATIONS  (PRN):  acetaminophen     Tablet .. 650 milliGRAM(s) Oral every 6 hours PRN Temp greater or equal to 38C (100.4F), Mild Pain (1 - 3)  aluminum hydroxide/magnesium hydroxide/simethicone Suspension 30 milliLiter(s) Oral every 4 hours PRN Dyspepsia  melatonin 3 milliGRAM(s) Oral at bedtime PRN Insomnia  ondansetron Injectable 4 milliGRAM(s) IV Push every 8 hours PRN Nausea and/or Vomiting          EXAM:  Vital Signs Last 24 Hrs  T(C): 36.9 (26 May 2022 11:42), Max: 36.9 (26 May 2022 11:42)  T(F): 98.5 (26 May 2022 11:42), Max: 98.5 (26 May 2022 11:42)  HR: 66 (26 May 2022 11:42) (66 - 84)  BP: 120/88 (26 May 2022 11:42) (120/88 - 189/75)  BP(mean): --  RR: 18 (26 May 2022 11:42) (18 - 18)  SpO2: 100% (26 May 2022 11:42) (90% - 100%)  GENERAL: No acute distress  LUNGS: no wheeze      LABS/IMAGING: reviewed                                   8.8    11.37 )-----------( 310      ( 26 May 2022 06:12 )             27.5   05-26    135  |  96<L>  |  72<H>  ----------------------------<  129<H>  5.1   |  25  |  7.67<H>    Ca    11.8<H>      26 May 2022 06:12  Phos  6.6     05-26  Mg     2.40     05-26          < from: Xray Chest 1 View- PORTABLE-Urgent (Xray Chest 1 View- PORTABLE-Urgent .) (05.22.22 @ 06:21) >    ACC: [id number] EXAM:  XR CHEST PORTABLE URGENT 1V                          PROCEDURE DATE:  05/22/2022          INTERPRETATION:  CLINICAL INFORMATION: Shortness of breath    TECHNIQUE: Frontal radiograph of the chest    COMPARISON: None available.    FINDINGS:    Low lung volumes. There are bilateral small pleural effusions. Prominent   vascular markings. The heart is not well evaluated in this projection. No   pneumothorax. No acute osseous findings    IMPRESSION:    Pulmonary edema with bilateral pleural effusions.    --- End of Report ---          KASHIF BARBOSA MD; Resident Radiologist  This document has been electronically signed.    < end of copied text >      PROBLEM LIST:  89yFemale with HEALTH ISSUES - PROBLEM Dx:  Acute pulmonary edema    Stage 5 chronic kidney disease not on chronic dialysis    Asthma    Metabolic encephalopathy    HLD (hyperlipidemia)    Acute on chronic diastolic congestive heart failure      RECS:  -cont diuresis  -wean O2 as tolerated  -incentive danilo  -no wheeze, prn nebs for asthma which does not appear to be in exacerbation     please feel free to call with any questions [phone]  Radha Gonzalez MD

## 2022-05-26 NOTE — PROGRESS NOTE ADULT - ASSESSMENT
Patient is a 89y F PMHx HTN, CKD, CHF, asthma, presenting today with SOB. BIBEMS on NRB. EMS gave ASA, one sublingual nitro. BP on EMS arrival SBP >200. Patient family at bedside reports patient awoke from sleep with difficulty breathing. 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  2) Anemia of chronic renal disease  3) Renal osteodystrophy  4) Acute on chronic systolic/diastolic CHF with fluid overload  5) Hypercalcemia/Hyperphosphatemia  6) Severe AS    Recommend:  Strict I/o  Palafox's catheter  Avoid Nephrotoxic agents  IV Bumex 2 mg IV q 12 hurly   Continue Metolazone 10 mg po daily  Intact PTH, Vitamin D 1,25 level, PHos, calcium level  reviewed  Hold PO Calcitriol due to hypercalcemia  S/p PRBC tranfusion  Iron studies and Ferritin reviewed  Continue Feosol 300 ml po daily  SQ EPO as per ordered  Low K/Low phos renal diet  Phos binders  No aggressive measure per family and/or HD treatment at present  DNR/DNI status  NO RRT/HD  Optimal medical treatment per primary team  Consider Palliative care consult  Optimal BP control with PO/IV medications with metoprolol ER, Hydralazine, NItrate, Nifedipine XLl  no ACEI/ARB  Will follow

## 2022-05-27 DIAGNOSIS — Z71.89 OTHER SPECIFIED COUNSELING: ICD-10-CM

## 2022-05-27 DIAGNOSIS — Z51.5 ENCOUNTER FOR PALLIATIVE CARE: ICD-10-CM

## 2022-05-27 DIAGNOSIS — R06.00 DYSPNEA, UNSPECIFIED: ICD-10-CM

## 2022-05-27 DIAGNOSIS — R53.81 OTHER MALAISE: ICD-10-CM

## 2022-05-27 LAB
ANION GAP SERPL CALC-SCNC: 11 MMOL/L — SIGNIFICANT CHANGE UP (ref 7–14)
BUN SERPL-MCNC: 73 MG/DL — HIGH (ref 7–23)
CALCIUM SERPL-MCNC: 11.8 MG/DL — HIGH (ref 8.4–10.5)
CHLORIDE SERPL-SCNC: 94 MMOL/L — LOW (ref 98–107)
CO2 SERPL-SCNC: 28 MMOL/L — SIGNIFICANT CHANGE UP (ref 22–31)
CREAT SERPL-MCNC: 7.67 MG/DL — HIGH (ref 0.5–1.3)
EGFR: 5 ML/MIN/1.73M2 — LOW
GLUCOSE SERPL-MCNC: 100 MG/DL — HIGH (ref 70–99)
HCT VFR BLD CALC: 26.8 % — LOW (ref 34.5–45)
HGB BLD-MCNC: 8.5 G/DL — LOW (ref 11.5–15.5)
MAGNESIUM SERPL-MCNC: 2.3 MG/DL — SIGNIFICANT CHANGE UP (ref 1.6–2.6)
MCHC RBC-ENTMCNC: 29.9 PG — SIGNIFICANT CHANGE UP (ref 27–34)
MCHC RBC-ENTMCNC: 31.7 GM/DL — LOW (ref 32–36)
MCV RBC AUTO: 94.4 FL — SIGNIFICANT CHANGE UP (ref 80–100)
NRBC # BLD: 0 /100 WBCS — SIGNIFICANT CHANGE UP
NRBC # FLD: 0.04 K/UL — HIGH
PHOSPHATE SERPL-MCNC: 6.3 MG/DL — HIGH (ref 2.5–4.5)
PLATELET # BLD AUTO: 299 K/UL — SIGNIFICANT CHANGE UP (ref 150–400)
POTASSIUM SERPL-MCNC: 5 MMOL/L — SIGNIFICANT CHANGE UP (ref 3.5–5.3)
POTASSIUM SERPL-SCNC: 5 MMOL/L — SIGNIFICANT CHANGE UP (ref 3.5–5.3)
RBC # BLD: 2.84 M/UL — LOW (ref 3.8–5.2)
RBC # FLD: 15.6 % — HIGH (ref 10.3–14.5)
SODIUM SERPL-SCNC: 133 MMOL/L — LOW (ref 135–145)
WBC # BLD: 10.6 K/UL — HIGH (ref 3.8–10.5)
WBC # FLD AUTO: 10.6 K/UL — HIGH (ref 3.8–10.5)

## 2022-05-27 PROCEDURE — 99232 SBSQ HOSP IP/OBS MODERATE 35: CPT

## 2022-05-27 PROCEDURE — 99223 1ST HOSP IP/OBS HIGH 75: CPT

## 2022-05-27 PROCEDURE — 99497 ADVNCD CARE PLAN 30 MIN: CPT | Mod: 25

## 2022-05-27 RX ADMIN — SIMVASTATIN 40 MILLIGRAM(S): 20 TABLET, FILM COATED ORAL at 21:21

## 2022-05-27 RX ADMIN — SEVELAMER CARBONATE 800 MILLIGRAM(S): 2400 POWDER, FOR SUSPENSION ORAL at 17:17

## 2022-05-27 RX ADMIN — BUMETANIDE 2 MILLIGRAM(S): 0.25 INJECTION INTRAMUSCULAR; INTRAVENOUS at 12:08

## 2022-05-27 RX ADMIN — Medication 650 MILLIGRAM(S): at 21:21

## 2022-05-27 RX ADMIN — Medication 3 MILLILITER(S): at 10:38

## 2022-05-27 RX ADMIN — GABAPENTIN 100 MILLIGRAM(S): 400 CAPSULE ORAL at 17:17

## 2022-05-27 RX ADMIN — Medication 3 MILLILITER(S): at 15:25

## 2022-05-27 RX ADMIN — Medication 100 MILLIGRAM(S): at 05:46

## 2022-05-27 RX ADMIN — SEVELAMER CARBONATE 800 MILLIGRAM(S): 2400 POWDER, FOR SUSPENSION ORAL at 08:56

## 2022-05-27 RX ADMIN — PANTOPRAZOLE SODIUM 40 MILLIGRAM(S): 20 TABLET, DELAYED RELEASE ORAL at 05:47

## 2022-05-27 RX ADMIN — Medication 90 MILLIGRAM(S): at 05:46

## 2022-05-27 RX ADMIN — Medication 100 MILLIGRAM(S): at 21:21

## 2022-05-27 RX ADMIN — Medication 650 MILLIGRAM(S): at 12:08

## 2022-05-27 RX ADMIN — Medication 100 MILLIGRAM(S): at 12:08

## 2022-05-27 RX ADMIN — Medication 650 MILLIGRAM(S): at 05:46

## 2022-05-27 RX ADMIN — Medication 300 MILLIGRAM(S): at 12:08

## 2022-05-27 RX ADMIN — SEVELAMER CARBONATE 800 MILLIGRAM(S): 2400 POWDER, FOR SUSPENSION ORAL at 12:08

## 2022-05-27 RX ADMIN — Medication 3 MILLILITER(S): at 03:09

## 2022-05-27 RX ADMIN — BUMETANIDE 2 MILLIGRAM(S): 0.25 INJECTION INTRAMUSCULAR; INTRAVENOUS at 02:13

## 2022-05-27 RX ADMIN — GABAPENTIN 100 MILLIGRAM(S): 400 CAPSULE ORAL at 05:46

## 2022-05-27 NOTE — CONSULT NOTE ADULT - PROBLEM SELECTOR RECOMMENDATION 4
Thank you for allowing us to participate in your patient's care. Goals are clear. Palliative team signing off. Please page [phone] for any q's or c's.     Angela Zhao D.O.   Palliative Medicine

## 2022-05-27 NOTE — CONSULT NOTE ADULT - SUBJECTIVE AND OBJECTIVE BOX
Pulmonary Consult Note    FRANCIA MARTEL  MRN-[id number]    Chief Complaint: Patient is a 89y old  Female who presents with a chief complaint of Respiratory failure, acute pulmonary edema. (24 May 2022 10:14)      HPI:  per chart review:  89F with PMh of HTN, CKD5, chronic diastolic CHF, asthma, who presents to the hospital with sudden on set of SOB this morning. Collateral primarily from daughter in law who was present at bedside as pt is sleepy/drowsy and unable to provide much information. Pt reportedly in good state of health since last hospitalization @ [facility] a few years ago. Since then, she has not had any major health issues. She reports good follow up. Pt has good adherence to her home meds. Pt went to sleep around 11PM last night and felt fine. Woke up early this morning not feeling well. She told her family, who decided to bring her to the hospital for evaluation. The family reported no recent illnesses, sick contacts, f/c, NV, abd pain, diarrhea, dysuria. Since arrival, pt was placed on BiPAP and given IV lasix which has alleviated her sx. She denied any associated CP. It is unclear if she has made urine since receiving lasix. Remainder of chronic condition is stable - HTN on stable regimen. BP usually 160s-170s. Any lower, she reportedly has dizziness. Asthma reportedly mild, no recent exacerbation. CKD advanced, but per discussion with pt and her family - she does not want to initiate HD.    ED Course: 218/97, 89, 30, 99% 10L NRB. Received IV hydral, labetalol, bumex. Also receive ntg.    -  -on my exam:  -sitting up in bed on nc, says breathing is ok, asking to get up and walk around.    ROS:  -  -  All other systems reviewed and negative    PAST MEDICAL HISTORY: HEALTH ISSUES - PROBLEM Dx:  Acute pulmonary edema    Stage 5 chronic kidney disease not on chronic dialysis    Asthma    Metabolic encephalopathy    HLD (hyperlipidemia)    Acute on chronic diastolic congestive heart failure            SOCIAL HISTORY:     ACTIVE MEDICATION LIST:  MEDICATIONS  (STANDING):  albuterol/ipratropium for Nebulization 3 milliLiter(s) Nebulizer every 6 hours  buMETAnide Injectable 2 milliGRAM(s) IV Push every 12 hours  calcitriol   Capsule 0.5 MICROGram(s) Oral two times a day  gabapentin 100 milliGRAM(s) Oral two times a day  hydrALAZINE 100 milliGRAM(s) Oral three times a day  metoprolol succinate  milliGRAM(s) Oral daily  NIFEdipine XL 90 milliGRAM(s) Oral daily  pantoprazole    Tablet 40 milliGRAM(s) Oral before breakfast  sevelamer carbonate 800 milliGRAM(s) Oral three times a day with meals  simvastatin 40 milliGRAM(s) Oral at bedtime  sodium bicarbonate 650 milliGRAM(s) Oral three times a day    MEDICATIONS  (PRN):  acetaminophen     Tablet .. 650 milliGRAM(s) Oral every 6 hours PRN Temp greater or equal to 38C (100.4F), Mild Pain (1 - 3)  aluminum hydroxide/magnesium hydroxide/simethicone Suspension 30 milliLiter(s) Oral every 4 hours PRN Dyspepsia  melatonin 3 milliGRAM(s) Oral at bedtime PRN Insomnia  ondansetron Injectable 4 milliGRAM(s) IV Push every 8 hours PRN Nausea and/or Vomiting      EXAM:  Vital Signs Last 24 Hrs  T(C): 36.6 (24 May 2022 11:53), Max: 36.6 (24 May 2022 00:50)  T(F): 97.8 (24 May 2022 11:53), Max: 97.8 (24 May 2022 00:50)  HR: 67 (24 May 2022 11:53) (60 - 70)  BP: 143/62 (24 May 2022 11:53) (143/62 - 171/64)  BP(mean): --  RR: 18 (24 May 2022 11:53) (18 - 20)  SpO2: 100% (24 May 2022 11:53) (97% - 100%)  GENERAL: No acute distress  NEURO: Alert   LUNGS:crackles      LABS/IMAGING: reviewed                        7.0    9.03  )-----------( 291      ( 24 May 2022 06:41 )             22.7   05-24    135  |  99  |  65<H>  ----------------------------<  99  5.2   |  23  |  7.57<H>    Ca    10.9<H>      24 May 2022 06:41  Phos  7.0     05-24  Mg     2.30     05-24  < from: Xray Chest 1 View- PORTABLE-Urgent (Xray Chest 1 View- PORTABLE-Urgent .) (05.22.22 @ 06:21) >    ACC: [id number] EXAM:  XR CHEST PORTABLE URGENT 1V                          PROCEDURE DATE:  05/22/2022          INTERPRETATION:  CLINICAL INFORMATION: Shortness of breath    TECHNIQUE: Frontal radiograph of the chest    COMPARISON: None available.    FINDINGS:    Low lung volumes. There are bilateral small pleural effusions. Prominent   vascular markings. The heart is not well evaluated in this projection. No   pneumothorax. No acute osseous findings    IMPRESSION:    Pulmonary edema with bilateral pleural effusions.    --- End of Report ---          KASHIF BARBOSA MD; Resident Radiologist  This document has been electronically signed.    < end of copied text >      PROBLEM LIST:  89yFemale with HEALTH ISSUES - PROBLEM Dx:  Acute pulmonary edema    Stage 5 chronic kidney disease not on chronic dialysis    Asthma    Metabolic encephalopathy    HLD (hyperlipidemia)    Acute on chronic diastolic congestive heart failure      RECS:  -cont diuresis  -wean O2 as tolerated  -incentive danilo  -no wheeze, prn nebs for asthma which does not appear to be in exacerbation    Thank you for this consultation, please feel free to call with any questions [phone]  Radha Gonzalez MD
Sriram Riley MD (Nephrology)  [address], [facility],  [address],  [id number]  TEl: [phone]  Cell: [phone]    Patient is a 89y old  Female who presents with a chief complaint of     HPI:   Patient is a 89y F PMHx HTN, CKD, CHF, asthma, presenting today with SOB. BIBEMS on NRB. EMS gave ASA, one sublingual nitro. BP on EMS arrival SBP >200. Patient family at bedside reports patient awoke from sleep with difficulty breathing. Patient takes nifedipine, metoprolol, hydralazine for BP, Bumex. Not on dialysis. Denies fevers, n/v/d, recent illness, headaches, vision changes, recent known sick contacts, hx AC    PAST MEDICAL & SURGICAL HISTORY:  Hypertension      High cholesterol      CKD (chronic kidney disease)      Asthma      CHF (congestive heart failure)  diastolic heart failure      History of hysterectomy            Allergies:  No Known Allergies      Home Medications:       Hospital Medications:   MEDICATIONS  (STANDING):      SOCIAL HISTORY:  Denies ETOh, Smoking,     Family History:  FAMILY HISTORY:      ROS:  Limited  Drowsy, lethargic, fatigue appearing lying in bed on BIPAP  Unable to follow verbal commands    VITALS:  T(F): 97.9 (05-22-22 @ 08:21), Max: 97.9 (05-22-22 @ 08:21)  HR: 68 (05-22-22 @ 08:21)  BP: 185/79 (05-22-22 @ 08:22)  RR: 12 (05-22-22 @ 08:21)  SpO2: 100% (05-22-22 @ 08:21)  Wt(kg): --    Height (cm): 162.6 (05-22 @ 05:04)  CAPILLARY BLOOD GLUCOSE            PHYSICAL EXAM:  GENERAL: Drowsy, lethargic, fatigue appearing lying in bed on BIPAP  HEENT: ONEIL, EOMI, neck supple, JVP noted  CHEST/LUNG: Bilateral decreased breath sounds, bibasilar rales and crepitations, no wheezing  HEART: Regular rate and rhythm, KESHA II/VI at LPSB, no gallops, no rub   ABDOMEN: Soft, nontender, non distended, bowel sounds present,  : No flank or supra pubic tenderness.  EXTREMITIES: Bilateral pitting pedal edema noted  Musculoskeletal: No joint or spinal tenderness  Neurology: Drowsy, lethargic, fatigue appearing on BIPAP, unable to follow verbal commands  SKIN: No rash or skin lesion    LABS:  05-22    141  |  104  |  66<H>  ----------------------------<  115<H>  5.2   |  19<L>  |  7.54<H>    Ca    10.6<H>      22 May 2022 05:20    TPro  6.4  /  Alb  3.6  /  TBili  0.3  /  DBili      /  AST  13  /  ALT  <5  /  AlkPhos  37<L>  05-22    Creatinine Trend: 7.54 <--                        7.5    9.66  )-----------( 327      ( 22 May 2022 05:20 )             24.1     Urine Studies:        RADIOLOGY & ADDITIONAL STUDIES:  rad< from: Xray Chest 1 View- PORTABLE-Urgent (Xray Chest 1 View- PORTABLE-Urgent .) (05.22.22 @ 06:21) >    ACC: [id number] EXAM:  XR CHEST PORTABLE URGENT 1V                          PROCEDURE DATE:  05/22/2022          INTERPRETATION:  CLINICAL INFORMATION: Shortness of breath    TECHNIQUE: Frontal radiograph of the chest    COMPARISON: None available.    FINDINGS:    Low lung volumes. There are bilateral small pleural effusions. Prominent   vascular markings. The heart is not well evaluated in this projection. No   pneumothorax. No acute osseous findings    IMPRESSION:    Pulmonary edema with bilateral pleural effusions.    --- End of Report ---          KASHIF BARBOSA MD; Resident Radiologist  This document has been electronically signed.  MILLI IRAHETA MD; Attending Radiologist  This document has been electronically signed. May 22 2022  7:24AM    < end of copied text >    EKG findings reviewed.    Imaging Personally Reviewed:  [x] YES  [ ] NO    Consultant(s) and primary physician Notes Reviewed:  [x] YES  [ ] NO    Care Discussed with Primary team/ Consultants/Other Providers [x] YES  [ ] NO          
HPI:  Patient is a 89y F PMHx HTN, CKD, CHF, asthma, presenting today with SOB. BIBEMS on NRB. EMS gave ASA, one sublingual nitro. BP on EMS arrival SBP >200. Patient family at bedside reports patient awoke from sleep with difficulty breathing. Patient takes nifedipine, metoprolol, hydralazine for BP, Bumex. Not on dialysis. Denies fevers, n/v/d, recent illness, headaches, vision changes, recent known sick contacts, hx ACS.    On arrival, patient SBP >200, breathing RR 30 on 10LPM NRB. CXR showing pulmonary edema with bilateral effusions. Placed on biPAP, MICU consulted.       PAST MEDICAL & SURGICAL HISTORY:  Hypertension      High cholesterol      CKD (chronic kidney disease)      Asthma      CHF (congestive heart failure)  diastolic heart failure      History of hysterectomy          FAMILY HISTORY:  No pertinent family history in first degree relatives        SOCIAL HISTORY:  Smoking: __ packs x ___ years  EtOH Use:  Marital Status:  Occupation:  Recent Travel:  Country of Birth:  Advance Directives:    Allergies    No Known Allergies    Intolerances        HOME MEDICATIONS:    REVIEW OF SYSTEMS: As per HPI   Constitutional:   Eyes:  ENT:  CV:  Resp:  GI:  :  MSK:  Integumentary:  Neurological:  Psychiatric:  Endocrine:  Hematologic/Lymphatic:  Allergic/Immunologic:  [ ] All other systems negative  [ ] Unable to assess ROS because ________    OBJECTIVE:  ICU Vital Signs Last 24 Hrs  T(C): 36.3 (22 May 2022 11:56), Max: 36.6 (22 May 2022 08:21)  T(F): 97.3 (22 May 2022 11:56), Max: 97.9 (22 May 2022 08:21)  HR: 67 (22 May 2022 13:37) (64 - 89)  BP: 200/80 (22 May 2022 13:37) (182/66 - 242/77)  BP(mean): 120 (22 May 2022 06:50) (119 - 121)  ABP: --  ABP(mean): --  RR: 13 (22 May 2022 13:37) (12 - 30)  SpO2: 100% (22 May 2022 13:37) (99% - 100%)        CAPILLARY BLOOD GLUCOSE          PHYSICAL EXAM:  General: NAD, tired, on biPAP, arousable   HEENT: dry mucous membranes, EOMI, PERRLA  Respiratory: crackles in anterior lung fields  Cardiovascular: s1s2 RRR, no MRG  Abdomen: soft, NTND, +BS  Extremities: no pitting edema  Neurological: AOx1  Psychiatry: normal affect    HOSPITAL MEDICATIONS:  MEDICATIONS  (STANDING):  albuterol/ipratropium for Nebulization 3 milliLiter(s) Nebulizer every 6 hours  buMETAnide Injectable 2 milliGRAM(s) IV Push every 12 hours  calcitriol   Capsule 0.5 MICROGram(s) Oral two times a day  gabapentin 100 milliGRAM(s) Oral two times a day  hydrALAZINE 100 milliGRAM(s) Oral three times a day  metoprolol succinate  milliGRAM(s) Oral daily  NIFEdipine XL 90 milliGRAM(s) Oral daily  pantoprazole    Tablet 40 milliGRAM(s) Oral before breakfast  simvastatin 40 milliGRAM(s) Oral at bedtime  sodium bicarbonate 650 milliGRAM(s) Oral three times a day    MEDICATIONS  (PRN):  acetaminophen     Tablet .. 650 milliGRAM(s) Oral every 6 hours PRN Temp greater or equal to 38C (100.4F), Mild Pain (1 - 3)  aluminum hydroxide/magnesium hydroxide/simethicone Suspension 30 milliLiter(s) Oral every 4 hours PRN Dyspepsia  melatonin 3 milliGRAM(s) Oral at bedtime PRN Insomnia  ondansetron Injectable 4 milliGRAM(s) IV Push every 8 hours PRN Nausea and/or Vomiting      LABS:                        7.5    9.66  )-----------( 327      ( 22 May 2022 05:20 )             24.1     05-22    141  |  104  |  66<H>  ----------------------------<  115<H>  5.2   |  19<L>  |  7.54<H>    Ca    10.6<H>      22 May 2022 05:20    TPro  6.4  /  Alb  3.6  /  TBili  0.3  /  DBili  x   /  AST  13  /  ALT  <5  /  AlkPhos  37<L>  05-22    PT/INR - ( 22 May 2022 05:20 )   PT: 11.8 sec;   INR: 1.02 ratio         PTT - ( 22 May 2022 05:20 )  PTT:31.8 sec      Venous Blood Gas:  05-22 @ 05:20  7.32/47/49/24/79.0  VBG Lactate: 0.8      MICROBIOLOGY:     RADIOLOGY:  [ ] Reviewed and interpreted by me    EKG:
Ryan Perez MD  Interventional Cardiology / Advance Heart Failure and Cardiac Transplant Specialist  [place] Office : [address] [place] [address] [place] [address]  Tel:   [place] Office : [address] [facility] [place] [address]  Tel: [phone]      HISTORY OF PRESENTING ILLNESS:  89F with PMh of HTN, CKD5, chronic diastolic CHF, asthma, who presents to the hospital with sudden on set of SOB this morning. CKD advanced, but per discussion with pt and her family - she does not want to initiate HD. Admitted with pulm edema currently on IV bumex and supplemental oxygen resting comfortably in bed. Cardiology consulted for history of CHF.   	   MEDICATIONS:  buMETAnide Injectable 2 milliGRAM(s) IV Push every 12 hours  buMETAnide Injectable 2 milliGRAM(s) IV Push once  hydrALAZINE 100 milliGRAM(s) Oral three times a day  metolazone 10 milliGRAM(s) Oral daily  metoprolol succinate  milliGRAM(s) Oral daily  NIFEdipine XL 90 milliGRAM(s) Oral daily      albuterol/ipratropium for Nebulization 3 milliLiter(s) Nebulizer every 6 hours    acetaminophen     Tablet .. 650 milliGRAM(s) Oral every 6 hours PRN  gabapentin 100 milliGRAM(s) Oral two times a day  melatonin 3 milliGRAM(s) Oral at bedtime PRN  ondansetron Injectable 4 milliGRAM(s) IV Push every 8 hours PRN    aluminum hydroxide/magnesium hydroxide/simethicone Suspension 30 milliLiter(s) Oral every 4 hours PRN  pantoprazole    Tablet 40 milliGRAM(s) Oral before breakfast    simvastatin 40 milliGRAM(s) Oral at bedtime    calcitriol   Capsule 0.5 MICROGram(s) Oral daily  ferrous    sulfate Liquid 300 milliGRAM(s) Oral daily  sodium bicarbonate 650 milliGRAM(s) Oral three times a day      PAST MEDICAL/SURGICAL HISTORY  PAST MEDICAL & SURGICAL HISTORY:  Hypertension      High cholesterol      CKD (chronic kidney disease)      Asthma      CHF (congestive heart failure)  diastolic heart failure      History of hysterectomy          SOCIAL HISTORY: Substance Use (street drugs): ( x ) never used  (  ) other:    FAMILY HISTORY:  No pertinent family history in first degree relatives        REVIEW OF SYSTEMS:  CONSTITUTIONAL: No fever, weight loss, or fatigue  EYES: No eye pain, visual disturbances, or discharge  ENMT:  No difficulty hearing, tinnitus, vertigo; No sinus or throat pain  BREASTS: No pain, masses, or nipple discharge  GASTROINTESTINAL: No abdominal or epigastric pain. No nausea, vomiting, or hematemesis; No diarrhea or constipation. No melena or hematochezia.  GENITOURINARY: No dysuria, frequency, hematuria, or incontinence  NEUROLOGICAL: No headaches, memory loss, loss of strength, numbness, or tremors  ENDOCRINE: No heat or cold intolerance; No hair loss  MUSCULOSKELETAL: No joint pain or swelling; No muscle, back, or extremity pain  PSYCHIATRIC: No depression, anxiety, mood swings, or difficulty sleeping  HEME/LYMPH: No easy bruising, or bleeding gums  All others negative    PHYSICAL EXAM:  T(C): 36.6 (05-24-22 @ 14:30), Max: 36.6 (05-24-22 @ 00:50)  HR: 66 (05-24-22 @ 14:30) (60 - 70)  BP: 157/50 (05-24-22 @ 14:30) (143/62 - 163/52)  RR: 18 (05-24-22 @ 14:30) (18 - 20)  SpO2: 100% (05-24-22 @ 14:30) (97% - 100%)  Wt(kg): --  I&O's Summary    23 May 2022 07:01  -  24 May 2022 07:00  --------------------------------------------------------  IN: 0 mL / OUT: 450 mL / NET: -450 mL          GENERAL: NAD  EYES: EOMI, PERRLA, conjunctiva and sclera clear  ENMT: No tonsillar erythema, exudates, or enlargement  Cardiovascular: Normal S1 S2, No JVD, No murmurs, No edema  Respiratory: Lungs crackles to auscultation	  Gastrointestinal:  Soft, Non-tender, + BS	  Extremities: No edema                                      6.8    9.12  )-----------( 287      ( 24 May 2022 13:43 )             21.3     05-24    135  |  99  |  65<H>  ----------------------------<  114<H>  5.3   |  24  |  7.49<H>    Ca    10.9<H>      24 May 2022 13:43  Phos  6.9     05-24  Mg     2.30     05-24      proBNP:   Lipid Profile:   HgA1c:   TSH:     Consultant(s) Notes Reviewed:  [x ] YES  [ ] NO    Care Discussed with Consultants/Other Providers [ x] YES  [ ] NO    Imaging Personally Reviewed independently:  [x] YES  [ ] NO    All labs, radiologic studies, vitals, orders and medications list reviewed. Patient is seen and examined at bedside. Case discussed with medical team.        
[facility]  Angela Zhao, Palliative Care Attending  Contact Info: Page [phone] (including Nights/Weekends), message on Microsoft Teams (Angela Zhao), or leave VM at Palliative Office [phone] (non-urgent)     HPI:  89F with PMh of HTN, CKD5, chronic diastolic CHF, asthma, who presents to the hospital with sudden on set of SOB this morning. Collateral primarily from daughter in law who was present at bedside as pt is sleepy/drowsy and unable to provide much information. Pt reportedly in good state of health since last hospitalization @ [facility] a few years ago. Since then, she has not had any major health issues. She reports good follow up. Pt has good adherence to her home meds. Pt went to sleep around 11PM last night and felt fine. Woke up early this morning not feeling well. She told her family, who decided to bring her to the hospital for evaluation. The family reported no recent illnesses, sick contacts, f/c, NV, abd pain, diarrhea, dysuria. Since arrival, pt was placed on BiPAP and given IV lasix which has alleviated her sx. She denied any associated CP. It is unclear if she has made urine since receiving lasix. Remainder of chronic condition is stable - HTN on stable regimen. BP usually 160s-170s. Any lower, she reportedly has dizziness. Asthma reportedly mild, no recent exacerbation. CKD advanced, but per discussion with pt and her family - she does not want to initiate HD.    ED Course: 218/97, 89, 30, 99% 10L NRB. Received IV hydral, labetalol, bumex. Also receive ntg.     (22 May 2022 12:35)    PERTINENT PM/SXH:   Hypertension    High cholesterol    CKD (chronic kidney disease)    Asthma    CHF (congestive heart failure)      History of hysterectomy      FAMILY HISTORY:  No pertinent family history in first degree relatives      ITEMS NOT CHECKED ARE NOT PRESENT    SOCIAL HISTORY:   Significant other/partner[ ]  Children[ x]  Anglican/Spirituality:  Substance hx:  [ ]   Tobacco hx:  [ ]   Alcohol hx: [ ]   Home Opioid hx:  NONE [ ] I-Stop Reference No: [id number]  Living Situation: [ x]Home  [ ]Long term care  [ ]Rehab [ ]Other    ADVANCE DIRECTIVES:    DNR  MOLST  [ x]  Living Will  [ ]   DECISION MAKER(s): Family- Sohini (daughter) and Rolando (son)   [ ] Health Care Proxy(s)  [ ] Surrogate(s)  [ ] Guardian           Name(s): Suzanna Villagomez (daughter) Phone Number(s): [phone]    BASELINE (I)ADL(s) (prior to admission): daughter and granddaughter help care for patient. Pt's family members are CDPAP.   [place]: [ ]Total  [x ] Moderate [ ]Dependent    Allergies    No Known Allergies    Intolerances    MEDICATIONS  (STANDING):  albuterol/ipratropium for Nebulization 3 milliLiter(s) Nebulizer every 6 hours  buMETAnide Injectable 2 milliGRAM(s) IV Push every 12 hours  ferrous    sulfate Liquid 300 milliGRAM(s) Oral daily  gabapentin 100 milliGRAM(s) Oral two times a day  hydrALAZINE 100 milliGRAM(s) Oral three times a day  metolazone 10 milliGRAM(s) Oral daily  metoprolol succinate  milliGRAM(s) Oral daily  NIFEdipine XL 90 milliGRAM(s) Oral daily  pantoprazole    Tablet 40 milliGRAM(s) Oral before breakfast  sevelamer carbonate 800 milliGRAM(s) Oral three times a day with meals  simvastatin 40 milliGRAM(s) Oral at bedtime  sodium bicarbonate 650 milliGRAM(s) Oral three times a day    MEDICATIONS  (PRN):  acetaminophen     Tablet .. 650 milliGRAM(s) Oral every 6 hours PRN Temp greater or equal to 38C (100.4F), Mild Pain (1 - 3)  aluminum hydroxide/magnesium hydroxide/simethicone Suspension 30 milliLiter(s) Oral every 4 hours PRN Dyspepsia  melatonin 3 milliGRAM(s) Oral at bedtime PRN Insomnia  ondansetron Injectable 4 milliGRAM(s) IV Push every 8 hours PRN Nausea and/or Vomiting    PRESENT SYMPTOMS: [ ]Unable to obtain due to poor mentation   Source if other than patient:  [ ]Family   [ ]Team     Pain: [ ]yes [ ]no- See PAIN AD score   QOL impact -   Location -                    Aggravating factors -  Quality -  Radiation -  Timing-  Severity (0-10 scale):  Minimal acceptable level (0-10 scale):     PAIN AD Score: 0    [email or web address]/[email or web address] (press ctrl +  left click to view)    Dyspnea:                           [ ]Mild [ ]Moderate [ ]Severe  Anxiety:                             [ ]Mild [ ]Moderate [ ]Severe  Fatigue:                             [ ]Mild [ ]Moderate [ ]Severe  Nausea:                             [ ]Mild [ ]Moderate [ ]Severe  Loss of appetite:              [ ]Mild [ ]Moderate [ ]Severe  Constipation:                    [ ]Mild [ ]Moderate [ ]Severe    Other Symptoms:  [ ]All other review of systems negative     Palliative Performance Status Version 2:      40   %    [email or web address]/[email or web address]_[email or web address]  PHYSICAL EXAM:  Vital Signs Last 24 Hrs  T(C): 36.7 (27 May 2022 11:51), Max: 36.8 (27 May 2022 05:42)  T(F): 98.1 (27 May 2022 11:51), Max: 98.2 (27 May 2022 05:42)  HR: 73 (27 May 2022 15:25) (65 - 77)  BP: 154/64 (27 May 2022 11:51) (154/64 - 191/68)  BP(mean): --  RR: 18 (27 May 2022 11:51) (18 - 18)  SpO2: 99% (27 May 2022 15:25) (96% - 100%) I&O's Summary    26 May 2022 07:01  -  27 May 2022 07:00  --------------------------------------------------------  IN: 200 mL / OUT: 1250 mL / NET: -1050 mL      GENERAL:   [ ]Alert  [ ]Oriented x   [ x]Lethargic  [ ]Cachexia  [ ]Unarousable  [ ]Verbal  [ ]Non-Verbal  Behavioral:   [ ] Anxiety  [ ] Delirium [ ] Agitation [x ] Other  HEENT: nasal cannula in place   [ ]Normal   [x ]Dry mouth   [ ]ET Tube/Trach  [ ]Oral lesions  PULMONARY:   [ x]Clear [ ]Tachypnea  [ ]Audible excessive secretions   [ ]Rhonchi        [ ]Right [ ]Left [ ]Bilateral  [ ]Crackles        [ ]Right [ ]Left [ ]Bilateral  [ ]Wheezing     [ ]Right [ ]Left [ ]Bilateral   [ ]Diminished breath sounds [ ]right [ ]left [ ]bilateral  CARDIOVASCULAR:    [x ]Regular [ ]Irregular [ ]Tachy  [ ]Del [ ]Murmur [ ]Other  GASTROINTESTINAL:  [ ]Soft  [x ]Distended   [ ]+BS  [ ]Non tender [ ]Tender  [ ]PEG [ ]OGT/ NGT  Last BM: 5/25  GENITOURINARY: urinary retention   [ ]Normal [ ] Incontinent   [ ]Oliguria/Anuria   [ x]Palafox  MUSCULOSKELETAL:   [ ]Normal   [ x]Weakness  [ ]Bed/Wheelchair bound [ ]Edema  NEUROLOGIC:   [ ]No focal deficits  [ ]Cognitive impairment  [ ]Dysphagia [ ]Dysarthria [ ]Paresis [ x]Other   SKIN: please see flowsheets   [ x]Normal    [ ]Rash  [ ]Pressure ulcer(s)       Present on admission [ ]y [ ]n    CRITICAL CARE:  [ ] Shock Present  [ ]Septic [ ]Cardiogenic [ ]Neurologic [ ]Hypovolemic  [ ]  Vasopressors [ ]  Inotropes   [ ]Respiratory failure present [ ]Mechanical ventilation [ ]Non-invasive ventilatory support [ ]High flow  [ ]Acute  [ ]Chronic [ ]Hypoxic  [ ]Hypercarbic [ ]Other  [ ]Other organ failure     LABS:                        8.5    10.60 )-----------( 299      ( 27 May 2022 07:40 )             26.8   05-27    133<L>  |  94<L>  |  73<H>  ----------------------------<  100<H>  5.0   |  28  |  7.67<H>    Ca    11.8<H>      27 May 2022 07:40  Phos  6.3     05-27  Mg     2.30     05-27      RADIOLOGY & ADDITIONAL STUDIES: < from: Xray Chest 1 View- PORTABLE-Urgent (Xray Chest 1 View- PORTABLE-Urgent .) (05.25.22 @ 15:40) >  IMPRESSION:  Poor lung expansion with bilateral effusions and indistinct   vasculature consistent with edema.    < end of copied text >      PROTEIN CALORIE MALNUTRITION PRESENT: [ ]mild [ ]moderate [ ]severe [ ]underweight [ ]morbid obesity  https://www.andeal.org/vault/2810/web/files/ONC/Table_Clinical%20Characteristics%20to%20Document%20Malnutrition-White%20JV%20et%20al%202012.pdf    Height (cm): 162.6 (05-22-22 @ 05:04)  Weight (kg): 54 (05-22-22 @ 18:06)  BMI (kg/m2): 20.4 (05-22-22 @ 18:06)    [x ]PPSV2 < or = to 30% [ ]significant weight loss  [ ]poor nutritional intake  [ ]anasarca      [ ]Artificial Nutrition      REFERRALS:   [ ]Chaplaincy  [x ]Hospice  [ ]Child Life  [ ]Social Work  [ ]Case management [ ]Holistic Therapy

## 2022-05-27 NOTE — CONSULT NOTE ADULT - PROBLEM SELECTOR RECOMMENDATION 3
Patient is DNR/DNI, MOLST completed prior to palliative consult.   Please see separate GOC note for discussions regarding hospice recommendation. Family amenable to hospice referral.

## 2022-05-27 NOTE — PROGRESS NOTE ADULT - SUBJECTIVE AND OBJECTIVE BOX
Ryan Perez MD  Interventional Cardiology / Advance Heart Failure and Cardiac Transplant Specialist  [place] Office : [phone] [place] [address]  Tel:   [place] Office : [address] [facility] [place] [address]  Tel: [phone]       Pt is lying in bed comfortable not in distress, no chest pains some SOB no palpitations  	  MEDICATIONS:  buMETAnide Injectable 2 milliGRAM(s) IV Push every 12 hours  hydrALAZINE 100 milliGRAM(s) Oral three times a day  metolazone 10 milliGRAM(s) Oral daily  metoprolol succinate  milliGRAM(s) Oral daily  NIFEdipine XL 90 milliGRAM(s) Oral daily      albuterol/ipratropium for Nebulization 3 milliLiter(s) Nebulizer every 6 hours    acetaminophen     Tablet .. 650 milliGRAM(s) Oral every 6 hours PRN  gabapentin 100 milliGRAM(s) Oral two times a day  melatonin 3 milliGRAM(s) Oral at bedtime PRN  ondansetron Injectable 4 milliGRAM(s) IV Push every 8 hours PRN    aluminum hydroxide/magnesium hydroxide/simethicone Suspension 30 milliLiter(s) Oral every 4 hours PRN  pantoprazole    Tablet 40 milliGRAM(s) Oral before breakfast    simvastatin 40 milliGRAM(s) Oral at bedtime    ferrous    sulfate Liquid 300 milliGRAM(s) Oral daily  sodium bicarbonate 650 milliGRAM(s) Oral three times a day      PAST MEDICAL/SURGICAL HISTORY  PAST MEDICAL & SURGICAL HISTORY:  Hypertension      High cholesterol      CKD (chronic kidney disease)      Asthma      CHF (congestive heart failure)  diastolic heart failure      History of hysterectomy          SOCIAL HISTORY: Substance Use (street drugs): ( x ) never used  (  ) other:    FAMILY HISTORY:  No pertinent family history in first degree relatives         PHYSICAL EXAM:  T(C): 36.9 (05-27-22 @ 21:14), Max: 36.9 (05-27-22 @ 21:14)  HR: 78 (05-27-22 @ 21:14) (65 - 78)  BP: 185/78 (05-27-22 @ 21:14) (154/64 - 191/68)  RR: 18 (05-27-22 @ 21:14) (18 - 18)  SpO2: 100% (05-27-22 @ 21:14) (96% - 100%)  Wt(kg): --  I&O's Summary    26 May 2022 07:01  -  27 May 2022 07:00  --------------------------------------------------------  IN: 200 mL / OUT: 1250 mL / NET: -1050 mL             EYES:   PERRLA   ENMT:   Moist mucous membranes, Good dentition, No lesions  Cardiovascular: Normal S1 S2, No JVD, 2/6 systolic murmurs, No edema  Respiratory: b/l rhonchi   Gastrointestinal:  Soft, Non-tender, + BS	  Extremities: no edema                                    8.5    10.60 )-----------( 299      ( 27 May 2022 07:40 )             26.8     05-27    133<L>  |  94<L>  |  73<H>  ----------------------------<  100<H>  5.0   |  28  |  7.67<H>    Ca    11.8<H>      27 May 2022 07:40  Phos  6.3     05-27  Mg     2.30     05-27      proBNP:   Lipid Profile:   HgA1c:   TSH:     Consultant(s) Notes Reviewed:  [x ] YES  [ ] NO    Care Discussed with Consultants/Other Providers [ x] YES  [ ] NO    Imaging Personally Reviewed independently:  [x] YES  [ ] NO    All labs, radiologic studies, vitals, orders and medications list reviewed. Patient is seen and examined at bedside. Case discussed with medical team.

## 2022-05-27 NOTE — GOALS OF CARE CONVERSATION - ADVANCED CARE PLANNING - CONVERSATION DETAILS
Referral to palliative care for complex decision making and symptom management in setting of advanced illness (ESRD, CHF, pulmonary edema). Met with Suzanna (daughter) and her  at bedside.     Re-affirmed patient's wishes to be a DNR/DNI. Suzanna shared that the patient had previously instructed her family regarding her wishes for advance directives. Introduced the philosophy of hospice services to continue to elevate patient's quality of life and avoid re-hospitalizations. Referral to palliative care for complex decision making and symptom management in setting of advanced illness (ESRD, CHF, pulmonary edema). Met with Suzanna (daughter) and her  at bedside.     Re-affirmed patient's wishes to be a DNR/DNI. Suzanna shared that the patient had previously instructed her family regarding her wishes for advance directives. Family share that their mother would not want invasive interventions including CPR, Mechanical ventilation and hemodialysis.  Introduced the philosophy of hospice care and the services provided to continue to elevate patient's quality of life and avoid re-hospitalizations. Family were amenable to a hospice referral and in agreement to referral to hospice care network.  Unit CM aware and will submit referral via [facility].

## 2022-05-27 NOTE — PROGRESS NOTE ADULT - SUBJECTIVE AND OBJECTIVE BOX
Sriram Riley MD (Nephrology progress note)  [address] [facility],  [id number],  [id number]  TEl: [phone]  Cell: [phone]    Patient is a 89y Female seen and evaluated at bedside. Vital signs, laboratory data, imaging studies, consult notes reviewed done within past 24 hours. Overnight events noted. Patient lying in bed in no distress. Still remains with SOB. Interval elevated renal function with stable electrolytes with K level 5    Allergies    No Known Allergies    Intolerances        ROS:  Limited  Alert and awake in no distress    VITALS:    T(C): 36.9 (05-27-22 @ 21:14), Max: 36.9 (05-27-22 @ 21:14)  HR: 78 (05-27-22 @ 21:14) (65 - 78)  BP: 185/78 (05-27-22 @ 21:14) (154/64 - 191/68)  RR: 18 (05-27-22 @ 21:14) (18 - 18)  SpO2: 100% (05-27-22 @ 21:14) (96% - 100%)  CAPILLARY BLOOD GLUCOSE          05-26-22 @ 07:01  -  05-27-22 @ 07:00  --------------------------------------------------------  IN: 200 mL / OUT: 1250 mL / NET: -1050 mL      MEDICATIONS  (STANDING):  albuterol/ipratropium for Nebulization 3 milliLiter(s) Nebulizer every 6 hours  buMETAnide Injectable 2 milliGRAM(s) IV Push every 12 hours  ferrous    sulfate Liquid 300 milliGRAM(s) Oral daily  gabapentin 100 milliGRAM(s) Oral two times a day  hydrALAZINE 100 milliGRAM(s) Oral three times a day  metolazone 10 milliGRAM(s) Oral daily  metoprolol succinate  milliGRAM(s) Oral daily  NIFEdipine XL 90 milliGRAM(s) Oral daily  pantoprazole    Tablet 40 milliGRAM(s) Oral before breakfast  sevelamer carbonate 800 milliGRAM(s) Oral three times a day with meals  simvastatin 40 milliGRAM(s) Oral at bedtime  sodium bicarbonate 650 milliGRAM(s) Oral three times a day    MEDICATIONS  (PRN):  acetaminophen     Tablet .. 650 milliGRAM(s) Oral every 6 hours PRN Temp greater or equal to 38C (100.4F), Mild Pain (1 - 3)  aluminum hydroxide/magnesium hydroxide/simethicone Suspension 30 milliLiter(s) Oral every 4 hours PRN Dyspepsia  melatonin 3 milliGRAM(s) Oral at bedtime PRN Insomnia  ondansetron Injectable 4 milliGRAM(s) IV Push every 8 hours PRN Nausea and/or Vomiting      PHYSICAL EXAM:  GENERAL: Alert, awake and oriented x2 in no distress  HEENT: ONEIL, EOMI, neck supple, mild JVP  CHEST/LUNG: Bilateral decreased breath sounds, bibasilar rales and crepitation, no wheezing  HEART: Regular rate and rhythm, KESHA II/VI at LPSB, no gallops, no rub   ABDOMEN: Soft, nontender, non distended, bowel sounds present  : No flank or supra pubic tenderness.  EXTREMITIES: Peripheral pulses are palpable, trace pedal edema  Neurology: AAOx2, no focal neurological deficit  SKIN: No rash or skin lesion  Musculoskeletal: No joint deformity       Vascular ACCESS:     LABS:                        8.5    10.60 )-----------( 299      ( 27 May 2022 07:40 )             26.8     05-27    133<L>  |  94<L>  |  73<H>  ----------------------------<  100<H>  5.0   |  28  |  7.67<H>    Ca    11.8<H>      27 May 2022 07:40  Phos  6.3     05-27  Mg     2.30     05-27                  RADIOLOGY & ADDITIONAL STUDIES:  rad  Imaging Personally Reviewed:  [x] YES  [ ] NO    Consultant(s) Notes Reviewed:  [x] YES  [ ] NO    Care Discussed with Primary team/ Other Providers [x] YES  [ ] NO

## 2022-05-27 NOTE — CONSULT NOTE ADULT - REASON FOR ADMISSION
Respiratory failure, acute pulmonary edema.
AMS

## 2022-05-27 NOTE — PROGRESS NOTE ADULT - SUBJECTIVE AND OBJECTIVE BOX
MARTEL SAADFIORDALIZA  89y  Female      Patient is a 89y old  Female who presents with a chief complaint of Respiratory failure, acute pulmonary edema. (27 May 2022 15:36)  Patient was seen and examined.chart reviewed.consults noted.comfortable,nad,chronic intermittent dyspnea,no cough,no fever    REVIEW OF SYSTEMS:  CONSTITUTIONAL: No fever  RESPIRATORY: No cough, hemoptysis or shortness of breath  CARDIOVASCULAR: No chest pain, palpitations, dizziness, or leg swelling  GASTROINTESTINAL: No abdominal pain. nausea, vomiting, hematemesis  INTERVAL HPI/OVERNIGHT EVENTS:  T(C): 36.7 (05-27-22 @ 11:51), Max: 36.8 (05-27-22 @ 05:42)  HR: 73 (05-27-22 @ 15:25) (65 - 77)  BP: 154/64 (05-27-22 @ 11:51) (154/64 - 191/68)  RR: 18 (05-27-22 @ 11:51) (18 - 18)  SpO2: 99% (05-27-22 @ 15:25) (96% - 100%)  Wt(kg): --  I&O's Summary    26 May 2022 07:01  -  27 May 2022 07:00  --------------------------------------------------------  IN: 200 mL / OUT: 1250 mL / NET: -1050 mL      T(C): 36.7 (05-27-22 @ 11:51), Max: 36.8 (05-27-22 @ 05:42)  HR: 73 (05-27-22 @ 15:25) (65 - 77)  BP: 154/64 (05-27-22 @ 11:51) (154/64 - 191/68)  RR: 18 (05-27-22 @ 11:51) (18 - 18)  SpO2: 99% (05-27-22 @ 15:25) (96% - 100%)  Wt(kg): --Vital Signs Last 24 Hrs  T(C): 36.7 (27 May 2022 11:51), Max: 36.8 (27 May 2022 05:42)  T(F): 98.1 (27 May 2022 11:51), Max: 98.2 (27 May 2022 05:42)  HR: 73 (27 May 2022 15:25) (65 - 77)  BP: 154/64 (27 May 2022 11:51) (154/64 - 191/68)  BP(mean): --  RR: 18 (27 May 2022 11:51) (18 - 18)  SpO2: 99% (27 May 2022 15:25) (96% - 100%)    LABS:                        8.5    10.60 )-----------( 299      ( 27 May 2022 07:40 )             26.8     05-27    133<L>  |  94<L>  |  73<H>  ----------------------------<  100<H>  5.0   |  28  |  7.67<H>    Ca    11.8<H>      27 May 2022 07:40  Phos  6.3     05-27  Mg     2.30     05-27          CAPILLARY BLOOD GLUCOSE                PAST MEDICAL & SURGICAL HISTORY:  Hypertension      High cholesterol      CKD (chronic kidney disease)      Asthma      CHF (congestive heart failure)  diastolic heart failure      History of hysterectomy          MEDICATIONS  (STANDING):  albuterol/ipratropium for Nebulization 3 milliLiter(s) Nebulizer every 6 hours  buMETAnide Injectable 2 milliGRAM(s) IV Push every 12 hours  ferrous    sulfate Liquid 300 milliGRAM(s) Oral daily  gabapentin 100 milliGRAM(s) Oral two times a day  hydrALAZINE 100 milliGRAM(s) Oral three times a day  metolazone 10 milliGRAM(s) Oral daily  metoprolol succinate  milliGRAM(s) Oral daily  NIFEdipine XL 90 milliGRAM(s) Oral daily  pantoprazole    Tablet 40 milliGRAM(s) Oral before breakfast  sevelamer carbonate 800 milliGRAM(s) Oral three times a day with meals  simvastatin 40 milliGRAM(s) Oral at bedtime  sodium bicarbonate 650 milliGRAM(s) Oral three times a day    MEDICATIONS  (PRN):  acetaminophen     Tablet .. 650 milliGRAM(s) Oral every 6 hours PRN Temp greater or equal to 38C (100.4F), Mild Pain (1 - 3)  aluminum hydroxide/magnesium hydroxide/simethicone Suspension 30 milliLiter(s) Oral every 4 hours PRN Dyspepsia  melatonin 3 milliGRAM(s) Oral at bedtime PRN Insomnia  ondansetron Injectable 4 milliGRAM(s) IV Push every 8 hours PRN Nausea and/or Vomiting        RADIOLOGY & ADDITIONAL TESTS:    Imaging Personally Reviewed:  [ ] YES  [ ] NO    Consultant(s) Notes Reviewed:  [ x] YES  [ ] NO    PHYSICAL EXAM:  GENERAL: Alert and awake lying in bed in no distress  HEAD:  Atraumatic, Normocephalic  EYES: EOMI, ONEIL, conjunctiva and sclera clear  NECK: Supple, No JVD, Normal thyroid  NERVOUS SYSTEM:  Alert & Oriented X3, Motor and sensory systems are intact,   CHEST/LUNG: Bilateral clear breath sounds, no rhochi, no wheezing, no crepitations,  HEART: Regular rate and rhythm; No murmurs, rubs, or gallops  ABDOMEN: Soft, Nontender, Nondistended; Bowel sounds present  EXTREMITIES:   Peripheral Pulses are palpable, no  edema        Care Discussed with Consultants/Other Providers [ ] YES  [ ] NO      Code Status: [] Full Code [] DNR [] DNI [] Goals of Care:   Disposition: [] ICU [] Stroke Unit [] RCU []PCU []Floor [] Discharge Home         SAIRA CelestinP

## 2022-05-27 NOTE — CONSULT NOTE ADULT - ASSESSMENT
89F PMH HTN, CKD, CHF, asthma, presenting today with flash pulmonary edema in the setting of HTN emergency.     #HTN emergency  -possibly 2/2 resistance to home meds (patient compliant per family), given IV bumex and hydral with response  -target no more than 25% reduction over the next 24 hours to avoid stroke    #Pulmonary edema  -continue with biPAP for work of breathing and reduction of R-sided filling pressures, transition to room air as able (patient's baseline)  -follow-up infectious workup as indicated    #GOC  -patients son and daughter-in-law at bedside, with MOLST form on hand indicating DNR/DNI. Outpatient nephrologist also present. Per son, goal is leaning towards comfort versus other aggressive measures, including ICU admission    Not a MICU candidate at this time. 
 Patient is a 89y F PMHx HTN, CKD, CHF, asthma, presenting today with SOB. BIBEMS on NRB. EMS gave ASA, one sublingual nitro. BP on EMS arrival SBP >200. Patient family at bedside reports patient awoke from sleep with difficulty breathing. Nephrology consult called for CKD/Fluid overload      Assessment:  1) Altered mental status with hypoxic respiratory failure likely metabolic encephalopathy  2) Acute fluid overload  3) Advanced CKD stage V with fluid overload not on RRT  4) Anemia of chronic renal disease  5) Renal osteodystrphy  6) Acute on chronic diastolic CHF with fluid overload    Recommend:  Strict I/o  Palafox's catheter  Avoid Nephrotoxic agents  IV Lasix/Bumex 2 mg IV q 12 hrly  Comfort measures  No aggressive measure per family. Discuss with family at bedside.   DNR/DNI status  GOC discussion with family  Not a candidate for RRT/HD  Optimal medical treatment per primary team  Palliative care consult  Optimal BP control with PO/IV medications with metoprolol/hydralazine  no ACEI/ARB  Will follow  
89F with PMh of HTN, CKD5, chronic diastolic CHF, asthma presenting w/ abrupt onset of SOB. Found to have acute pulmonary edema i/s/o HTN emergency and advanced CKD. Palliative consulted for complex decision making regarding goc in setting of advanced illness, pulmonary edema, ESRD refusing HD.   
89F with PMh of HTN, CKD5, chronic diastolic CHF, asthma, who presents to the hospital with sudden on set of SOB     EKG: NSR no acute changes  Tele: NSR    1. CHF  -echo with normal LV systolic function, moderate diastolic dysfunction and new severe AS   -c/w IV bumex 2mg BID, monitor I&Os   -wean off oxygen as tolerated    2. CKD  -advanced, family currently declining HD  -f/u renal    3. Anemia  -plan for PRBC transfusion  -give additional bumex 2mg IV at beginning of transfusion  -continue to monitor H/H     4. HTN  -improving  -c/w hydral, metoprolol and nifedipine  -continue to monitor BP

## 2022-05-27 NOTE — PROGRESS NOTE ADULT - SUBJECTIVE AND OBJECTIVE BOX
Pulmonary Follow Up Note    FRANCIA MARTEL  MRN-[id number]    Chief Complaint: Patient is a 89y old  Female who presents with a chief complaint of Respiratory failure, acute pulmonary edema. (24 May 2022 10:14)      HPI:  getting neb tx  says she is ok    MEDICATIONS  (STANDING):  albuterol/ipratropium for Nebulization 3 milliLiter(s) Nebulizer every 6 hours  buMETAnide Injectable 2 milliGRAM(s) IV Push every 12 hours  ferrous    sulfate Liquid 300 milliGRAM(s) Oral daily  gabapentin 100 milliGRAM(s) Oral two times a day  hydrALAZINE 100 milliGRAM(s) Oral three times a day  metolazone 10 milliGRAM(s) Oral daily  metoprolol succinate  milliGRAM(s) Oral daily  NIFEdipine XL 90 milliGRAM(s) Oral daily  pantoprazole    Tablet 40 milliGRAM(s) Oral before breakfast  sevelamer carbonate 800 milliGRAM(s) Oral three times a day with meals  simvastatin 40 milliGRAM(s) Oral at bedtime  sodium bicarbonate 650 milliGRAM(s) Oral three times a day    MEDICATIONS  (PRN):  acetaminophen     Tablet .. 650 milliGRAM(s) Oral every 6 hours PRN Temp greater or equal to 38C (100.4F), Mild Pain (1 - 3)  aluminum hydroxide/magnesium hydroxide/simethicone Suspension 30 milliLiter(s) Oral every 4 hours PRN Dyspepsia  melatonin 3 milliGRAM(s) Oral at bedtime PRN Insomnia  ondansetron Injectable 4 milliGRAM(s) IV Push every 8 hours PRN Nausea and/or Vomiting          EXAM:  Vital Signs Last 24 Hrs  T(C): 36.8 (27 May 2022 05:42), Max: 36.9 (26 May 2022 11:42)  T(F): 98.2 (27 May 2022 05:42), Max: 98.5 (26 May 2022 11:42)  HR: 68 (27 May 2022 10:38) (65 - 77)  BP: 191/68 (27 May 2022 05:42) (120/88 - 191/68)  BP(mean): --  RR: 18 (27 May 2022 05:42) (18 - 18)  SpO2: 99% (27 May 2022 10:38) (96% - 100%)  GENERAL: No acute distress  LUNGS: no wheeze      LABS/IMAGING: reviewed                                              8.5    10.60 )-----------( 299      ( 27 May 2022 07:40 )             26.8   05-27    133<L>  |  94<L>  |  73<H>  ----------------------------<  100<H>  5.0   |  28  |  7.67<H>    Ca    11.8<H>      27 May 2022 07:40  Phos  6.3     05-27  Mg     2.30     05-27            < from: Xray Chest 1 View- PORTABLE-Urgent (Xray Chest 1 View- PORTABLE-Urgent .) (05.22.22 @ 06:21) >    ACC: [id number] EXAM:  XR CHEST PORTABLE URGENT 1V                          PROCEDURE DATE:  05/22/2022          INTERPRETATION:  CLINICAL INFORMATION: Shortness of breath    TECHNIQUE: Frontal radiograph of the chest    COMPARISON: None available.    FINDINGS:    Low lung volumes. There are bilateral small pleural effusions. Prominent   vascular markings. The heart is not well evaluated in this projection. No   pneumothorax. No acute osseous findings    IMPRESSION:    Pulmonary edema with bilateral pleural effusions.    --- End of Report ---          KASHIF BARBOSA MD; Resident Radiologist  This document has been electronically signed.    < end of copied text >      PROBLEM LIST:  89yFemale with HEALTH ISSUES - PROBLEM Dx:  Acute pulmonary edema    Stage 5 chronic kidney disease not on chronic dialysis    Asthma    Metabolic encephalopathy    HLD (hyperlipidemia)    Acute on chronic diastolic congestive heart failure      RECS:  -cont diuresis  -wean O2 as tolerated  -incentive danilo  -prn nebs for asthma     please feel free to call with any questions [phone]  Radha Gonzalez MD

## 2022-05-27 NOTE — CONSULT NOTE ADULT - PROBLEM SELECTOR RECOMMENDATION 2
PPSV 40%  per discussions with family, patient does ambulate out of bed but the majority of her day is spent in bed.   Please assist with ADLs

## 2022-05-27 NOTE — CONSULT NOTE ADULT - PROBLEM SELECTOR RECOMMENDATION 9
On 3L NC   diuresis per primary team   Nebs prn   Can consider adding dilaudid 2mg q6h prn dyspnea/respiratory distress

## 2022-05-28 LAB
ANION GAP SERPL CALC-SCNC: 13 MMOL/L — SIGNIFICANT CHANGE UP (ref 7–14)
BUN SERPL-MCNC: 75 MG/DL — HIGH (ref 7–23)
CALCIUM SERPL-MCNC: 12.1 MG/DL — HIGH (ref 8.4–10.5)
CHLORIDE SERPL-SCNC: 92 MMOL/L — LOW (ref 98–107)
CO2 SERPL-SCNC: 27 MMOL/L — SIGNIFICANT CHANGE UP (ref 22–31)
CREAT SERPL-MCNC: 7.59 MG/DL — HIGH (ref 0.5–1.3)
EGFR: 5 ML/MIN/1.73M2 — LOW
GLUCOSE SERPL-MCNC: 90 MG/DL — SIGNIFICANT CHANGE UP (ref 70–99)
HCT VFR BLD CALC: 25.1 % — LOW (ref 34.5–45)
HGB BLD-MCNC: 7.8 G/DL — LOW (ref 11.5–15.5)
MAGNESIUM SERPL-MCNC: 2.4 MG/DL — SIGNIFICANT CHANGE UP (ref 1.6–2.6)
MCHC RBC-ENTMCNC: 28.9 PG — SIGNIFICANT CHANGE UP (ref 27–34)
MCHC RBC-ENTMCNC: 31.1 GM/DL — LOW (ref 32–36)
MCV RBC AUTO: 93 FL — SIGNIFICANT CHANGE UP (ref 80–100)
NRBC # BLD: 0 /100 WBCS — SIGNIFICANT CHANGE UP
NRBC # FLD: 0.04 K/UL — HIGH
PHOSPHATE SERPL-MCNC: 6.2 MG/DL — HIGH (ref 2.5–4.5)
PLATELET # BLD AUTO: 295 K/UL — SIGNIFICANT CHANGE UP (ref 150–400)
POTASSIUM SERPL-MCNC: 5.1 MMOL/L — SIGNIFICANT CHANGE UP (ref 3.5–5.3)
POTASSIUM SERPL-SCNC: 5.1 MMOL/L — SIGNIFICANT CHANGE UP (ref 3.5–5.3)
RBC # BLD: 2.7 M/UL — LOW (ref 3.8–5.2)
RBC # FLD: 15.4 % — HIGH (ref 10.3–14.5)
SODIUM SERPL-SCNC: 132 MMOL/L — LOW (ref 135–145)
WBC # BLD: 10.97 K/UL — HIGH (ref 3.8–10.5)
WBC # FLD AUTO: 10.97 K/UL — HIGH (ref 3.8–10.5)

## 2022-05-28 PROCEDURE — 99232 SBSQ HOSP IP/OBS MODERATE 35: CPT

## 2022-05-28 RX ORDER — BUMETANIDE 0.25 MG/ML
2 INJECTION INTRAMUSCULAR; INTRAVENOUS EVERY 12 HOURS
Refills: 0 | Status: DISCONTINUED | OUTPATIENT
Start: 2022-05-28 | End: 2022-05-29

## 2022-05-28 RX ORDER — SEVELAMER CARBONATE 2400 MG/1
1600 POWDER, FOR SUSPENSION ORAL
Refills: 0 | Status: DISCONTINUED | OUTPATIENT
Start: 2022-05-28 | End: 2022-06-02

## 2022-05-28 RX ADMIN — Medication 100 MILLIGRAM(S): at 06:29

## 2022-05-28 RX ADMIN — Medication 650 MILLIGRAM(S): at 06:28

## 2022-05-28 RX ADMIN — GABAPENTIN 100 MILLIGRAM(S): 400 CAPSULE ORAL at 06:29

## 2022-05-28 RX ADMIN — SEVELAMER CARBONATE 800 MILLIGRAM(S): 2400 POWDER, FOR SUSPENSION ORAL at 08:55

## 2022-05-28 RX ADMIN — Medication 90 MILLIGRAM(S): at 06:29

## 2022-05-28 RX ADMIN — SEVELAMER CARBONATE 800 MILLIGRAM(S): 2400 POWDER, FOR SUSPENSION ORAL at 17:12

## 2022-05-28 RX ADMIN — Medication 100 MILLIGRAM(S): at 17:11

## 2022-05-28 RX ADMIN — Medication 650 MILLIGRAM(S): at 22:19

## 2022-05-28 RX ADMIN — SIMVASTATIN 40 MILLIGRAM(S): 20 TABLET, FILM COATED ORAL at 22:19

## 2022-05-28 RX ADMIN — GABAPENTIN 100 MILLIGRAM(S): 400 CAPSULE ORAL at 17:12

## 2022-05-28 RX ADMIN — SEVELAMER CARBONATE 800 MILLIGRAM(S): 2400 POWDER, FOR SUSPENSION ORAL at 12:43

## 2022-05-28 RX ADMIN — Medication 300 MILLIGRAM(S): at 12:43

## 2022-05-28 RX ADMIN — BUMETANIDE 2 MILLIGRAM(S): 0.25 INJECTION INTRAMUSCULAR; INTRAVENOUS at 01:08

## 2022-05-28 RX ADMIN — Medication 650 MILLIGRAM(S): at 12:43

## 2022-05-28 RX ADMIN — PANTOPRAZOLE SODIUM 40 MILLIGRAM(S): 20 TABLET, DELAYED RELEASE ORAL at 06:29

## 2022-05-28 RX ADMIN — Medication 3 MILLILITER(S): at 14:42

## 2022-05-28 RX ADMIN — Medication 3 MILLILITER(S): at 08:12

## 2022-05-28 RX ADMIN — BUMETANIDE 2 MILLIGRAM(S): 0.25 INJECTION INTRAMUSCULAR; INTRAVENOUS at 12:41

## 2022-05-28 RX ADMIN — Medication 100 MILLIGRAM(S): at 22:19

## 2022-05-28 NOTE — PROGRESS NOTE ADULT - ASSESSMENT
89F with PMh of HTN, CKD5, chronic diastolic CHF, asthma, who presents to the hospital with sudden on set of SOB     EKG: NSR no acute changes  Tele: NSR    1. CHF  -echo with normal LV systolic function, moderate diastolic dysfunction and new severe AS. no w/u planned as patient with advanced CKD and declining HD  -c/w IV bumex 2mg BID, monitor I&Os , on metolazone 10mg daily   -wean off oxygen as tolerated    2. CKD  -advanced, family currently declining HD. rec GOC   -f/u renal    3. Anemia  -s/p PRBC transfusion  -continue to monitor H/H     4. HTN  -improving  -c/w hydral, metoprolol and nifedipine  -continue to monitor BP

## 2022-05-28 NOTE — PROGRESS NOTE ADULT - SUBJECTIVE AND OBJECTIVE BOX
MARTEL SAADFIORDALIZA  89y  Female      Patient is a 89y old  Female who presents with a chief complaint of Respiratory failure, acute pulmonary edema. (28 May 2022 17:39)  comfortable,nad.no sob,no fever,no cough,no cp    REVIEW OF SYSTEMS:  CONSTITUTIONAL: No fever  RESPIRATORY: No cough, hemoptysis or shortness of breath  CARDIOVASCULAR: No chest pain, palpitations, dizziness, or leg swelling  GASTROINTESTINAL: No abdominal pain. nausea, vomiting, hematemesis       INTERVAL HPI/OVERNIGHT EVENTS:  T(C): 36.8 (05-28-22 @ 12:23), Max: 37.1 (05-28-22 @ 06:26)  HR: 79 (05-28-22 @ 17:10) (67 - 89)  BP: 166/69 (05-28-22 @ 17:10) (146/50 - 185/78)  RR: 17 (05-28-22 @ 12:23) (17 - 18)  SpO2: 99% (05-28-22 @ 14:42) (98% - 100%)  Wt(kg): --  I&O's Summary    27 May 2022 07:01  -  28 May 2022 07:00  --------------------------------------------------------  IN: 900 mL / OUT: 2100 mL / NET: -1200 mL    28 May 2022 07:01  -  28 May 2022 19:24  --------------------------------------------------------  IN: 600 mL / OUT: 600 mL / NET: 0 mL      T(C): 36.8 (05-28-22 @ 12:23), Max: 37.1 (05-28-22 @ 06:26)  HR: 79 (05-28-22 @ 17:10) (67 - 89)  BP: 166/69 (05-28-22 @ 17:10) (146/50 - 185/78)  RR: 17 (05-28-22 @ 12:23) (17 - 18)  SpO2: 99% (05-28-22 @ 14:42) (98% - 100%)  Wt(kg): --Vital Signs Last 24 Hrs  T(C): 36.8 (28 May 2022 12:23), Max: 37.1 (28 May 2022 06:26)  T(F): 98.2 (28 May 2022 12:23), Max: 98.7 (28 May 2022 06:26)  HR: 79 (28 May 2022 17:10) (67 - 89)  BP: 166/69 (28 May 2022 17:10) (146/50 - 185/78)  BP(mean): --  RR: 17 (28 May 2022 12:23) (17 - 18)  SpO2: 99% (28 May 2022 14:42) (98% - 100%)    LABS:                        7.8    10.97 )-----------( 295      ( 28 May 2022 05:01 )             25.1     05-28    132<L>  |  92<L>  |  75<H>  ----------------------------<  90  5.1   |  27  |  7.59<H>    Ca    12.1<H>      28 May 2022 05:01  Phos  6.2     05-28  Mg     2.40     05-28          CAPILLARY BLOOD GLUCOSE                PAST MEDICAL & SURGICAL HISTORY:  Hypertension      High cholesterol      CKD (chronic kidney disease)      Asthma      CHF (congestive heart failure)  diastolic heart failure      History of hysterectomy          MEDICATIONS  (STANDING):  albuterol/ipratropium for Nebulization 3 milliLiter(s) Nebulizer every 6 hours  buMETAnide 2 milliGRAM(s) Oral every 12 hours  ferrous    sulfate Liquid 300 milliGRAM(s) Oral daily  gabapentin 100 milliGRAM(s) Oral two times a day  hydrALAZINE 100 milliGRAM(s) Oral three times a day  metolazone 10 milliGRAM(s) Oral daily  metoprolol succinate  milliGRAM(s) Oral daily  NIFEdipine XL 90 milliGRAM(s) Oral daily  pantoprazole    Tablet 40 milliGRAM(s) Oral before breakfast  sevelamer carbonate 1600 milliGRAM(s) Oral two times a day  simvastatin 40 milliGRAM(s) Oral at bedtime  sodium bicarbonate 650 milliGRAM(s) Oral three times a day    MEDICATIONS  (PRN):  acetaminophen     Tablet .. 650 milliGRAM(s) Oral every 6 hours PRN Temp greater or equal to 38C (100.4F), Mild Pain (1 - 3)  aluminum hydroxide/magnesium hydroxide/simethicone Suspension 30 milliLiter(s) Oral every 4 hours PRN Dyspepsia  melatonin 3 milliGRAM(s) Oral at bedtime PRN Insomnia  ondansetron Injectable 4 milliGRAM(s) IV Push every 8 hours PRN Nausea and/or Vomiting        RADIOLOGY & ADDITIONAL TESTS:    Imaging Personally Reviewed:  [ ] YES  [ ] NO    Consultant(s) Notes Reviewed:  [ ] YES  [ ] NO    PHYSICAL EXAM:  GENERAL: Alert and awake lying in bed in no distress  HEAD:  Atraumatic, Normocephalic  EYES: EOMI, ONEIL, conjunctiva and sclera clear  NECK: Supple, No JVD, Normal thyroid  NERVOUS SYSTEM:  Alert & Oriented X2, Motor and sensory systems are intact,   CHEST/LUNG: Bilateral clear breath sounds, no rhochi, no wheezing, no crepitations,  HEART: Regular rate and rhythm; No murmurs, rubs, or gallops  ABDOMEN: Soft, Nontender, Nondistended; Bowel sounds present  EXTREMITIES:   Peripheral Pulses are palpable,   edema +        Care Discussed with Consultants/Other Providers [x ] YES  [ ] NO      Code Status: [] Full Code [] DNR [] DNI [] Goals of Care:   Disposition: [] ICU [] Stroke Unit [] RCU []PCU []Floor [] Discharge Home         SAIRA CelestinP

## 2022-05-28 NOTE — PROGRESS NOTE ADULT - SUBJECTIVE AND OBJECTIVE BOX
Ryan Perez MD  Interventional Cardiology / Advance Heart Failure and Cardiac Transplant Specialist  [place] Office : [address] [place] [id number]  Tel:   [place] Office : [address] [facility] [place] [address]  Tel: [phone]       Pt is lying in bed comfortable not in distress, no chest pains some SOB no palpitations  	  MEDICATIONS:  buMETAnide Injectable 2 milliGRAM(s) IV Push every 12 hours  hydrALAZINE 100 milliGRAM(s) Oral three times a day  metolazone 10 milliGRAM(s) Oral daily  metoprolol succinate  milliGRAM(s) Oral daily  NIFEdipine XL 90 milliGRAM(s) Oral daily      albuterol/ipratropium for Nebulization 3 milliLiter(s) Nebulizer every 6 hours    acetaminophen     Tablet .. 650 milliGRAM(s) Oral every 6 hours PRN  gabapentin 100 milliGRAM(s) Oral two times a day  melatonin 3 milliGRAM(s) Oral at bedtime PRN  ondansetron Injectable 4 milliGRAM(s) IV Push every 8 hours PRN    aluminum hydroxide/magnesium hydroxide/simethicone Suspension 30 milliLiter(s) Oral every 4 hours PRN  pantoprazole    Tablet 40 milliGRAM(s) Oral before breakfast    simvastatin 40 milliGRAM(s) Oral at bedtime    ferrous    sulfate Liquid 300 milliGRAM(s) Oral daily  sodium bicarbonate 650 milliGRAM(s) Oral three times a day      PAST MEDICAL/SURGICAL HISTORY  PAST MEDICAL & SURGICAL HISTORY:  Hypertension      High cholesterol      CKD (chronic kidney disease)      Asthma      CHF (congestive heart failure)  diastolic heart failure      History of hysterectomy          SOCIAL HISTORY: Substance Use (street drugs): ( x ) never used  (  ) other:    FAMILY HISTORY:  No pertinent family history in first degree relatives         PHYSICAL EXAM:  T(C): 36.8 (05-28-22 @ 12:23), Max: 37.1 (05-28-22 @ 06:26)  HR: 67 (05-28-22 @ 12:23) (67 - 89)  BP: 146/50 (05-28-22 @ 12:23) (146/50 - 185/78)  RR: 17 (05-28-22 @ 12:23) (17 - 18)  SpO2: 100% (05-28-22 @ 12:23) (98% - 100%)  Wt(kg): --  I&O's Summary    27 May 2022 07:01  -  28 May 2022 07:00  --------------------------------------------------------  IN: 450 mL / OUT: 1200 mL / NET: -750 mL          GENERAL: NAD  EYES:   PERRLA   ENMT:   Moist mucous membranes, Good dentition, No lesions  Cardiovascular: Normal S1 S2, No JVD, 2/6 ejection systolic murmurs, No edema  Respiratory: Lungs clear to auscultation	  Gastrointestinal:  Soft, Non-tender, + BS	  Extremities: no edema                                    7.8    10.97 )-----------( 295      ( 28 May 2022 05:01 )             25.1     05-28    132<L>  |  92<L>  |  75<H>  ----------------------------<  90  5.1   |  27  |  7.59<H>    Ca    12.1<H>      28 May 2022 05:01  Phos  6.2     05-28  Mg     2.40     05-28      proBNP:   Lipid Profile:   HgA1c:   TSH:     Consultant(s) Notes Reviewed:  [x ] YES  [ ] NO    Care Discussed with Consultants/Other Providers [ x] YES  [ ] NO    Imaging Personally Reviewed independently:  [x] YES  [ ] NO    All labs, radiologic studies, vitals, orders and medications list reviewed. Patient is seen and examined at bedside. Case discussed with medical team.

## 2022-05-28 NOTE — PROGRESS NOTE ADULT - ASSESSMENT
Patient is a 89y F PMHx HTN, CKD, CHF, asthma, presenting today with SOB. BIBEMS on NRB. EMS gave ASA, one sublingual nitro. BP on EMS arrival SBP >200. Patient family at bedside reports patient awoke from sleep with difficulty breathing. 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  2) Anemia of chronic renal disease  3) Renal osteodystrophy  4) Acute on chronic systolic/diastolic CHF with fluid overload  5) Hypercalcemia/Hyperphosphatemia  6) Severe AS  7) DNR/DNI    Recommend:  Strict I/o  Avoid Nephrotoxic agents  Switch IV Bumex to Po  Continue Metolazone 10 mg po daily  Intact PTH, Vitamin D 1,25 level, PHos, calcium level  reviewed  Hold PO Calcitriol due to hypercalcemia  S/p PRBC tranfusion  Iron studies and Ferritin reviewed  Continue Feosol 300 ml po daily  SQ EPO  Low K/Low phos renal diet  Phos binders  No aggressive measure per family and/or HD treatment at present  DNR/DNI status  NO RRT/HD  Optimal medical treatment per primary team  Palliative care consult reviewed  GOC with family  Optimal BP control with PO/IV medications with metoprolol ER, Hydralazine, NItrate, Nifedipine XL  Discharge planning per primary team/Family  Will follow

## 2022-05-28 NOTE — PROGRESS NOTE ADULT - SUBJECTIVE AND OBJECTIVE BOX
Srirma Riley MD (Nephrology progress note)  [address], [facility],  [address],  [facility]  TEl: [phone]  Cell: [phone]    Patient is a 89y Female seen and evaluated at bedside. Vital signs, laboratory data, imaging studies, consult notes reviewed done within past 24 hours. Overnight events noted. Patient lying in bed alert and awake in no distress. Interval elevated but stable renal function    Allergies    No Known Allergies    Intolerances        ROS:  Limited  Alert and awake in no distress    VITALS:    T(C): 36.8 (05-28-22 @ 12:23), Max: 37.1 (05-28-22 @ 06:26)  HR: 79 (05-28-22 @ 17:10) (67 - 89)  BP: 166/69 (05-28-22 @ 17:10) (146/50 - 185/78)  RR: 17 (05-28-22 @ 12:23) (17 - 18)  SpO2: 99% (05-28-22 @ 14:42) (98% - 100%)  CAPILLARY BLOOD GLUCOSE          05-27-22 @ 07:01  -  05-28-22 @ 07:00  --------------------------------------------------------  IN: 900 mL / OUT: 2100 mL / NET: -1200 mL    05-28-22 @ 07:01  -  05-28-22 @ 17:40  --------------------------------------------------------  IN: 600 mL / OUT: 600 mL / NET: 0 mL      MEDICATIONS  (STANDING):  albuterol/ipratropium for Nebulization 3 milliLiter(s) Nebulizer every 6 hours  buMETAnide Injectable 2 milliGRAM(s) IV Push every 12 hours  ferrous    sulfate Liquid 300 milliGRAM(s) Oral daily  gabapentin 100 milliGRAM(s) Oral two times a day  hydrALAZINE 100 milliGRAM(s) Oral three times a day  metolazone 10 milliGRAM(s) Oral daily  metoprolol succinate  milliGRAM(s) Oral daily  NIFEdipine XL 90 milliGRAM(s) Oral daily  pantoprazole    Tablet 40 milliGRAM(s) Oral before breakfast  sevelamer carbonate 800 milliGRAM(s) Oral three times a day with meals  simvastatin 40 milliGRAM(s) Oral at bedtime  sodium bicarbonate 650 milliGRAM(s) Oral three times a day    MEDICATIONS  (PRN):  acetaminophen     Tablet .. 650 milliGRAM(s) Oral every 6 hours PRN Temp greater or equal to 38C (100.4F), Mild Pain (1 - 3)  aluminum hydroxide/magnesium hydroxide/simethicone Suspension 30 milliLiter(s) Oral every 4 hours PRN Dyspepsia  melatonin 3 milliGRAM(s) Oral at bedtime PRN Insomnia  ondansetron Injectable 4 milliGRAM(s) IV Push every 8 hours PRN Nausea and/or Vomiting      PHYSICAL EXAM:  GENERAL: Alert, awake and oriented x2 in no distress on nasal cannula oxygen  HEENT: ONEIL, EOMI, neck supple, no JVP, no carotid bruit, oral mucosa moist and pale  CHEST/LUNG: Bilateral decreased breath sounds, bibasilar rales and crepitations, no wheezing  HEART: Regular rate and rhythm, KESHA II/VI at LPSB, no gallops, no rub   ABDOMEN: Soft, nontender, non distended, bowel sounds present  : No flank or supra pubic tenderness.  EXTREMITIES: Peripheral pulses are palpable, no pedal edema  Neurology: AAOx1-2, no focal neurological deficit  SKIN: No rash or skin lesion  Musculoskeletal: No joint deformity or spinal tenderness.      Vascular ACCESS: None    LABS:                        7.8    10.97 )-----------( 295      ( 28 May 2022 05:01 )             25.1     05-28    132<L>  |  92<L>  |  75<H>  ----------------------------<  90  5.1   |  27  |  7.59<H>    Ca    12.1<H>      28 May 2022 05:01  Phos  6.2     05-28  Mg     2.40     05-28                  RADIOLOGY & ADDITIONAL STUDIES:  r  Imaging Personally Reviewed:  [x] YES  [ ] NO    Consultant(s) Notes Reviewed:  [x] YES  [ ] NO    Care Discussed with Primary team/ Other Providers [x] YES  [ ] NO

## 2022-05-28 NOTE — PROGRESS NOTE ADULT - SUBJECTIVE AND OBJECTIVE BOX
Pulmonary Follow Up Note    FRANCIA MARTEL  MRN-[id number]    Chief Complaint: Patient is a 89y old  Female who presents with a chief complaint of Respiratory failure, acute pulmonary edema. (24 May 2022 10:14)      HPI:  family at bedside  states she is ok    MEDICATIONS  (STANDING):  albuterol/ipratropium for Nebulization 3 milliLiter(s) Nebulizer every 6 hours  buMETAnide Injectable 2 milliGRAM(s) IV Push every 12 hours  ferrous    sulfate Liquid 300 milliGRAM(s) Oral daily  gabapentin 100 milliGRAM(s) Oral two times a day  hydrALAZINE 100 milliGRAM(s) Oral three times a day  metolazone 10 milliGRAM(s) Oral daily  metoprolol succinate  milliGRAM(s) Oral daily  NIFEdipine XL 90 milliGRAM(s) Oral daily  pantoprazole    Tablet 40 milliGRAM(s) Oral before breakfast  sevelamer carbonate 800 milliGRAM(s) Oral three times a day with meals  simvastatin 40 milliGRAM(s) Oral at bedtime  sodium bicarbonate 650 milliGRAM(s) Oral three times a day    MEDICATIONS  (PRN):  acetaminophen     Tablet .. 650 milliGRAM(s) Oral every 6 hours PRN Temp greater or equal to 38C (100.4F), Mild Pain (1 - 3)  aluminum hydroxide/magnesium hydroxide/simethicone Suspension 30 milliLiter(s) Oral every 4 hours PRN Dyspepsia  melatonin 3 milliGRAM(s) Oral at bedtime PRN Insomnia  ondansetron Injectable 4 milliGRAM(s) IV Push every 8 hours PRN Nausea and/or Vomiting          EXAM:  Vital Signs Last 24 Hrs  T(C): 36.8 (28 May 2022 12:23), Max: 37.1 (28 May 2022 06:26)  T(F): 98.2 (28 May 2022 12:23), Max: 98.7 (28 May 2022 06:26)  HR: 67 (28 May 2022 12:23) (67 - 89)  BP: 146/50 (28 May 2022 12:23) (146/50 - 185/78)  BP(mean): --  RR: 17 (28 May 2022 12:23) (17 - 18)  SpO2: 100% (28 May 2022 12:23) (98% - 100%)  GENERAL: No acute distress  LUNGS: no wheeze      LABS/IMAGING: reviewed                                                         7.8    10.97 )-----------( 295      ( 28 May 2022 05:01 )             25.1   05-28    132<L>  |  92<L>  |  75<H>  ----------------------------<  90  5.1   |  27  |  7.59<H>    Ca    12.1<H>      28 May 2022 05:01  Phos  6.2     05-28  Mg     2.40     05-28              < from: Xray Chest 1 View- PORTABLE-Urgent (Xray Chest 1 View- PORTABLE-Urgent .) (05.22.22 @ 06:21) >    ACC: [id number] EXAM:  XR CHEST PORTABLE URGENT 1V                          PROCEDURE DATE:  05/22/2022          INTERPRETATION:  CLINICAL INFORMATION: Shortness of breath    TECHNIQUE: Frontal radiograph of the chest    COMPARISON: None available.    FINDINGS:    Low lung volumes. There are bilateral small pleural effusions. Prominent   vascular markings. The heart is not well evaluated in this projection. No   pneumothorax. No acute osseous findings    IMPRESSION:    Pulmonary edema with bilateral pleural effusions.    --- End of Report ---          KASHIF BARBOSA MD; Resident Radiologist  This document has been electronically signed.    < end of copied text >      PROBLEM LIST:  89yFemale with HEALTH ISSUES - PROBLEM Dx:  Acute pulmonary edema    Stage 5 chronic kidney disease not on chronic dialysis    Asthma    Metabolic encephalopathy    HLD (hyperlipidemia)    Acute on chronic diastolic congestive heart failure      RECS:  -cont diuresis  -wean O2 as tolerated  -incentive danilo  -prn nebs for asthma     please feel free to call with any questions [phone]  Radha Gonzalez MD

## 2022-05-29 LAB
ANION GAP SERPL CALC-SCNC: 17 MMOL/L — HIGH (ref 7–14)
BLD GP AB SCN SERPL QL: NEGATIVE — SIGNIFICANT CHANGE UP
BUN SERPL-MCNC: 79 MG/DL — HIGH (ref 7–23)
CALCIUM SERPL-MCNC: 12.1 MG/DL — HIGH (ref 8.4–10.5)
CHLORIDE SERPL-SCNC: 90 MMOL/L — LOW (ref 98–107)
CO2 SERPL-SCNC: 26 MMOL/L — SIGNIFICANT CHANGE UP (ref 22–31)
CREAT SERPL-MCNC: 7.76 MG/DL — HIGH (ref 0.5–1.3)
EGFR: 5 ML/MIN/1.73M2 — LOW
GLUCOSE SERPL-MCNC: 98 MG/DL — SIGNIFICANT CHANGE UP (ref 70–99)
HCT VFR BLD CALC: 24.6 % — LOW (ref 34.5–45)
HGB BLD-MCNC: 7.8 G/DL — LOW (ref 11.5–15.5)
MAGNESIUM SERPL-MCNC: 2.4 MG/DL — SIGNIFICANT CHANGE UP (ref 1.6–2.6)
MCHC RBC-ENTMCNC: 29.7 PG — SIGNIFICANT CHANGE UP (ref 27–34)
MCHC RBC-ENTMCNC: 31.7 GM/DL — LOW (ref 32–36)
MCV RBC AUTO: 93.5 FL — SIGNIFICANT CHANGE UP (ref 80–100)
NRBC # BLD: 0 /100 WBCS — SIGNIFICANT CHANGE UP
NRBC # FLD: 0 K/UL — SIGNIFICANT CHANGE UP
PHOSPHATE SERPL-MCNC: 6.3 MG/DL — HIGH (ref 2.5–4.5)
PLATELET # BLD AUTO: 281 K/UL — SIGNIFICANT CHANGE UP (ref 150–400)
POTASSIUM SERPL-MCNC: 5.4 MMOL/L — HIGH (ref 3.5–5.3)
POTASSIUM SERPL-SCNC: 5.4 MMOL/L — HIGH (ref 3.5–5.3)
RBC # BLD: 2.63 M/UL — LOW (ref 3.8–5.2)
RBC # FLD: 15.3 % — HIGH (ref 10.3–14.5)
RH IG SCN BLD-IMP: POSITIVE — SIGNIFICANT CHANGE UP
SARS-COV-2 RNA SPEC QL NAA+PROBE: SIGNIFICANT CHANGE UP
SODIUM SERPL-SCNC: 133 MMOL/L — LOW (ref 135–145)
WBC # BLD: 11.29 K/UL — HIGH (ref 3.8–10.5)
WBC # FLD AUTO: 11.29 K/UL — HIGH (ref 3.8–10.5)

## 2022-05-29 PROCEDURE — 71045 X-RAY EXAM CHEST 1 VIEW: CPT | Mod: 26

## 2022-05-29 RX ORDER — SODIUM ZIRCONIUM CYCLOSILICATE 10 G/10G
10 POWDER, FOR SUSPENSION ORAL ONCE
Refills: 0 | Status: COMPLETED | OUTPATIENT
Start: 2022-05-29 | End: 2022-05-29

## 2022-05-29 RX ORDER — SODIUM ZIRCONIUM CYCLOSILICATE 10 G/10G
10 POWDER, FOR SUSPENSION ORAL
Refills: 0 | Status: DISCONTINUED | OUTPATIENT
Start: 2022-05-29 | End: 2022-06-02

## 2022-05-29 RX ORDER — BUMETANIDE 0.25 MG/ML
1 INJECTION INTRAMUSCULAR; INTRAVENOUS
Qty: 20 | Refills: 0 | Status: DISCONTINUED | OUTPATIENT
Start: 2022-05-29 | End: 2022-05-31

## 2022-05-29 RX ADMIN — BUMETANIDE 2 MILLIGRAM(S): 0.25 INJECTION INTRAMUSCULAR; INTRAVENOUS at 00:40

## 2022-05-29 RX ADMIN — Medication 3 MILLILITER(S): at 15:12

## 2022-05-29 RX ADMIN — Medication 3 MILLILITER(S): at 09:47

## 2022-05-29 RX ADMIN — PANTOPRAZOLE SODIUM 40 MILLIGRAM(S): 20 TABLET, DELAYED RELEASE ORAL at 05:20

## 2022-05-29 RX ADMIN — Medication 90 MILLIGRAM(S): at 05:21

## 2022-05-29 RX ADMIN — GABAPENTIN 100 MILLIGRAM(S): 400 CAPSULE ORAL at 05:23

## 2022-05-29 RX ADMIN — Medication 3 MILLIGRAM(S): at 22:04

## 2022-05-29 RX ADMIN — BUMETANIDE 5 MG/HR: 0.25 INJECTION INTRAMUSCULAR; INTRAVENOUS at 15:32

## 2022-05-29 RX ADMIN — Medication 3 MILLILITER(S): at 03:20

## 2022-05-29 RX ADMIN — Medication 100 MILLIGRAM(S): at 05:20

## 2022-05-29 RX ADMIN — Medication 100 MILLIGRAM(S): at 05:21

## 2022-05-29 RX ADMIN — Medication 100 MILLIGRAM(S): at 22:00

## 2022-05-29 RX ADMIN — Medication 650 MILLIGRAM(S): at 22:04

## 2022-05-29 RX ADMIN — Medication 650 MILLIGRAM(S): at 22:00

## 2022-05-29 RX ADMIN — SEVELAMER CARBONATE 1600 MILLIGRAM(S): 2400 POWDER, FOR SUSPENSION ORAL at 05:17

## 2022-05-29 RX ADMIN — BUMETANIDE 5 MG/HR: 0.25 INJECTION INTRAMUSCULAR; INTRAVENOUS at 21:59

## 2022-05-29 RX ADMIN — Medication 3 MILLILITER(S): at 20:49

## 2022-05-29 RX ADMIN — SODIUM ZIRCONIUM CYCLOSILICATE 10 GRAM(S): 10 POWDER, FOR SUSPENSION ORAL at 23:59

## 2022-05-29 RX ADMIN — Medication 650 MILLIGRAM(S): at 23:04

## 2022-05-29 RX ADMIN — SODIUM ZIRCONIUM CYCLOSILICATE 10 GRAM(S): 10 POWDER, FOR SUSPENSION ORAL at 12:45

## 2022-05-29 RX ADMIN — ONDANSETRON 4 MILLIGRAM(S): 8 TABLET, FILM COATED ORAL at 01:16

## 2022-05-29 RX ADMIN — Medication 650 MILLIGRAM(S): at 05:20

## 2022-05-29 RX ADMIN — Medication 650 MILLIGRAM(S): at 15:36

## 2022-05-29 RX ADMIN — Medication 30 MILLILITER(S): at 01:10

## 2022-05-29 RX ADMIN — Medication 300 MILLIGRAM(S): at 17:19

## 2022-05-29 RX ADMIN — GABAPENTIN 100 MILLIGRAM(S): 400 CAPSULE ORAL at 17:20

## 2022-05-29 RX ADMIN — SIMVASTATIN 40 MILLIGRAM(S): 20 TABLET, FILM COATED ORAL at 22:00

## 2022-05-29 RX ADMIN — SEVELAMER CARBONATE 1600 MILLIGRAM(S): 2400 POWDER, FOR SUSPENSION ORAL at 17:19

## 2022-05-29 NOTE — PROGRESS NOTE ADULT - SUBJECTIVE AND OBJECTIVE BOX
Sriram Riley MD (Nephrology progress note)  [address], [facility] [place],  [address],  [id number]  TEl: [phone]  Cell: [phone]    Patient is a 89y Female seen and evaluated at bedside. Vital signs, laboratory data, imaging studies, consult notes reviewed done within past 24 hours. Overnight events noted. Patient lying in bed confused and disoriented remains with dyspnea on nasal cannula oxygen. Chest X-ray with moderate pleural effusion. S cr remains elevated with mild hyperkalemia    Allergies    No Known Allergies    Intolerances        ROS:  Limited  Alert and awake but confused and disoriented lying in bed  Unable to follow verbal commands    VITALS:    T(C): 36.8 (05-29-22 @ 15:36), Max: 37.1 (05-29-22 @ 05:18)  HR: 85 (05-29-22 @ 15:36) (72 - 85)  BP: 148/60 (05-29-22 @ 15:36) (148/60 - 178/66)  RR: 18 (05-29-22 @ 15:36) (18 - 18)  SpO2: 99% (05-29-22 @ 15:36) (91% - 100%)  CAPILLARY BLOOD GLUCOSE          05-28-22 @ 07:01  -  05-29-22 @ 07:00  --------------------------------------------------------  IN: 925 mL / OUT: 1300 mL / NET: -375 mL    05-29-22 @ 07:01  -  05-29-22 @ 16:56  --------------------------------------------------------  IN: 1020 mL / OUT: 730 mL / NET: 290 mL      MEDICATIONS  (STANDING):  albuterol/ipratropium for Nebulization 3 milliLiter(s) Nebulizer every 6 hours  buMETAnide Infusion 1 mG/Hr (5 mL/Hr) IV Continuous <Continuous>  ferrous    sulfate Liquid 300 milliGRAM(s) Oral daily  gabapentin 100 milliGRAM(s) Oral two times a day  hydrALAZINE 100 milliGRAM(s) Oral three times a day  metolazone 10 milliGRAM(s) Oral daily  metoprolol succinate  milliGRAM(s) Oral daily  NIFEdipine XL 90 milliGRAM(s) Oral daily  pantoprazole    Tablet 40 milliGRAM(s) Oral before breakfast  sevelamer carbonate 1600 milliGRAM(s) Oral two times a day  simvastatin 40 milliGRAM(s) Oral at bedtime  sodium bicarbonate 650 milliGRAM(s) Oral three times a day  sodium zirconium cyclosilicate 10 Gram(s) Oral two times a day    MEDICATIONS  (PRN):  acetaminophen     Tablet .. 650 milliGRAM(s) Oral every 6 hours PRN Temp greater or equal to 38C (100.4F), Mild Pain (1 - 3)  aluminum hydroxide/magnesium hydroxide/simethicone Suspension 30 milliLiter(s) Oral every 4 hours PRN Dyspepsia  melatonin 3 milliGRAM(s) Oral at bedtime PRN Insomnia  ondansetron Injectable 4 milliGRAM(s) IV Push every 8 hours PRN Nausea and/or Vomiting      PHYSICAL EXAM:  GENERAL: Alert, awake and oriented x1-2 in no distress  HEENT: ONEIL, EOMI, neck supple, no JVP, no carotid bruit, oral mucosa moist and pink.  CHEST/LUNG: Bilateral decreased breath sounds, bibasilar rales and crepitations, no wheezing  HEART: Regular rate and rhythm, KESHA II/VI at LPSB, no gallops, no rub   ABDOMEN: Soft, nontender, non distended, bowel sounds present  : No flank or supra pubic tenderness.  EXTREMITIES: Bilateral ankle edema  Neurology: AAOx1-2, no focal neurological deficit  SKIN: No rash or skin lesion  Musculoskeletal: No joint deformity    Vascular ACCESS: None    LABS:                        7.8    11.29 )-----------( 281      ( 29 May 2022 04:50 )             24.6     05-29    133<L>  |  90<L>  |  79<H>  ----------------------------<  98  5.4<H>   |  26  |  7.76<H>    Ca    12.1<H>      29 May 2022 04:50  Phos  6.3     05-29  Mg     2.40     05-29                  RADIOLOGY & ADDITIONAL STUDIES:  rad< from: Xray Chest 1 View- PORTABLE-Routine (Xray Chest 1 View- PORTABLE-Routine in AM.) (05.29.22 @ 08:58) >    ACC: [id number] EXAM:  XR CHEST PORTABLE ROUTINE 1V                          PROCEDURE DATE:  05/29/2022          INTERPRETATION:  INDICATION: Dypnea.    COMPARISON: 5/25/22    Technique: AP radiograph of the chest    FINDINGS:  Heart/Vascular: Difficult to assess heart size on this AP film - heart   appears stable in size.  Pulmonary: Low lung volumes with bilateral effusions and perihilar haze   more on the right than the left - consistent with pulmonary edema which   has progressed since the prior study. No pneumothorax.  Bones: No acute bony finding.    Impression:  Progression of pulmonary edema changes.        --- End of Report ---            ANNIE SNOW MD; Attending Radiologist  This document has been electronically signed. May 29 2022  1:27PM    < end of copied text >    Imaging Personally Reviewed:  [x] YES  [ ] NO    Consultant(s) Notes Reviewed:  [x] YES  [ ] NO    Care Discussed with Primary team/ Other Providers [x] YES  [ ] NO

## 2022-05-29 NOTE — PROGRESS NOTE ADULT - SUBJECTIVE AND OBJECTIVE BOX
MARTEL SAADFIORDALIZA  89y  Female      Patient is a 89y old  Female who presents with a chief complaint of Respiratory failure, acute pulmonary edema. (29 May 2022 16:55)  Reported intermittent dyspnea.comfortable,nad.no cp,no cough,no fever    REVIEW OF SYSTEMS:  CONSTITUTIONAL: No fever  RESPIRATORY: No cough, hemoptysis or shortness of breath  CARDIOVASCULAR: No chest pain, palpitations, dizziness, or leg swelling  GASTROINTESTINAL: No abdominal pain. nausea, vomiting, hematemesis  INTERVAL HPI/OVERNIGHT EVENTS:  T(C): 36.8 (05-29-22 @ 20:18), Max: 37.1 (05-29-22 @ 05:18)  HR: 90 (05-29-22 @ 20:18) (72 - 90)  BP: 174/70 (05-29-22 @ 20:18) (148/60 - 174/70)  RR: 18 (05-29-22 @ 20:18) (18 - 18)  SpO2: 100% (05-29-22 @ 20:18) (91% - 100%)  Wt(kg): --  I&O's Summary    28 May 2022 07:01  -  29 May 2022 07:00  --------------------------------------------------------  IN: 925 mL / OUT: 1300 mL / NET: -375 mL    29 May 2022 07:01  -  29 May 2022 20:56  --------------------------------------------------------  IN: 1030 mL / OUT: 980 mL / NET: 50 mL      T(C): 36.8 (05-29-22 @ 20:18), Max: 37.1 (05-29-22 @ 05:18)  HR: 90 (05-29-22 @ 20:18) (72 - 90)  BP: 174/70 (05-29-22 @ 20:18) (148/60 - 174/70)  RR: 18 (05-29-22 @ 20:18) (18 - 18)  SpO2: 100% (05-29-22 @ 20:18) (91% - 100%)  Wt(kg): --Vital Signs Last 24 Hrs  T(C): 36.8 (29 May 2022 20:18), Max: 37.1 (29 May 2022 05:18)  T(F): 98.2 (29 May 2022 20:18), Max: 98.8 (29 May 2022 05:18)  HR: 90 (29 May 2022 20:18) (72 - 90)  BP: 174/70 (29 May 2022 20:18) (148/60 - 174/70)  BP(mean): --  RR: 18 (29 May 2022 20:18) (18 - 18)  SpO2: 100% (29 May 2022 20:18) (91% - 100%)    LABS:                        7.8    11.29 )-----------( 281      ( 29 May 2022 04:50 )             24.6     05-29    133<L>  |  90<L>  |  79<H>  ----------------------------<  98  5.4<H>   |  26  |  7.76<H>    Ca    12.1<H>      29 May 2022 04:50  Phos  6.3     05-29  Mg     2.40     05-29          CAPILLARY BLOOD GLUCOSE                PAST MEDICAL & SURGICAL HISTORY:  Hypertension      High cholesterol      CKD (chronic kidney disease)      Asthma      CHF (congestive heart failure)  diastolic heart failure      History of hysterectomy          MEDICATIONS  (STANDING):  albuterol/ipratropium for Nebulization 3 milliLiter(s) Nebulizer every 6 hours  buMETAnide Infusion 1 mG/Hr (5 mL/Hr) IV Continuous <Continuous>  ferrous    sulfate Liquid 300 milliGRAM(s) Oral daily  gabapentin 100 milliGRAM(s) Oral two times a day  hydrALAZINE 100 milliGRAM(s) Oral three times a day  metolazone 10 milliGRAM(s) Oral daily  metoprolol succinate  milliGRAM(s) Oral daily  NIFEdipine XL 90 milliGRAM(s) Oral daily  pantoprazole    Tablet 40 milliGRAM(s) Oral before breakfast  sevelamer carbonate 1600 milliGRAM(s) Oral two times a day  simvastatin 40 milliGRAM(s) Oral at bedtime  sodium bicarbonate 650 milliGRAM(s) Oral three times a day  sodium zirconium cyclosilicate 10 Gram(s) Oral two times a day    MEDICATIONS  (PRN):  acetaminophen     Tablet .. 650 milliGRAM(s) Oral every 6 hours PRN Temp greater or equal to 38C (100.4F), Mild Pain (1 - 3)  aluminum hydroxide/magnesium hydroxide/simethicone Suspension 30 milliLiter(s) Oral every 4 hours PRN Dyspepsia  melatonin 3 milliGRAM(s) Oral at bedtime PRN Insomnia  ondansetron Injectable 4 milliGRAM(s) IV Push every 8 hours PRN Nausea and/or Vomiting        RADIOLOGY & ADDITIONAL TESTS:    Imaging Personally Reviewed:  [ ] YES  [ ] NO    Consultant(s) Notes Reviewed:  [ ] YES  [ ] NO    PHYSICAL EXAM:  GENERAL: Alert and awake lying in bed in no distress  HEAD:  Atraumatic, Normocephalic  EYES: EOMI, ONEIL, conjunctiva and sclera clear  NECK: Supple, No JVD, Normal thyroid  NERVOUS SYSTEM:  Alert & Oriented X2, Motor and sensory systems are intact,   CHEST/LUNG: mild rhonchi at bases,  HEART: Regular rate and rhythm; No murmurs, rubs, or gallops  ABDOMEN: Soft, Nontender, Nondistended; Bowel sounds present  EXTREMITIES:   Peripheral Pulses are palpable, no  edema        Care Discussed with Consultants/Other Providers [ x] YES  [ ] NO      Code Status: [] Full Code [] DNR [] DNI [] Goals of Care:   Disposition: [] ICU [] Stroke Unit [] RCU []PCU []Floor [] Discharge Home         GLENN CelestinFACP

## 2022-05-29 NOTE — PROGRESS NOTE ADULT - SUBJECTIVE AND OBJECTIVE BOX
Ryan Perez MD  Interventional Cardiology / Advance Heart Failure and Cardiac Transplant Specialist  [place] Office : [address] [place] [address]  Tel:   [place] Office : [address] [facility] [place] [address]  Tel: [phone]       Pt is lying in bed comfortable not in distress, no chest pains some SOB no palpitations  	  MEDICATIONS:  buMETAnide Infusion 1 mG/Hr IV Continuous <Continuous>  hydrALAZINE 100 milliGRAM(s) Oral three times a day  metolazone 10 milliGRAM(s) Oral daily  metoprolol succinate  milliGRAM(s) Oral daily  NIFEdipine XL 90 milliGRAM(s) Oral daily      albuterol/ipratropium for Nebulization 3 milliLiter(s) Nebulizer every 6 hours    acetaminophen     Tablet .. 650 milliGRAM(s) Oral every 6 hours PRN  gabapentin 100 milliGRAM(s) Oral two times a day  melatonin 3 milliGRAM(s) Oral at bedtime PRN  ondansetron Injectable 4 milliGRAM(s) IV Push every 8 hours PRN    aluminum hydroxide/magnesium hydroxide/simethicone Suspension 30 milliLiter(s) Oral every 4 hours PRN  pantoprazole    Tablet 40 milliGRAM(s) Oral before breakfast    simvastatin 40 milliGRAM(s) Oral at bedtime    ferrous    sulfate Liquid 300 milliGRAM(s) Oral daily  sodium bicarbonate 650 milliGRAM(s) Oral three times a day      PAST MEDICAL/SURGICAL HISTORY  PAST MEDICAL & SURGICAL HISTORY:  Hypertension      High cholesterol      CKD (chronic kidney disease)      Asthma      CHF (congestive heart failure)  diastolic heart failure      History of hysterectomy          SOCIAL HISTORY: Substance Use (street drugs): ( x ) never used  (  ) other:    FAMILY HISTORY:  No pertinent family history in first degree relatives         PHYSICAL EXAM:  T(C): 37.1 (05-29-22 @ 05:18), Max: 37.1 (05-29-22 @ 05:18)  HR: 78 (05-29-22 @ 09:48) (72 - 80)  BP: 158/66 (05-29-22 @ 05:18) (158/66 - 178/66)  RR: 18 (05-29-22 @ 05:18) (18 - 18)  SpO2: 97% (05-29-22 @ 09:48) (91% - 100%)  Wt(kg): --  I&O's Summary    28 May 2022 07:01  -  29 May 2022 07:00  --------------------------------------------------------  IN: 925 mL / OUT: 1300 mL / NET: -375 mL          GENERAL: NAD  EYES:   PERRLA   ENMT:   Moist mucous membranes, Good dentition, No lesions  Cardiovascular: Normal S1 S2, No JVD, No murmurs, No edema  Respiratory: b/l rhonchi   Gastrointestinal:  Soft, Non-tender, + BS	  Extremities: no edema                                    7.8    11.29 )-----------( 281      ( 29 May 2022 04:50 )             24.6     05-29    133<L>  |  90<L>  |  79<H>  ----------------------------<  98  5.4<H>   |  26  |  7.76<H>    Ca    12.1<H>      29 May 2022 04:50  Phos  6.3     05-29  Mg     2.40     05-29      proBNP:   Lipid Profile:   HgA1c:   TSH:     Consultant(s) Notes Reviewed:  [x ] YES  [ ] NO    Care Discussed with Consultants/Other Providers [ x] YES  [ ] NO    Imaging Personally Reviewed independently:  [x] YES  [ ] NO    All labs, radiologic studies, vitals, orders and medications list reviewed. Patient is seen and examined at bedside. Case discussed with medical team.

## 2022-05-29 NOTE — PROGRESS NOTE ADULT - ASSESSMENT
Patient is a 89y F PMHx HTN, CKD, CHF, asthma, presenting today with SOB. BIBEMS on NRB. EMS gave ASA, one sublingual nitro. BP on EMS arrival SBP >200. Patient family at bedside reports patient awoke from sleep with difficulty breathing. 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  2) Anemia of chronic renal disease  3) Renal osteodystrophy  4) Acute on chronic systolic/diastolic CHF with fluid overload  5) Hypercalcemia/Hyperphosphatemia  6) Severe AS  7) DNR/DNI    Recommend:  Strict I/o  Avoid Nephrotoxic agents  Switch IV Lasix/PO Bumex to IV drip as per ordered  Continue Metolazone 10 mg po daily  Intact PTH, Vitamin D 1,25 level, PHos, calcium level  reviewed  Hold PO Calcitriol/all vitamin D prodcuts due to hypercalcemia  S/p PRBC tranfusion  Iron studies and Ferritin reviewed  Continue Feosol 300 ml po daily  Transfuse PRBC with goal Hgb>7  SQ EPO  Low K/Low phos renal diet  Phos binders  No aggressive measure per family and/or HD treatment at present  DNR/DNI status  NO RRT/HD  GOC with family  Optimal BP control with PO/IV medications with metoprolol ER, Hydralazine, NItrate, Nifedipine XL  Will follow

## 2022-05-30 LAB
ANION GAP SERPL CALC-SCNC: 10 MMOL/L — SIGNIFICANT CHANGE UP (ref 7–14)
BUN SERPL-MCNC: 86 MG/DL — HIGH (ref 7–23)
CALCIUM SERPL-MCNC: 11.7 MG/DL — HIGH (ref 8.4–10.5)
CHLORIDE SERPL-SCNC: 92 MMOL/L — LOW (ref 98–107)
CO2 SERPL-SCNC: 31 MMOL/L — SIGNIFICANT CHANGE UP (ref 22–31)
CREAT SERPL-MCNC: 8 MG/DL — HIGH (ref 0.5–1.3)
EGFR: 4 ML/MIN/1.73M2 — LOW
GLUCOSE SERPL-MCNC: 103 MG/DL — HIGH (ref 70–99)
HCT VFR BLD CALC: 22.8 % — LOW (ref 34.5–45)
HGB BLD-MCNC: 7.2 G/DL — LOW (ref 11.5–15.5)
MAGNESIUM SERPL-MCNC: 2.6 MG/DL — SIGNIFICANT CHANGE UP (ref 1.6–2.6)
MCHC RBC-ENTMCNC: 29.8 PG — SIGNIFICANT CHANGE UP (ref 27–34)
MCHC RBC-ENTMCNC: 31.6 GM/DL — LOW (ref 32–36)
MCV RBC AUTO: 94.2 FL — SIGNIFICANT CHANGE UP (ref 80–100)
NRBC # BLD: 0 /100 WBCS — SIGNIFICANT CHANGE UP
NRBC # FLD: 0 K/UL — SIGNIFICANT CHANGE UP
PHOSPHATE SERPL-MCNC: 6.3 MG/DL — HIGH (ref 2.5–4.5)
PLATELET # BLD AUTO: 270 K/UL — SIGNIFICANT CHANGE UP (ref 150–400)
POTASSIUM SERPL-MCNC: 5.1 MMOL/L — SIGNIFICANT CHANGE UP (ref 3.5–5.3)
POTASSIUM SERPL-SCNC: 5.1 MMOL/L — SIGNIFICANT CHANGE UP (ref 3.5–5.3)
RBC # BLD: 2.42 M/UL — LOW (ref 3.8–5.2)
RBC # FLD: 15 % — HIGH (ref 10.3–14.5)
SODIUM SERPL-SCNC: 133 MMOL/L — LOW (ref 135–145)
WBC # BLD: 9.55 K/UL — SIGNIFICANT CHANGE UP (ref 3.8–10.5)
WBC # FLD AUTO: 9.55 K/UL — SIGNIFICANT CHANGE UP (ref 3.8–10.5)

## 2022-05-30 RX ORDER — ERYTHROPOIETIN 10000 [IU]/ML
20000 INJECTION, SOLUTION INTRAVENOUS; SUBCUTANEOUS ONCE
Refills: 0 | Status: COMPLETED | OUTPATIENT
Start: 2022-05-30 | End: 2022-05-30

## 2022-05-30 RX ADMIN — SEVELAMER CARBONATE 1600 MILLIGRAM(S): 2400 POWDER, FOR SUSPENSION ORAL at 05:03

## 2022-05-30 RX ADMIN — Medication 300 MILLIGRAM(S): at 16:06

## 2022-05-30 RX ADMIN — SIMVASTATIN 40 MILLIGRAM(S): 20 TABLET, FILM COATED ORAL at 22:14

## 2022-05-30 RX ADMIN — Medication 3 MILLILITER(S): at 10:19

## 2022-05-30 RX ADMIN — Medication 3 MILLILITER(S): at 04:07

## 2022-05-30 RX ADMIN — SEVELAMER CARBONATE 1600 MILLIGRAM(S): 2400 POWDER, FOR SUSPENSION ORAL at 17:40

## 2022-05-30 RX ADMIN — Medication 650 MILLIGRAM(S): at 22:16

## 2022-05-30 RX ADMIN — Medication 650 MILLIGRAM(S): at 05:04

## 2022-05-30 RX ADMIN — SODIUM ZIRCONIUM CYCLOSILICATE 10 GRAM(S): 10 POWDER, FOR SUSPENSION ORAL at 22:15

## 2022-05-30 RX ADMIN — ERYTHROPOIETIN 20000 UNIT(S): 10000 INJECTION, SOLUTION INTRAVENOUS; SUBCUTANEOUS at 17:39

## 2022-05-30 RX ADMIN — Medication 3 MILLILITER(S): at 16:25

## 2022-05-30 RX ADMIN — SODIUM ZIRCONIUM CYCLOSILICATE 10 GRAM(S): 10 POWDER, FOR SUSPENSION ORAL at 09:51

## 2022-05-30 RX ADMIN — Medication 90 MILLIGRAM(S): at 05:03

## 2022-05-30 RX ADMIN — GABAPENTIN 100 MILLIGRAM(S): 400 CAPSULE ORAL at 05:03

## 2022-05-30 RX ADMIN — Medication 3 MILLIGRAM(S): at 22:16

## 2022-05-30 RX ADMIN — Medication 100 MILLIGRAM(S): at 05:04

## 2022-05-30 RX ADMIN — GABAPENTIN 100 MILLIGRAM(S): 400 CAPSULE ORAL at 17:45

## 2022-05-30 RX ADMIN — Medication 100 MILLIGRAM(S): at 22:14

## 2022-05-30 RX ADMIN — Medication 100 MILLIGRAM(S): at 05:05

## 2022-05-30 RX ADMIN — Medication 3 MILLILITER(S): at 19:30

## 2022-05-30 RX ADMIN — PANTOPRAZOLE SODIUM 40 MILLIGRAM(S): 20 TABLET, DELAYED RELEASE ORAL at 05:05

## 2022-05-30 RX ADMIN — Medication 650 MILLIGRAM(S): at 16:07

## 2022-05-30 NOTE — PROGRESS NOTE ADULT - SUBJECTIVE AND OBJECTIVE BOX
FRANCIA MARTEL  89y  Female      Patient is a 89y old  Female who presents with a chief complaint of Respiratory failure, acute pulmonary edema. (29 May 2022 16:55)  Reported intermittent dyspnea.comfortable,nad.no cp,no cough,no fever    MEDICATIONS  (STANDING):  albuterol/ipratropium for Nebulization 3 milliLiter(s) Nebulizer every 6 hours  buMETAnide Infusion 1 mG/Hr (5 mL/Hr) IV Continuous <Continuous>  ferrous    sulfate Liquid 300 milliGRAM(s) Oral daily  gabapentin 100 milliGRAM(s) Oral two times a day  hydrALAZINE 100 milliGRAM(s) Oral three times a day  metolazone 10 milliGRAM(s) Oral daily  metoprolol succinate  milliGRAM(s) Oral daily  NIFEdipine XL 90 milliGRAM(s) Oral daily  pantoprazole    Tablet 40 milliGRAM(s) Oral before breakfast  sevelamer carbonate 1600 milliGRAM(s) Oral two times a day  simvastatin 40 milliGRAM(s) Oral at bedtime  sodium bicarbonate 650 milliGRAM(s) Oral three times a day  sodium zirconium cyclosilicate 10 Gram(s) Oral two times a day    MEDICATIONS  (PRN):  acetaminophen     Tablet .. 650 milliGRAM(s) Oral every 6 hours PRN Temp greater or equal to 38C (100.4F), Mild Pain (1 - 3)  aluminum hydroxide/magnesium hydroxide/simethicone Suspension 30 milliLiter(s) Oral every 4 hours PRN Dyspepsia  melatonin 3 milliGRAM(s) Oral at bedtime PRN Insomnia  ondansetron Injectable 4 milliGRAM(s) IV Push every 8 hours PRN Nausea and/or Vomiting    Vital Signs Last 24 Hrs  T(C): 37 (05-30-22 @ 21:14), Max: 37 (05-30-22 @ 04:20)  T(F): 98.6 (05-30-22 @ 21:14), Max: 98.6 (05-30-22 @ 04:20)  HR: 80 (05-30-22 @ 21:14) (70 - 88)  BP: 188/58 (05-30-22 @ 21:14) (145/64 - 188/58)  BP(mean): 100 (05-30-22 @ 04:20) (100 - 100)  RR: 18 (05-30-22 @ 21:14) (18 - 98)  SpO2: 100% (05-30-22 @ 21:14) (95% - 100%)                    PAST MEDICAL & SURGICAL HISTORY:  Hypertension      High cholesterol      CKD (chronic kidney disease)      Asthma      CHF (congestive heart failure)  diastolic heart failure      History of hysterectomy      RADIOLOGY & ADDITIONAL TESTS:    Imaging Personally Reviewed:  [ ] YES  [ ] NO    Consultant(s) Notes Reviewed:  [ ] YES  [ ] NO    PHYSICAL EXAM:  GENERAL: Alert and awake lying in bed in no distress  HEAD:  Atraumatic, Normocephalic  EYES: EOMI, ONEIL, conjunctiva and sclera clear  NECK: Supple, No JVD, Normal thyroid  NERVOUS SYSTEM:  Alert & Oriented X2, Motor and sensory systems are intact,   CHEST/LUNG: mild rhonchi at bases,  HEART: Regular rate and rhythm; No murmurs, rubs, or gallops  ABDOMEN: Soft, Nontender, Nondistended; Bowel sounds present  EXTREMITIES:   Peripheral Pulses are palpable, no  edema      LABS:  05-30    133<L>  |  92<L>  |  86<H>  ----------------------------<  103<H>  5.1   |  31  |  8.00<H>    Ca    11.7<H>      30 May 2022 05:00  Phos  6.3     05-30  Mg     2.60     05-30      Creatinine Trend: 8.00 <--, 7.76 <--, 7.59 <--, 7.67 <--, 7.67 <--, 7.55 <--, 7.49 <--, 7.57 <--                        7.2    9.55  )-----------( 270      ( 30 May 2022 05:00 )             22.8     Urine Studies:

## 2022-05-30 NOTE — PROGRESS NOTE ADULT - SUBJECTIVE AND OBJECTIVE BOX
Ryan Perez MD  Interventional Cardiology / Advance Heart Failure and Cardiac Transplant Specialist  [place] Office : [address] [place] [address] [place] [address]  Tel:   [place] Office : [address] [facility] [place] [address]  Tel: [phone]       Pt is lying in bed comfortable not in distress, no chest pains no SOB no palpitations  	  MEDICATIONS:  buMETAnide Infusion 1 mG/Hr IV Continuous <Continuous>  hydrALAZINE 100 milliGRAM(s) Oral three times a day  metolazone 10 milliGRAM(s) Oral daily  metoprolol succinate  milliGRAM(s) Oral daily  NIFEdipine XL 90 milliGRAM(s) Oral daily      albuterol/ipratropium for Nebulization 3 milliLiter(s) Nebulizer every 6 hours    acetaminophen     Tablet .. 650 milliGRAM(s) Oral every 6 hours PRN  gabapentin 100 milliGRAM(s) Oral two times a day  melatonin 3 milliGRAM(s) Oral at bedtime PRN  ondansetron Injectable 4 milliGRAM(s) IV Push every 8 hours PRN    aluminum hydroxide/magnesium hydroxide/simethicone Suspension 30 milliLiter(s) Oral every 4 hours PRN  pantoprazole    Tablet 40 milliGRAM(s) Oral before breakfast    simvastatin 40 milliGRAM(s) Oral at bedtime    ferrous    sulfate Liquid 300 milliGRAM(s) Oral daily  sodium bicarbonate 650 milliGRAM(s) Oral three times a day      PAST MEDICAL/SURGICAL HISTORY  PAST MEDICAL & SURGICAL HISTORY:  Hypertension      High cholesterol      CKD (chronic kidney disease)      Asthma      CHF (congestive heart failure)  diastolic heart failure      History of hysterectomy          SOCIAL HISTORY: Substance Use (street drugs): ( x ) never used  (  ) other:    FAMILY HISTORY:  No pertinent family history in first degree relatives        REVIEW OF SYSTEMS:  CONSTITUTIONAL: No fever, weight loss, or fatigue  EYES: No eye pain, visual disturbances, or discharge  ENMT:  No difficulty hearing, tinnitus, vertigo; No sinus or throat pain  BREASTS: No pain, masses, or nipple discharge  GASTROINTESTINAL: No abdominal or epigastric pain. No nausea, vomiting, or hematemesis; No diarrhea or constipation. No melena or hematochezia.  GENITOURINARY: No dysuria, frequency, hematuria, or incontinence  NEUROLOGICAL: No headaches, memory loss, loss of strength, numbness, or tremors  ENDOCRINE: No heat or cold intolerance; No hair loss  MUSCULOSKELETAL: No joint pain or swelling; No muscle, back, or extremity pain  PSYCHIATRIC: No depression, anxiety, mood swings, or difficulty sleeping  HEME/LYMPH: No easy bruising, or bleeding gums       PHYSICAL EXAM:  T(C): 37 (05-30-22 @ 21:14), Max: 37 (05-30-22 @ 04:20)  HR: 80 (05-30-22 @ 21:14) (70 - 88)  BP: 188/58 (05-30-22 @ 21:14) (145/64 - 188/58)  RR: 18 (05-30-22 @ 21:14) (18 - 98)  SpO2: 100% (05-30-22 @ 21:14) (95% - 100%)  Wt(kg): --  I&O's Summary    29 May 2022 07:01  -  30 May 2022 07:00  --------------------------------------------------------  IN: 1320 mL / OUT: 2630 mL / NET: -1310 mL    30 May 2022 07:01  -  30 May 2022 21:54  --------------------------------------------------------  IN: 975 mL / OUT: 1545 mL / NET: -570 mL          GENERAL: NAD  EYES:   PERRLA   ENMT:   Moist mucous membranes, Good dentition, No lesions  Cardiovascular: Normal S1 S2, No JVD, 2/6 systolic murmurs, No edema  Respiratory: Lungs clear to auscultation	  Gastrointestinal:  Soft, Non-tender, + BS	  Extremities: no edema                                    7.2    9.55  )-----------( 270      ( 30 May 2022 05:00 )             22.8     05-30    133<L>  |  92<L>  |  86<H>  ----------------------------<  103<H>  5.1   |  31  |  8.00<H>    Ca    11.7<H>      30 May 2022 05:00  Phos  6.3     05-30  Mg     2.60     05-30      proBNP:   Lipid Profile:   HgA1c:   TSH:     Consultant(s) Notes Reviewed:  [x ] YES  [ ] NO    Care Discussed with Consultants/Other Providers [ x] YES  [ ] NO    Imaging Personally Reviewed independently:  [x] YES  [ ] NO    All labs, radiologic studies, vitals, orders and medications list reviewed. Patient is seen and examined at bedside. Case discussed with medical team.

## 2022-05-30 NOTE — PROGRESS NOTE ADULT - SUBJECTIVE AND OBJECTIVE BOX
Sriram Riley MD (Nephrology progress note)  [address] [facility],  [address],  [id number]  TEl: [phone]  Cell: [phone]    Patient is a 89y Female seen and evaluated at bedside. Vital signs, laboratory data, imaging studies, consult notes reviewed done within past 24 hours. Overnight events noted. Patient awake but confused and disoriented lying in bed in no distress on nasal cannula oxygen. Interval elevated renal function with non oliguria. Last 24 hours I/o with net negative fluid balance upto 1.4 L with 2.6 L urine output on IV Bumex drip     Allergies    No Known Allergies    Intolerances        ROS:  Limited  Alert and awake in no distress on nasal cannula oxygen      VITALS:    T(C): 36.7 (05-30-22 @ 14:45), Max: 37 (05-30-22 @ 04:20)  HR: 75 (05-30-22 @ 14:45) (70 - 90)  BP: 145/64 (05-30-22 @ 14:45) (145/64 - 174/70)  RR: 19 (05-30-22 @ 14:45) (18 - 98)  SpO2: 98% (05-30-22 @ 14:45) (95% - 100%)  CAPILLARY BLOOD GLUCOSE          05-29-22 @ 07:01  -  05-30-22 @ 07:00  --------------------------------------------------------  IN: 1320 mL / OUT: 2630 mL / NET: -1310 mL      MEDICATIONS  (STANDING):  albuterol/ipratropium for Nebulization 3 milliLiter(s) Nebulizer every 6 hours  buMETAnide Infusion 1 mG/Hr (5 mL/Hr) IV Continuous <Continuous>  ferrous    sulfate Liquid 300 milliGRAM(s) Oral daily  gabapentin 100 milliGRAM(s) Oral two times a day  hydrALAZINE 100 milliGRAM(s) Oral three times a day  metolazone 10 milliGRAM(s) Oral daily  metoprolol succinate  milliGRAM(s) Oral daily  NIFEdipine XL 90 milliGRAM(s) Oral daily  pantoprazole    Tablet 40 milliGRAM(s) Oral before breakfast  sevelamer carbonate 1600 milliGRAM(s) Oral two times a day  simvastatin 40 milliGRAM(s) Oral at bedtime  sodium bicarbonate 650 milliGRAM(s) Oral three times a day  sodium zirconium cyclosilicate 10 Gram(s) Oral two times a day    MEDICATIONS  (PRN):  acetaminophen     Tablet .. 650 milliGRAM(s) Oral every 6 hours PRN Temp greater or equal to 38C (100.4F), Mild Pain (1 - 3)  aluminum hydroxide/magnesium hydroxide/simethicone Suspension 30 milliLiter(s) Oral every 4 hours PRN Dyspepsia  melatonin 3 milliGRAM(s) Oral at bedtime PRN Insomnia  ondansetron Injectable 4 milliGRAM(s) IV Push every 8 hours PRN Nausea and/or Vomiting      PHYSICAL EXAM:  GENERAL: Alert and awake, confused and disoriented x1-2 in no distress  HEENT: ONEIL, EOMI, neck supple, no JVP, no carotid bruit, oral mucosa moist and pink.  CHEST/LUNG: Bilateral decreased breath sounds, bibasilar rales and crepitations, Right>left  HEART: Regular rate and rhythm, KESHA II/VI at LPSB, no gallops, no rub   ABDOMEN: Soft, nontender, non distended, bowel sounds presentGU: No flank or supra pubic tenderness.  EXTREMITIES: Peripheral pulses are palpable, no pedal edema  Neurology: AAOx1-2, no focal neurological deficit  SKIN: No rash or skin lesion  Musculoskeletal: No joint deformity.      Vascular ACCESS:     LABS:                        7.2    9.55  )-----------( 270      ( 30 May 2022 05:00 )             22.8     05-30    133<L>  |  92<L>  |  86<H>  ----------------------------<  103<H>  5.1   |  31  |  8.00<H>    Ca    11.7<H>      30 May 2022 05:00  Phos  6.3     05-30  Mg     2.60     05-30                  RADIOLOGY & ADDITIONAL STUDIES:    Imaging Personally Reviewed:  [x] YES  [ ] NO    Consultant(s) Notes Reviewed:  [x] YES  [ ] NO    Care Discussed with Primary team/ Other Providers [x] YES  [ ] NO

## 2022-05-30 NOTE — CHART NOTE - NSCHARTNOTEFT_GEN_A_CORE
As per RN pt is now saturating 100% on 6L NC, weaning off BIPAP, pt passed dysphagia screen - dysphagia diet ordered, will c/t monitor.
Hgb noted to be downtrending, today Hgb 7.2. Discussed with Dr. Moreno, recommends no blood transfusion at this time.

## 2022-05-31 ENCOUNTER — TRANSCRIPTION ENCOUNTER (OUTPATIENT)
Age: 87
End: 2022-05-31

## 2022-05-31 LAB
ANION GAP SERPL CALC-SCNC: 15 MMOL/L — HIGH (ref 7–14)
BUN SERPL-MCNC: 89 MG/DL — HIGH (ref 7–23)
CALCIUM SERPL-MCNC: 11.6 MG/DL — HIGH (ref 8.4–10.5)
CHLORIDE SERPL-SCNC: 88 MMOL/L — LOW (ref 98–107)
CO2 SERPL-SCNC: 29 MMOL/L — SIGNIFICANT CHANGE UP (ref 22–31)
CREAT SERPL-MCNC: 8.05 MG/DL — HIGH (ref 0.5–1.3)
EGFR: 4 ML/MIN/1.73M2 — LOW
GLUCOSE SERPL-MCNC: 100 MG/DL — HIGH (ref 70–99)
HCT VFR BLD CALC: 25.2 % — LOW (ref 34.5–45)
HGB BLD-MCNC: 7.8 G/DL — LOW (ref 11.5–15.5)
MAGNESIUM SERPL-MCNC: 2.7 MG/DL — HIGH (ref 1.6–2.6)
MCHC RBC-ENTMCNC: 29.7 PG — SIGNIFICANT CHANGE UP (ref 27–34)
MCHC RBC-ENTMCNC: 31 GM/DL — LOW (ref 32–36)
MCV RBC AUTO: 95.8 FL — SIGNIFICANT CHANGE UP (ref 80–100)
NRBC # BLD: 0 /100 WBCS — SIGNIFICANT CHANGE UP
NRBC # FLD: 0 K/UL — SIGNIFICANT CHANGE UP
PHOSPHATE SERPL-MCNC: 6.6 MG/DL — HIGH (ref 2.5–4.5)
PLATELET # BLD AUTO: 298 K/UL — SIGNIFICANT CHANGE UP (ref 150–400)
POTASSIUM SERPL-MCNC: 4.4 MMOL/L — SIGNIFICANT CHANGE UP (ref 3.5–5.3)
POTASSIUM SERPL-SCNC: 4.4 MMOL/L — SIGNIFICANT CHANGE UP (ref 3.5–5.3)
RBC # BLD: 2.63 M/UL — LOW (ref 3.8–5.2)
RBC # FLD: 15 % — HIGH (ref 10.3–14.5)
SODIUM SERPL-SCNC: 132 MMOL/L — LOW (ref 135–145)
WBC # BLD: 10.19 K/UL — SIGNIFICANT CHANGE UP (ref 3.8–10.5)
WBC # FLD AUTO: 10.19 K/UL — SIGNIFICANT CHANGE UP (ref 3.8–10.5)

## 2022-05-31 RX ORDER — LANOLIN ALCOHOL/MO/W.PET/CERES
9 CREAM (GRAM) TOPICAL AT BEDTIME
Refills: 0 | Status: DISCONTINUED | OUTPATIENT
Start: 2022-05-31 | End: 2022-06-02

## 2022-05-31 RX ORDER — LANOLIN ALCOHOL/MO/W.PET/CERES
6 CREAM (GRAM) TOPICAL ONCE
Refills: 0 | Status: COMPLETED | OUTPATIENT
Start: 2022-05-31 | End: 2022-05-31

## 2022-05-31 RX ORDER — BUMETANIDE 0.25 MG/ML
2 INJECTION INTRAMUSCULAR; INTRAVENOUS
Refills: 0 | Status: DISCONTINUED | OUTPATIENT
Start: 2022-05-31 | End: 2022-06-02

## 2022-05-31 RX ORDER — HYDROMORPHONE HYDROCHLORIDE 2 MG/ML
2 INJECTION INTRAMUSCULAR; INTRAVENOUS; SUBCUTANEOUS EVERY 6 HOURS
Refills: 0 | Status: DISCONTINUED | OUTPATIENT
Start: 2022-05-31 | End: 2022-06-02

## 2022-05-31 RX ADMIN — BUMETANIDE 2 MILLIGRAM(S): 0.25 INJECTION INTRAMUSCULAR; INTRAVENOUS at 18:15

## 2022-05-31 RX ADMIN — SODIUM ZIRCONIUM CYCLOSILICATE 10 GRAM(S): 10 POWDER, FOR SUSPENSION ORAL at 10:24

## 2022-05-31 RX ADMIN — Medication 6 MILLIGRAM(S): at 02:15

## 2022-05-31 RX ADMIN — Medication 3 MILLILITER(S): at 09:49

## 2022-05-31 RX ADMIN — Medication 650 MILLIGRAM(S): at 05:27

## 2022-05-31 RX ADMIN — Medication 100 MILLIGRAM(S): at 21:56

## 2022-05-31 RX ADMIN — PANTOPRAZOLE SODIUM 40 MILLIGRAM(S): 20 TABLET, DELAYED RELEASE ORAL at 05:28

## 2022-05-31 RX ADMIN — Medication 3 MILLILITER(S): at 03:50

## 2022-05-31 RX ADMIN — Medication 100 MILLIGRAM(S): at 05:27

## 2022-05-31 RX ADMIN — GABAPENTIN 100 MILLIGRAM(S): 400 CAPSULE ORAL at 05:26

## 2022-05-31 RX ADMIN — Medication 650 MILLIGRAM(S): at 22:10

## 2022-05-31 RX ADMIN — Medication 90 MILLIGRAM(S): at 05:26

## 2022-05-31 RX ADMIN — Medication 650 MILLIGRAM(S): at 14:35

## 2022-05-31 RX ADMIN — SEVELAMER CARBONATE 1600 MILLIGRAM(S): 2400 POWDER, FOR SUSPENSION ORAL at 05:28

## 2022-05-31 RX ADMIN — BUMETANIDE 5 MG/HR: 0.25 INJECTION INTRAMUSCULAR; INTRAVENOUS at 05:27

## 2022-05-31 RX ADMIN — Medication 3 MILLILITER(S): at 15:38

## 2022-05-31 RX ADMIN — GABAPENTIN 100 MILLIGRAM(S): 400 CAPSULE ORAL at 18:16

## 2022-05-31 RX ADMIN — SIMVASTATIN 40 MILLIGRAM(S): 20 TABLET, FILM COATED ORAL at 22:10

## 2022-05-31 RX ADMIN — SEVELAMER CARBONATE 1600 MILLIGRAM(S): 2400 POWDER, FOR SUSPENSION ORAL at 18:15

## 2022-05-31 RX ADMIN — Medication 300 MILLIGRAM(S): at 10:24

## 2022-05-31 RX ADMIN — Medication 100 MILLIGRAM(S): at 05:26

## 2022-05-31 RX ADMIN — SODIUM ZIRCONIUM CYCLOSILICATE 10 GRAM(S): 10 POWDER, FOR SUSPENSION ORAL at 23:37

## 2022-05-31 NOTE — PROGRESS NOTE ADULT - ASSESSMENT
Patient is a 89y F PMHx HTN, CKD, CHF, asthma, presenting today with SOB. BIBEMS on NRB. EMS gave ASA, one sublingual nitro. BP on EMS arrival SBP >200. Patient family at bedside reports patient awoke from sleep with difficulty breathing. 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  2) Anemia of chronic renal disease  3) Renal osteodystrophy  4) Acute on chronic systolic/diastolic CHF with fluid overload  5) Hypercalcemia/Hyperphosphatemia  6) Severe AS  7) DNR/DNI    Recommend:  Strict I/o  Avoid Nephrotoxic agents  Switch IV Bumex to po  Continue Metolazone 10 mg po daily  Intact PTH, Vitamin D 1,25 level, PHos, calcium level  reviewed  Hold PO Calcitriol/all vitamin D prodcuts due to hypercalcemia  S/p PRBC tranfusion  Iron studies and Ferritin reviewed  Continue Feosol 300 ml po daily  Transfuse PRBC with goal Hgb>7  SQ EPO  Low K/Low phos renal diet  Phos binders  No aggressive measure per family and/or HD treatment at present  DNR/DNI status  NO RRT/HD  GOC with family  Optimal BP control with PO/IV medications with metoprolol ER, Hydralazine, NItrate, Nifedipine XL  Hospice home  Discharge planning per primary team  Will follow

## 2022-05-31 NOTE — DISCHARGE NOTE PROVIDER - PROVIDER TOKENS
FREE:[LAST:[Hospice],PHONE:[(   )    -],FAX:[(   )    -],ADDRESS:[Please call your Home Hospice Physician any further care or medical management needed]]

## 2022-05-31 NOTE — PROGRESS NOTE ADULT - ASSESSMENT
89F with PMh of HTN, CKD5, chronic diastolic CHF, asthma, who presents to the hospital with sudden on set of SOB     EKG: NSR no acute changes  Tele: NSR    1. CHF  -echo with normal LV systolic function, moderate diastolic dysfunction and new severe AS. no w/u planned as patient with advanced CKD and declining HD  -c/w PO bumex 2mg BID, monitor I&Os , on metolazone 10mg daily   -wean off oxygen as tolerated    2. CKD  -advanced, family currently declining HD. rec GOC plan for home hospice  -f/u renal    3. Anemia  -s/p PRBC transfusion  -continue to monitor H/H     4. HTN  -improving  -c/w hydral, metoprolol and nifedipine  -continue to monitor BP

## 2022-05-31 NOTE — PROGRESS NOTE ADULT - SUBJECTIVE AND OBJECTIVE BOX
FRANCIA MARTEL  89y  Female      Patient is a 89y old  Female who presents with a chief complaint of Respiratory failure, acute pulmonary edema. (29 May 2022 16:55)  Reported intermittent dyspnea.comfortable,nad.no cp,no cough,no fever    MEDICATIONS  (STANDING):  albuterol/ipratropium for Nebulization 3 milliLiter(s) Nebulizer every 6 hours  buMETAnide 2 milliGRAM(s) Oral two times a day  ferrous    sulfate Liquid 300 milliGRAM(s) Oral daily  gabapentin 100 milliGRAM(s) Oral two times a day  hydrALAZINE 100 milliGRAM(s) Oral three times a day  melatonin 9 milliGRAM(s) Oral at bedtime  metolazone 10 milliGRAM(s) Oral daily  metoprolol succinate  milliGRAM(s) Oral daily  NIFEdipine XL 90 milliGRAM(s) Oral daily  pantoprazole    Tablet 40 milliGRAM(s) Oral before breakfast  sevelamer carbonate 1600 milliGRAM(s) Oral two times a day  simvastatin 40 milliGRAM(s) Oral at bedtime  sodium bicarbonate 650 milliGRAM(s) Oral three times a day  sodium zirconium cyclosilicate 10 Gram(s) Oral two times a day    MEDICATIONS  (PRN):  acetaminophen     Tablet .. 650 milliGRAM(s) Oral every 6 hours PRN Temp greater or equal to 38C (100.4F), Mild Pain (1 - 3)  aluminum hydroxide/magnesium hydroxide/simethicone Suspension 30 milliLiter(s) Oral every 4 hours PRN Dyspepsia  HYDROmorphone   Tablet 2 milliGRAM(s) Oral every 6 hours PRN Moderate Pain (4 - 6)  ondansetron Injectable 4 milliGRAM(s) IV Push every 8 hours PRN Nausea and/or Vomiting    Vital Signs Last 24 Hrs  T(C): 37.3 (05-31-22 @ 21:33), Max: 37.3 (05-31-22 @ 21:33)  T(F): 99.2 (05-31-22 @ 21:33), Max: 99.2 (05-31-22 @ 21:33)  HR: 78 (05-31-22 @ 21:33) (66 - 84)  BP: 160/57 (05-31-22 @ 21:33) (142/76 - 160/57)  BP(mean): 100 (05-31-22 @ 05:13) (100 - 100)  RR: 18 (05-31-22 @ 21:33) (18 - 19)  SpO2: 98% (05-31-22 @ 21:33) (97% - 100%            PAST MEDICAL & SURGICAL HISTORY:  Hypertension      High cholesterol      CKD (chronic kidney disease)  Constitutional: No fever, fatigue  Skin: No rash.  Eyes: No recent vision problems or eye pain.  ENT: No congestion, ear pain, or sore throat.  Cardiovascular: No chest pain or palpation.  Respiratory: +cough, shortness of breath, congestion, or wheezing.  Gastrointestinal: No abdominal pain, nausea, vomiting, or diarrhea.  Genitourinary: No dysuria.  Musculoskeletal: No joint swelling.  Neurologic: No headache.      Asthma      CHF (congestive heart failure)  diastolic heart failure      History of hysterectomy      RADIOLOGY & ADDITIONAL TESTS:    Imaging Personally Reviewed:  [ ] YES  [ ] NO    Consultant(s) Notes Reviewed:  [ ] YES  [ ] NO    PHYSICAL EXAM:  GENERAL: Alert and awake lying in bed in no distress  HEAD:  Atraumatic, Normocephalic  EYES: EOMI, ONEIL, conjunctiva and sclera clear  NECK: Supple, No JVD, Normal thyroid  NERVOUS SYSTEM:  aleert awake  CHEST/LUNG: mild rhonchi at bases,  HEART: Regular rate and rhythm; No murmurs, rubs, or gallops  ABDOMEN: Soft, Nontender, Nondistended; Bowel sounds present  EXTREMITIES:   Peripheral Pulses are palpable, no  edema      LABS:  05-31    132<L>  |  88<L>  |  89<H>  ----------------------------<  100<H>  4.4   |  29  |  8.05<H>    Ca    11.6<H>      31 May 2022 05:13  Phos  6.6     05-31  Mg     2.70     05-31      Creatinine Trend: 8.05 <--, 8.00 <--, 7.76 <--, 7.59 <--, 7.67 <--, 7.67 <--, 7.55 <--                        7.8    10.19 )-----------( 298      ( 31 May 2022 05:13 )             25.2     Urine Studies:

## 2022-05-31 NOTE — DISCHARGE NOTE PROVIDER - NSDCCPCAREPLAN_GEN_ALL_CORE_FT
PRINCIPAL DISCHARGE DIAGNOSIS  Diagnosis: Acute on chronic kidney failure  Assessment and Plan of Treatment: You were admited with worsening kidney disease, and it was decided that no dialysis would be initiated, and upon Goals of Care you were made a DNR/DNI, with comfort measures initiated after Palliative Care discussion. Nephrology was consulted and adjusted your medications to assist with diuresis. Continue recommended medication regimen. Monitor for signs/symptoms of fluid overload and electrolyte abnormalities, such as, shortness of breath, cough, swelling, chest discomfort, changes in heart rate, dizziness, fainting, or changes in mental status. [facility]up with [facility] for further medical management         SECONDARY DISCHARGE DIAGNOSES  Diagnosis: Acute pulmonary edema  Assessment and Plan of Treatment: You were evaluated by Nephrology and your medications were adjusted from IV to oral diuretics. You were evaluated by Palliative Care Team and medication was ordered to help decrease your shortness of breath. Continue these medications as ordered and if there are any changes follow up with  your [facility] for further medical management .    Diagnosis: Asthma  Assessment and Plan of Treatment: Continue current medications as prescribed.  Avoid exposures to environmental allergens such as carpets, pets and both first-hand and second-hand smoking.  During seasonal allergy period, take a shower as soon as you get home and change your clothes immediately.  Follow up your routine medical appointments, or follow up with your [facility] Physician for further medical management.      Diagnosis: Encounter for palliative care  Assessment and Plan of Treatment: You were evaluated by the Palliative Care team with recommendations for comfort care. Your family made a decision to bring you home on Home Hospice. Follow up with your [facility] for further medical management as needed.    Diagnosis: Acute respiratory failure  Assessment and Plan of Treatment:      PRINCIPAL DISCHARGE DIAGNOSIS  Diagnosis: Acute on chronic kidney failure  Assessment and Plan of Treatment: You were admited with worsening kidney disease, and it was decided that no dialysis would be initiated, and upon Goals of Care you were made a DNR/DNI, with comfort measures initiated after Palliative Care discussion. Nephrology was consulted and adjusted your medications to assist with diuresis. Continue recommended medication regimen. Monitor for signs/symptoms of fluid overload and electrolyte abnormalities, such as, shortness of breath, cough, swelling, chest discomfort, changes in heart rate, dizziness, fainting, or changes in mental status. Follow-up with your [facility] for further medical management         SECONDARY DISCHARGE DIAGNOSES  Diagnosis: Acute pulmonary edema  Assessment and Plan of Treatment: You were evaluated by Nephrology and your medications were adjusted from IV to oral diuretics. You were evaluated by Palliative Care Team and medication was ordered to help decrease your shortness of breath. Continue these medications as ordered and if there are any changes follow up with  your [facility] for further medical management .    Diagnosis: Asthma  Assessment and Plan of Treatment: Continue current medications as prescribed.  Avoid exposures to environmental allergens such as carpets, pets and both first-hand and second-hand smoking.  During seasonal allergy period, take a shower as soon as you get home and change your clothes immediately.  Follow up your routine medical appointments, or follow up with your [facility] Physician for further medical management.      Diagnosis: Encounter for palliative care  Assessment and Plan of Treatment: You were evaluated by the Palliative Care team with recommendations for comfort care. Your family made a decision to bring you home on Home [facility]. Follow up with your [facility] for further medical management as needed.    Diagnosis: Acute respiratory failure  Assessment and Plan of Treatment: Continue to use your oxygen and medications to decrease your shortness of breath. Follow up with your [facility] for further medical management as needed.     PRINCIPAL DISCHARGE DIAGNOSIS  Diagnosis: Acute on chronic kidney failure  Assessment and Plan of Treatment: You were admited with worsening kidney disease, and it was decided that no dialysis would be initiated, and upon Goals of Care you were made a DNR/DNI, with comfort measures initiated after Palliative Care discussion. Nephrology was consulted and adjusted your medications to assist with diuresis. Continue recommended medication regimen. Monitor for signs/symptoms of fluid overload and electrolyte abnormalities, such as, shortness of breath, cough, swelling, chest discomfort, changes in heart rate, dizziness, fainting, or changes in mental status. Follow-up with your [facility] for further medical management         SECONDARY DISCHARGE DIAGNOSES  Diagnosis: Acute pulmonary edema  Assessment and Plan of Treatment: You were evaluated by Nephrology and your medications were adjusted from IV to oral diuretics. You were evaluated by Palliative Care Team and medication was ordered to help decrease your shortness of breath. Continue these medications as ordered and if there are any changes follow up with  your [facility] for further medical management .    Diagnosis: Asthma  Assessment and Plan of Treatment: Continue current medications, and nebulizer as prescribed.  Avoid exposures to environmental allergens such as carpets, pets and both first-hand and second-hand smoking.  During seasonal allergy period, take a shower as soon as you get home and change your clothes immediately.  Follow up your routine medical appointments, or follow up with your [facility] Physician for further medical management.      Diagnosis: Encounter for palliative care  Assessment and Plan of Treatment: You were evaluated by the Palliative Care team with recommendations for comfort care. Your family made a decision to bring you home on Home [facility]ospice. Follow up with your [facility] for further medical management as needed.    Diagnosis: Acute respiratory failure  Assessment and Plan of Treatment: Continue to use your oxygen, nebulizer, and medications to decrease your shortness of breath. Follow up with your [facility] for further medical management as needed.

## 2022-05-31 NOTE — DISCHARGE NOTE PROVIDER - CARE PROVIDER_API CALL
Hospice,   Please call your Home [facility]osp[facility] Physician any further care or medical management needed  Phone: (   )    -  Fax: (   )    -  Follow Up Time:

## 2022-05-31 NOTE — DISCHARGE NOTE PROVIDER - HOSPITAL COURSE
89F with PMh of HTN, CKD5, chronic diastolic CHF, asthma presenting w/ abrupt onset of SOB. Found to have acute pulmonary edema i/s/o HTN emergency and advanced CKD. Admit for further management.      Acute pulmonary edema.   ·  Plan: -In setting of HTN emergency/acute CHF  -Initially on NRB, switched to BiPAP w/ good effect. Satting fine. Respiration nonlabored.  -Continue iv bumex for diuresis.metolazone  -Closely monitor while on BiPAP. Wean as tolerated.  -MICU recs ( re: BIPAP) noted.  -Note that pt is DNR/DNI - MOLST done and health care proxy form copied in chart.    Acute on chronic diastolic congestive heart failure.   ·  Plan: Overloaded on exam.  -IV diuresis as above.  -Can check ECHO.   -Strict I:O, daily wts.    Metabolic encephalopathy.   ·  Plan: Likely in setting of respiratory failure/CHF.  -Continue optimize above.    Stage 5 chronic kidney disease not on chronic dialysis.   ·  Plan: -Renal f/u noted.     Asthma.   ·  Plan: -Stable. Duoneb PRN.    HLD (hyperlipidemia).   ·  Plan: Stable. Cont statin.     On ___ this case was reviewed with  ____, the patient is medically stable and optimized for discharge. All medications were reviewed and prescriptions were sent to mutually agreed upon pharmacy.   89F with PMh of HTN, CKD5, chronic diastolic CHF, asthma presenting w/ abrupt onset of SOB. Found to have acute pulmonary edema i/s/o HTN emergency and advanced CKD. Admit for further management.      Acute pulmonary edema.   ·  Plan: -In setting of HTN emergency/acute CHF  -Initially on NRB, switched to BiPAP w/ good effect. Satting fine. Respiration nonlabored.  -Continue iv bumex for diuresis.metolazone  -Closely monitor while on BiPAP. Wean as tolerated.  -MICU recs ( re: BIPAP) noted.  -Note that pt is DNR/DNI - MOLST done and health care proxy form copied in chart.  -Palliative care consulted , patient placed on comfort care awaiting Home Hospice    Acute on chronic diastolic congestive heart failure.   ·  Plan: Overloaded on exam.  -IV diuresis as above.  -Can check ECHO.   -Strict I:O, daily wts.    Metabolic encephalopathy.   ·  Plan: Likely in setting of respiratory failure/CHF.  -Continue optimize above.    Stage 5 chronic kidney disease not on chronic dialysis.   ·  Plan: -Renal f/u noted.  -Family declined dialysis     Asthma.   ·  Plan: -Stable. Duoneb PRN.    HLD (hyperlipidemia).   ·  Plan: Stable. Cont statin.     On 6/2 this case was reviewed with Dr. Nelson, the patient is medically stable and optimized for discharge on Home Hospice. All medications were reviewed and prescriptions were sent to mutually agreed upon pharmacy.   89F with PMh of HTN, CKD5, chronic diastolic CHF, asthma presenting w/ abrupt onset of SOB. Found to have acute pulmonary edema i/s/o HTN emergency and advanced CKD. Admit for further management.      Acute pulmonary edema.   ·  Plan: -In setting of HTN emergency/acute CHF  -Initially on NRB, switched to BiPAP w/ good effect. Satting fine. Respiration nonlabored.  -Continue iv bumex for diuresis.metolazone  -Closely monitor while on BiPAP. Wean as tolerated.  -MICU recs ( re: BIPAP) noted.Patient refusing BIPAP, weaned to 3 L nC, with O2 sat %  -Pt is DNR/DNI - MOLST done and health care proxy form copied in chart.  -Palliative care consulted , patient placed on comfort care awaiting Home Hospice  -Hospice approved , equipment was delivered    Acute on chronic diastolic congestive heart failure.   ·  Plan: Overloaded on exam.  -Cardiology consulted   -echo with normal LV systolic function, moderate diastolic dysfunction and new severe AS. no w/u planned as patient with advanced CKD and declining HD  -c/w PO bumex 2mg BID, monitor I&Os , on metolazone 10mg daily   -wean off oxygen as tolerated  -IV diuresis as above.  -Strict I:O, daily wts.    Metabolic encephalopathy.   ·  Plan: Likely in setting of respiratory failure/CHF.  -Continue optimize above.    Stage 5 chronic kidney disease not on chronic dialysis.   ·  Plan: -Renal f/u noted.  -Renal consulted appreciate recs  -Strict I/o  -Avoid Nephrotoxic agents  -Continue PO Bumex  -Continue Metolazone 10 mg po daily  -Continue Lokelma 10 mg po bid  -Family declined dialysis     Asthma.   ·  Plan: -Stable. Duoneb PRN.    HLD (hyperlipidemia).   ·  Plan: Stable. Cont statin.     On 6/2 this case was reviewed with Dr. Moreno, the patient is medically stable and optimized for discharge on Home Hospice. All medications were reviewed and prescriptions were sent to mutually agreed upon pharmacy. Family agrees with the plan of care.

## 2022-05-31 NOTE — DISCHARGE NOTE PROVIDER - NSDCMRMEDTOKEN_GEN_ALL_CORE_FT
Bumex 2 mg oral tablet: 1 tab(s) orally 2 times a day  calcitriol 0.5 mcg oral capsule: 1 cap(s) orally 2 times a day  ferrous gluconate 324 mg (38 mg elemental iron) oral tablet: 1 tab(s) orally 2 times a day  gabapentin 100 mg oral capsule: 1 cap(s) orally 3 times a day  hydrALAZINE 100 mg oral tablet: 1 tab(s) orally 3 times a day  metoprolol succinate 100 mg oral tablet, extended release: 1 tab(s) orally once a day  NIFEdipine 90 mg oral tablet, extended release: 1 tab(s) orally once a day  Protonix 40 mg oral delayed release tablet: 1 tab(s) orally once a day  simvastatin 40 mg oral tablet: 1 tab(s) orally once a day (at bedtime)  sodium bicarbonate 650 mg oral tablet: 2 tab(s) orally 2 times a day   acetaminophen 325 mg oral tablet: 2 tab(s) orally every 6 hours, As needed, Temp greater or equal to 38C (100.4F), Mild Pain (1 - 3)  aluminum hydroxide-magnesium hydroxide 200 mg-200 mg/5 mL oral suspension: 30 milliliter(s) orally every 4 hours, As needed, Dyspepsia  bumetanide 2 mg oral tablet: 1 tab(s) orally 2 times a day  ferrous sulfate 300 mg/5 mL (60 mg/5 mL elemental iron) oral liquid: 5 milliliter(s) orally once a day  gabapentin 100 mg oral capsule: 1 cap(s) orally 2 times a day  hydrALAZINE 100 mg oral tablet: 1 tab(s) orally 3 times a day  HYDROmorphone 2 mg oral tablet: 1 tab(s) orally every 6 hours, As needed, Moderate Pain (4 - 6) MDD:4 tabs  ipratropium-albuterol 0.5 mg-2.5 mg/3 mL inhalation solution: 3 milliliter(s) inhaled every 6 hours, As Needed   Lokelma 10 g oral powder for reconstitution: 10 gram(s) orally 2 times a day   melatonin 3 mg oral tablet: 3 tab(s) orally once a day (at bedtime)  metOLazone 10 mg oral tablet: 1 tab(s) orally once a day  metoprolol succinate 100 mg oral tablet, extended release: 1 tab(s) orally once a day  NIFEdipine 90 mg oral tablet, extended release: 1 tab(s) orally once a day  Protonix 40 mg oral delayed release tablet: 1 tab(s) orally once a day  sevelamer carbonate 800 mg oral tablet: 2 tab(s) orally 2 times a day  simvastatin 40 mg oral tablet: 1 tab(s) orally once a day (at bedtime)  sodium bicarbonate 650 mg oral tablet: 1 tab(s) orally 3 times a day

## 2022-05-31 NOTE — PROGRESS NOTE ADULT - SUBJECTIVE AND OBJECTIVE BOX
Ryan Perez MD  Interventional Cardiology / Endovascular Specialist  [place] Office : [phone] [place] [id number]  Tel:   [place] Office : [phone] [facility] [place] [address]  Tel: [phone]      Subjective/Overnight events: Patient lying in bed comfortably. No acute distress.    MEDICATIONS:  buMETAnide 2 milliGRAM(s) Oral two times a day  hydrALAZINE 100 milliGRAM(s) Oral three times a day  metolazone 10 milliGRAM(s) Oral daily  metoprolol succinate  milliGRAM(s) Oral daily  NIFEdipine XL 90 milliGRAM(s) Oral daily      albuterol/ipratropium for Nebulization 3 milliLiter(s) Nebulizer every 6 hours    acetaminophen     Tablet .. 650 milliGRAM(s) Oral every 6 hours PRN  gabapentin 100 milliGRAM(s) Oral two times a day  HYDROmorphone   Tablet 2 milliGRAM(s) Oral every 6 hours PRN  melatonin 3 milliGRAM(s) Oral at bedtime PRN  ondansetron Injectable 4 milliGRAM(s) IV Push every 8 hours PRN    aluminum hydroxide/magnesium hydroxide/simethicone Suspension 30 milliLiter(s) Oral every 4 hours PRN  pantoprazole    Tablet 40 milliGRAM(s) Oral before breakfast    simvastatin 40 milliGRAM(s) Oral at bedtime    ferrous    sulfate Liquid 300 milliGRAM(s) Oral daily  sodium bicarbonate 650 milliGRAM(s) Oral three times a day      PAST MEDICAL/SURGICAL HISTORY  PAST MEDICAL & SURGICAL HISTORY:  Hypertension      High cholesterol      CKD (chronic kidney disease)      Asthma      CHF (congestive heart failure)  diastolic heart failure      History of hysterectomy          SOCIAL HISTORY: Substance Use (street drugs): ( x ) never used  (  ) other:        PHYSICAL EXAM:  T(C): 36.7 (05-31-22 @ 13:05), Max: 37 (05-30-22 @ 21:14)  HR: 67 (05-31-22 @ 14:33) (66 - 84)  BP: 145/73 (05-31-22 @ 13:05) (142/76 - 188/58)  RR: 18 (05-31-22 @ 13:05) (18 - 19)  SpO2: 97% (05-31-22 @ 14:33) (97% - 100%)  Wt(kg): --  I&O's Summary    30 May 2022 07:01  -  31 May 2022 07:00  --------------------------------------------------------  IN: 1510 mL / OUT: 2345 mL / NET: -835 mL          GENERAL: NAD  EYES:   PERRLA   ENMT:   Moist mucous membranes, Good dentition, No lesions  Cardiovascular: Normal S1 S2, No JVD, 2/6 systolic murmurs, No edema  Respiratory: Lungs clear to auscultation	  Gastrointestinal:  Soft, Non-tender, + BS	  Extremities: no edema                                    7.8    10.19 )-----------( 298      ( 31 May 2022 05:13 )             25.2     05-31    132<L>  |  88<L>  |  89<H>  ----------------------------<  100<H>  4.4   |  29  |  8.05<H>    Ca    11.6<H>      31 May 2022 05:13  Phos  6.6     05-31  Mg     2.70     05-31      proBNP:   Lipid Profile:   HgA1c:   TSH:     Consultant(s) Notes Reviewed:  [x ] YES  [ ] NO    Care Discussed with Consultants/Other Providers [ x] YES  [ ] NO    Imaging Personally Reviewed independently:  [x] YES  [ ] NO    All labs, radiologic studies, vitals, orders and medications list reviewed. Patient is seen and examined at bedside. Case discussed with medical team.

## 2022-05-31 NOTE — PROGRESS NOTE ADULT - SUBJECTIVE AND OBJECTIVE BOX
Sriram Riley MD (Nephrology progress note)  [address] [facility],  [id number],  [id number]  TEl: [phone]  Cell: [phone]    Patient is a 89y Female seen and evaluated at bedside. Vital signs, laboratory data, imaging studies, consult notes reviewed done within past 24 hours. Overnight events noted. Patient alert and awake in no distress lying in bed. Interval elevated renal function with non oliguria. Await Home Hospice    Allergies    No Known Allergies    Intolerances        ROS:  Limited  Alert and awake in no distress on nasal cannula oxygen    VITALS:    T(C): 36.7 (05-31-22 @ 13:05), Max: 37 (05-30-22 @ 21:14)  HR: 76 (05-31-22 @ 15:46) (66 - 84)  BP: 145/73 (05-31-22 @ 13:05) (142/76 - 188/58)  RR: 18 (05-31-22 @ 13:05) (18 - 19)  SpO2: 97% (05-31-22 @ 14:33) (97% - 100%)  CAPILLARY BLOOD GLUCOSE          05-30-22 @ 07:01  -  05-31-22 @ 07:00  --------------------------------------------------------  IN: 1510 mL / OUT: 2345 mL / NET: -835 mL    05-31-22 @ 07:01  -  05-31-22 @ 16:49  --------------------------------------------------------  IN: 605 mL / OUT: 790 mL / NET: -185 mL      MEDICATIONS  (STANDING):  albuterol/ipratropium for Nebulization 3 milliLiter(s) Nebulizer every 6 hours  buMETAnide 2 milliGRAM(s) Oral two times a day  ferrous    sulfate Liquid 300 milliGRAM(s) Oral daily  gabapentin 100 milliGRAM(s) Oral two times a day  hydrALAZINE 100 milliGRAM(s) Oral three times a day  metolazone 10 milliGRAM(s) Oral daily  metoprolol succinate  milliGRAM(s) Oral daily  NIFEdipine XL 90 milliGRAM(s) Oral daily  pantoprazole    Tablet 40 milliGRAM(s) Oral before breakfast  sevelamer carbonate 1600 milliGRAM(s) Oral two times a day  simvastatin 40 milliGRAM(s) Oral at bedtime  sodium bicarbonate 650 milliGRAM(s) Oral three times a day  sodium zirconium cyclosilicate 10 Gram(s) Oral two times a day    MEDICATIONS  (PRN):  acetaminophen     Tablet .. 650 milliGRAM(s) Oral every 6 hours PRN Temp greater or equal to 38C (100.4F), Mild Pain (1 - 3)  aluminum hydroxide/magnesium hydroxide/simethicone Suspension 30 milliLiter(s) Oral every 4 hours PRN Dyspepsia  HYDROmorphone   Tablet 2 milliGRAM(s) Oral every 6 hours PRN Moderate Pain (4 - 6)  melatonin 3 milliGRAM(s) Oral at bedtime PRN Insomnia  ondansetron Injectable 4 milliGRAM(s) IV Push every 8 hours PRN Nausea and/or Vomiting      PHYSICAL EXAM:  GENERAL: Alert, awake and oriented x2-3 in no distress, Hard of hearing,  HEENT: ONEIL, EOMI, neck supple, no JVP.  CHEST/LUNG: Bilateral decreased breath sounds, Bibasilar rales and crepitations  HEART: Regular rate and rhythm, KESHA II/VI at LPSB, no gallops, no rub   ABDOMEN: Soft, nontender, non distended, bowel sounds present,  : No flank or supra pubic tenderness.  EXTREMITIES: Peripheral pulses are palpable, no pedal edema  Neurology: AAOx2-3, no focal neurological deficit  SKIN: No rash or skin lesion  Musculoskeletal: No joint deformity or spinal tenderness.      Vascular ACCESS: None    LABS:                        7.8    10.19 )-----------( 298      ( 31 May 2022 05:13 )             25.2     05-31    132<L>  |  88<L>  |  89<H>  ----------------------------<  100<H>  4.4   |  29  |  8.05<H>    Ca    11.6<H>      31 May 2022 05:13  Phos  6.6     05-31  Mg     2.70     05-31                  RADIOLOGY & ADDITIONAL STUDIES:    Imaging Personally Reviewed:  [x] YES  [ ] NO    Consultant(s) Notes Reviewed:  [x] YES  [ ] NO    Care Discussed with Primary team/ Other Providers [x] YES  [ ] NO

## 2022-06-01 LAB
ANION GAP SERPL CALC-SCNC: 15 MMOL/L — HIGH (ref 7–14)
BUN SERPL-MCNC: 93 MG/DL — HIGH (ref 7–23)
CALCIUM SERPL-MCNC: 11.7 MG/DL — HIGH (ref 8.4–10.5)
CHLORIDE SERPL-SCNC: 89 MMOL/L — LOW (ref 98–107)
CO2 SERPL-SCNC: 31 MMOL/L — SIGNIFICANT CHANGE UP (ref 22–31)
CREAT SERPL-MCNC: 8.12 MG/DL — HIGH (ref 0.5–1.3)
EGFR: 4 ML/MIN/1.73M2 — LOW
GLUCOSE SERPL-MCNC: 99 MG/DL — SIGNIFICANT CHANGE UP (ref 70–99)
HCT VFR BLD CALC: 25.3 % — LOW (ref 34.5–45)
HGB BLD-MCNC: 7.9 G/DL — LOW (ref 11.5–15.5)
MAGNESIUM SERPL-MCNC: 2.8 MG/DL — HIGH (ref 1.6–2.6)
MCHC RBC-ENTMCNC: 29.9 PG — SIGNIFICANT CHANGE UP (ref 27–34)
MCHC RBC-ENTMCNC: 31.2 GM/DL — LOW (ref 32–36)
MCV RBC AUTO: 95.8 FL — SIGNIFICANT CHANGE UP (ref 80–100)
NRBC # BLD: 0 /100 WBCS — SIGNIFICANT CHANGE UP
NRBC # FLD: 0 K/UL — SIGNIFICANT CHANGE UP
PHOSPHATE SERPL-MCNC: 5.8 MG/DL — HIGH (ref 2.5–4.5)
PLATELET # BLD AUTO: 303 K/UL — SIGNIFICANT CHANGE UP (ref 150–400)
POTASSIUM SERPL-MCNC: 4.3 MMOL/L — SIGNIFICANT CHANGE UP (ref 3.5–5.3)
POTASSIUM SERPL-SCNC: 4.3 MMOL/L — SIGNIFICANT CHANGE UP (ref 3.5–5.3)
RBC # BLD: 2.64 M/UL — LOW (ref 3.8–5.2)
RBC # FLD: 15.2 % — HIGH (ref 10.3–14.5)
SODIUM SERPL-SCNC: 135 MMOL/L — SIGNIFICANT CHANGE UP (ref 135–145)
WBC # BLD: 10.6 K/UL — HIGH (ref 3.8–10.5)
WBC # FLD AUTO: 10.6 K/UL — HIGH (ref 3.8–10.5)

## 2022-06-01 RX ADMIN — Medication 650 MILLIGRAM(S): at 05:04

## 2022-06-01 RX ADMIN — SEVELAMER CARBONATE 1600 MILLIGRAM(S): 2400 POWDER, FOR SUSPENSION ORAL at 05:01

## 2022-06-01 RX ADMIN — Medication 650 MILLIGRAM(S): at 21:15

## 2022-06-01 RX ADMIN — GABAPENTIN 100 MILLIGRAM(S): 400 CAPSULE ORAL at 05:04

## 2022-06-01 RX ADMIN — Medication 3 MILLILITER(S): at 03:25

## 2022-06-01 RX ADMIN — BUMETANIDE 2 MILLIGRAM(S): 0.25 INJECTION INTRAMUSCULAR; INTRAVENOUS at 13:15

## 2022-06-01 RX ADMIN — BUMETANIDE 2 MILLIGRAM(S): 0.25 INJECTION INTRAMUSCULAR; INTRAVENOUS at 05:01

## 2022-06-01 RX ADMIN — Medication 100 MILLIGRAM(S): at 05:05

## 2022-06-01 RX ADMIN — Medication 9 MILLIGRAM(S): at 21:15

## 2022-06-01 RX ADMIN — Medication 650 MILLIGRAM(S): at 13:15

## 2022-06-01 RX ADMIN — Medication 100 MILLIGRAM(S): at 21:15

## 2022-06-01 RX ADMIN — Medication 100 MILLIGRAM(S): at 05:03

## 2022-06-01 RX ADMIN — PANTOPRAZOLE SODIUM 40 MILLIGRAM(S): 20 TABLET, DELAYED RELEASE ORAL at 05:04

## 2022-06-01 RX ADMIN — Medication 3 MILLILITER(S): at 15:17

## 2022-06-01 RX ADMIN — SODIUM ZIRCONIUM CYCLOSILICATE 10 GRAM(S): 10 POWDER, FOR SUSPENSION ORAL at 11:32

## 2022-06-01 RX ADMIN — SEVELAMER CARBONATE 1600 MILLIGRAM(S): 2400 POWDER, FOR SUSPENSION ORAL at 17:01

## 2022-06-01 RX ADMIN — SODIUM ZIRCONIUM CYCLOSILICATE 10 GRAM(S): 10 POWDER, FOR SUSPENSION ORAL at 21:15

## 2022-06-01 RX ADMIN — SIMVASTATIN 40 MILLIGRAM(S): 20 TABLET, FILM COATED ORAL at 21:15

## 2022-06-01 RX ADMIN — Medication 90 MILLIGRAM(S): at 05:01

## 2022-06-01 RX ADMIN — Medication 300 MILLIGRAM(S): at 13:16

## 2022-06-01 RX ADMIN — GABAPENTIN 100 MILLIGRAM(S): 400 CAPSULE ORAL at 17:01

## 2022-06-01 RX ADMIN — Medication 3 MILLILITER(S): at 09:26

## 2022-06-01 RX ADMIN — Medication 3 MILLILITER(S): at 21:48

## 2022-06-01 NOTE — PROGRESS NOTE ADULT - PROBLEM SELECTOR PROBLEM 1
Acute pulmonary edema

## 2022-06-01 NOTE — PROGRESS NOTE ADULT - PROBLEM SELECTOR PLAN 2
Overloaded on exam.  -IV diuresis as above.  -Can check ECHO.   -Strict I:O, daily wts.
cont diuresis
Overloaded on exam.  -IV diuresis as above.  -Can check ECHO.   -Strict I:O, daily wts.
cont diuresis
Overloaded on exam.  -IV diuresis as above.  -Can check ECHO.   -Strict I:O, daily wts.

## 2022-06-01 NOTE — PROGRESS NOTE ADULT - PROBLEM SELECTOR PLAN 5
-Stable. Zoë SIMMONSN.
Psych/Behavioral
-Stable. Zoë SIMMONSN.

## 2022-06-01 NOTE — PROGRESS NOTE ADULT - SUBJECTIVE AND OBJECTIVE BOX
FRANCIA MARTEL  89y  Female      Patient is a 89y old  Female who presents with a chief complaint of Respiratory failure, acute pulmonary edema. (29 May 2022 16:55)    MEDICATIONS  (STANDING):  albuterol/ipratropium for Nebulization 3 milliLiter(s) Nebulizer every 6 hours  buMETAnide 2 milliGRAM(s) Oral two times a day  ferrous    sulfate Liquid 300 milliGRAM(s) Oral daily  gabapentin 100 milliGRAM(s) Oral two times a day  hydrALAZINE 100 milliGRAM(s) Oral three times a day  melatonin 9 milliGRAM(s) Oral at bedtime  metolazone 10 milliGRAM(s) Oral daily  metoprolol succinate  milliGRAM(s) Oral daily  NIFEdipine XL 90 milliGRAM(s) Oral daily  pantoprazole    Tablet 40 milliGRAM(s) Oral before breakfast  sevelamer carbonate 1600 milliGRAM(s) Oral two times a day  simvastatin 40 milliGRAM(s) Oral at bedtime  sodium bicarbonate 650 milliGRAM(s) Oral three times a day  sodium zirconium cyclosilicate 10 Gram(s) Oral two times a day    MEDICATIONS  (PRN):  acetaminophen     Tablet .. 650 milliGRAM(s) Oral every 6 hours PRN Temp greater or equal to 38C (100.4F), Mild Pain (1 - 3)  aluminum hydroxide/magnesium hydroxide/simethicone Suspension 30 milliLiter(s) Oral every 4 hours PRN Dyspepsia  HYDROmorphone   Tablet 2 milliGRAM(s) Oral every 6 hours PRN Moderate Pain (4 - 6)  ondansetron Injectable 4 milliGRAM(s) IV Push every 8 hours PRN Nausea and/or Vomiting    Vital Signs Last 24 Hrs  T(C): 37.1 (06-01-22 @ 20:13), Max: 37.2 (06-01-22 @ 04:40)  T(F): 98.8 (06-01-22 @ 20:13), Max: 98.9 (06-01-22 @ 04:40)  HR: 80 (06-01-22 @ 20:13) (68 - 80)  BP: 160/71 (06-01-22 @ 20:13) (150/56 - 160/71)  BP(mean): 78 (06-01-22 @ 04:40) (78 - 78)  RR: 18 (06-01-22 @ 20:13) (18 - 20)  SpO2: 98% (06-01-22 @ 15:02) (98% - 100%)          PAST MEDICAL & SURGICAL HISTORY:  Hypertension      High cholesterol      CKD (chronic kidney disease)  Constitutional: No fever, fatigue  Skin: No rash.  Eyes: No recent vision problems or eye pain.  ENT: No congestion, ear pain, or sore throat.  Cardiovascular: No chest pain or palpation.  Respiratory: +cough, shortness of breath, congestion, or wheezing.  Gastrointestinal: No abdominal pain, nausea, vomiting, or diarrhea.  Genitourinary: No dysuria.  Musculoskeletal: No joint swelling.  Neurologic: No headache.      Asthma      CHF (congestive heart failure)  diastolic heart failure      History of hysterectomy      RADIOLOGY & ADDITIONAL TESTS:    Imaging Personally Reviewed:  [ ] YES  [ ] NO    Consultant(s) Notes Reviewed:  [ ] YES  [ ] NO    PHYSICAL EXAM:  GENERAL: Alert and awake lying in bed in no distress  HEAD:  Atraumatic, Normocephalic  EYES: EOMI, ONEIL, conjunctiva and sclera clear  NECK: Supple, No JVD, Normal thyroid  NERVOUS SYSTEM:  aleert awake  CHEST/LUNG: mild rhonchi at bases,  HEART: Regular rate and rhythm; No murmurs, rubs, or gallops  ABDOMEN: Soft, Nontender, Nondistended; Bowel sounds present  EXTREMITIES:   Peripheral Pulses are palpable, no  edema      LABS:  06-01    135  |  89<L>  |  93<H>  ----------------------------<  99  4.3   |  31  |  8.12<H>    Ca    11.7<H>      01 Jun 2022 05:27  Phos  5.8     06-01  Mg     2.80     06-01      Creatinine Trend: 8.12 <--, 8.05 <--, 8.00 <--, 7.76 <--, 7.59 <--, 7.67 <--, 7.67 <--                        7.9    10.60 )-----------( 303      ( 01 Jun 2022 05:27 )             25.3     Urine Studies:

## 2022-06-01 NOTE — PROGRESS NOTE ADULT - PROBLEM SELECTOR PLAN 6
Stable. Cont statin.       DVT ppx- HSQ
Stable. Cont statin.       DVT ppx- HSQ        waiting for dispo
Stable. Cont statin.       DVT ppx- HSQ        waiting for dispo
Stable. Cont statin.       DVT ppx- HSQ

## 2022-06-01 NOTE — PROGRESS NOTE ADULT - PROBLEM SELECTOR PLAN 3
Likely in setting of respiratory failure/CHF.  -Continue optimize above.  -Dysphagia screen now. NPO until that is done. If fails, needs SLP. Will also convert to IV meds if possible.
Likely in setting of respiratory failure/CHF.  -Continue optimize above.
Likely in setting of respiratory failure/CHF.
Likely in setting of respiratory failure/CHF.  -Continue optimize above.  -Dysphagia screen now. NPO until that is done. If fails, needs SLP. Will also convert to IV meds if possible.
Likely in setting of respiratory failure/CHF.  -Continue optimize above.  -Dysphagia screen now. NPO until that is done. If fails, needs SLP. Will also convert to IV meds if possible.
Likely in setting of respiratory failure/CHF.  -Continue optimize above.
Likely in setting of respiratory failure/CHF.  -Continue optimize above.
Likely in setting of respiratory failure/CHF.  -Continue optimize above.  -Dysphagia screen now. NPO until that is done. If fails, needs SLP. Will also convert to IV meds if possible.
Likely in setting of respiratory failure/CHF.
Likely in setting of respiratory failure/CHF.  -Continue optimize above.

## 2022-06-01 NOTE — PROGRESS NOTE ADULT - PROBLEM SELECTOR PROBLEM 4
Stage 5 chronic kidney disease not on chronic dialysis

## 2022-06-01 NOTE — PROGRESS NOTE ADULT - SUBJECTIVE AND OBJECTIVE BOX
Sriram Riley MD (Nephrology progress note)  [address], [facility],  [address],  [facility]  TEl: [phone]  Cell: [phone]    Patient is a 89y Female seen and evaluated at bedside. Vital signs, laboratory data, imaging studies, consult notes reviewed done within past 24 hours. Overnight events noted. Patient lying in bed alert and awake in no distress. Interval elevated renal function with non oliguria. Await Home hospice set up.    Allergies    No Known Allergies    Intolerances        ROS:  Limited  Alert and awake in no distress on nasal cannula oxygen    VITALS:    T(C): 36.9 (06-01-22 @ 12:33), Max: 37.3 (05-31-22 @ 21:33)  HR: 73 (06-01-22 @ 15:02) (68 - 78)  BP: 150/56 (06-01-22 @ 13:25) (150/56 - 160/58)  RR: 18 (06-01-22 @ 12:33) (18 - 20)  SpO2: 98% (06-01-22 @ 15:02) (98% - 100%)  CAPILLARY BLOOD GLUCOSE          05-31-22 @ 07:01  -  06-01-22 @ 07:00  --------------------------------------------------------  IN: 605 mL / OUT: 2015 mL / NET: -1410 mL    06-01-22 @ 07:01  -  06-01-22 @ 17:57  --------------------------------------------------------  IN: 300 mL / OUT: 450 mL / NET: -150 mL      MEDICATIONS  (STANDING):  albuterol/ipratropium for Nebulization 3 milliLiter(s) Nebulizer every 6 hours  buMETAnide 2 milliGRAM(s) Oral two times a day  ferrous    sulfate Liquid 300 milliGRAM(s) Oral daily  gabapentin 100 milliGRAM(s) Oral two times a day  hydrALAZINE 100 milliGRAM(s) Oral three times a day  melatonin 9 milliGRAM(s) Oral at bedtime  metolazone 10 milliGRAM(s) Oral daily  metoprolol succinate  milliGRAM(s) Oral daily  NIFEdipine XL 90 milliGRAM(s) Oral daily  pantoprazole    Tablet 40 milliGRAM(s) Oral before breakfast  sevelamer carbonate 1600 milliGRAM(s) Oral two times a day  simvastatin 40 milliGRAM(s) Oral at bedtime  sodium bicarbonate 650 milliGRAM(s) Oral three times a day  sodium zirconium cyclosilicate 10 Gram(s) Oral two times a day    MEDICATIONS  (PRN):  acetaminophen     Tablet .. 650 milliGRAM(s) Oral every 6 hours PRN Temp greater or equal to 38C (100.4F), Mild Pain (1 - 3)  aluminum hydroxide/magnesium hydroxide/simethicone Suspension 30 milliLiter(s) Oral every 4 hours PRN Dyspepsia  HYDROmorphone   Tablet 2 milliGRAM(s) Oral every 6 hours PRN Moderate Pain (4 - 6)  ondansetron Injectable 4 milliGRAM(s) IV Push every 8 hours PRN Nausea and/or Vomiting      PHYSICAL EXAM:  GENERAL: Alert, awake and oriented x2-3 in no distress  HEENT: ONEIL, EOMI, neck supple, no JVP  CHEST/LUNG: Bilateral decreased breath sounds, bibasilar rales and crepitations, no wheezing  HEART: Regular rate and rhythm, KESHA II/VI at LPSB, no gallops, no rub   ABDOMEN: Soft, nontender, non distended, bowel sounds present  : No flank or supra pubic tenderness.  EXTREMITIES: Bilateral trace pedal edema  Neurology: AAOx2-3, no focal neurological deficit  SKIN: No rash or skin lesion  Musculoskeletal: No joint deformity    Vascular ACCESS: None    LABS:                        7.9    10.60 )-----------( 303      ( 01 Jun 2022 05:27 )             25.3     06-01    135  |  89<L>  |  93<H>  ----------------------------<  99  4.3   |  31  |  8.12<H>    Ca    11.7<H>      01 Jun 2022 05:27  Phos  5.8     06-01  Mg     2.80     06-01                  RADIOLOGY & ADDITIONAL STUDIES:  None  Imaging Personally Reviewed:  [x] YES  [ ] NO    Consultant(s) Notes Reviewed:  [x] YES  [ ] NO    Care Discussed with Primary team/ Other Providers [x] YES  [ ] NO

## 2022-06-01 NOTE — PROGRESS NOTE ADULT - PROBLEM SELECTOR PLAN 1
-In setting of HTN emergency/acute CHF  -Initially on NRB, switched to BiPAP w/ good effect. Satting fine. Respiration nonlabored.  -diuresis  suppl oxygen    pt is DNR/DNI - MOLST done and health care proxy form copied in chart.  waiting for dc with home hospice
-In setting of HTN emergency/acute CHF  -Initially on NRB, switched to BiPAP w/ good effect. Satting fine. Respiration nonlabored.  -Continue po bumex for diuresis.metolazone  -Closely monitor while on BiPAP. Wean as tolerated.  -MICU recs ( re: BIPAP) noted.  -Note that pt is DNR/DNI - MOLST done and health care proxy form copied in chart.
-In setting of HTN emergency/acute CHF  -Initially on NRB, switched to BiPAP w/ good effect. Satting fine. Respiration nonlabored.  -Continue IV bumex for diuresis.  -Closely monitor while on BiPAP. Wean as tolerated.  -MICU recs ( re: BIPAP) noted.  -Note that pt is DNR/DNI - MOLST done and health care proxy form copied in chart.
-In setting of HTN emergency/acute CHF  -Initially on NRB, switched to BiPAP w/ good effect. Satting fine. Respiration nonlabored.  -Continue IV bumex for diuresis.  -Closely monitor while on BiPAP. Wean as tolerated.  -MICU recs ( re: BIPAP) noted.  -Note that pt is DNR/DNI - MOLST done and health care proxy form copied in chart.
-In setting of HTN emergency/acute CHF  -Initially on NRB, switched to BiPAP w/ good effect. Satting fine. Respiration nonlabored.  -diuresis  suppl oxygen    pt is DNR/DNI - MOLST done and health care proxy form copied in chart.
-In setting of HTN emergency/acute CHF  -Initially on NRB, switched to BiPAP w/ good effect. Satting fine. Respiration nonlabored.  -Continue IV bumex for diuresis.  -Closely monitor while on BiPAP. Wean as tolerated.  -MICU recs ( re: BIPAP) noted.  -Note that pt is DNR/DNI - MOLST done and health care proxy form copied in chart.
-In setting of HTN emergency/acute CHF  -Initially on NRB, switched to BiPAP w/ good effect. Satting fine. Respiration nonlabored.  -Continue IV bumex for diuresis.  -Closely monitor while on BiPAP. Wean as tolerated.  -MICU recs ( re: BIPAP) noted.  -Note that pt is DNR/DNI - MOLST done and health care proxy form copied in chart.
-In setting of HTN emergency/acute CHF  -Initially on NRB, switched to BiPAP w/ good effect. Satting fine. Respiration nonlabored.  -Continue iv bumex for diuresis.metolazone  -Closely monitor while on BiPAP. Wean as tolerated.  -MICU recs ( re: BIPAP) noted.  -Note that pt is DNR/DNI - MOLST done and health care proxy form copied in chart.

## 2022-06-01 NOTE — PROGRESS NOTE ADULT - ASSESSMENT
Patient is a 89y F PMHx HTN, CKD, CHF, asthma, presenting today with SOB. BIBEMS on NRB. EMS gave ASA, one sublingual nitro. BP on EMS arrival SBP >200. Patient family at bedside reports patient awoke from sleep with difficulty breathing. 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  2) Anemia of chronic renal disease  3) Renal osteodystrophy  4) Acute on chronic systolic/diastolic CHF with fluid overload  5) Hypercalcemia/Hyperphosphatemia  6) Severe AS  7) DNR/DNI    Recommend:  Strict I/o  Avoid Nephrotoxic agents  Continue PO Bumex  Continue Metolazone 10 mg po daily  Continue Lokelma 10 mg po bid  Intact PTH, Vitamin D 1,25 level, PHos, calcium level  reviewed  Hold PO Calcitriol/all vitamin D prodcuts due to hypercalcemia  S/p PRBC tranfusion  Iron studies and Ferritin reviewed  Continue Feosol 300 ml po daily  Transfuse PRBC with goal Hgb>7  SQ EPO  Low K/Low phos renal diet  Phos binders as prescribed  No aggressive measure per family and/or HD treatment at present  DNR/DNI status  NO RRT/HD  Hospice home  Discharge planning per primary team  Will follow

## 2022-06-01 NOTE — PROGRESS NOTE ADULT - ASSESSMENT
89F with PMh of HTN, CKD5, chronic diastolic CHF, asthma, who presents to the hospital with sudden on set of SOB     EKG: NSR no acute changes  Tele: NSR    1. CHF  -echo with normal LV systolic function, moderate diastolic dysfunction and new severe AS. no w/u planned as patient with advanced CKD and declining HD  -c/w PO bumex 2mg BID, monitor I&Os , on metolazone 10mg daily   -wean off oxygen as tolerated    2. CKD  -advanced, family currently declining HD. [facility] plan for home hospice  -f/u renal    3. Anemia  -s/p PRBC transfusion  -continue to monitor H/H     4. HTN  -improving  -c/w hydral, metoprolol and nifedipine  -continue to monitor BP

## 2022-06-01 NOTE — PROGRESS NOTE ADULT - PROBLEM SELECTOR PLAN 4
-Renal f/u noted
As discussed w/ family - pt does not wish to proceed w/ HD.   -Supportive measures.  -Cont Bicarb.  -Optimize fluid status as above.  -Trend labs.
-Renal f/u noted
As discussed w/ family - pt does not wish to proceed w/ HD.   -Supportive measures.  -Cont Bicarb.  -Optimize fluid status as above.  -Trend labs.
-Renal f/u noted
As discussed w/ family - pt does not wish to proceed w/ HD.   -Supportive measures.  -Cont Bicarb.  -Optimize fluid status as above.  -Trend labs.

## 2022-06-01 NOTE — PROGRESS NOTE ADULT - SUBJECTIVE AND OBJECTIVE BOX
Ryan Perez MD  Interventional Cardiology / Endovascular Specialist  [place] Office : [phone] [place] [id number]  Tel:   [place] Office : [address] [facility] [place] [address]  Tel: [phone]      Subjective/Overnight events: Patient lying in bed comfortably. No acute distress.   	  MEDICATIONS:  buMETAnide 2 milliGRAM(s) Oral two times a day  hydrALAZINE 100 milliGRAM(s) Oral three times a day  metolazone 10 milliGRAM(s) Oral daily  metoprolol succinate  milliGRAM(s) Oral daily  NIFEdipine XL 90 milliGRAM(s) Oral daily      albuterol/ipratropium for Nebulization 3 milliLiter(s) Nebulizer every 6 hours    acetaminophen     Tablet .. 650 milliGRAM(s) Oral every 6 hours PRN  gabapentin 100 milliGRAM(s) Oral two times a day  HYDROmorphone   Tablet 2 milliGRAM(s) Oral every 6 hours PRN  melatonin 9 milliGRAM(s) Oral at bedtime  ondansetron Injectable 4 milliGRAM(s) IV Push every 8 hours PRN    aluminum hydroxide/magnesium hydroxide/simethicone Suspension 30 milliLiter(s) Oral every 4 hours PRN  pantoprazole    Tablet 40 milliGRAM(s) Oral before breakfast    simvastatin 40 milliGRAM(s) Oral at bedtime    ferrous    sulfate Liquid 300 milliGRAM(s) Oral daily  sodium bicarbonate 650 milliGRAM(s) Oral three times a day      PAST MEDICAL/SURGICAL HISTORY  PAST MEDICAL & SURGICAL HISTORY:  Hypertension      High cholesterol      CKD (chronic kidney disease)      Asthma      CHF (congestive heart failure)  diastolic heart failure      History of hysterectomy          SOCIAL HISTORY: Substance Use (street drugs): ( x ) never used  (  ) other:    FAMILY HISTORY:  No pertinent family history in first degree relatives            PHYSICAL EXAM:  T(C): 37.2 (06-01-22 @ 04:40), Max: 37.3 (05-31-22 @ 21:33)  HR: 69 (06-01-22 @ 10:24) (67 - 78)  BP: 160/58 (06-01-22 @ 04:40) (142/76 - 160/58)  RR: 20 (06-01-22 @ 04:40) (18 - 20)  SpO2: 98% (06-01-22 @ 10:24) (97% - 100%)  Wt(kg): --  I&O's Summary    31 May 2022 07:01  -  01 Jun 2022 07:00  --------------------------------------------------------  IN: 605 mL / OUT: 2015 mL / NET: -1410 mL        GENERAL: NAD  EYES:   PERRLA   ENMT:   Moist mucous membranes, Good dentition, No lesions  Cardiovascular: Normal S1 S2, No JVD, 2/6 systolic murmurs, No edema  Respiratory: Lungs clear to auscultation	  Gastrointestinal:  Soft, Non-tender, + BS	  Extremities: no edema                                7.9    10.60 )-----------( 303      ( 01 Jun 2022 05:27 )             25.3     06-01    135  |  89<L>  |  93<H>  ----------------------------<  99  4.3   |  31  |  8.12<H>    Ca    11.7<H>      01 Jun 2022 05:27  Phos  5.8     06-01  Mg     2.80     06-01      proBNP:   Lipid Profile:   HgA1c:   TSH:     Consultant(s) Notes Reviewed:  [x ] YES  [ ] NO    Care Discussed with Consultants/Other Providers [ x] YES  [ ] NO    Imaging Personally Reviewed independently:  [x] YES  [ ] NO    All labs, radiologic studies, vitals, orders and medications list reviewed. Patient is seen and examined at bedside. Case discussed with medical team.

## 2022-06-02 ENCOUNTER — TRANSCRIPTION ENCOUNTER (OUTPATIENT)
Age: 87
End: 2022-06-02

## 2022-06-02 VITALS
RESPIRATION RATE: 17 BRPM | HEART RATE: 67 BPM | DIASTOLIC BLOOD PRESSURE: 42 MMHG | TEMPERATURE: 98 F | SYSTOLIC BLOOD PRESSURE: 133 MMHG | OXYGEN SATURATION: 100 %

## 2022-06-02 LAB
ANION GAP SERPL CALC-SCNC: 15 MMOL/L — HIGH (ref 7–14)
BUN SERPL-MCNC: 97 MG/DL — HIGH (ref 7–23)
CALCIUM SERPL-MCNC: 11.6 MG/DL — HIGH (ref 8.4–10.5)
CHLORIDE SERPL-SCNC: 87 MMOL/L — LOW (ref 98–107)
CO2 SERPL-SCNC: 32 MMOL/L — HIGH (ref 22–31)
CREAT SERPL-MCNC: 8.06 MG/DL — HIGH (ref 0.5–1.3)
EGFR: 4 ML/MIN/1.73M2 — LOW
GLUCOSE SERPL-MCNC: 88 MG/DL — SIGNIFICANT CHANGE UP (ref 70–99)
HCT VFR BLD CALC: 25.1 % — LOW (ref 34.5–45)
HGB BLD-MCNC: 7.6 G/DL — LOW (ref 11.5–15.5)
MAGNESIUM SERPL-MCNC: 2.7 MG/DL — HIGH (ref 1.6–2.6)
MCHC RBC-ENTMCNC: 29.8 PG — SIGNIFICANT CHANGE UP (ref 27–34)
MCHC RBC-ENTMCNC: 30.3 GM/DL — LOW (ref 32–36)
MCV RBC AUTO: 98.4 FL — SIGNIFICANT CHANGE UP (ref 80–100)
NRBC # BLD: 0 /100 WBCS — SIGNIFICANT CHANGE UP
NRBC # FLD: 0.02 K/UL — HIGH
PHOSPHATE SERPL-MCNC: 5.9 MG/DL — HIGH (ref 2.5–4.5)
PLATELET # BLD AUTO: 294 K/UL — SIGNIFICANT CHANGE UP (ref 150–400)
POTASSIUM SERPL-MCNC: 4.1 MMOL/L — SIGNIFICANT CHANGE UP (ref 3.5–5.3)
POTASSIUM SERPL-SCNC: 4.1 MMOL/L — SIGNIFICANT CHANGE UP (ref 3.5–5.3)
RBC # BLD: 2.55 M/UL — LOW (ref 3.8–5.2)
RBC # FLD: 15.4 % — HIGH (ref 10.3–14.5)
SODIUM SERPL-SCNC: 134 MMOL/L — LOW (ref 135–145)
WBC # BLD: 11.1 K/UL — HIGH (ref 3.8–10.5)
WBC # FLD AUTO: 11.1 K/UL — HIGH (ref 3.8–10.5)

## 2022-06-02 PROCEDURE — 99232 SBSQ HOSP IP/OBS MODERATE 35: CPT

## 2022-06-02 RX ORDER — SODIUM ZIRCONIUM CYCLOSILICATE 10 G/10G
10 POWDER, FOR SUSPENSION ORAL
Qty: 600 | Refills: 0
Start: 2022-06-02 | End: 2022-07-01

## 2022-06-02 RX ORDER — LANOLIN ALCOHOL/MO/W.PET/CERES
3 CREAM (GRAM) TOPICAL
Qty: 90 | Refills: 0
Start: 2022-06-02 | End: 2022-07-01

## 2022-06-02 RX ORDER — PANTOPRAZOLE SODIUM 20 MG/1
1 TABLET, DELAYED RELEASE ORAL
Qty: 30 | Refills: 0
Start: 2022-06-02 | End: 2022-07-01

## 2022-06-02 RX ORDER — SIMVASTATIN 20 MG/1
1 TABLET, FILM COATED ORAL
Qty: 0 | Refills: 0 | DISCHARGE

## 2022-06-02 RX ORDER — IPRATROPIUM/ALBUTEROL SULFATE 18-103MCG
3 AEROSOL WITH ADAPTER (GRAM) INHALATION
Qty: 360 | Refills: 0
Start: 2022-06-02 | End: 2022-07-01

## 2022-06-02 RX ORDER — FERROUS SULFATE 325(65) MG
5 TABLET ORAL
Qty: 150 | Refills: 0
Start: 2022-06-02 | End: 2022-07-01

## 2022-06-02 RX ORDER — SIMVASTATIN 20 MG/1
1 TABLET, FILM COATED ORAL
Qty: 30 | Refills: 0
Start: 2022-06-02 | End: 2022-07-01

## 2022-06-02 RX ORDER — NIFEDIPINE 30 MG
1 TABLET, EXTENDED RELEASE 24 HR ORAL
Qty: 0 | Refills: 0 | DISCHARGE

## 2022-06-02 RX ORDER — NIFEDIPINE 30 MG
1 TABLET, EXTENDED RELEASE 24 HR ORAL
Qty: 30 | Refills: 0
Start: 2022-06-02 | End: 2022-07-01

## 2022-06-02 RX ORDER — BUMETANIDE 0.25 MG/ML
1 INJECTION INTRAMUSCULAR; INTRAVENOUS
Qty: 0 | Refills: 0 | DISCHARGE

## 2022-06-02 RX ORDER — HYDROMORPHONE HYDROCHLORIDE 2 MG/ML
1 INJECTION INTRAMUSCULAR; INTRAVENOUS; SUBCUTANEOUS
Qty: 40 | Refills: 0
Start: 2022-06-02 | End: 2022-06-11

## 2022-06-02 RX ORDER — BUMETANIDE 0.25 MG/ML
1 INJECTION INTRAMUSCULAR; INTRAVENOUS
Qty: 30 | Refills: 0
Start: 2022-06-02

## 2022-06-02 RX ORDER — CALCITRIOL 0.5 UG/1
1 CAPSULE ORAL
Qty: 0 | Refills: 0 | DISCHARGE

## 2022-06-02 RX ORDER — METOPROLOL TARTRATE 50 MG
1 TABLET ORAL
Qty: 0 | Refills: 0 | DISCHARGE

## 2022-06-02 RX ORDER — SODIUM BICARBONATE 1 MEQ/ML
2 SYRINGE (ML) INTRAVENOUS
Qty: 0 | Refills: 0 | DISCHARGE

## 2022-06-02 RX ORDER — SEVELAMER CARBONATE 2400 MG/1
2 POWDER, FOR SUSPENSION ORAL
Qty: 120 | Refills: 0
Start: 2022-06-02 | End: 2022-07-01

## 2022-06-02 RX ORDER — SODIUM BICARBONATE 1 MEQ/ML
1 SYRINGE (ML) INTRAVENOUS
Qty: 90 | Refills: 0
Start: 2022-06-02 | End: 2022-07-01

## 2022-06-02 RX ORDER — BUMETANIDE 0.25 MG/ML
1 INJECTION INTRAMUSCULAR; INTRAVENOUS
Qty: 60 | Refills: 0
Start: 2022-06-02 | End: 2022-07-01

## 2022-06-02 RX ORDER — ACETAMINOPHEN 500 MG
2 TABLET ORAL
Qty: 240 | Refills: 0
Start: 2022-06-02 | End: 2022-07-01

## 2022-06-02 RX ORDER — FERROUS GLUCONATE 100 %
1 POWDER (GRAM) MISCELLANEOUS
Qty: 0 | Refills: 0 | DISCHARGE

## 2022-06-02 RX ORDER — METOPROLOL TARTRATE 50 MG
1 TABLET ORAL
Qty: 30 | Refills: 0
Start: 2022-06-02 | End: 2022-07-01

## 2022-06-02 RX ORDER — PANTOPRAZOLE SODIUM 20 MG/1
1 TABLET, DELAYED RELEASE ORAL
Qty: 0 | Refills: 0 | DISCHARGE

## 2022-06-02 RX ORDER — GABAPENTIN 400 MG/1
1 CAPSULE ORAL
Qty: 60 | Refills: 0
Start: 2022-06-02 | End: 2022-07-01

## 2022-06-02 RX ORDER — HYDRALAZINE HCL 50 MG
1 TABLET ORAL
Qty: 90 | Refills: 0
Start: 2022-06-02 | End: 2022-07-01

## 2022-06-02 RX ADMIN — Medication 3 MILLILITER(S): at 03:26

## 2022-06-02 RX ADMIN — GABAPENTIN 100 MILLIGRAM(S): 400 CAPSULE ORAL at 06:06

## 2022-06-02 RX ADMIN — Medication 650 MILLIGRAM(S): at 07:00

## 2022-06-02 RX ADMIN — Medication 650 MILLIGRAM(S): at 06:07

## 2022-06-02 RX ADMIN — Medication 3 MILLILITER(S): at 09:15

## 2022-06-02 RX ADMIN — PANTOPRAZOLE SODIUM 40 MILLIGRAM(S): 20 TABLET, DELAYED RELEASE ORAL at 06:08

## 2022-06-02 RX ADMIN — BUMETANIDE 2 MILLIGRAM(S): 0.25 INJECTION INTRAMUSCULAR; INTRAVENOUS at 06:08

## 2022-06-02 RX ADMIN — SEVELAMER CARBONATE 1600 MILLIGRAM(S): 2400 POWDER, FOR SUSPENSION ORAL at 06:07

## 2022-06-02 RX ADMIN — Medication 100 MILLIGRAM(S): at 06:05

## 2022-06-02 RX ADMIN — Medication 650 MILLIGRAM(S): at 06:05

## 2022-06-02 RX ADMIN — Medication 90 MILLIGRAM(S): at 06:06

## 2022-06-02 RX ADMIN — Medication 100 MILLIGRAM(S): at 06:07

## 2022-06-02 RX ADMIN — SODIUM ZIRCONIUM CYCLOSILICATE 10 GRAM(S): 10 POWDER, FOR SUSPENSION ORAL at 09:04

## 2022-06-02 NOTE — PROGRESS NOTE ADULT - SUBJECTIVE AND OBJECTIVE BOX
Sriram Riley MD (Nephrology progress note)  [address], [facility],  [address],  [facility]  TEl: [phone]  Cell: [phone]    Patient is a 89y Female seen and evaluated at bedside. Vital signs, laboratory data, imaging studies, consult notes reviewed done within past 24 hours. Overnight events noted. Patinet alert and awake in no distress on nasal cannula oxygen. Denies any chest pain, SOB at present. Await Home hospice.    Allergies    No Known Allergies    Intolerances        ROS:  Limited  Alert and awake in no distress on nasal cannula oxygen    VITALS:    T(C): 36.6 (06-02-22 @ 05:45), Max: 37.1 (06-01-22 @ 20:13)  HR: 69 (06-02-22 @ 07:21) (69 - 81)  BP: 164/84 (06-02-22 @ 05:45) (150/56 - 164/84)  RR: 17 (06-02-22 @ 05:45) (17 - 18)  SpO2: 100% (06-02-22 @ 07:21) (98% - 100%)  CAPILLARY BLOOD GLUCOSE          06-01-22 @ 07:01  -  06-02-22 @ 07:00  --------------------------------------------------------  IN: 700 mL / OUT: 450 mL / NET: 250 mL      MEDICATIONS  (STANDING):  albuterol/ipratropium for Nebulization 3 milliLiter(s) Nebulizer every 6 hours  buMETAnide 2 milliGRAM(s) Oral two times a day  ferrous    sulfate Liquid 300 milliGRAM(s) Oral daily  gabapentin 100 milliGRAM(s) Oral two times a day  hydrALAZINE 100 milliGRAM(s) Oral three times a day  melatonin 9 milliGRAM(s) Oral at bedtime  metolazone 10 milliGRAM(s) Oral daily  metoprolol succinate  milliGRAM(s) Oral daily  NIFEdipine XL 90 milliGRAM(s) Oral daily  pantoprazole    Tablet 40 milliGRAM(s) Oral before breakfast  sevelamer carbonate 1600 milliGRAM(s) Oral two times a day  simvastatin 40 milliGRAM(s) Oral at bedtime  sodium bicarbonate 650 milliGRAM(s) Oral three times a day  sodium zirconium cyclosilicate 10 Gram(s) Oral two times a day    MEDICATIONS  (PRN):  acetaminophen     Tablet .. 650 milliGRAM(s) Oral every 6 hours PRN Temp greater or equal to 38C (100.4F), Mild Pain (1 - 3)  aluminum hydroxide/magnesium hydroxide/simethicone Suspension 30 milliLiter(s) Oral every 4 hours PRN Dyspepsia  HYDROmorphone   Tablet 2 milliGRAM(s) Oral every 6 hours PRN Moderate Pain (4 - 6)  ondansetron Injectable 4 milliGRAM(s) IV Push every 8 hours PRN Nausea and/or Vomiting      PHYSICAL EXAM:  GENERAL: Alert, awake and oriented x2-3 in no distress  HEENT: ONEIL, EOMI, neck supple, no JVP, no carotid bruit, oral mucosa moist and pink.  CHEST/LUNG: Bilateral decreased breath sounds, bibasilar rales and crepitations, no wheezing  HEART: Regular rate and rhythm, KESHA II/VI at LPSB, no gallops, no rub   ABDOMEN: Soft, nontender, non distended, bowel sounds present, no palpable organomegaly  : No flank or supra pubic tenderness.  EXTREMITIES: Peripheral pulses are palpable, no pedal edema  Neurology: AAOx2-3, no focal neurological deficit  SKIN: No rash or skin lesion  Musculoskeletal: No joint deformity    Vascular ACCESS: None    LABS:                        7.6    11.10 )-----------( 294      ( 02 Jun 2022 06:22 )             25.1     06-02    134<L>  |  87<L>  |  97<H>  ----------------------------<  88  4.1   |  32<H>  |  8.06<H>    Ca    11.6<H>      02 Jun 2022 06:22  Phos  5.9     06-02  Mg     2.70     06-02                  RADIOLOGY & ADDITIONAL STUDIES:    Imaging Personally Reviewed:  [x] YES  [ ] NO    Consultant(s) Notes Reviewed:  [x] YES  [ ] NO    Care Discussed with Primary team/ Other Providers [x] YES  [ ] NO

## 2022-06-02 NOTE — PROGRESS NOTE ADULT - TIME BILLING
-pulmonart,palliative ,Renal consults noted  -d/w ACP
Patient/daughter/primary team
-pulmonart,palliative ,Renal consults noted  -d/w ACP
Patient/daughter/primary team
-pulmonart,palliative ,Renal consults noted  -d/w ACP

## 2022-06-02 NOTE — PROGRESS NOTE ADULT - NUTRITIONAL ASSESSMENT
Able to tolerate po intake

## 2022-06-02 NOTE — DISCHARGE NOTE NURSING/CASE MANAGEMENT/SOCIAL WORK - NSDCPEFALRISK_GEN_ALL_CORE
For information on Fall & Injury Prevention, visit: [email or web address] OR  [email or web address] OR  https://www.cdc.gov/steadi/patient.html

## 2022-06-02 NOTE — PROGRESS NOTE ADULT - PROVIDER SPECIALTY LIST ADULT
Cardiology
Cardiology
Pulmonology
Pulmonology
Nephrology
Pulmonology
Cardiology
Cardiology
Nephrology
Pulmonology
Pulmonology
Cardiology
Cardiology
Internal Medicine
Nephrology
Nephrology
Cardiology
Cardiology
Internal Medicine
Internal Medicine
Nephrology
Nephrology
Internal Medicine
Nephrology
Nephrology
Internal Medicine

## 2022-06-02 NOTE — DISCHARGE NOTE NURSING/CASE MANAGEMENT/SOCIAL WORK - PATIENT PORTAL LINK FT
You can access the FollowMyHealth Patient Portal offered by [facility] by registering at the following website: [email or web address]. By joining [employer]’s FollowMyHealth portal, you will also be able to view your health information using other applications (apps) compatible with our system.

## 2022-06-02 NOTE — PROGRESS NOTE ADULT - SUBJECTIVE AND OBJECTIVE BOX
Ryan Perez MD  Interventional Cardiology / Endovascular Specialist  [place] Office : [address] [place] [id number]  Tel:   [place] Office : [address] [facility] [place] [address]  Tel: [phone]      Subjective/Overnight events: Patient lying in bed comfortably. No acute distress.   	  MEDICATIONS:  buMETAnide 2 milliGRAM(s) Oral two times a day  hydrALAZINE 100 milliGRAM(s) Oral three times a day  metolazone 10 milliGRAM(s) Oral daily  metoprolol succinate  milliGRAM(s) Oral daily  NIFEdipine XL 90 milliGRAM(s) Oral daily      albuterol/ipratropium for Nebulization 3 milliLiter(s) Nebulizer every 6 hours    acetaminophen     Tablet .. 650 milliGRAM(s) Oral every 6 hours PRN  gabapentin 100 milliGRAM(s) Oral two times a day  HYDROmorphone   Tablet 2 milliGRAM(s) Oral every 6 hours PRN  melatonin 9 milliGRAM(s) Oral at bedtime  ondansetron Injectable 4 milliGRAM(s) IV Push every 8 hours PRN    aluminum hydroxide/magnesium hydroxide/simethicone Suspension 30 milliLiter(s) Oral every 4 hours PRN  pantoprazole    Tablet 40 milliGRAM(s) Oral before breakfast    simvastatin 40 milliGRAM(s) Oral at bedtime    ferrous    sulfate Liquid 300 milliGRAM(s) Oral daily  sodium bicarbonate 650 milliGRAM(s) Oral three times a day      PAST MEDICAL/SURGICAL HISTORY  PAST MEDICAL & SURGICAL HISTORY:  Hypertension      High cholesterol      CKD (chronic kidney disease)      Asthma      CHF (congestive heart failure)  diastolic heart failure      History of hysterectomy          SOCIAL HISTORY: Substance Use (street drugs): ( x ) never used  (  ) other:    FAMILY HISTORY:  No pertinent family history in first degree relatives          PHYSICAL EXAM:  T(C): 36.6 (06-02-22 @ 05:45), Max: 37.1 (06-01-22 @ 20:13)  HR: 69 (06-02-22 @ 07:21) (69 - 81)  BP: 164/84 (06-02-22 @ 05:45) (150/56 - 164/84)  RR: 17 (06-02-22 @ 05:45) (17 - 18)  SpO2: 100% (06-02-22 @ 07:21) (98% - 100%)  Wt(kg): --  I&O's Summary    01 Jun 2022 07:01  -  02 Jun 2022 07:00  --------------------------------------------------------  IN: 700 mL / OUT: 450 mL / NET: 250 mL          GENERAL: NAD  EYES:   PERRLA   ENMT:   Moist mucous membranes, Good dentition, No lesions  Cardiovascular: Normal S1 S2, No JVD, 2/6 systolic murmurs, No edema  Respiratory: Lungs clear to auscultation	  Gastrointestinal:  Soft, Non-tender, + BS	  Extremities: no edema                                    7.6    11.10 )-----------( 294      ( 02 Jun 2022 06:22 )             25.1     06-02    134<L>  |  87<L>  |  97<H>  ----------------------------<  88  4.1   |  32<H>  |  8.06<H>    Ca    11.6<H>      02 Jun 2022 06:22  Phos  5.9     06-02  Mg     2.70     06-02      proBNP:   Lipid Profile:   HgA1c:   TSH:     Consultant(s) Notes Reviewed:  [x ] YES  [ ] NO    Care Discussed with Consultants/Other Providers [ x] YES  [ ] NO    Imaging Personally Reviewed independently:  [x] YES  [ ] NO    All labs, radiologic studies, vitals, orders and medications list reviewed. Patient is seen and examined at bedside. Case discussed with medical team.

## 2022-06-02 NOTE — PROGRESS NOTE ADULT - ASSESSMENT
Patient is a 89y F PMHx HTN, CKD, CHF, asthma, presenting today with SOB. BIBEMS on NRB. EMS gave ASA, one sublingual nitro. BP on EMS arrival SBP >200. Patient family at bedside reports patient awoke from sleep with difficulty breathing. 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  2) Anemia of chronic renal disease  3) Renal osteodystrophy  4) Acute on chronic systolic/diastolic CHF with fluid overload  5) Hypercalcemia/Hyperphosphatemia  6) Severe AS  7) DNR/DNI    Recommend:  Strict I/o  Avoid Nephrotoxic agents  Continue PO Bumex  Continue Metolazone 10 mg po daily  Continue Lokelma 10 mg po bid  Intact PTH, Vitamin D 1,25 level, PHos, calcium level  reviewed  Hold PO Calcitriol/all vitamin D prodcuts due to hypercalcemia  S/p PRBC tranfusion  Iron studies and Ferritin reviewed  Continue Feosol 300 ml po daily  SQ EPO  Low K/Low phos renal diet  Phos binders as prescribed  No aggressive measure per family and/or HD treatment at present  DNR/DNI status  NO RRT/HD  Hospice home  Discharge planning per primary team  Will follow

## 2022-06-02 NOTE — PROGRESS NOTE ADULT - ASSESSMENT
89F with PMh of HTN, CKD5, chronic diastolic CHF, asthma, who presents to the hospital with sudden on set of SOB     EKG: NSR no acute changes  Tele: NSR    1. CHF  -echo with normal LV systolic function, moderate diastolic dysfunction and new severe AS. no w/u planned as patient with advanced CKD and declining HD  -c/w PO bumex 2mg BID, monitor I&Os , on metolazone 10mg daily       2. CKD  -advanced, family currently declining HD. [facility] plan for home hospice  -f/u renal    3. Anemia  -s/p PRBC transfusion  -continue to monitor H/H     4. HTN  -improving  -c/w hydral, metoprolol and nifedipine  -continue to monitor BP

## 2022-06-02 NOTE — PROGRESS NOTE ADULT - SUBJECTIVE AND OBJECTIVE BOX
PULMONARY PROGRESS NOTE    FRANCIA MARTEL  MRN-[id number]    Patient is a 89y old  Female who presents with a chief complaint of Respiratory failure, acute pulmonary edema. (01 Jun 2022 17:56)      HPI:  - on 2L  - poorly communicative     ROS:   -    ACTIVE MEDICATION LIST:  MEDICATIONS  (STANDING):  albuterol/ipratropium for Nebulization 3 milliLiter(s) Nebulizer every 6 hours  buMETAnide 2 milliGRAM(s) Oral two times a day  ferrous    sulfate Liquid 300 milliGRAM(s) Oral daily  gabapentin 100 milliGRAM(s) Oral two times a day  hydrALAZINE 100 milliGRAM(s) Oral three times a day  melatonin 9 milliGRAM(s) Oral at bedtime  metolazone 10 milliGRAM(s) Oral daily  metoprolol succinate  milliGRAM(s) Oral daily  NIFEdipine XL 90 milliGRAM(s) Oral daily  pantoprazole    Tablet 40 milliGRAM(s) Oral before breakfast  sevelamer carbonate 1600 milliGRAM(s) Oral two times a day  simvastatin 40 milliGRAM(s) Oral at bedtime  sodium bicarbonate 650 milliGRAM(s) Oral three times a day  sodium zirconium cyclosilicate 10 Gram(s) Oral two times a day    MEDICATIONS  (PRN):  acetaminophen     Tablet .. 650 milliGRAM(s) Oral every 6 hours PRN Temp greater or equal to 38C (100.4F), Mild Pain (1 - 3)  aluminum hydroxide/magnesium hydroxide/simethicone Suspension 30 milliLiter(s) Oral every 4 hours PRN Dyspepsia  HYDROmorphone   Tablet 2 milliGRAM(s) Oral every 6 hours PRN Moderate Pain (4 - 6)  ondansetron Injectable 4 milliGRAM(s) IV Push every 8 hours PRN Nausea and/or Vomiting      EXAM:  Vital Signs Last 24 Hrs  T(C): 36.6 (02 Jun 2022 05:45), Max: 37.1 (01 Jun 2022 20:13)  T(F): 97.8 (02 Jun 2022 05:45), Max: 98.8 (01 Jun 2022 20:13)  HR: 69 (02 Jun 2022 07:21) (69 - 81)  BP: 164/84 (02 Jun 2022 05:45) (150/56 - 164/84)  BP(mean): --  RR: 17 (02 Jun 2022 05:45) (17 - 18)  SpO2: 100% (02 Jun 2022 07:21) (98% - 100%)    GENERAL: The patient is in no resp distress  not making her needs known  poor resp effort, but not labored. no wheeze                        7.6    11.10 )-----------( 294      ( 02 Jun 2022 06:22 )             25.1       06-02    134<L>  |  87<L>  |  97<H>  ----------------------------<  88  4.1   |  32<H>  |  8.06<H>    Ca    11.6<H>      02 Jun 2022 06:22  Phos  5.9     06-02  Mg     2.70     06-02       rad< from: Xray Chest 1 View- PORTABLE-Routine (Xray Chest 1 View- PORTABLE-Routine in AM.) (05.29.22 @ 08:58) >    ACC: [id number] EXAM:  XR CHEST PORTABLE ROUTINE 1V                          PROCEDURE DATE:  05/29/2022          INTERPRETATION:  INDICATION: Dypnea.    COMPARISON: 5/25/22    Technique: AP radiograph of the chest    FINDINGS:  Heart/Vascular: Difficult to assess heart size on this AP film - heart   appears stable in size.  Pulmonary: Low lung volumes with bilateral effusions and perihilar haze   more on the right than the left - consistent with pulmonary edema which   has progressed since the prior study. No pneumothorax.  Bones: No acute bony finding.    Impression:  Progression of pulmonary edema changes.        --- End of Report ---            ANNIE SNOW MD; Attending Radiologist  This document has been electronically signed. May 29 2022  1:27PM    < end of copied text >    PROBLEM LIST:  89y Female with HEALTH ISSUES - PROBLEM Dx:  Acute pulmonary edema    Stage 5 chronic kidney disease not on chronic dialysis    Asthma    Metabolic encephalopathy    HLD (hyperlipidemia)    Acute on chronic diastolic congestive heart failure    Dyspnea    Debility    Advanced care planning/counseling discussion    Encounter for palliative care        RECS:  -wean O2 as tolerated  -incentive danilo  -prn nebs for asthma      Please call with any questions.    Inga Herrera DO  [facility] Pulmonary/Sleep Medicine  [phone]

## 2022-06-02 NOTE — PROGRESS NOTE ADULT - REASON FOR ADMISSION
Respiratory failure, acute pulmonary edema.

## 2022-10-19 NOTE — PHYSICAL THERAPY INITIAL EVALUATION ADULT - GAIT DEVIATIONS NOTED, PT EVAL
Advance Care Planning   Ambulatory ACP Specialist Patient Outreach    Date:  10/19/2022  ACP Specialist:  KATE Ogden    Outreach call to patient in follow-up to ACP Specialist referral from: STACIE De Santiago CNP    [x] PCP  [] Provider   [] Ambulatory Care Management [] Other for Reason:    [x] Advance Directive Assistance  [] Code Status Discussion  [] Complete Portable DNR Order  [] Discuss Goals of Care  [] Complete POST/MOST  [] Early ACP Decision-Making  [] Other    Date Referral Received:10/05/2022    Today's Outreach:  [] First   [] Second  [] Third [x] [address] outreach made by []  phone  [] email []   [place]     Intervention:  [x] Spoke with Patient  [] Left VM requesting return call      Outcome:Reminder call placed today re: ACP visit 10/20/2022 at 9am. Pt reports she did receive ACP packet. Next Step:   [x] ACP scheduled conversation  [] Outreach again in one week               [] Email / Mail ACP Info Sheets  [] Email / Mail Advance Directive            [] Close Referral. Routing closure to referring provider/staff and to ACP Specialist . [] Closure Letter mailed to Patient with Invitation to Contact ACP Specialist if/when ready.     Thank you for this referral. decreased velocity of limb motion/decreased step length

## 2022-12-28 NOTE — PRE-OP CHECKLIST - NOTHING BY MOUTH SINCE
Called pt to review abnormal mammo and recommendations. No answer, will follow up.  
08-Dec-2017 00:00
03-Dec-2017 23:59

## 2023-01-01 NOTE — ED PROVIDER NOTE - CPE EDP [facility]C NORM
[FreeTextEntry2] : 1.Audio re-evaluation at next available  2. Follow up with pediatrician re: middle ear status  3. ENT consult re: middle ear status and hearing loss - - -

## 2023-04-14 NOTE — PROGRESS NOTE ADULT - PROBLEM SELECTOR PROBLEM 7
Urinary tract infection with hematuria, site unspecified
Urinary tract infection with hematuria, site unspecified
clear

## 2023-07-31 NOTE — ED ADULT TRIAGE NOTE - ESI TRIAGE ACUITY LEVEL, MLM
[facility]  INPATIENT SPEECH THERAPY  [facility] [address] [facility] UNIT  TIME   [phone]  75 MINUTES    [x]Daily Note  []Progress Note    Date: 2023  Patient Name: Gabriela Gutierrez        MRN: [id number]    Account #: [de-identified]  : 1957  (77 y.o.)  Gender: male   Primary Provider: Ana Hess MD  Swallowing Status/Diet:  Liquid Consistency Recommendation: Thin  Diet Solids Recommendation: Easy to Chew    Subjective: Patient alert, cooperative, and upright in bed. No pain reported at this time. Objective:     Patient reports he is unsure of discharge planning. Do know this was reviewed with him on 23. Did review the plans to d/c to neuro restorative upon d/c. Unsure of estimated d/c date at this time. Patient verbalize understanding; however, question carryover with this information. Patient set the bed alarm off 4x throughout tx session due to impulsivity and getting up independently. Continuous reminders provided to not get up independently. SLP did assist him with tasks (ex. Going to bathroom, getting dentures off of table, getting a drink, etc); however, patient would initially attempt to complete these independently rather than asking for assistance. Patient continues to report that he will get out of bed independently and \"does not need assistance\"    Poor safety awareness noted, as patient is unaware of cognitive/physical impairments. No difficulties reported with swallowing solids, liquids, and medications at this time. Patient prefers to remain on easy to chew consistencies with thin liquids. Recall task completed. Patient is provided a visual scene. Given 5 minute distracted time delay, patient is required to recall specific details from this scene. Task completed with 90% accuracy. Minimal semantic cues provided during this task. Following 2 step commands with 4 components task completed. Patient is required to accurately follow the written commands provided for him.  Task 3 approximation with 90% accuracy and minimal verbal cues. Goal 4: The patient will complete the effortful swallow maneuver to improve hyolaryngeal elevation, tongue base retraction, and vocal fold closure with 90% accuracy and minimal verbal cues. Comprehension: 5 - Patient understands basic needs (hungry/hot/pain)  Expression: 5 - Expresses basic ideas/needs only (hungry/hot/pain)  Social Interaction: 4 - Patient appropriate 75-90%+ of the time  Problem Solvin - Patient solves simple/routine tasks 75-90%+   Memory: 3 - Patient remembers 50%-74% of the time    ASSESSMENT:  Assessment: [x]Progressing towards goals          []Not Progressing towards goals    Patient Tolerance of Treatment:   [x]Tolerated well []Tolerated fair []Required rest breaks []Fatigued    Education:  Learner:  [x]Patient          []Significant Other          []Other  Education provided regarding:  [x]Goals and POC   []Diet and swallowing precautions    []Home Exercise Program  []Progress and/or discharge information  Method of Education:  [x]Discussion          []Demonstration          []Handout          []Other  Evaluation of Education:   [x]Verbalized understanding   []Demonstrates without assistance  []Demonstrates with assistance  []Needs further instruction     []No evidence of learning                  []No family present      Plan: [x]Continue with current plan of care    []Modify current plan of care as follows:    []Discharge patient    Discharge Location:    Services/Supervision Recommended:      []Patient continues to require treatment by a licensed therapist to address functional deficits as outlined in the established plan of care.         Electronically signed by JAMILA Huynh on 2023 at 2:55 PM

## 2023-08-14 NOTE — PATIENT PROFILE ADULT. - CAREGIVER NAME
Alert and oriented to person, place and time. Normal mood and affect, no apparent risk to self or others Suzanna Villagomez

## 2023-10-18 NOTE — DIETITIAN INITIAL EVALUATION ADULT - ENTER FROM (G/KG)
1.2 No Repair - Repaired With Adjacent Surgical Defect Text (Leave Blank If You Do Not Want): After the excision the defect was repaired concurrently with another surgical defect which was in close approximation.

## 2023-11-20 NOTE — H&P ADULT - PROBLEM SELECTOR PLAN 5
Lov; 5/1/23  Nov: 11/30/23    [facility]    Pt has three days left  Triamterene-hydrochlorothiazide    Please call pt when done -Stable. Zoë SIMMONSN.

## 2023-11-30 NOTE — H&P ADULT - NEGATIVE ENMT SYMPTOMS
Initiate Treatment: Accutane
Render In Strict Bullet Format?: No
Detail Level: Zone
Plan: Moisturizer daily
Discontinue Regimen: Spironolactone 50mg\\nTretinoin 0.025% cream
no hearing difficulty/no ear pain

## 2024-04-01 NOTE — PROGRESS NOTE ADULT - SUBJECTIVE AND OBJECTIVE BOX
[facility] NEPHROLOGY- PROGRESS NOTE    87 year old female with history of hypertension presents with SOB and LE edema. Nephrology consulted for elevated Scr.    REVIEW OF SYSTEMS:  Gen: no changes in weight  Cards: no chest pain  Resp: no dyspnea  GI: no nausea or vomiting or diarrhea  Vascular: no LE edema    No Known Allergies      Hospital Medications: Medications reviewed    VITALS:  T(F): 97.6 (12-04-17 @ 12:32), Max: 98.2 (12-04-17 @ 05:13)  HR: 95 (12-04-17 @ 12:32)  BP: 148/75 (12-04-17 @ 12:32)  RR: 18 (12-04-17 @ 12:32)  SpO2: 98% (12-04-17 @ 12:32)  Wt(kg): --    12-03 @ 07:01  -  12-04 @ 07:00  --------------------------------------------------------  IN: 670 mL / OUT: 600 mL / NET: 70 mL    12-04 @ 07:01  -  12-04 @ 15:42  --------------------------------------------------------  IN: 240 mL / OUT: 300 mL / NET: -60 mL      PHYSICAL EXAM:    Gen: NAD, calm  Cards: RRR, +S1/S2, no M/G/R  Resp: CTA B/L  GI: soft, NT/ND, NABS  Vascular: no LE edema B/L    LABS:  12-04    139  |  102  |  31<H>  ----------------------------<  109<H>  4.2   |  23  |  2.13<H>    Ca    8.7      04 Dec 2017 04:21  Phos  3.2     12-04  Mg     1.5     12-04      Creatinine Trend: 2.13 <--, 1.97 <--, 2.05 <--, 2.35 <--, 2.57 <--, 2.55 <--, 2.52 <--                        7.4    12.70 )-----------( 447      ( 04 Dec 2017 04:21 )             24.4 done

## 2024-04-29 NOTE — PATIENT PROFILE ADULT - NSPROMEDSPATCH_GEN_A_NUR
2nd call attempt:  Left voicemail message requesting return call regarding referral from Dr. Barnett for outpatient DSME related to A1C of 12.8% on 6/2023.  Contact info provided   none

## 2024-05-03 NOTE — PROGRESS NOTE ADULT - SUBJECTIVE AND OBJECTIVE BOX
CC: Blister and Hand Pain     HPI:  38-year-old female with history of SLE, not currently on medications, presents to the emergency department for rash.  Patient has noticed 4 days of erythematous, occasionally painful rash, primarily on her forearms and hands.  Has areas of erythema and papules with few small vesicles.  Reports has been outside a lot recently, playing with her kids, so may have environmental exposure.  Has several soaps and perfumes at home, but no new exposures.    No airway involvement.  No history of similar rash.    Records Reviewed:  Recent available ED and inpatient notes reviewed in EMR.    PMHx/PSHx:  Per HPI.   - does not have a problem list on file.  - has a past surgical history that includes Other surgical history (10/02/2018) and Mouth surgery (10/02/2018).    Medications:  Reviewed in EMR. See EMR for complete list of medications and doses.    Allergies:  Patient has no known allergies.    Social History:  - Tobacco:  has no history on file for tobacco use.   - Alcohol:  has no history on file for alcohol use.   - Illicit Drugs:  has no history on file for drug use.     ROS:  Per HPI.       ???????????????????????????????????????????????????????????????  Triage Vitals:  T 36.8 °C (98.2 °F)  HR 84  /79  RR 16  O2 100 % None (Room air)    Physical Exam  Vitals and nursing note reviewed.   Constitutional:       General: She is not in acute distress.  HENT:      Head: Atraumatic.   Eyes:      General: No scleral icterus.     Conjunctiva/sclera: Conjunctivae normal.   Pulmonary:      Effort: Pulmonary effort is normal. No respiratory distress.   Skin:     Comments: Scattered erythematous papular rash on the hands and forearms with few small vesicles.  No drainage or signs of infection.  Areas are erythematous.   Neurological:      Mental Status: She is alert.      Cranial Nerves: No facial asymmetry.   Psychiatric:         Mood and Affect: Mood normal.         Behavior: Behavior  Cardiovascular Disease Progress Note    Overnight events: No acute events overnight. Ms. iVllagomez is resting comfortably. She is complaining of pain in her hands and feet.   Otherwise review of systems negative    Objective Findings:  T(C): 36.4 (10-18-18 @ 05:27), Max: 36.6 (10-17-18 @ 21:31)  HR: 78 (10-18-18 @ 05:27) (69 - 78)  BP: 140/65 (10-18-18 @ 05:27) (116/46 - 147/77)  RR: 18 (10-18-18 @ 05:27) (17 - 18)  SpO2: 100% (10-18-18 @ 05:27) (99% - 100%)  Wt(kg): --  Daily     Daily Weight in k.2 (18 Oct 2018 07:47)      Physical Exam:  Gen: NAD  HEENT: EOMI  CV: RRR, normal S1 + S2, no m/r/g  Lungs: CTAB  Abd: soft, non-tender  Ext: No edema    Telemetry: Sinus; no ectopy    Laboratory Data:                        8.5    8.65  )-----------( 315      ( 17 Oct 2018 06:53 )             27.1     10-17    133<L>  |  89<L>  |  47<H>  ----------------------------<  89  3.7   |  29  |  3.81<H>    Ca    9.1      17 Oct 2018 06:53                Inpatient Medications:  MEDICATIONS  (STANDING):  amLODIPine   Tablet 5 milliGRAM(s) Oral daily  buDESOnide  80 MICROgram(s)/formoterol 4.5 MICROgram(s) Inhaler 2 Puff(s) Inhalation two times a day  buMETAnide Injectable 2 milliGRAM(s) IV Push every 12 hours  ergocalciferol 05372 Unit(s) Oral <User Schedule>  gabapentin 100 milliGRAM(s) Oral every 12 hours  heparin  Injectable 5000 Unit(s) SubCutaneous every 12 hours  hydrALAZINE 100 milliGRAM(s) Oral three times a day  isosorbide   mononitrate ER Tablet (IMDUR) 120 milliGRAM(s) Oral daily  metoprolol succinate  milliGRAM(s) Oral daily  pantoprazole    Tablet 40 milliGRAM(s) Oral before breakfast  sevelamer hydrochloride 800 milliGRAM(s) Oral three times a day  simvastatin 40 milliGRAM(s) Oral at bedtime      Assessment:  88 year old woman with CKD V, HTN, and HLD presents with acute diastolic CHF.    Plan of Care:    #Acute diastolic CHF-  Clinically improving and now off bumex gtt.   Creatinine pending this AM  Agree with transitioning to oral Bumex.   TTE from 4 months ago reviewed.      Over 25 minutes spent on total encounter; more than 50% of the visit was spent counseling and/or coordinating care by the attending physician.      Justin Vick MD [facility]  Cardiovascular Disease  [phone] normal.       ???????????????????????????????????????????????????????????????  Assessment and Plan:  38-year-old female presents emergency department for rash.  Her exam is most consistent with contact dermatitis with discrete areas primarily involving her upper extremities.  Does not have urticaria or other evidence for histamine-mediated allergic reaction.  Unclear exposure, likely an environmental exposure from playing outside over the last few days with her children.  No evidence for cellulitis or other infection.  Provided prescriptions for Zyrtec and triamcinolone cream with instructions to follow-up with her PCP or return to the ED if the rash becomes worse she develops fevers, or any other acute concerns.    Social Determinants Limiting Care:  None identified    Disposition:  Discharge home    --  Panfilo Desir MD  Emergency Medicine, PGY-3      Diagnoses as of 05/03/24 0306   Irritant contact dermatitis, unspecified trigger     Procedures ? [employer] last updated 5/3/2024 3:06 AM        Panfilo Desir MD  Resident  05/03/24 [id number]

## 2024-12-24 NOTE — ED ADULT NURSE NOTE - LANGUAGE ASSISTANCE NEEDED
Controlled with current regime
No-Patient/Caregiver offered and refused free interpretation services.
